# Patient Record
Sex: FEMALE | Race: OTHER | NOT HISPANIC OR LATINO
[De-identification: names, ages, dates, MRNs, and addresses within clinical notes are randomized per-mention and may not be internally consistent; named-entity substitution may affect disease eponyms.]

---

## 2017-04-18 ENCOUNTER — RESULT REVIEW (OUTPATIENT)
Age: 82
End: 2017-04-18

## 2017-04-19 ENCOUNTER — APPOINTMENT (OUTPATIENT)
Dept: DERMATOLOGY | Facility: CLINIC | Age: 82
End: 2017-04-19

## 2017-10-30 ENCOUNTER — TRANSCRIPTION ENCOUNTER (OUTPATIENT)
Age: 82
End: 2017-10-30

## 2017-11-08 ENCOUNTER — TRANSCRIPTION ENCOUNTER (OUTPATIENT)
Age: 82
End: 2017-11-08

## 2017-11-15 ENCOUNTER — OUTPATIENT (OUTPATIENT)
Dept: OUTPATIENT SERVICES | Facility: HOSPITAL | Age: 82
LOS: 1 days | End: 2017-11-15
Payer: MEDICARE

## 2017-11-15 DIAGNOSIS — I63.9 CEREBRAL INFARCTION, UNSPECIFIED: ICD-10-CM

## 2017-11-15 DIAGNOSIS — R26.0 ATAXIC GAIT: ICD-10-CM

## 2017-11-15 DIAGNOSIS — Z51.89 ENCOUNTER FOR OTHER SPECIFIED AFTERCARE: ICD-10-CM

## 2017-11-28 DIAGNOSIS — R27.9 UNSPECIFIED LACK OF COORDINATION: ICD-10-CM

## 2017-11-28 DIAGNOSIS — I67.9 CEREBROVASCULAR DISEASE, UNSPECIFIED: ICD-10-CM

## 2017-12-20 PROCEDURE — 97116 GAIT TRAINING THERAPY: CPT

## 2017-12-20 PROCEDURE — 97112 NEUROMUSCULAR REEDUCATION: CPT

## 2017-12-20 PROCEDURE — 97163 PT EVAL HIGH COMPLEX 45 MIN: CPT

## 2017-12-20 PROCEDURE — 97530 THERAPEUTIC ACTIVITIES: CPT

## 2017-12-20 PROCEDURE — G8978: CPT | Mod: CM

## 2017-12-20 PROCEDURE — G8979: CPT | Mod: CL

## 2017-12-20 PROCEDURE — 97167 OT EVAL HIGH COMPLEX 60 MIN: CPT

## 2017-12-20 PROCEDURE — G8984: CPT | Mod: CM

## 2017-12-20 PROCEDURE — G8985: CPT | Mod: CK

## 2017-12-20 PROCEDURE — 97535 SELF CARE MNGMENT TRAINING: CPT

## 2017-12-20 PROCEDURE — 97110 THERAPEUTIC EXERCISES: CPT

## 2018-01-01 ENCOUNTER — OTHER (OUTPATIENT)
Age: 83
End: 2018-01-01

## 2018-01-01 ENCOUNTER — INPATIENT (INPATIENT)
Facility: HOSPITAL | Age: 83
LOS: 4 days | Discharge: HOME | DRG: 948 | End: 2018-03-15
Attending: SURGERY | Admitting: SURGERY
Payer: MEDICARE

## 2018-01-01 ENCOUNTER — TRANSCRIPTION ENCOUNTER (OUTPATIENT)
Age: 83
End: 2018-01-01

## 2018-01-01 ENCOUNTER — EMERGENCY (EMERGENCY)
Facility: HOSPITAL | Age: 83
LOS: 1 days | Discharge: HOME | End: 2018-01-01
Attending: EMERGENCY MEDICINE
Payer: MEDICARE

## 2018-01-01 ENCOUNTER — INPATIENT (INPATIENT)
Facility: HOSPITAL | Age: 83
LOS: 31 days | DRG: 64 | End: 2018-12-28
Attending: HOSPITALIST | Admitting: INTERNAL MEDICINE
Payer: MEDICARE

## 2018-01-01 ENCOUNTER — APPOINTMENT (OUTPATIENT)
Dept: ORTHOPEDIC SURGERY | Facility: CLINIC | Age: 83
End: 2018-01-01
Payer: MEDICARE

## 2018-01-01 ENCOUNTER — APPOINTMENT (OUTPATIENT)
Dept: PULMONOLOGY | Facility: CLINIC | Age: 83
End: 2018-01-01
Payer: COMMERCIAL

## 2018-01-01 ENCOUNTER — INPATIENT (INPATIENT)
Facility: HOSPITAL | Age: 83
LOS: 4 days | Discharge: HOME | DRG: 871 | End: 2018-03-24
Attending: INTERNAL MEDICINE | Admitting: HOSPITALIST
Payer: MEDICARE

## 2018-01-01 VITALS
RESPIRATION RATE: 18 BRPM | DIASTOLIC BLOOD PRESSURE: 61 MMHG | SYSTOLIC BLOOD PRESSURE: 119 MMHG | HEART RATE: 72 BPM | OXYGEN SATURATION: 98 % | TEMPERATURE: 99 F

## 2018-01-01 VITALS
HEIGHT: 66 IN | HEART RATE: 85 BPM | WEIGHT: 175.05 LBS | TEMPERATURE: 97 F | RESPIRATION RATE: 20 BRPM | SYSTOLIC BLOOD PRESSURE: 158 MMHG | OXYGEN SATURATION: 100 % | DIASTOLIC BLOOD PRESSURE: 90 MMHG

## 2018-01-01 VITALS
HEART RATE: 54 BPM | RESPIRATION RATE: 18 BRPM | DIASTOLIC BLOOD PRESSURE: 79 MMHG | WEIGHT: 154.98 LBS | SYSTOLIC BLOOD PRESSURE: 208 MMHG | TEMPERATURE: 98 F | OXYGEN SATURATION: 95 % | HEIGHT: 66 IN

## 2018-01-01 VITALS
BODY MASS INDEX: 28.7 KG/M2 | HEART RATE: 58 BPM | DIASTOLIC BLOOD PRESSURE: 72 MMHG | WEIGHT: 162 LBS | SYSTOLIC BLOOD PRESSURE: 124 MMHG | HEIGHT: 63 IN | OXYGEN SATURATION: 90 %

## 2018-01-01 VITALS
HEIGHT: 63 IN | TEMPERATURE: 102 F | HEART RATE: 98 BPM | OXYGEN SATURATION: 96 % | WEIGHT: 164.91 LBS | DIASTOLIC BLOOD PRESSURE: 86 MMHG | SYSTOLIC BLOOD PRESSURE: 181 MMHG | RESPIRATION RATE: 24 BRPM

## 2018-01-01 VITALS
HEIGHT: 64 IN | WEIGHT: 134.92 LBS | TEMPERATURE: 98 F | SYSTOLIC BLOOD PRESSURE: 118 MMHG | DIASTOLIC BLOOD PRESSURE: 72 MMHG | HEART RATE: 58 BPM | OXYGEN SATURATION: 96 % | RESPIRATION RATE: 17 BRPM

## 2018-01-01 VITALS — OXYGEN SATURATION: 91 % | RESPIRATION RATE: 11 BRPM

## 2018-01-01 VITALS
HEIGHT: 63 IN | WEIGHT: 168 LBS | BODY MASS INDEX: 29.77 KG/M2 | SYSTOLIC BLOOD PRESSURE: 174 MMHG | DIASTOLIC BLOOD PRESSURE: 73 MMHG | HEART RATE: 60 BPM

## 2018-01-01 VITALS
OXYGEN SATURATION: 96 % | DIASTOLIC BLOOD PRESSURE: 68 MMHG | SYSTOLIC BLOOD PRESSURE: 152 MMHG | HEART RATE: 78 BPM | RESPIRATION RATE: 18 BRPM

## 2018-01-01 VITALS
RESPIRATION RATE: 16 BRPM | SYSTOLIC BLOOD PRESSURE: 116 MMHG | HEART RATE: 60 BPM | TEMPERATURE: 98 F | OXYGEN SATURATION: 98 % | DIASTOLIC BLOOD PRESSURE: 68 MMHG

## 2018-01-01 VITALS
DIASTOLIC BLOOD PRESSURE: 94 MMHG | SYSTOLIC BLOOD PRESSURE: 181 MMHG | OXYGEN SATURATION: 99 % | TEMPERATURE: 99 F | HEART RATE: 51 BPM | RESPIRATION RATE: 20 BRPM

## 2018-01-01 VITALS
OXYGEN SATURATION: 95 % | DIASTOLIC BLOOD PRESSURE: 79 MMHG | RESPIRATION RATE: 18 BRPM | HEART RATE: 61 BPM | SYSTOLIC BLOOD PRESSURE: 172 MMHG | TEMPERATURE: 98 F

## 2018-01-01 DIAGNOSIS — N30.00 ACUTE CYSTITIS WITHOUT HEMATURIA: ICD-10-CM

## 2018-01-01 DIAGNOSIS — R32 UNSPECIFIED URINARY INCONTINENCE: ICD-10-CM

## 2018-01-01 DIAGNOSIS — G45.9 TRANSIENT CEREBRAL ISCHEMIC ATTACK, UNSPECIFIED: ICD-10-CM

## 2018-01-01 DIAGNOSIS — J96.01 ACUTE RESPIRATORY FAILURE WITH HYPOXIA: ICD-10-CM

## 2018-01-01 DIAGNOSIS — E11.65 TYPE 2 DIABETES MELLITUS WITH HYPERGLYCEMIA: ICD-10-CM

## 2018-01-01 DIAGNOSIS — I50.32 CHRONIC DIASTOLIC (CONGESTIVE) HEART FAILURE: ICD-10-CM

## 2018-01-01 DIAGNOSIS — Z86.39 PERSONAL HISTORY OF OTHER ENDOCRINE, NUTRITIONAL AND METABOLIC DISEASE: ICD-10-CM

## 2018-01-01 DIAGNOSIS — Z86.73 PERSONAL HISTORY OF TRANSIENT ISCHEMIC ATTACK (TIA), AND CEREBRAL INFARCTION W/OUT RESIDUAL DEFICITS: ICD-10-CM

## 2018-01-01 DIAGNOSIS — S32.009A UNSPECIFIED FRACTURE OF UNSPECIFIED LUMBAR VERTEBRA, INITIAL ENCOUNTER FOR CLOSED FRACTURE: ICD-10-CM

## 2018-01-01 DIAGNOSIS — N28.9 DISORDER OF KIDNEY AND URETER, UNSPECIFIED: ICD-10-CM

## 2018-01-01 DIAGNOSIS — Z98.890 OTHER SPECIFIED POSTPROCEDURAL STATES: Chronic | ICD-10-CM

## 2018-01-01 DIAGNOSIS — T17.900A UNSPECIFIED FOREIGN BODY IN RESPIRATORY TRACT, PART UNSPECIFIED CAUSING ASPHYXIATION, INITIAL ENCOUNTER: ICD-10-CM

## 2018-01-01 DIAGNOSIS — N20.0 CALCULUS OF KIDNEY: ICD-10-CM

## 2018-01-01 DIAGNOSIS — R13.10 DYSPHAGIA, UNSPECIFIED: ICD-10-CM

## 2018-01-01 DIAGNOSIS — G93.40 ENCEPHALOPATHY, UNSPECIFIED: ICD-10-CM

## 2018-01-01 DIAGNOSIS — I46.9 CARDIAC ARREST, CAUSE UNSPECIFIED: ICD-10-CM

## 2018-01-01 DIAGNOSIS — R57.9 SHOCK, UNSPECIFIED: ICD-10-CM

## 2018-01-01 DIAGNOSIS — J18.9 PNEUMONIA, UNSPECIFIED ORGANISM: ICD-10-CM

## 2018-01-01 DIAGNOSIS — R56.9 UNSPECIFIED CONVULSIONS: ICD-10-CM

## 2018-01-01 DIAGNOSIS — Z82.61 FAMILY HISTORY OF ARTHRITIS: ICD-10-CM

## 2018-01-01 DIAGNOSIS — Z86.59 PERSONAL HISTORY OF OTHER MENTAL AND BEHAVIORAL DISORDERS: ICD-10-CM

## 2018-01-01 DIAGNOSIS — S22.39XA FRACTURE OF ONE RIB, UNSPECIFIED SIDE, INITIAL ENCOUNTER FOR CLOSED FRACTURE: ICD-10-CM

## 2018-01-01 DIAGNOSIS — Z51.5 ENCOUNTER FOR PALLIATIVE CARE: ICD-10-CM

## 2018-01-01 DIAGNOSIS — A41.9 SEPSIS, UNSPECIFIED ORGANISM: ICD-10-CM

## 2018-01-01 DIAGNOSIS — Z86.79 PERSONAL HISTORY OF OTHER DISEASES OF THE CIRCULATORY SYSTEM: ICD-10-CM

## 2018-01-01 DIAGNOSIS — I10 ESSENTIAL (PRIMARY) HYPERTENSION: ICD-10-CM

## 2018-01-01 DIAGNOSIS — Z71.89 OTHER SPECIFIED COUNSELING: ICD-10-CM

## 2018-01-01 DIAGNOSIS — Z86.69 PERSONAL HISTORY OF OTHER DISEASES OF THE NERVOUS SYSTEM AND SENSE ORGANS: ICD-10-CM

## 2018-01-01 DIAGNOSIS — R74.8 ABNORMAL LEVELS OF OTHER SERUM ENZYMES: ICD-10-CM

## 2018-01-01 DIAGNOSIS — J69.0 PNEUMONITIS DUE TO INHALATION OF FOOD AND VOMIT: ICD-10-CM

## 2018-01-01 DIAGNOSIS — Z98.49 CATARACT EXTRACTION STATUS, UNSPECIFIED EYE: Chronic | ICD-10-CM

## 2018-01-01 DIAGNOSIS — E87.0 HYPEROSMOLALITY AND HYPERNATREMIA: ICD-10-CM

## 2018-01-01 DIAGNOSIS — Z87.19 PERSONAL HISTORY OF OTHER DISEASES OF THE DIGESTIVE SYSTEM: ICD-10-CM

## 2018-01-01 DIAGNOSIS — R41.82 ALTERED MENTAL STATUS, UNSPECIFIED: ICD-10-CM

## 2018-01-01 DIAGNOSIS — R53.2 FUNCTIONAL QUADRIPLEGIA: ICD-10-CM

## 2018-01-01 DIAGNOSIS — R50.9 FEVER, UNSPECIFIED: ICD-10-CM

## 2018-01-01 DIAGNOSIS — Z80.9 FAMILY HISTORY OF MALIGNANT NEOPLASM, UNSPECIFIED: ICD-10-CM

## 2018-01-01 DIAGNOSIS — N18.9 CHRONIC KIDNEY DISEASE, UNSPECIFIED: ICD-10-CM

## 2018-01-01 DIAGNOSIS — S22.31XA FRACTURE OF ONE RIB, RIGHT SIDE, INITIAL ENCOUNTER FOR CLOSED FRACTURE: ICD-10-CM

## 2018-01-01 DIAGNOSIS — F03.90 UNSPECIFIED DEMENTIA WITHOUT BEHAVIORAL DISTURBANCE: ICD-10-CM

## 2018-01-01 DIAGNOSIS — I63.9 CEREBRAL INFARCTION, UNSPECIFIED: ICD-10-CM

## 2018-01-01 DIAGNOSIS — G47.33 OBSTRUCTIVE SLEEP APNEA (ADULT) (PEDIATRIC): ICD-10-CM

## 2018-01-01 DIAGNOSIS — I25.10 ATHEROSCLEROTIC HEART DISEASE OF NATIVE CORONARY ARTERY WITHOUT ANGINA PECTORIS: ICD-10-CM

## 2018-01-01 DIAGNOSIS — T14.8XXA OTHER INJURY OF UNSPECIFIED BODY REGION, INITIAL ENCOUNTER: ICD-10-CM

## 2018-01-01 DIAGNOSIS — G93.41 METABOLIC ENCEPHALOPATHY: ICD-10-CM

## 2018-01-01 DIAGNOSIS — E46 UNSPECIFIED PROTEIN-CALORIE MALNUTRITION: ICD-10-CM

## 2018-01-01 DIAGNOSIS — S32.010A WEDGE COMPRESSION FRACTURE OF FIRST LUMBAR VERTEBRA, INITIAL ENCOUNTER FOR CLOSED FRACTURE: ICD-10-CM

## 2018-01-01 DIAGNOSIS — E11.9 TYPE 2 DIABETES MELLITUS WITHOUT COMPLICATIONS: ICD-10-CM

## 2018-01-01 LAB
-  AMIKACIN: SIGNIFICANT CHANGE UP
-  AMPICILLIN/SULBACTAM: SIGNIFICANT CHANGE UP
-  AMPICILLIN: SIGNIFICANT CHANGE UP
-  AZTREONAM: SIGNIFICANT CHANGE UP
-  CEFAZOLIN: SIGNIFICANT CHANGE UP
-  CEFEPIME: SIGNIFICANT CHANGE UP
-  CEFOXITIN: SIGNIFICANT CHANGE UP
-  CEFTAZIDIME: SIGNIFICANT CHANGE UP
-  CEFTRIAXONE: SIGNIFICANT CHANGE UP
-  CIPROFLOXACIN: SIGNIFICANT CHANGE UP
-  CLINDAMYCIN: SIGNIFICANT CHANGE UP
-  COAGULASE NEGATIVE STAPHYLOCOCCUS: SIGNIFICANT CHANGE UP
-  ERTAPENEM: SIGNIFICANT CHANGE UP
-  ERYTHROMYCIN: SIGNIFICANT CHANGE UP
-  GENTAMICIN: SIGNIFICANT CHANGE UP
-  IMIPENEM: SIGNIFICANT CHANGE UP
-  IMIPENEM: SIGNIFICANT CHANGE UP
-  LEVOFLOXACIN: SIGNIFICANT CHANGE UP
-  MEROPENEM: SIGNIFICANT CHANGE UP
-  NITROFURANTOIN: SIGNIFICANT CHANGE UP
-  OXACILLIN: SIGNIFICANT CHANGE UP
-  PENICILLIN: SIGNIFICANT CHANGE UP
-  PIPERACILLIN/TAZOBACTAM: SIGNIFICANT CHANGE UP
-  RIFAMPIN: SIGNIFICANT CHANGE UP
-  TETRACYCLINE: SIGNIFICANT CHANGE UP
-  TIGECYCLINE: SIGNIFICANT CHANGE UP
-  TOBRAMYCIN: SIGNIFICANT CHANGE UP
-  TRIMETHOPRIM/SULFAMETHOXAZOLE: SIGNIFICANT CHANGE UP
-  VANCOMYCIN: SIGNIFICANT CHANGE UP
24R-OH-CALCIDIOL SERPL-MCNC: 26 NG/ML — LOW (ref 30–80)
ABO RH CONFIRMATION: SIGNIFICANT CHANGE UP
ACYLCARNITINE/C0 SERPL-SRTO: 0.8 UMOL/L — SIGNIFICANT CHANGE UP (ref 0.1–0.8)
ALBUMIN SERPL ELPH-MCNC: 3.3 G/DL — SIGNIFICANT CHANGE UP (ref 3.3–5.2)
ALBUMIN SERPL ELPH-MCNC: 3.5 G/DL — SIGNIFICANT CHANGE UP (ref 3.3–5.2)
ALBUMIN SERPL ELPH-MCNC: 3.7 G/DL — SIGNIFICANT CHANGE UP (ref 3.3–5.2)
ALBUMIN SERPL ELPH-MCNC: 3.8 G/DL — SIGNIFICANT CHANGE UP (ref 3.3–5.2)
ALBUMIN SERPL ELPH-MCNC: 4 G/DL — SIGNIFICANT CHANGE UP (ref 3.3–5.2)
ALBUMIN SERPL ELPH-MCNC: 4.4 G/DL — SIGNIFICANT CHANGE UP (ref 3.3–5.2)
ALP SERPL-CCNC: 37 U/L — LOW (ref 40–120)
ALP SERPL-CCNC: 37 U/L — LOW (ref 40–120)
ALP SERPL-CCNC: 41 U/L — SIGNIFICANT CHANGE UP (ref 40–120)
ALP SERPL-CCNC: 45 U/L — SIGNIFICANT CHANGE UP (ref 40–120)
ALP SERPL-CCNC: 45 U/L — SIGNIFICANT CHANGE UP (ref 40–120)
ALP SERPL-CCNC: 47 U/L — SIGNIFICANT CHANGE UP (ref 40–120)
ALT FLD-CCNC: 10 U/L — SIGNIFICANT CHANGE UP
ALT FLD-CCNC: 13 U/L — SIGNIFICANT CHANGE UP
ALT FLD-CCNC: 15 U/L — SIGNIFICANT CHANGE UP
ALT FLD-CCNC: 6 U/L — SIGNIFICANT CHANGE UP
AMMONIA BLD-MCNC: 25 UMOL/L — SIGNIFICANT CHANGE UP (ref 11–55)
AMMONIA BLD-MCNC: 34 UMOL/L — SIGNIFICANT CHANGE UP (ref 11–55)
ANION GAP SERPL CALC-SCNC: 10 MMOL/L — SIGNIFICANT CHANGE UP (ref 5–17)
ANION GAP SERPL CALC-SCNC: 11 MMOL/L — SIGNIFICANT CHANGE UP (ref 5–17)
ANION GAP SERPL CALC-SCNC: 12 MMOL/L — SIGNIFICANT CHANGE UP (ref 5–17)
ANION GAP SERPL CALC-SCNC: 13 MMOL/L — SIGNIFICANT CHANGE UP (ref 5–17)
ANION GAP SERPL CALC-SCNC: 14 MMOL/L — SIGNIFICANT CHANGE UP (ref 5–17)
ANION GAP SERPL CALC-SCNC: 15 MMOL/L — SIGNIFICANT CHANGE UP (ref 5–17)
ANION GAP SERPL CALC-SCNC: 16 MMOL/L — SIGNIFICANT CHANGE UP (ref 5–17)
ANION GAP SERPL CALC-SCNC: 17 MMOL/L — SIGNIFICANT CHANGE UP (ref 5–17)
ANION GAP SERPL CALC-SCNC: 18 MMOL/L — HIGH (ref 5–17)
ANION GAP SERPL CALC-SCNC: 20 MMOL/L — HIGH (ref 5–17)
ANION GAP SERPL CALC-SCNC: 8 MMOL/L — SIGNIFICANT CHANGE UP (ref 5–17)
ANISOCYTOSIS BLD QL: SLIGHT — SIGNIFICANT CHANGE UP
APPEARANCE UR: ABNORMAL
APPEARANCE UR: CLEAR — SIGNIFICANT CHANGE UP
APTT BLD: 28.1 SEC — SIGNIFICANT CHANGE UP (ref 27.5–37.4)
APTT BLD: 28.7 SEC — SIGNIFICANT CHANGE UP (ref 27.5–37.4)
APTT BLD: 29.9 SEC — SIGNIFICANT CHANGE UP (ref 27.5–37.4)
AST SERPL-CCNC: 12 U/L — SIGNIFICANT CHANGE UP
AST SERPL-CCNC: 17 U/L — SIGNIFICANT CHANGE UP
AST SERPL-CCNC: 18 U/L — SIGNIFICANT CHANGE UP
AST SERPL-CCNC: 22 U/L — SIGNIFICANT CHANGE UP
AST SERPL-CCNC: 25 U/L — SIGNIFICANT CHANGE UP
AST SERPL-CCNC: 31 U/L — SIGNIFICANT CHANGE UP
BACTERIA # UR AUTO: ABNORMAL
BACTERIA # UR AUTO: ABNORMAL
BASE EXCESS BLDA CALC-SCNC: -2.2 MMOL/L — LOW (ref -2–2)
BASE EXCESS BLDA CALC-SCNC: 1.5 MMOL/L — SIGNIFICANT CHANGE UP (ref -2–2)
BASE EXCESS BLDA CALC-SCNC: 1.6 MMOL/L — SIGNIFICANT CHANGE UP (ref -2–2)
BASE EXCESS BLDA CALC-SCNC: 2.3 MMOL/L — HIGH (ref -2–2)
BASE EXCESS BLDA CALC-SCNC: 3.6 MMOL/L — HIGH (ref -2–2)
BASOPHILS # BLD AUTO: 0 K/UL — SIGNIFICANT CHANGE UP (ref 0–0.2)
BASOPHILS NFR BLD AUTO: 0.1 % — SIGNIFICANT CHANGE UP (ref 0–2)
BASOPHILS NFR BLD AUTO: 0.2 % — SIGNIFICANT CHANGE UP (ref 0–2)
BASOPHILS NFR BLD AUTO: 0.2 % — SIGNIFICANT CHANGE UP (ref 0–2)
BASOPHILS NFR BLD AUTO: 0.3 % — SIGNIFICANT CHANGE UP (ref 0–2)
BASOPHILS NFR BLD AUTO: 0.4 % — SIGNIFICANT CHANGE UP (ref 0–2)
BASOPHILS NFR BLD AUTO: 0.4 % — SIGNIFICANT CHANGE UP (ref 0–2)
BASOPHILS NFR BLD AUTO: 0.5 % — SIGNIFICANT CHANGE UP (ref 0–2)
BASOPHILS NFR BLD AUTO: 0.5 % — SIGNIFICANT CHANGE UP (ref 0–2)
BASOPHILS NFR BLD AUTO: 0.6 % — SIGNIFICANT CHANGE UP (ref 0–2)
BASOPHILS NFR BLD AUTO: 0.6 % — SIGNIFICANT CHANGE UP (ref 0–2)
BASOPHILS NFR BLD AUTO: 0.7 % — SIGNIFICANT CHANGE UP (ref 0–2)
BASOPHILS NFR BLD AUTO: 0.7 % — SIGNIFICANT CHANGE UP (ref 0–2)
BASOPHILS NFR BLD AUTO: 0.9 % — SIGNIFICANT CHANGE UP (ref 0–2)
BASOPHILS NFR BLD AUTO: 1 % — SIGNIFICANT CHANGE UP (ref 0–2)
BILIRUB SERPL-MCNC: 0.3 MG/DL — LOW (ref 0.4–2)
BILIRUB SERPL-MCNC: 0.4 MG/DL — SIGNIFICANT CHANGE UP (ref 0.4–2)
BILIRUB SERPL-MCNC: 0.5 MG/DL — SIGNIFICANT CHANGE UP (ref 0.4–2)
BILIRUB UR-MCNC: NEGATIVE — SIGNIFICANT CHANGE UP
BLD GP AB SCN SERPL QL: SIGNIFICANT CHANGE UP
BLOOD GAS COMMENTS ARTERIAL: SIGNIFICANT CHANGE UP
BUN SERPL-MCNC: 17 MG/DL — SIGNIFICANT CHANGE UP (ref 8–20)
BUN SERPL-MCNC: 17 MG/DL — SIGNIFICANT CHANGE UP (ref 8–20)
BUN SERPL-MCNC: 18 MG/DL — SIGNIFICANT CHANGE UP (ref 8–20)
BUN SERPL-MCNC: 18 MG/DL — SIGNIFICANT CHANGE UP (ref 8–20)
BUN SERPL-MCNC: 19 MG/DL — SIGNIFICANT CHANGE UP (ref 8–20)
BUN SERPL-MCNC: 20 MG/DL — SIGNIFICANT CHANGE UP (ref 8–20)
BUN SERPL-MCNC: 21 MG/DL — HIGH (ref 8–20)
BUN SERPL-MCNC: 22 MG/DL — HIGH (ref 8–20)
BUN SERPL-MCNC: 22 MG/DL — HIGH (ref 8–20)
BUN SERPL-MCNC: 23 MG/DL — HIGH (ref 8–20)
BUN SERPL-MCNC: 24 MG/DL — HIGH (ref 8–20)
BUN SERPL-MCNC: 24 MG/DL — HIGH (ref 8–20)
BUN SERPL-MCNC: 26 MG/DL — HIGH (ref 8–20)
BUN SERPL-MCNC: 28 MG/DL — HIGH (ref 8–20)
BUN SERPL-MCNC: 30 MG/DL — HIGH (ref 8–20)
BUN SERPL-MCNC: 30 MG/DL — HIGH (ref 8–20)
BUN SERPL-MCNC: 31 MG/DL — HIGH (ref 8–20)
BUN SERPL-MCNC: 32 MG/DL — HIGH (ref 8–20)
BUN SERPL-MCNC: 33 MG/DL — HIGH (ref 8–20)
BUN SERPL-MCNC: 33 MG/DL — HIGH (ref 8–20)
BUN SERPL-MCNC: 36 MG/DL — HIGH (ref 8–20)
BUN SERPL-MCNC: 36 MG/DL — HIGH (ref 8–20)
BUN SERPL-MCNC: 38 MG/DL — HIGH (ref 8–20)
BUN SERPL-MCNC: 41 MG/DL — HIGH (ref 8–20)
BUN SERPL-MCNC: 42 MG/DL — HIGH (ref 8–20)
BUN SERPL-MCNC: 43 MG/DL — HIGH (ref 8–20)
BUN SERPL-MCNC: 43 MG/DL — HIGH (ref 8–20)
BUN SERPL-MCNC: 44 MG/DL — HIGH (ref 8–20)
BUN SERPL-MCNC: 45 MG/DL — HIGH (ref 8–20)
BUN SERPL-MCNC: 48 MG/DL — HIGH (ref 8–20)
BUN SERPL-MCNC: 51 MG/DL — HIGH (ref 8–20)
BUN SERPL-MCNC: 51 MG/DL — HIGH (ref 8–20)
BUN SERPL-MCNC: 52 MG/DL — HIGH (ref 8–20)
BUN SERPL-MCNC: 53 MG/DL — HIGH (ref 8–20)
BUN SERPL-MCNC: 53 MG/DL — HIGH (ref 8–20)
BUN SERPL-MCNC: 58 MG/DL — HIGH (ref 8–20)
BUN SERPL-MCNC: 59 MG/DL — HIGH (ref 8–20)
BUN SERPL-MCNC: 61 MG/DL — HIGH (ref 8–20)
BUN SERPL-MCNC: 61 MG/DL — HIGH (ref 8–20)
BUN SERPL-MCNC: 68 MG/DL — HIGH (ref 8–20)
CALCIUM SERPL-MCNC: 10 MG/DL — SIGNIFICANT CHANGE UP (ref 8.6–10.2)
CALCIUM SERPL-MCNC: 10 MG/DL — SIGNIFICANT CHANGE UP (ref 8.6–10.2)
CALCIUM SERPL-MCNC: 10.1 MG/DL — SIGNIFICANT CHANGE UP (ref 8.6–10.2)
CALCIUM SERPL-MCNC: 8.5 MG/DL — LOW (ref 8.6–10.2)
CALCIUM SERPL-MCNC: 8.7 MG/DL — SIGNIFICANT CHANGE UP (ref 8.6–10.2)
CALCIUM SERPL-MCNC: 8.7 MG/DL — SIGNIFICANT CHANGE UP (ref 8.6–10.2)
CALCIUM SERPL-MCNC: 8.8 MG/DL — SIGNIFICANT CHANGE UP (ref 8.6–10.2)
CALCIUM SERPL-MCNC: 8.9 MG/DL — SIGNIFICANT CHANGE UP (ref 8.6–10.2)
CALCIUM SERPL-MCNC: 9 MG/DL — SIGNIFICANT CHANGE UP (ref 8.6–10.2)
CALCIUM SERPL-MCNC: 9.1 MG/DL — SIGNIFICANT CHANGE UP (ref 8.6–10.2)
CALCIUM SERPL-MCNC: 9.2 MG/DL — SIGNIFICANT CHANGE UP (ref 8.6–10.2)
CALCIUM SERPL-MCNC: 9.2 MG/DL — SIGNIFICANT CHANGE UP (ref 8.6–10.2)
CALCIUM SERPL-MCNC: 9.4 MG/DL — SIGNIFICANT CHANGE UP (ref 8.6–10.2)
CALCIUM SERPL-MCNC: 9.5 MG/DL — SIGNIFICANT CHANGE UP (ref 8.6–10.2)
CALCIUM SERPL-MCNC: 9.6 MG/DL — SIGNIFICANT CHANGE UP (ref 8.6–10.2)
CALCIUM SERPL-MCNC: 9.7 MG/DL — SIGNIFICANT CHANGE UP (ref 8.6–10.2)
CALCIUM SERPL-MCNC: 9.8 MG/DL — SIGNIFICANT CHANGE UP (ref 8.6–10.2)
CALCIUM SERPL-MCNC: 9.9 MG/DL — SIGNIFICANT CHANGE UP (ref 8.6–10.2)
CARN ESTERS SERPL-MCNC: 27.6 UMOL/L — SIGNIFICANT CHANGE UP (ref 4–28)
CARNITINE FREE SERPL-MCNC: 34.3 UMOL/L — SIGNIFICANT CHANGE UP (ref 24–63)
CARNITINE SERPL-MCNC: 61.9 UMOL/L — SIGNIFICANT CHANGE UP (ref 35–84)
CARNITINE SERPL-MCNC: SIGNIFICANT CHANGE UP
CHLORIDE SERPL-SCNC: 102 MMOL/L — SIGNIFICANT CHANGE UP (ref 98–107)
CHLORIDE SERPL-SCNC: 103 MMOL/L — SIGNIFICANT CHANGE UP (ref 98–107)
CHLORIDE SERPL-SCNC: 104 MMOL/L — SIGNIFICANT CHANGE UP (ref 98–107)
CHLORIDE SERPL-SCNC: 105 MMOL/L — SIGNIFICANT CHANGE UP (ref 98–107)
CHLORIDE SERPL-SCNC: 106 MMOL/L — SIGNIFICANT CHANGE UP (ref 98–107)
CHLORIDE SERPL-SCNC: 107 MMOL/L — SIGNIFICANT CHANGE UP (ref 98–107)
CHLORIDE SERPL-SCNC: 108 MMOL/L — HIGH (ref 98–107)
CHLORIDE SERPL-SCNC: 109 MMOL/L — HIGH (ref 98–107)
CHLORIDE SERPL-SCNC: 110 MMOL/L — HIGH (ref 98–107)
CHLORIDE SERPL-SCNC: 111 MMOL/L — HIGH (ref 98–107)
CHLORIDE SERPL-SCNC: 111 MMOL/L — HIGH (ref 98–107)
CHOLEST SERPL-MCNC: 325 MG/DL — HIGH (ref 110–199)
CK MB CFR SERPL CALC: 3.4 NG/ML — SIGNIFICANT CHANGE UP (ref 0–6.7)
CK MB CFR SERPL CALC: 3.8 NG/ML — SIGNIFICANT CHANGE UP (ref 0–6.7)
CK MB CFR SERPL CALC: 8.6 NG/ML — HIGH (ref 0–6.7)
CK SERPL-CCNC: 190 U/L — HIGH (ref 25–170)
CK SERPL-CCNC: 209 U/L — HIGH (ref 25–170)
CK SERPL-CCNC: 380 U/L — HIGH (ref 25–170)
CK SERPL-CCNC: 45 U/L — SIGNIFICANT CHANGE UP (ref 25–170)
CO2 SERPL-SCNC: 19 MMOL/L — LOW (ref 22–29)
CO2 SERPL-SCNC: 21 MMOL/L — LOW (ref 22–29)
CO2 SERPL-SCNC: 22 MMOL/L — SIGNIFICANT CHANGE UP (ref 22–29)
CO2 SERPL-SCNC: 23 MMOL/L — SIGNIFICANT CHANGE UP (ref 22–29)
CO2 SERPL-SCNC: 24 MMOL/L — SIGNIFICANT CHANGE UP (ref 22–29)
CO2 SERPL-SCNC: 25 MMOL/L — SIGNIFICANT CHANGE UP (ref 22–29)
CO2 SERPL-SCNC: 26 MMOL/L — SIGNIFICANT CHANGE UP (ref 22–29)
CO2 SERPL-SCNC: 27 MMOL/L — SIGNIFICANT CHANGE UP (ref 22–29)
CO2 SERPL-SCNC: 28 MMOL/L — SIGNIFICANT CHANGE UP (ref 22–29)
CO2 SERPL-SCNC: 29 MMOL/L — SIGNIFICANT CHANGE UP (ref 22–29)
CO2 SERPL-SCNC: 30 MMOL/L — HIGH (ref 22–29)
CO2 SERPL-SCNC: 30 MMOL/L — HIGH (ref 22–29)
CO2 SERPL-SCNC: 31 MMOL/L — HIGH (ref 22–29)
CO2 SERPL-SCNC: 31 MMOL/L — HIGH (ref 22–29)
CO2 SERPL-SCNC: 32 MMOL/L — HIGH (ref 22–29)
CO2 SERPL-SCNC: 33 MMOL/L — HIGH (ref 22–29)
COLOR SPEC: SIGNIFICANT CHANGE UP
COLOR SPEC: YELLOW — SIGNIFICANT CHANGE UP
COMMENT - URINE: SIGNIFICANT CHANGE UP
CORTIS AM PEAK SERPL-MCNC: 14.3 UG/DL — SIGNIFICANT CHANGE UP (ref 6–18.4)
CREAT ?TM UR-MCNC: 202 MG/DL — SIGNIFICANT CHANGE UP
CREAT SERPL-MCNC: 0.82 MG/DL — SIGNIFICANT CHANGE UP (ref 0.5–1.3)
CREAT SERPL-MCNC: 0.83 MG/DL — SIGNIFICANT CHANGE UP (ref 0.5–1.3)
CREAT SERPL-MCNC: 0.87 MG/DL — SIGNIFICANT CHANGE UP (ref 0.5–1.3)
CREAT SERPL-MCNC: 0.89 MG/DL — SIGNIFICANT CHANGE UP (ref 0.5–1.3)
CREAT SERPL-MCNC: 0.93 MG/DL — SIGNIFICANT CHANGE UP (ref 0.5–1.3)
CREAT SERPL-MCNC: 0.93 MG/DL — SIGNIFICANT CHANGE UP (ref 0.5–1.3)
CREAT SERPL-MCNC: 0.94 MG/DL — SIGNIFICANT CHANGE UP (ref 0.5–1.3)
CREAT SERPL-MCNC: 0.96 MG/DL — SIGNIFICANT CHANGE UP (ref 0.5–1.3)
CREAT SERPL-MCNC: 0.97 MG/DL — SIGNIFICANT CHANGE UP (ref 0.5–1.3)
CREAT SERPL-MCNC: 0.97 MG/DL — SIGNIFICANT CHANGE UP (ref 0.5–1.3)
CREAT SERPL-MCNC: 0.98 MG/DL — SIGNIFICANT CHANGE UP (ref 0.5–1.3)
CREAT SERPL-MCNC: 0.99 MG/DL — SIGNIFICANT CHANGE UP (ref 0.5–1.3)
CREAT SERPL-MCNC: 0.99 MG/DL — SIGNIFICANT CHANGE UP (ref 0.5–1.3)
CREAT SERPL-MCNC: 1 MG/DL — SIGNIFICANT CHANGE UP (ref 0.5–1.3)
CREAT SERPL-MCNC: 1.01 MG/DL — SIGNIFICANT CHANGE UP (ref 0.5–1.3)
CREAT SERPL-MCNC: 1.02 MG/DL — SIGNIFICANT CHANGE UP (ref 0.5–1.3)
CREAT SERPL-MCNC: 1.03 MG/DL — SIGNIFICANT CHANGE UP (ref 0.5–1.3)
CREAT SERPL-MCNC: 1.04 MG/DL — SIGNIFICANT CHANGE UP (ref 0.5–1.3)
CREAT SERPL-MCNC: 1.08 MG/DL — SIGNIFICANT CHANGE UP (ref 0.5–1.3)
CREAT SERPL-MCNC: 1.1 MG/DL — SIGNIFICANT CHANGE UP (ref 0.5–1.3)
CREAT SERPL-MCNC: 1.12 MG/DL — SIGNIFICANT CHANGE UP (ref 0.5–1.3)
CREAT SERPL-MCNC: 1.13 MG/DL — SIGNIFICANT CHANGE UP (ref 0.5–1.3)
CREAT SERPL-MCNC: 1.14 MG/DL — SIGNIFICANT CHANGE UP (ref 0.5–1.3)
CREAT SERPL-MCNC: 1.15 MG/DL — SIGNIFICANT CHANGE UP (ref 0.5–1.3)
CREAT SERPL-MCNC: 1.16 MG/DL — SIGNIFICANT CHANGE UP (ref 0.5–1.3)
CREAT SERPL-MCNC: 1.17 MG/DL — SIGNIFICANT CHANGE UP (ref 0.5–1.3)
CREAT SERPL-MCNC: 1.2 MG/DL — SIGNIFICANT CHANGE UP (ref 0.5–1.3)
CREAT SERPL-MCNC: 1.2 MG/DL — SIGNIFICANT CHANGE UP (ref 0.5–1.3)
CREAT SERPL-MCNC: 1.21 MG/DL — SIGNIFICANT CHANGE UP (ref 0.5–1.3)
CREAT SERPL-MCNC: 1.27 MG/DL — SIGNIFICANT CHANGE UP (ref 0.5–1.3)
CREAT SERPL-MCNC: 1.32 MG/DL — HIGH (ref 0.5–1.3)
CREAT SERPL-MCNC: 1.36 MG/DL — HIGH (ref 0.5–1.3)
CREAT SERPL-MCNC: 1.38 MG/DL — HIGH (ref 0.5–1.3)
CREAT SERPL-MCNC: 1.39 MG/DL — HIGH (ref 0.5–1.3)
CREAT SERPL-MCNC: 1.4 MG/DL — HIGH (ref 0.5–1.3)
CREAT SERPL-MCNC: 1.42 MG/DL — HIGH (ref 0.5–1.3)
CREAT SERPL-MCNC: 1.42 MG/DL — HIGH (ref 0.5–1.3)
CREAT SERPL-MCNC: 1.45 MG/DL — HIGH (ref 0.5–1.3)
CREAT SERPL-MCNC: 1.49 MG/DL — HIGH (ref 0.5–1.3)
CREAT SERPL-MCNC: 1.57 MG/DL — HIGH (ref 0.5–1.3)
CREAT SERPL-MCNC: 1.67 MG/DL — HIGH (ref 0.5–1.3)
CREAT SERPL-MCNC: 1.88 MG/DL — HIGH (ref 0.5–1.3)
CULTURE RESULTS: NO GROWTH — SIGNIFICANT CHANGE UP
CULTURE RESULTS: SIGNIFICANT CHANGE UP
DIFF PNL FLD: ABNORMAL
DIFF PNL FLD: NEGATIVE — SIGNIFICANT CHANGE UP
DIFF PNL FLD: NEGATIVE — SIGNIFICANT CHANGE UP
EOSINOPHIL # BLD AUTO: 0 K/UL — SIGNIFICANT CHANGE UP (ref 0–0.5)
EOSINOPHIL # BLD AUTO: 0.1 K/UL — SIGNIFICANT CHANGE UP (ref 0–0.5)
EOSINOPHIL # BLD AUTO: 0.2 K/UL — SIGNIFICANT CHANGE UP (ref 0–0.5)
EOSINOPHIL # BLD AUTO: 0.2 K/UL — SIGNIFICANT CHANGE UP (ref 0–0.5)
EOSINOPHIL # BLD AUTO: 0.3 K/UL — SIGNIFICANT CHANGE UP (ref 0–0.5)
EOSINOPHIL NFR BLD AUTO: 0.2 % — SIGNIFICANT CHANGE UP (ref 0–6)
EOSINOPHIL NFR BLD AUTO: 0.4 % — SIGNIFICANT CHANGE UP (ref 0–6)
EOSINOPHIL NFR BLD AUTO: 0.6 % — SIGNIFICANT CHANGE UP (ref 0–6)
EOSINOPHIL NFR BLD AUTO: 1.1 % — SIGNIFICANT CHANGE UP (ref 0–6)
EOSINOPHIL NFR BLD AUTO: 1.2 % — SIGNIFICANT CHANGE UP (ref 0–6)
EOSINOPHIL NFR BLD AUTO: 1.3 % — SIGNIFICANT CHANGE UP (ref 0–6)
EOSINOPHIL NFR BLD AUTO: 1.7 % — SIGNIFICANT CHANGE UP (ref 0–6)
EOSINOPHIL NFR BLD AUTO: 2.1 % — SIGNIFICANT CHANGE UP (ref 0–6)
EOSINOPHIL NFR BLD AUTO: 2.1 % — SIGNIFICANT CHANGE UP (ref 0–6)
EOSINOPHIL NFR BLD AUTO: 2.6 % — SIGNIFICANT CHANGE UP (ref 0–6)
EOSINOPHIL NFR BLD AUTO: 2.8 % — SIGNIFICANT CHANGE UP (ref 0–6)
EOSINOPHIL NFR BLD AUTO: 3.2 % — SIGNIFICANT CHANGE UP (ref 0–6)
EOSINOPHIL NFR BLD AUTO: 3.3 % — SIGNIFICANT CHANGE UP (ref 0–6)
EOSINOPHIL NFR BLD AUTO: 4.7 % — SIGNIFICANT CHANGE UP (ref 0–6)
EPI CELLS # UR: ABNORMAL
EPI CELLS # UR: SIGNIFICANT CHANGE UP
FERRITIN SERPL-MCNC: 122 NG/ML — SIGNIFICANT CHANGE UP (ref 11–306)
FOLATE SERPL-MCNC: 13.6 NG/ML — SIGNIFICANT CHANGE UP (ref 4–16)
GAS PNL BLDA: SIGNIFICANT CHANGE UP
GLUCOSE BLDC GLUCOMTR-MCNC: 108 MG/DL — HIGH (ref 70–99)
GLUCOSE BLDC GLUCOMTR-MCNC: 112 MG/DL — HIGH (ref 70–99)
GLUCOSE BLDC GLUCOMTR-MCNC: 112 MG/DL — HIGH (ref 70–99)
GLUCOSE BLDC GLUCOMTR-MCNC: 114 MG/DL — HIGH (ref 70–99)
GLUCOSE BLDC GLUCOMTR-MCNC: 115 MG/DL — HIGH (ref 70–99)
GLUCOSE BLDC GLUCOMTR-MCNC: 116 MG/DL — HIGH (ref 70–99)
GLUCOSE BLDC GLUCOMTR-MCNC: 117 MG/DL — HIGH (ref 70–99)
GLUCOSE BLDC GLUCOMTR-MCNC: 119 MG/DL — HIGH (ref 70–99)
GLUCOSE BLDC GLUCOMTR-MCNC: 120 MG/DL — HIGH (ref 70–99)
GLUCOSE BLDC GLUCOMTR-MCNC: 121 MG/DL — HIGH (ref 70–99)
GLUCOSE BLDC GLUCOMTR-MCNC: 122 MG/DL — HIGH (ref 70–99)
GLUCOSE BLDC GLUCOMTR-MCNC: 125 MG/DL — HIGH (ref 70–99)
GLUCOSE BLDC GLUCOMTR-MCNC: 127 MG/DL — HIGH (ref 70–99)
GLUCOSE BLDC GLUCOMTR-MCNC: 127 MG/DL — HIGH (ref 70–99)
GLUCOSE BLDC GLUCOMTR-MCNC: 128 MG/DL — HIGH (ref 70–99)
GLUCOSE BLDC GLUCOMTR-MCNC: 129 MG/DL — HIGH (ref 70–99)
GLUCOSE BLDC GLUCOMTR-MCNC: 129 MG/DL — HIGH (ref 70–99)
GLUCOSE BLDC GLUCOMTR-MCNC: 130 MG/DL — HIGH (ref 70–99)
GLUCOSE BLDC GLUCOMTR-MCNC: 132 MG/DL — HIGH (ref 70–99)
GLUCOSE BLDC GLUCOMTR-MCNC: 133 MG/DL — HIGH (ref 70–99)
GLUCOSE BLDC GLUCOMTR-MCNC: 133 MG/DL — HIGH (ref 70–99)
GLUCOSE BLDC GLUCOMTR-MCNC: 134 MG/DL — HIGH (ref 70–99)
GLUCOSE BLDC GLUCOMTR-MCNC: 136 MG/DL — HIGH (ref 70–99)
GLUCOSE BLDC GLUCOMTR-MCNC: 136 MG/DL — HIGH (ref 70–99)
GLUCOSE BLDC GLUCOMTR-MCNC: 137 MG/DL — HIGH (ref 70–99)
GLUCOSE BLDC GLUCOMTR-MCNC: 137 MG/DL — HIGH (ref 70–99)
GLUCOSE BLDC GLUCOMTR-MCNC: 138 MG/DL — HIGH (ref 70–99)
GLUCOSE BLDC GLUCOMTR-MCNC: 141 MG/DL — HIGH (ref 70–99)
GLUCOSE BLDC GLUCOMTR-MCNC: 142 MG/DL — HIGH (ref 70–99)
GLUCOSE BLDC GLUCOMTR-MCNC: 142 MG/DL — HIGH (ref 70–99)
GLUCOSE BLDC GLUCOMTR-MCNC: 143 MG/DL — HIGH (ref 70–99)
GLUCOSE BLDC GLUCOMTR-MCNC: 143 MG/DL — HIGH (ref 70–99)
GLUCOSE BLDC GLUCOMTR-MCNC: 145 MG/DL — HIGH (ref 70–99)
GLUCOSE BLDC GLUCOMTR-MCNC: 145 MG/DL — HIGH (ref 70–99)
GLUCOSE BLDC GLUCOMTR-MCNC: 148 MG/DL — HIGH (ref 70–99)
GLUCOSE BLDC GLUCOMTR-MCNC: 148 MG/DL — HIGH (ref 70–99)
GLUCOSE BLDC GLUCOMTR-MCNC: 149 MG/DL — HIGH (ref 70–99)
GLUCOSE BLDC GLUCOMTR-MCNC: 150 MG/DL — HIGH (ref 70–99)
GLUCOSE BLDC GLUCOMTR-MCNC: 152 MG/DL — HIGH (ref 70–99)
GLUCOSE BLDC GLUCOMTR-MCNC: 154 MG/DL — HIGH (ref 70–99)
GLUCOSE BLDC GLUCOMTR-MCNC: 154 MG/DL — HIGH (ref 70–99)
GLUCOSE BLDC GLUCOMTR-MCNC: 155 MG/DL — HIGH (ref 70–99)
GLUCOSE BLDC GLUCOMTR-MCNC: 155 MG/DL — HIGH (ref 70–99)
GLUCOSE BLDC GLUCOMTR-MCNC: 157 MG/DL — HIGH (ref 70–99)
GLUCOSE BLDC GLUCOMTR-MCNC: 157 MG/DL — HIGH (ref 70–99)
GLUCOSE BLDC GLUCOMTR-MCNC: 159 MG/DL — HIGH (ref 70–99)
GLUCOSE BLDC GLUCOMTR-MCNC: 160 MG/DL — HIGH (ref 70–99)
GLUCOSE BLDC GLUCOMTR-MCNC: 160 MG/DL — HIGH (ref 70–99)
GLUCOSE BLDC GLUCOMTR-MCNC: 161 MG/DL — HIGH (ref 70–99)
GLUCOSE BLDC GLUCOMTR-MCNC: 161 MG/DL — HIGH (ref 70–99)
GLUCOSE BLDC GLUCOMTR-MCNC: 162 MG/DL — HIGH (ref 70–99)
GLUCOSE BLDC GLUCOMTR-MCNC: 163 MG/DL — HIGH (ref 70–99)
GLUCOSE BLDC GLUCOMTR-MCNC: 164 MG/DL — HIGH (ref 70–99)
GLUCOSE BLDC GLUCOMTR-MCNC: 164 MG/DL — HIGH (ref 70–99)
GLUCOSE BLDC GLUCOMTR-MCNC: 165 MG/DL — HIGH (ref 70–99)
GLUCOSE BLDC GLUCOMTR-MCNC: 166 MG/DL — HIGH (ref 70–99)
GLUCOSE BLDC GLUCOMTR-MCNC: 166 MG/DL — HIGH (ref 70–99)
GLUCOSE BLDC GLUCOMTR-MCNC: 167 MG/DL — HIGH (ref 70–99)
GLUCOSE BLDC GLUCOMTR-MCNC: 167 MG/DL — HIGH (ref 70–99)
GLUCOSE BLDC GLUCOMTR-MCNC: 168 MG/DL — HIGH (ref 70–99)
GLUCOSE BLDC GLUCOMTR-MCNC: 168 MG/DL — HIGH (ref 70–99)
GLUCOSE BLDC GLUCOMTR-MCNC: 169 MG/DL — HIGH (ref 70–99)
GLUCOSE BLDC GLUCOMTR-MCNC: 169 MG/DL — HIGH (ref 70–99)
GLUCOSE BLDC GLUCOMTR-MCNC: 171 MG/DL — HIGH (ref 70–99)
GLUCOSE BLDC GLUCOMTR-MCNC: 172 MG/DL — HIGH (ref 70–99)
GLUCOSE BLDC GLUCOMTR-MCNC: 173 MG/DL — HIGH (ref 70–99)
GLUCOSE BLDC GLUCOMTR-MCNC: 173 MG/DL — HIGH (ref 70–99)
GLUCOSE BLDC GLUCOMTR-MCNC: 174 MG/DL — HIGH (ref 70–99)
GLUCOSE BLDC GLUCOMTR-MCNC: 174 MG/DL — HIGH (ref 70–99)
GLUCOSE BLDC GLUCOMTR-MCNC: 176 MG/DL — HIGH (ref 70–99)
GLUCOSE BLDC GLUCOMTR-MCNC: 176 MG/DL — HIGH (ref 70–99)
GLUCOSE BLDC GLUCOMTR-MCNC: 178 MG/DL — HIGH (ref 70–99)
GLUCOSE BLDC GLUCOMTR-MCNC: 181 MG/DL — HIGH (ref 70–99)
GLUCOSE BLDC GLUCOMTR-MCNC: 182 MG/DL — HIGH (ref 70–99)
GLUCOSE BLDC GLUCOMTR-MCNC: 183 MG/DL — HIGH (ref 70–99)
GLUCOSE BLDC GLUCOMTR-MCNC: 184 MG/DL — HIGH (ref 70–99)
GLUCOSE BLDC GLUCOMTR-MCNC: 184 MG/DL — HIGH (ref 70–99)
GLUCOSE BLDC GLUCOMTR-MCNC: 186 MG/DL — HIGH (ref 70–99)
GLUCOSE BLDC GLUCOMTR-MCNC: 187 MG/DL — HIGH (ref 70–99)
GLUCOSE BLDC GLUCOMTR-MCNC: 187 MG/DL — HIGH (ref 70–99)
GLUCOSE BLDC GLUCOMTR-MCNC: 189 MG/DL — HIGH (ref 70–99)
GLUCOSE BLDC GLUCOMTR-MCNC: 189 MG/DL — HIGH (ref 70–99)
GLUCOSE BLDC GLUCOMTR-MCNC: 190 MG/DL — HIGH (ref 70–99)
GLUCOSE BLDC GLUCOMTR-MCNC: 191 MG/DL — HIGH (ref 70–99)
GLUCOSE BLDC GLUCOMTR-MCNC: 191 MG/DL — HIGH (ref 70–99)
GLUCOSE BLDC GLUCOMTR-MCNC: 193 MG/DL — HIGH (ref 70–99)
GLUCOSE BLDC GLUCOMTR-MCNC: 193 MG/DL — HIGH (ref 70–99)
GLUCOSE BLDC GLUCOMTR-MCNC: 194 MG/DL — HIGH (ref 70–99)
GLUCOSE BLDC GLUCOMTR-MCNC: 195 MG/DL — HIGH (ref 70–99)
GLUCOSE BLDC GLUCOMTR-MCNC: 196 MG/DL — HIGH (ref 70–99)
GLUCOSE BLDC GLUCOMTR-MCNC: 197 MG/DL — HIGH (ref 70–99)
GLUCOSE BLDC GLUCOMTR-MCNC: 199 MG/DL — HIGH (ref 70–99)
GLUCOSE BLDC GLUCOMTR-MCNC: 199 MG/DL — HIGH (ref 70–99)
GLUCOSE BLDC GLUCOMTR-MCNC: 200 MG/DL — HIGH (ref 70–99)
GLUCOSE BLDC GLUCOMTR-MCNC: 201 MG/DL — HIGH (ref 70–99)
GLUCOSE BLDC GLUCOMTR-MCNC: 202 MG/DL — HIGH (ref 70–99)
GLUCOSE BLDC GLUCOMTR-MCNC: 207 MG/DL — HIGH (ref 70–99)
GLUCOSE BLDC GLUCOMTR-MCNC: 207 MG/DL — HIGH (ref 70–99)
GLUCOSE BLDC GLUCOMTR-MCNC: 209 MG/DL — HIGH (ref 70–99)
GLUCOSE BLDC GLUCOMTR-MCNC: 212 MG/DL — HIGH (ref 70–99)
GLUCOSE BLDC GLUCOMTR-MCNC: 213 MG/DL — HIGH (ref 70–99)
GLUCOSE BLDC GLUCOMTR-MCNC: 214 MG/DL — HIGH (ref 70–99)
GLUCOSE BLDC GLUCOMTR-MCNC: 216 MG/DL — HIGH (ref 70–99)
GLUCOSE BLDC GLUCOMTR-MCNC: 217 MG/DL — HIGH (ref 70–99)
GLUCOSE BLDC GLUCOMTR-MCNC: 218 MG/DL — HIGH (ref 70–99)
GLUCOSE BLDC GLUCOMTR-MCNC: 218 MG/DL — HIGH (ref 70–99)
GLUCOSE BLDC GLUCOMTR-MCNC: 220 MG/DL — HIGH (ref 70–99)
GLUCOSE BLDC GLUCOMTR-MCNC: 221 MG/DL — HIGH (ref 70–99)
GLUCOSE BLDC GLUCOMTR-MCNC: 222 MG/DL — HIGH (ref 70–99)
GLUCOSE BLDC GLUCOMTR-MCNC: 223 MG/DL — HIGH (ref 70–99)
GLUCOSE BLDC GLUCOMTR-MCNC: 223 MG/DL — HIGH (ref 70–99)
GLUCOSE BLDC GLUCOMTR-MCNC: 224 MG/DL — HIGH (ref 70–99)
GLUCOSE BLDC GLUCOMTR-MCNC: 224 MG/DL — HIGH (ref 70–99)
GLUCOSE BLDC GLUCOMTR-MCNC: 225 MG/DL — HIGH (ref 70–99)
GLUCOSE BLDC GLUCOMTR-MCNC: 228 MG/DL — HIGH (ref 70–99)
GLUCOSE BLDC GLUCOMTR-MCNC: 229 MG/DL — HIGH (ref 70–99)
GLUCOSE BLDC GLUCOMTR-MCNC: 229 MG/DL — HIGH (ref 70–99)
GLUCOSE BLDC GLUCOMTR-MCNC: 230 MG/DL — HIGH (ref 70–99)
GLUCOSE BLDC GLUCOMTR-MCNC: 231 MG/DL — HIGH (ref 70–99)
GLUCOSE BLDC GLUCOMTR-MCNC: 232 MG/DL — HIGH (ref 70–99)
GLUCOSE BLDC GLUCOMTR-MCNC: 233 MG/DL — HIGH (ref 70–99)
GLUCOSE BLDC GLUCOMTR-MCNC: 233 MG/DL — HIGH (ref 70–99)
GLUCOSE BLDC GLUCOMTR-MCNC: 237 MG/DL — HIGH (ref 70–99)
GLUCOSE BLDC GLUCOMTR-MCNC: 237 MG/DL — HIGH (ref 70–99)
GLUCOSE BLDC GLUCOMTR-MCNC: 238 MG/DL — HIGH (ref 70–99)
GLUCOSE BLDC GLUCOMTR-MCNC: 239 MG/DL — HIGH (ref 70–99)
GLUCOSE BLDC GLUCOMTR-MCNC: 242 MG/DL — HIGH (ref 70–99)
GLUCOSE BLDC GLUCOMTR-MCNC: 246 MG/DL — HIGH (ref 70–99)
GLUCOSE BLDC GLUCOMTR-MCNC: 246 MG/DL — HIGH (ref 70–99)
GLUCOSE BLDC GLUCOMTR-MCNC: 247 MG/DL — HIGH (ref 70–99)
GLUCOSE BLDC GLUCOMTR-MCNC: 248 MG/DL — HIGH (ref 70–99)
GLUCOSE BLDC GLUCOMTR-MCNC: 250 MG/DL — HIGH (ref 70–99)
GLUCOSE BLDC GLUCOMTR-MCNC: 252 MG/DL — HIGH (ref 70–99)
GLUCOSE BLDC GLUCOMTR-MCNC: 253 MG/DL — HIGH (ref 70–99)
GLUCOSE BLDC GLUCOMTR-MCNC: 255 MG/DL — HIGH (ref 70–99)
GLUCOSE BLDC GLUCOMTR-MCNC: 255 MG/DL — HIGH (ref 70–99)
GLUCOSE BLDC GLUCOMTR-MCNC: 257 MG/DL — HIGH (ref 70–99)
GLUCOSE BLDC GLUCOMTR-MCNC: 262 MG/DL — HIGH (ref 70–99)
GLUCOSE BLDC GLUCOMTR-MCNC: 264 MG/DL — HIGH (ref 70–99)
GLUCOSE BLDC GLUCOMTR-MCNC: 266 MG/DL — HIGH (ref 70–99)
GLUCOSE BLDC GLUCOMTR-MCNC: 269 MG/DL — HIGH (ref 70–99)
GLUCOSE BLDC GLUCOMTR-MCNC: 270 MG/DL — HIGH (ref 70–99)
GLUCOSE BLDC GLUCOMTR-MCNC: 271 MG/DL — HIGH (ref 70–99)
GLUCOSE BLDC GLUCOMTR-MCNC: 277 MG/DL — HIGH (ref 70–99)
GLUCOSE BLDC GLUCOMTR-MCNC: 280 MG/DL — HIGH (ref 70–99)
GLUCOSE BLDC GLUCOMTR-MCNC: 281 MG/DL — HIGH (ref 70–99)
GLUCOSE BLDC GLUCOMTR-MCNC: 291 MG/DL — HIGH (ref 70–99)
GLUCOSE BLDC GLUCOMTR-MCNC: 293 MG/DL — HIGH (ref 70–99)
GLUCOSE BLDC GLUCOMTR-MCNC: 294 MG/DL — HIGH (ref 70–99)
GLUCOSE BLDC GLUCOMTR-MCNC: 311 MG/DL — HIGH (ref 70–99)
GLUCOSE BLDC GLUCOMTR-MCNC: 56 MG/DL — LOW (ref 70–99)
GLUCOSE BLDC GLUCOMTR-MCNC: 82 MG/DL — SIGNIFICANT CHANGE UP (ref 70–99)
GLUCOSE BLDC GLUCOMTR-MCNC: 83 MG/DL — SIGNIFICANT CHANGE UP (ref 70–99)
GLUCOSE BLDC GLUCOMTR-MCNC: 93 MG/DL — SIGNIFICANT CHANGE UP (ref 70–99)
GLUCOSE BLDC GLUCOMTR-MCNC: 95 MG/DL — SIGNIFICANT CHANGE UP (ref 70–99)
GLUCOSE BLDC GLUCOMTR-MCNC: 97 MG/DL — SIGNIFICANT CHANGE UP (ref 70–99)
GLUCOSE SERPL-MCNC: 104 MG/DL — SIGNIFICANT CHANGE UP (ref 70–115)
GLUCOSE SERPL-MCNC: 105 MG/DL — SIGNIFICANT CHANGE UP (ref 70–115)
GLUCOSE SERPL-MCNC: 111 MG/DL — SIGNIFICANT CHANGE UP (ref 70–115)
GLUCOSE SERPL-MCNC: 117 MG/DL — HIGH (ref 70–115)
GLUCOSE SERPL-MCNC: 118 MG/DL — HIGH (ref 70–115)
GLUCOSE SERPL-MCNC: 121 MG/DL — HIGH (ref 70–115)
GLUCOSE SERPL-MCNC: 123 MG/DL — HIGH (ref 70–115)
GLUCOSE SERPL-MCNC: 127 MG/DL — HIGH (ref 70–115)
GLUCOSE SERPL-MCNC: 127 MG/DL — HIGH (ref 70–115)
GLUCOSE SERPL-MCNC: 129 MG/DL — HIGH (ref 70–115)
GLUCOSE SERPL-MCNC: 132 MG/DL — HIGH (ref 70–115)
GLUCOSE SERPL-MCNC: 133 MG/DL — HIGH (ref 70–115)
GLUCOSE SERPL-MCNC: 133 MG/DL — HIGH (ref 70–115)
GLUCOSE SERPL-MCNC: 134 MG/DL — HIGH (ref 70–115)
GLUCOSE SERPL-MCNC: 136 MG/DL — HIGH (ref 70–115)
GLUCOSE SERPL-MCNC: 137 MG/DL — HIGH (ref 70–115)
GLUCOSE SERPL-MCNC: 151 MG/DL — HIGH (ref 70–115)
GLUCOSE SERPL-MCNC: 154 MG/DL — HIGH (ref 70–115)
GLUCOSE SERPL-MCNC: 161 MG/DL — HIGH (ref 70–115)
GLUCOSE SERPL-MCNC: 163 MG/DL — HIGH (ref 70–115)
GLUCOSE SERPL-MCNC: 168 MG/DL — HIGH (ref 70–115)
GLUCOSE SERPL-MCNC: 170 MG/DL — HIGH (ref 70–115)
GLUCOSE SERPL-MCNC: 181 MG/DL — HIGH (ref 70–115)
GLUCOSE SERPL-MCNC: 187 MG/DL — HIGH (ref 70–115)
GLUCOSE SERPL-MCNC: 196 MG/DL — HIGH (ref 70–115)
GLUCOSE SERPL-MCNC: 196 MG/DL — HIGH (ref 70–115)
GLUCOSE SERPL-MCNC: 197 MG/DL — HIGH (ref 70–115)
GLUCOSE SERPL-MCNC: 201 MG/DL — HIGH (ref 70–115)
GLUCOSE SERPL-MCNC: 206 MG/DL — HIGH (ref 70–115)
GLUCOSE SERPL-MCNC: 208 MG/DL — HIGH (ref 70–115)
GLUCOSE SERPL-MCNC: 209 MG/DL — HIGH (ref 70–115)
GLUCOSE SERPL-MCNC: 210 MG/DL — HIGH (ref 70–115)
GLUCOSE SERPL-MCNC: 215 MG/DL — HIGH (ref 70–115)
GLUCOSE SERPL-MCNC: 219 MG/DL — HIGH (ref 70–115)
GLUCOSE SERPL-MCNC: 228 MG/DL — HIGH (ref 70–115)
GLUCOSE SERPL-MCNC: 229 MG/DL — HIGH (ref 70–115)
GLUCOSE SERPL-MCNC: 234 MG/DL — HIGH (ref 70–115)
GLUCOSE SERPL-MCNC: 242 MG/DL — HIGH (ref 70–115)
GLUCOSE SERPL-MCNC: 247 MG/DL — HIGH (ref 70–115)
GLUCOSE SERPL-MCNC: 248 MG/DL — HIGH (ref 70–115)
GLUCOSE SERPL-MCNC: 255 MG/DL — HIGH (ref 70–115)
GLUCOSE SERPL-MCNC: 262 MG/DL — HIGH (ref 70–115)
GLUCOSE SERPL-MCNC: 281 MG/DL — HIGH (ref 70–115)
GLUCOSE SERPL-MCNC: 298 MG/DL — HIGH (ref 70–115)
GLUCOSE SERPL-MCNC: 319 MG/DL — HIGH (ref 70–115)
GLUCOSE UR QL: NEGATIVE MG/DL — SIGNIFICANT CHANGE UP
HAV IGM SER-ACNC: SIGNIFICANT CHANGE UP
HBA1C BLD-MCNC: 6.3 % — HIGH (ref 4–5.6)
HBA1C BLD-MCNC: 6.7 % — HIGH (ref 4–5.6)
HBA1C BLD-MCNC: 6.9 % — HIGH (ref 4–5.6)
HBV CORE IGM SER-ACNC: SIGNIFICANT CHANGE UP
HBV SURFACE AG SER-ACNC: SIGNIFICANT CHANGE UP
HCO3 BLDA-SCNC: 22 MMOL/L — SIGNIFICANT CHANGE UP (ref 20–26)
HCO3 BLDA-SCNC: 26 MMOL/L — SIGNIFICANT CHANGE UP (ref 20–26)
HCO3 BLDA-SCNC: 28 MMOL/L — HIGH (ref 20–26)
HCT VFR BLD CALC: 25.7 % — LOW (ref 37–47)
HCT VFR BLD CALC: 27.3 % — LOW (ref 37–47)
HCT VFR BLD CALC: 27.5 % — LOW (ref 37–47)
HCT VFR BLD CALC: 27.8 % — LOW (ref 37–47)
HCT VFR BLD CALC: 28.3 % — LOW (ref 37–47)
HCT VFR BLD CALC: 28.3 % — LOW (ref 37–47)
HCT VFR BLD CALC: 28.7 % — LOW (ref 37–47)
HCT VFR BLD CALC: 29.7 % — LOW (ref 37–47)
HCT VFR BLD CALC: 29.9 % — LOW (ref 37–47)
HCT VFR BLD CALC: 29.9 % — LOW (ref 37–47)
HCT VFR BLD CALC: 30 % — LOW (ref 37–47)
HCT VFR BLD CALC: 30.2 % — LOW (ref 37–47)
HCT VFR BLD CALC: 30.3 % — LOW (ref 37–47)
HCT VFR BLD CALC: 30.4 % — LOW (ref 37–47)
HCT VFR BLD CALC: 30.9 % — LOW (ref 37–47)
HCT VFR BLD CALC: 31 % — LOW (ref 37–47)
HCT VFR BLD CALC: 31 % — LOW (ref 37–47)
HCT VFR BLD CALC: 31.3 % — LOW (ref 37–47)
HCT VFR BLD CALC: 31.6 % — LOW (ref 37–47)
HCT VFR BLD CALC: 32 % — LOW (ref 37–47)
HCT VFR BLD CALC: 32.4 % — LOW (ref 37–47)
HCT VFR BLD CALC: 32.7 % — LOW (ref 37–47)
HCT VFR BLD CALC: 32.8 % — LOW (ref 37–47)
HCT VFR BLD CALC: 32.8 % — LOW (ref 37–47)
HCT VFR BLD CALC: 32.9 % — LOW (ref 37–47)
HCT VFR BLD CALC: 33.1 % — LOW (ref 37–47)
HCT VFR BLD CALC: 33.4 % — LOW (ref 37–47)
HCT VFR BLD CALC: 33.6 % — LOW (ref 37–47)
HCT VFR BLD CALC: 33.7 % — LOW (ref 37–47)
HCT VFR BLD CALC: 33.8 % — LOW (ref 37–47)
HCT VFR BLD CALC: 33.9 % — LOW (ref 37–47)
HCT VFR BLD CALC: 34.2 % — LOW (ref 37–47)
HCT VFR BLD CALC: 34.2 % — LOW (ref 37–47)
HCT VFR BLD CALC: 34.6 % — LOW (ref 37–47)
HCT VFR BLD CALC: 35 % — LOW (ref 37–47)
HCT VFR BLD CALC: 35.5 % — LOW (ref 37–47)
HCT VFR BLD CALC: 35.9 % — LOW (ref 37–47)
HCT VFR BLD CALC: 36.1 % — LOW (ref 37–47)
HCV AB S/CO SERPL IA: 0.08 S/CO — SIGNIFICANT CHANGE UP
HCV AB SERPL-IMP: SIGNIFICANT CHANGE UP
HDLC SERPL-MCNC: 48 MG/DL — LOW
HGB BLD-MCNC: 10.1 G/DL — LOW (ref 12–16)
HGB BLD-MCNC: 10.1 G/DL — LOW (ref 12–16)
HGB BLD-MCNC: 10.2 G/DL — LOW (ref 12–16)
HGB BLD-MCNC: 10.2 G/DL — LOW (ref 12–16)
HGB BLD-MCNC: 10.4 G/DL — LOW (ref 12–16)
HGB BLD-MCNC: 10.5 G/DL — LOW (ref 12–16)
HGB BLD-MCNC: 10.6 G/DL — LOW (ref 12–16)
HGB BLD-MCNC: 10.6 G/DL — LOW (ref 12–16)
HGB BLD-MCNC: 10.7 G/DL — LOW (ref 12–16)
HGB BLD-MCNC: 11 G/DL — LOW (ref 12–16)
HGB BLD-MCNC: 11.1 G/DL — LOW (ref 12–16)
HGB BLD-MCNC: 11.2 G/DL — LOW (ref 12–16)
HGB BLD-MCNC: 11.2 G/DL — LOW (ref 12–16)
HGB BLD-MCNC: 11.6 G/DL — LOW (ref 12–16)
HGB BLD-MCNC: 11.8 G/DL — LOW (ref 12–16)
HGB BLD-MCNC: 8.4 G/DL — LOW (ref 12–16)
HGB BLD-MCNC: 8.8 G/DL — LOW (ref 12–16)
HGB BLD-MCNC: 8.9 G/DL — LOW (ref 12–16)
HGB BLD-MCNC: 9 G/DL — LOW (ref 12–16)
HGB BLD-MCNC: 9.2 G/DL — LOW (ref 12–16)
HGB BLD-MCNC: 9.2 G/DL — LOW (ref 12–16)
HGB BLD-MCNC: 9.5 G/DL — LOW (ref 12–16)
HGB BLD-MCNC: 9.6 G/DL — LOW (ref 12–16)
HGB BLD-MCNC: 9.7 G/DL — LOW (ref 12–16)
HGB BLD-MCNC: 9.8 G/DL — LOW (ref 12–16)
HGB BLD-MCNC: 9.9 G/DL — LOW (ref 12–16)
HGB BLD-MCNC: 9.9 G/DL — LOW (ref 12–16)
HIV 1 & 2 AB SERPL IA.RAPID: SIGNIFICANT CHANGE UP
HOROWITZ INDEX BLDA+IHG-RTO: 0.21 — SIGNIFICANT CHANGE UP
HOROWITZ INDEX BLDA+IHG-RTO: 21 — SIGNIFICANT CHANGE UP
HOROWITZ INDEX BLDA+IHG-RTO: SIGNIFICANT CHANGE UP
INR BLD: 1.01 RATIO — SIGNIFICANT CHANGE UP (ref 0.88–1.16)
INR BLD: 1.03 RATIO — SIGNIFICANT CHANGE UP (ref 0.88–1.16)
INR BLD: 1.03 RATIO — SIGNIFICANT CHANGE UP (ref 0.88–1.16)
INR BLD: 1.08 RATIO — SIGNIFICANT CHANGE UP (ref 0.88–1.16)
IRON SATN MFR SERPL: 26 % — SIGNIFICANT CHANGE UP (ref 14–50)
IRON SATN MFR SERPL: 62 UG/DL — SIGNIFICANT CHANGE UP (ref 37–145)
KETONES UR-MCNC: ABNORMAL
KETONES UR-MCNC: NEGATIVE — SIGNIFICANT CHANGE UP
LACTATE BLDV-MCNC: 1.2 MMOL/L — SIGNIFICANT CHANGE UP (ref 0.5–2)
LACTATE BLDV-MCNC: 1.3 MMOL/L — SIGNIFICANT CHANGE UP (ref 0.5–2)
LACTATE BLDV-MCNC: 1.3 MMOL/L — SIGNIFICANT CHANGE UP (ref 0.5–2)
LACTATE BLDV-MCNC: 1.4 MMOL/L — SIGNIFICANT CHANGE UP (ref 0.5–2)
LACTATE BLDV-MCNC: 1.5 MMOL/L — SIGNIFICANT CHANGE UP (ref 0.5–2)
LEUKOCYTE ESTERASE UR-ACNC: ABNORMAL
LEUKOCYTE ESTERASE UR-ACNC: NEGATIVE — SIGNIFICANT CHANGE UP
LEVETIRACETAM SERPL-MCNC: 22.9 MCG/ML — SIGNIFICANT CHANGE UP (ref 12–46)
LEVETIRACETAM SERPL-MCNC: 24.7 MCG/ML — SIGNIFICANT CHANGE UP (ref 12–46)
LIDOCAIN IGE QN: 20 U/L — LOW (ref 22–51)
LIPID PNL WITH DIRECT LDL SERPL: 253 MG/DL — SIGNIFICANT CHANGE UP
LYMPHOCYTES # BLD AUTO: 0.5 K/UL — LOW (ref 1–4.8)
LYMPHOCYTES # BLD AUTO: 1 K/UL — SIGNIFICANT CHANGE UP (ref 1–4.8)
LYMPHOCYTES # BLD AUTO: 1.1 K/UL — SIGNIFICANT CHANGE UP (ref 1–4.8)
LYMPHOCYTES # BLD AUTO: 1.3 K/UL — SIGNIFICANT CHANGE UP (ref 1–4.8)
LYMPHOCYTES # BLD AUTO: 1.4 K/UL — SIGNIFICANT CHANGE UP (ref 1–4.8)
LYMPHOCYTES # BLD AUTO: 1.7 K/UL — SIGNIFICANT CHANGE UP (ref 1–4.8)
LYMPHOCYTES # BLD AUTO: 11.5 % — LOW (ref 20–55)
LYMPHOCYTES # BLD AUTO: 13.1 % — LOW (ref 20–55)
LYMPHOCYTES # BLD AUTO: 14.6 % — LOW (ref 20–55)
LYMPHOCYTES # BLD AUTO: 17 % — LOW (ref 20–55)
LYMPHOCYTES # BLD AUTO: 17.6 % — LOW (ref 20–55)
LYMPHOCYTES # BLD AUTO: 18.2 % — LOW (ref 20–55)
LYMPHOCYTES # BLD AUTO: 19.4 % — LOW (ref 20–55)
LYMPHOCYTES # BLD AUTO: 20 % — SIGNIFICANT CHANGE UP (ref 20–55)
LYMPHOCYTES # BLD AUTO: 20.1 % — SIGNIFICANT CHANGE UP (ref 20–55)
LYMPHOCYTES # BLD AUTO: 20.5 % — SIGNIFICANT CHANGE UP (ref 20–55)
LYMPHOCYTES # BLD AUTO: 23.3 % — SIGNIFICANT CHANGE UP (ref 20–55)
LYMPHOCYTES # BLD AUTO: 24.2 % — SIGNIFICANT CHANGE UP (ref 20–55)
LYMPHOCYTES # BLD AUTO: 24.9 % — SIGNIFICANT CHANGE UP (ref 20–55)
LYMPHOCYTES # BLD AUTO: 27.9 % — SIGNIFICANT CHANGE UP (ref 20–55)
LYMPHOCYTES # BLD AUTO: 38.1 % — SIGNIFICANT CHANGE UP (ref 20–55)
LYMPHOCYTES # BLD AUTO: 4.9 % — LOW (ref 20–55)
MACROCYTES BLD QL: SLIGHT — SIGNIFICANT CHANGE UP
MAGNESIUM SERPL-MCNC: 1.7 MG/DL — SIGNIFICANT CHANGE UP (ref 1.6–2.6)
MAGNESIUM SERPL-MCNC: 1.8 MG/DL — SIGNIFICANT CHANGE UP (ref 1.6–2.6)
MAGNESIUM SERPL-MCNC: 1.8 MG/DL — SIGNIFICANT CHANGE UP (ref 1.8–2.6)
MAGNESIUM SERPL-MCNC: 1.9 MG/DL — SIGNIFICANT CHANGE UP (ref 1.6–2.6)
MAGNESIUM SERPL-MCNC: 1.9 MG/DL — SIGNIFICANT CHANGE UP (ref 1.8–2.6)
MAGNESIUM SERPL-MCNC: 2 MG/DL — SIGNIFICANT CHANGE UP (ref 1.8–2.6)
MAGNESIUM SERPL-MCNC: 2.1 MG/DL — SIGNIFICANT CHANGE UP (ref 1.6–2.6)
MAGNESIUM SERPL-MCNC: 2.1 MG/DL — SIGNIFICANT CHANGE UP (ref 1.8–2.6)
MAGNESIUM SERPL-MCNC: 2.2 MG/DL — SIGNIFICANT CHANGE UP (ref 1.6–2.6)
MAGNESIUM SERPL-MCNC: 2.3 MG/DL — SIGNIFICANT CHANGE UP (ref 1.6–2.6)
MAGNESIUM SERPL-MCNC: 2.3 MG/DL — SIGNIFICANT CHANGE UP (ref 1.6–2.6)
MAGNESIUM SERPL-MCNC: 2.7 MG/DL — HIGH (ref 1.6–2.6)
MAGNESIUM SERPL-MCNC: 2.7 MG/DL — HIGH (ref 1.6–2.6)
MCHC RBC-ENTMCNC: 29.6 PG — SIGNIFICANT CHANGE UP (ref 27–31)
MCHC RBC-ENTMCNC: 29.9 PG — SIGNIFICANT CHANGE UP (ref 27–31)
MCHC RBC-ENTMCNC: 29.9 PG — SIGNIFICANT CHANGE UP (ref 27–31)
MCHC RBC-ENTMCNC: 30 PG — SIGNIFICANT CHANGE UP (ref 27–31)
MCHC RBC-ENTMCNC: 30.1 PG — SIGNIFICANT CHANGE UP (ref 27–31)
MCHC RBC-ENTMCNC: 30.1 PG — SIGNIFICANT CHANGE UP (ref 27–31)
MCHC RBC-ENTMCNC: 30.2 PG — SIGNIFICANT CHANGE UP (ref 27–31)
MCHC RBC-ENTMCNC: 30.3 PG — SIGNIFICANT CHANGE UP (ref 27–31)
MCHC RBC-ENTMCNC: 30.4 PG — SIGNIFICANT CHANGE UP (ref 27–31)
MCHC RBC-ENTMCNC: 30.5 PG — SIGNIFICANT CHANGE UP (ref 27–31)
MCHC RBC-ENTMCNC: 30.6 PG — SIGNIFICANT CHANGE UP (ref 27–31)
MCHC RBC-ENTMCNC: 30.7 PG — SIGNIFICANT CHANGE UP (ref 27–31)
MCHC RBC-ENTMCNC: 30.8 PG — SIGNIFICANT CHANGE UP (ref 27–31)
MCHC RBC-ENTMCNC: 30.8 PG — SIGNIFICANT CHANGE UP (ref 27–31)
MCHC RBC-ENTMCNC: 30.9 PG — SIGNIFICANT CHANGE UP (ref 27–31)
MCHC RBC-ENTMCNC: 31 G/DL — LOW (ref 32–36)
MCHC RBC-ENTMCNC: 31.1 PG — HIGH (ref 27–31)
MCHC RBC-ENTMCNC: 31.2 PG — HIGH (ref 27–31)
MCHC RBC-ENTMCNC: 31.2 PG — HIGH (ref 27–31)
MCHC RBC-ENTMCNC: 31.3 G/DL — LOW (ref 32–36)
MCHC RBC-ENTMCNC: 31.3 PG — HIGH (ref 27–31)
MCHC RBC-ENTMCNC: 31.3 PG — HIGH (ref 27–31)
MCHC RBC-ENTMCNC: 31.4 G/DL — LOW (ref 32–36)
MCHC RBC-ENTMCNC: 31.4 PG — HIGH (ref 27–31)
MCHC RBC-ENTMCNC: 31.5 G/DL — LOW (ref 32–36)
MCHC RBC-ENTMCNC: 31.7 G/DL — LOW (ref 32–36)
MCHC RBC-ENTMCNC: 31.8 G/DL — LOW (ref 32–36)
MCHC RBC-ENTMCNC: 32 G/DL — SIGNIFICANT CHANGE UP (ref 32–36)
MCHC RBC-ENTMCNC: 32 G/DL — SIGNIFICANT CHANGE UP (ref 32–36)
MCHC RBC-ENTMCNC: 32.3 G/DL — SIGNIFICANT CHANGE UP (ref 32–36)
MCHC RBC-ENTMCNC: 32.4 G/DL — SIGNIFICANT CHANGE UP (ref 32–36)
MCHC RBC-ENTMCNC: 32.4 G/DL — SIGNIFICANT CHANGE UP (ref 32–36)
MCHC RBC-ENTMCNC: 32.5 G/DL — SIGNIFICANT CHANGE UP (ref 32–36)
MCHC RBC-ENTMCNC: 32.6 G/DL — SIGNIFICANT CHANGE UP (ref 32–36)
MCHC RBC-ENTMCNC: 32.7 G/DL — SIGNIFICANT CHANGE UP (ref 32–36)
MCHC RBC-ENTMCNC: 32.8 G/DL — SIGNIFICANT CHANGE UP (ref 32–36)
MCHC RBC-ENTMCNC: 32.8 G/DL — SIGNIFICANT CHANGE UP (ref 32–36)
MCHC RBC-ENTMCNC: 32.9 G/DL — SIGNIFICANT CHANGE UP (ref 32–36)
MCHC RBC-ENTMCNC: 32.9 G/DL — SIGNIFICANT CHANGE UP (ref 32–36)
MCHC RBC-ENTMCNC: 33 G/DL — SIGNIFICANT CHANGE UP (ref 32–36)
MCHC RBC-ENTMCNC: 33.1 G/DL — SIGNIFICANT CHANGE UP (ref 32–36)
MCHC RBC-ENTMCNC: 33.1 G/DL — SIGNIFICANT CHANGE UP (ref 32–36)
MCHC RBC-ENTMCNC: 33.2 G/DL — SIGNIFICANT CHANGE UP (ref 32–36)
MCHC RBC-ENTMCNC: 33.2 G/DL — SIGNIFICANT CHANGE UP (ref 32–36)
MCHC RBC-ENTMCNC: 33.3 G/DL — SIGNIFICANT CHANGE UP (ref 32–36)
MCHC RBC-ENTMCNC: 33.4 G/DL — SIGNIFICANT CHANGE UP (ref 32–36)
MCHC RBC-ENTMCNC: 33.5 G/DL — SIGNIFICANT CHANGE UP (ref 32–36)
MCHC RBC-ENTMCNC: 33.7 G/DL — SIGNIFICANT CHANGE UP (ref 32–36)
MCHC RBC-ENTMCNC: 33.9 G/DL — SIGNIFICANT CHANGE UP (ref 32–36)
MCV RBC AUTO: 100.6 FL — HIGH (ref 81–99)
MCV RBC AUTO: 90.6 FL — SIGNIFICANT CHANGE UP (ref 81–99)
MCV RBC AUTO: 90.7 FL — SIGNIFICANT CHANGE UP (ref 81–99)
MCV RBC AUTO: 90.7 FL — SIGNIFICANT CHANGE UP (ref 81–99)
MCV RBC AUTO: 91 FL — SIGNIFICANT CHANGE UP (ref 81–99)
MCV RBC AUTO: 91.4 FL — SIGNIFICANT CHANGE UP (ref 81–99)
MCV RBC AUTO: 91.5 FL — SIGNIFICANT CHANGE UP (ref 81–99)
MCV RBC AUTO: 91.9 FL — SIGNIFICANT CHANGE UP (ref 81–99)
MCV RBC AUTO: 92.1 FL — SIGNIFICANT CHANGE UP (ref 81–99)
MCV RBC AUTO: 92.2 FL — SIGNIFICANT CHANGE UP (ref 81–99)
MCV RBC AUTO: 92.3 FL — SIGNIFICANT CHANGE UP (ref 81–99)
MCV RBC AUTO: 92.3 FL — SIGNIFICANT CHANGE UP (ref 81–99)
MCV RBC AUTO: 92.4 FL — SIGNIFICANT CHANGE UP (ref 81–99)
MCV RBC AUTO: 92.5 FL — SIGNIFICANT CHANGE UP (ref 81–99)
MCV RBC AUTO: 92.6 FL — SIGNIFICANT CHANGE UP (ref 81–99)
MCV RBC AUTO: 92.7 FL — SIGNIFICANT CHANGE UP (ref 81–99)
MCV RBC AUTO: 92.9 FL — SIGNIFICANT CHANGE UP (ref 81–99)
MCV RBC AUTO: 92.9 FL — SIGNIFICANT CHANGE UP (ref 81–99)
MCV RBC AUTO: 93 FL — SIGNIFICANT CHANGE UP (ref 81–99)
MCV RBC AUTO: 93.1 FL — SIGNIFICANT CHANGE UP (ref 81–99)
MCV RBC AUTO: 93.2 FL — SIGNIFICANT CHANGE UP (ref 81–99)
MCV RBC AUTO: 93.4 FL — SIGNIFICANT CHANGE UP (ref 81–99)
MCV RBC AUTO: 93.6 FL — SIGNIFICANT CHANGE UP (ref 81–99)
MCV RBC AUTO: 93.7 FL — SIGNIFICANT CHANGE UP (ref 81–99)
MCV RBC AUTO: 94.5 FL — SIGNIFICANT CHANGE UP (ref 81–99)
MCV RBC AUTO: 95.3 FL — SIGNIFICANT CHANGE UP (ref 81–99)
MCV RBC AUTO: 96 FL — SIGNIFICANT CHANGE UP (ref 81–99)
MCV RBC AUTO: 96.2 FL — SIGNIFICANT CHANGE UP (ref 81–99)
MCV RBC AUTO: 96.5 FL — SIGNIFICANT CHANGE UP (ref 81–99)
MCV RBC AUTO: 96.9 FL — SIGNIFICANT CHANGE UP (ref 81–99)
MCV RBC AUTO: 97 FL — SIGNIFICANT CHANGE UP (ref 81–99)
MCV RBC AUTO: 97.1 FL — SIGNIFICANT CHANGE UP (ref 81–99)
MCV RBC AUTO: 97.2 FL — SIGNIFICANT CHANGE UP (ref 81–99)
MCV RBC AUTO: 97.7 FL — SIGNIFICANT CHANGE UP (ref 81–99)
METHOD TYPE: SIGNIFICANT CHANGE UP
MONOCYTES # BLD AUTO: 0.4 K/UL — SIGNIFICANT CHANGE UP (ref 0–0.8)
MONOCYTES # BLD AUTO: 0.5 K/UL — SIGNIFICANT CHANGE UP (ref 0–0.8)
MONOCYTES # BLD AUTO: 0.6 K/UL — SIGNIFICANT CHANGE UP (ref 0–0.8)
MONOCYTES # BLD AUTO: 0.7 K/UL — SIGNIFICANT CHANGE UP (ref 0–0.8)
MONOCYTES NFR BLD AUTO: 10.3 % — HIGH (ref 3–10)
MONOCYTES NFR BLD AUTO: 11.6 % — HIGH (ref 3–10)
MONOCYTES NFR BLD AUTO: 11.7 % — HIGH (ref 3–10)
MONOCYTES NFR BLD AUTO: 5.5 % — SIGNIFICANT CHANGE UP (ref 3–10)
MONOCYTES NFR BLD AUTO: 5.8 % — SIGNIFICANT CHANGE UP (ref 3–10)
MONOCYTES NFR BLD AUTO: 6.4 % — SIGNIFICANT CHANGE UP (ref 3–10)
MONOCYTES NFR BLD AUTO: 7.3 % — SIGNIFICANT CHANGE UP (ref 3–10)
MONOCYTES NFR BLD AUTO: 7.5 % — SIGNIFICANT CHANGE UP (ref 3–10)
MONOCYTES NFR BLD AUTO: 7.8 % — SIGNIFICANT CHANGE UP (ref 3–10)
MONOCYTES NFR BLD AUTO: 8.3 % — SIGNIFICANT CHANGE UP (ref 3–10)
MONOCYTES NFR BLD AUTO: 8.4 % — SIGNIFICANT CHANGE UP (ref 3–10)
MONOCYTES NFR BLD AUTO: 8.6 % — SIGNIFICANT CHANGE UP (ref 3–10)
MONOCYTES NFR BLD AUTO: 8.7 % — SIGNIFICANT CHANGE UP (ref 3–10)
MONOCYTES NFR BLD AUTO: 8.8 % — SIGNIFICANT CHANGE UP (ref 3–10)
MONOCYTES NFR BLD AUTO: 9.1 % — SIGNIFICANT CHANGE UP (ref 3–10)
MONOCYTES NFR BLD AUTO: 9.7 % — SIGNIFICANT CHANGE UP (ref 3–10)
NEUTROPHILS # BLD AUTO: 2.1 K/UL — SIGNIFICANT CHANGE UP (ref 1.8–8)
NEUTROPHILS # BLD AUTO: 2.7 K/UL — SIGNIFICANT CHANGE UP (ref 1.8–8)
NEUTROPHILS # BLD AUTO: 3 K/UL — SIGNIFICANT CHANGE UP (ref 1.8–8)
NEUTROPHILS # BLD AUTO: 3 K/UL — SIGNIFICANT CHANGE UP (ref 1.8–8)
NEUTROPHILS # BLD AUTO: 3.1 K/UL — SIGNIFICANT CHANGE UP (ref 1.8–8)
NEUTROPHILS # BLD AUTO: 3.2 K/UL — SIGNIFICANT CHANGE UP (ref 1.8–8)
NEUTROPHILS # BLD AUTO: 3.7 K/UL — SIGNIFICANT CHANGE UP (ref 1.8–8)
NEUTROPHILS # BLD AUTO: 3.8 K/UL — SIGNIFICANT CHANGE UP (ref 1.8–8)
NEUTROPHILS # BLD AUTO: 4.1 K/UL — SIGNIFICANT CHANGE UP (ref 1.8–8)
NEUTROPHILS # BLD AUTO: 4.1 K/UL — SIGNIFICANT CHANGE UP (ref 1.8–8)
NEUTROPHILS # BLD AUTO: 4.5 K/UL — SIGNIFICANT CHANGE UP (ref 1.8–8)
NEUTROPHILS # BLD AUTO: 4.7 K/UL — SIGNIFICANT CHANGE UP (ref 1.8–8)
NEUTROPHILS # BLD AUTO: 5 K/UL — SIGNIFICANT CHANGE UP (ref 1.8–8)
NEUTROPHILS # BLD AUTO: 6.5 K/UL — SIGNIFICANT CHANGE UP (ref 1.8–8)
NEUTROPHILS # BLD AUTO: 9 K/UL — HIGH (ref 1.8–8)
NEUTROPHILS # BLD AUTO: 9.2 K/UL — HIGH (ref 1.8–8)
NEUTROPHILS NFR BLD AUTO: 48.5 % — SIGNIFICANT CHANGE UP (ref 37–73)
NEUTROPHILS NFR BLD AUTO: 59.4 % — SIGNIFICANT CHANGE UP (ref 37–73)
NEUTROPHILS NFR BLD AUTO: 62.9 % — SIGNIFICANT CHANGE UP (ref 37–73)
NEUTROPHILS NFR BLD AUTO: 64.2 % — SIGNIFICANT CHANGE UP (ref 37–73)
NEUTROPHILS NFR BLD AUTO: 64.8 % — SIGNIFICANT CHANGE UP (ref 37–73)
NEUTROPHILS NFR BLD AUTO: 65.3 % — SIGNIFICANT CHANGE UP (ref 37–73)
NEUTROPHILS NFR BLD AUTO: 65.8 % — SIGNIFICANT CHANGE UP (ref 37–73)
NEUTROPHILS NFR BLD AUTO: 68.9 % — SIGNIFICANT CHANGE UP (ref 37–73)
NEUTROPHILS NFR BLD AUTO: 69.4 % — SIGNIFICANT CHANGE UP (ref 37–73)
NEUTROPHILS NFR BLD AUTO: 70.1 % — SIGNIFICANT CHANGE UP (ref 37–73)
NEUTROPHILS NFR BLD AUTO: 70.9 % — SIGNIFICANT CHANGE UP (ref 37–73)
NEUTROPHILS NFR BLD AUTO: 72.9 % — SIGNIFICANT CHANGE UP (ref 37–73)
NEUTROPHILS NFR BLD AUTO: 75.8 % — HIGH (ref 37–73)
NEUTROPHILS NFR BLD AUTO: 77.9 % — HIGH (ref 37–73)
NEUTROPHILS NFR BLD AUTO: 80.7 % — HIGH (ref 37–73)
NEUTROPHILS NFR BLD AUTO: 88.8 % — HIGH (ref 37–73)
NITRITE UR-MCNC: NEGATIVE — SIGNIFICANT CHANGE UP
NITRITE UR-MCNC: POSITIVE
NITRITE UR-MCNC: POSITIVE
NT-PROBNP SERPL-SCNC: 1680 PG/ML — HIGH (ref 0–300)
NT-PROBNP SERPL-SCNC: 816 PG/ML — HIGH (ref 0–300)
ORGANISM # SPEC MICROSCOPIC CNT: SIGNIFICANT CHANGE UP
OVALOCYTES BLD QL SMEAR: SLIGHT — SIGNIFICANT CHANGE UP
PCO2 BLDA: 36 MMHG — SIGNIFICANT CHANGE UP (ref 35–45)
PCO2 BLDA: 39 MMHG — SIGNIFICANT CHANGE UP (ref 35–45)
PCO2 BLDA: 40 MMHG — SIGNIFICANT CHANGE UP (ref 35–45)
PCO2 BLDA: 44 MMHG — SIGNIFICANT CHANGE UP (ref 35–45)
PCO2 BLDA: 46 MMHG — HIGH (ref 35–45)
PH BLDA: 7.38 — SIGNIFICANT CHANGE UP (ref 7.35–7.45)
PH BLDA: 7.39 — SIGNIFICANT CHANGE UP (ref 7.35–7.45)
PH BLDA: 7.41 — SIGNIFICANT CHANGE UP (ref 7.35–7.45)
PH BLDA: 7.42 — SIGNIFICANT CHANGE UP (ref 7.35–7.45)
PH BLDA: 7.46 — HIGH (ref 7.35–7.45)
PH UR: 5 — SIGNIFICANT CHANGE UP (ref 5–8)
PH UR: 6 — SIGNIFICANT CHANGE UP (ref 5–8)
PH UR: 6.5 — SIGNIFICANT CHANGE UP (ref 5–8)
PH UR: 7 — SIGNIFICANT CHANGE UP (ref 5–8)
PH UR: 9 — HIGH (ref 5–8)
PHOSPHATE SERPL-MCNC: 2.1 MG/DL — LOW (ref 2.4–4.7)
PHOSPHATE SERPL-MCNC: 2.8 MG/DL — SIGNIFICANT CHANGE UP (ref 2.4–4.7)
PHOSPHATE SERPL-MCNC: 2.9 MG/DL — SIGNIFICANT CHANGE UP (ref 2.4–4.7)
PHOSPHATE SERPL-MCNC: 3.1 MG/DL — SIGNIFICANT CHANGE UP (ref 2.4–4.7)
PHOSPHATE SERPL-MCNC: 3.4 MG/DL — SIGNIFICANT CHANGE UP (ref 2.4–4.7)
PHOSPHATE SERPL-MCNC: 3.6 MG/DL — SIGNIFICANT CHANGE UP (ref 2.4–4.7)
PHOSPHATE SERPL-MCNC: 3.6 MG/DL — SIGNIFICANT CHANGE UP (ref 2.4–4.7)
PHOSPHATE SERPL-MCNC: 3.8 MG/DL — SIGNIFICANT CHANGE UP (ref 2.4–4.7)
PHOSPHATE SERPL-MCNC: 4.2 MG/DL — SIGNIFICANT CHANGE UP (ref 2.4–4.7)
PHOSPHATE SERPL-MCNC: 4.4 MG/DL — SIGNIFICANT CHANGE UP (ref 2.4–4.7)
PHOSPHATE SERPL-MCNC: 4.9 MG/DL — HIGH (ref 2.4–4.7)
PHOSPHATE SERPL-MCNC: 5.1 MG/DL — HIGH (ref 2.4–4.7)
PLAT MORPH BLD: NORMAL — SIGNIFICANT CHANGE UP
PLATELET # BLD AUTO: 147 K/UL — LOW (ref 150–400)
PLATELET # BLD AUTO: 155 K/UL — SIGNIFICANT CHANGE UP (ref 150–400)
PLATELET # BLD AUTO: 164 K/UL — SIGNIFICANT CHANGE UP (ref 150–400)
PLATELET # BLD AUTO: 171 K/UL — SIGNIFICANT CHANGE UP (ref 150–400)
PLATELET # BLD AUTO: 176 K/UL — SIGNIFICANT CHANGE UP (ref 150–400)
PLATELET # BLD AUTO: 182 K/UL — SIGNIFICANT CHANGE UP (ref 150–400)
PLATELET # BLD AUTO: 183 K/UL — SIGNIFICANT CHANGE UP (ref 150–400)
PLATELET # BLD AUTO: 183 K/UL — SIGNIFICANT CHANGE UP (ref 150–400)
PLATELET # BLD AUTO: 184 K/UL — SIGNIFICANT CHANGE UP (ref 150–400)
PLATELET # BLD AUTO: 184 K/UL — SIGNIFICANT CHANGE UP (ref 150–400)
PLATELET # BLD AUTO: 186 K/UL — SIGNIFICANT CHANGE UP (ref 150–400)
PLATELET # BLD AUTO: 187 K/UL — SIGNIFICANT CHANGE UP (ref 150–400)
PLATELET # BLD AUTO: 188 K/UL — SIGNIFICANT CHANGE UP (ref 150–400)
PLATELET # BLD AUTO: 198 K/UL — SIGNIFICANT CHANGE UP (ref 150–400)
PLATELET # BLD AUTO: 199 K/UL — SIGNIFICANT CHANGE UP (ref 150–400)
PLATELET # BLD AUTO: 200 K/UL — SIGNIFICANT CHANGE UP (ref 150–400)
PLATELET # BLD AUTO: 202 K/UL — SIGNIFICANT CHANGE UP (ref 150–400)
PLATELET # BLD AUTO: 203 K/UL — SIGNIFICANT CHANGE UP (ref 150–400)
PLATELET # BLD AUTO: 203 K/UL — SIGNIFICANT CHANGE UP (ref 150–400)
PLATELET # BLD AUTO: 204 K/UL — SIGNIFICANT CHANGE UP (ref 150–400)
PLATELET # BLD AUTO: 206 K/UL — SIGNIFICANT CHANGE UP (ref 150–400)
PLATELET # BLD AUTO: 207 K/UL — SIGNIFICANT CHANGE UP (ref 150–400)
PLATELET # BLD AUTO: 209 K/UL — SIGNIFICANT CHANGE UP (ref 150–400)
PLATELET # BLD AUTO: 210 K/UL — SIGNIFICANT CHANGE UP (ref 150–400)
PLATELET # BLD AUTO: 210 K/UL — SIGNIFICANT CHANGE UP (ref 150–400)
PLATELET # BLD AUTO: 212 K/UL — SIGNIFICANT CHANGE UP (ref 150–400)
PLATELET # BLD AUTO: 216 K/UL — SIGNIFICANT CHANGE UP (ref 150–400)
PLATELET # BLD AUTO: 217 K/UL — SIGNIFICANT CHANGE UP (ref 150–400)
PLATELET # BLD AUTO: 219 K/UL — SIGNIFICANT CHANGE UP (ref 150–400)
PLATELET # BLD AUTO: 224 K/UL — SIGNIFICANT CHANGE UP (ref 150–400)
PLATELET # BLD AUTO: 225 K/UL — SIGNIFICANT CHANGE UP (ref 150–400)
PLATELET # BLD AUTO: 239 K/UL — SIGNIFICANT CHANGE UP (ref 150–400)
PLATELET # BLD AUTO: 246 K/UL — SIGNIFICANT CHANGE UP (ref 150–400)
PLATELET # BLD AUTO: 248 K/UL — SIGNIFICANT CHANGE UP (ref 150–400)
PO2 BLDA: 65 MMHG — LOW (ref 83–108)
PO2 BLDA: 72 MMHG — LOW (ref 83–108)
PO2 BLDA: 75 MMHG — LOW (ref 83–108)
PO2 BLDA: 78 MMHG — LOW (ref 83–108)
PO2 BLDA: 80 MMHG — LOW (ref 83–108)
POIKILOCYTOSIS BLD QL AUTO: SLIGHT — SIGNIFICANT CHANGE UP
POTASSIUM SERPL-MCNC: 3.2 MMOL/L — LOW (ref 3.5–5.3)
POTASSIUM SERPL-MCNC: 3.6 MMOL/L — SIGNIFICANT CHANGE UP (ref 3.5–5.3)
POTASSIUM SERPL-MCNC: 3.6 MMOL/L — SIGNIFICANT CHANGE UP (ref 3.5–5.3)
POTASSIUM SERPL-MCNC: 3.8 MMOL/L — SIGNIFICANT CHANGE UP (ref 3.5–5.3)
POTASSIUM SERPL-MCNC: 3.9 MMOL/L — SIGNIFICANT CHANGE UP (ref 3.5–5.3)
POTASSIUM SERPL-MCNC: 3.9 MMOL/L — SIGNIFICANT CHANGE UP (ref 3.5–5.3)
POTASSIUM SERPL-MCNC: 4 MMOL/L — SIGNIFICANT CHANGE UP (ref 3.5–5.3)
POTASSIUM SERPL-MCNC: 4.1 MMOL/L — SIGNIFICANT CHANGE UP (ref 3.5–5.3)
POTASSIUM SERPL-MCNC: 4.1 MMOL/L — SIGNIFICANT CHANGE UP (ref 3.5–5.3)
POTASSIUM SERPL-MCNC: 4.2 MMOL/L — SIGNIFICANT CHANGE UP (ref 3.5–5.3)
POTASSIUM SERPL-MCNC: 4.3 MMOL/L — SIGNIFICANT CHANGE UP (ref 3.5–5.3)
POTASSIUM SERPL-MCNC: 4.3 MMOL/L — SIGNIFICANT CHANGE UP (ref 3.5–5.3)
POTASSIUM SERPL-MCNC: 4.4 MMOL/L — SIGNIFICANT CHANGE UP (ref 3.5–5.3)
POTASSIUM SERPL-MCNC: 4.5 MMOL/L — SIGNIFICANT CHANGE UP (ref 3.5–5.3)
POTASSIUM SERPL-MCNC: 4.6 MMOL/L — SIGNIFICANT CHANGE UP (ref 3.5–5.3)
POTASSIUM SERPL-MCNC: 4.7 MMOL/L — SIGNIFICANT CHANGE UP (ref 3.5–5.3)
POTASSIUM SERPL-MCNC: 4.8 MMOL/L — SIGNIFICANT CHANGE UP (ref 3.5–5.3)
POTASSIUM SERPL-MCNC: 5 MMOL/L — SIGNIFICANT CHANGE UP (ref 3.5–5.3)
POTASSIUM SERPL-MCNC: 5.3 MMOL/L — SIGNIFICANT CHANGE UP (ref 3.5–5.3)
POTASSIUM SERPL-MCNC: 5.7 MMOL/L — HIGH (ref 3.5–5.3)
POTASSIUM SERPL-MCNC: 5.8 MMOL/L — HIGH (ref 3.5–5.3)
POTASSIUM SERPL-MCNC: 5.9 MMOL/L — HIGH (ref 3.5–5.3)
POTASSIUM SERPL-SCNC: 3.2 MMOL/L — LOW (ref 3.5–5.3)
POTASSIUM SERPL-SCNC: 3.6 MMOL/L — SIGNIFICANT CHANGE UP (ref 3.5–5.3)
POTASSIUM SERPL-SCNC: 3.6 MMOL/L — SIGNIFICANT CHANGE UP (ref 3.5–5.3)
POTASSIUM SERPL-SCNC: 3.8 MMOL/L — SIGNIFICANT CHANGE UP (ref 3.5–5.3)
POTASSIUM SERPL-SCNC: 3.9 MMOL/L — SIGNIFICANT CHANGE UP (ref 3.5–5.3)
POTASSIUM SERPL-SCNC: 3.9 MMOL/L — SIGNIFICANT CHANGE UP (ref 3.5–5.3)
POTASSIUM SERPL-SCNC: 4 MMOL/L — SIGNIFICANT CHANGE UP (ref 3.5–5.3)
POTASSIUM SERPL-SCNC: 4.1 MMOL/L — SIGNIFICANT CHANGE UP (ref 3.5–5.3)
POTASSIUM SERPL-SCNC: 4.1 MMOL/L — SIGNIFICANT CHANGE UP (ref 3.5–5.3)
POTASSIUM SERPL-SCNC: 4.2 MMOL/L — SIGNIFICANT CHANGE UP (ref 3.5–5.3)
POTASSIUM SERPL-SCNC: 4.3 MMOL/L — SIGNIFICANT CHANGE UP (ref 3.5–5.3)
POTASSIUM SERPL-SCNC: 4.3 MMOL/L — SIGNIFICANT CHANGE UP (ref 3.5–5.3)
POTASSIUM SERPL-SCNC: 4.4 MMOL/L — SIGNIFICANT CHANGE UP (ref 3.5–5.3)
POTASSIUM SERPL-SCNC: 4.5 MMOL/L — SIGNIFICANT CHANGE UP (ref 3.5–5.3)
POTASSIUM SERPL-SCNC: 4.6 MMOL/L — SIGNIFICANT CHANGE UP (ref 3.5–5.3)
POTASSIUM SERPL-SCNC: 4.7 MMOL/L — SIGNIFICANT CHANGE UP (ref 3.5–5.3)
POTASSIUM SERPL-SCNC: 4.8 MMOL/L — SIGNIFICANT CHANGE UP (ref 3.5–5.3)
POTASSIUM SERPL-SCNC: 5 MMOL/L — SIGNIFICANT CHANGE UP (ref 3.5–5.3)
POTASSIUM SERPL-SCNC: 5.3 MMOL/L — SIGNIFICANT CHANGE UP (ref 3.5–5.3)
POTASSIUM SERPL-SCNC: 5.7 MMOL/L — HIGH (ref 3.5–5.3)
POTASSIUM SERPL-SCNC: 5.8 MMOL/L — HIGH (ref 3.5–5.3)
POTASSIUM SERPL-SCNC: 5.9 MMOL/L — HIGH (ref 3.5–5.3)
PREALB SERPL-MCNC: 15 MG/DL — LOW (ref 18–38)
PROCALCITONIN SERPL-MCNC: 0.11 NG/ML — HIGH (ref 0–0.04)
PROCALCITONIN SERPL-MCNC: 0.19 NG/ML — HIGH (ref 0–0.04)
PROCALCITONIN SERPL-MCNC: 0.42 NG/ML — HIGH (ref 0–0.04)
PROT SERPL-MCNC: 5.8 G/DL — LOW (ref 6.6–8.7)
PROT SERPL-MCNC: 6 G/DL — LOW (ref 6.6–8.7)
PROT SERPL-MCNC: 6.4 G/DL — LOW (ref 6.6–8.7)
PROT SERPL-MCNC: 6.5 G/DL — LOW (ref 6.6–8.7)
PROT SERPL-MCNC: 6.5 G/DL — LOW (ref 6.6–8.7)
PROT SERPL-MCNC: 6.8 G/DL — SIGNIFICANT CHANGE UP (ref 6.6–8.7)
PROT UR-MCNC: 100 MG/DL
PROT UR-MCNC: 30 MG/DL
PROT UR-MCNC: 500 MG/DL
PROTHROM AB SERPL-ACNC: 11.1 SEC — SIGNIFICANT CHANGE UP (ref 9.8–12.7)
PROTHROM AB SERPL-ACNC: 11.3 SEC — SIGNIFICANT CHANGE UP (ref 9.8–12.7)
PROTHROM AB SERPL-ACNC: 11.9 SEC — SIGNIFICANT CHANGE UP (ref 10–12.9)
PROTHROM AB SERPL-ACNC: 11.9 SEC — SIGNIFICANT CHANGE UP (ref 9.8–12.7)
RAPID RVP RESULT: SIGNIFICANT CHANGE UP
RBC # BLD: 2.78 M/UL — LOW (ref 4.4–5.2)
RBC # BLD: 2.91 M/UL — LOW (ref 4.4–5.2)
RBC # BLD: 2.93 M/UL — LOW (ref 4.4–5.2)
RBC # BLD: 3 M/UL — LOW (ref 4.4–5.2)
RBC # BLD: 3.03 M/UL — LOW (ref 4.4–5.2)
RBC # BLD: 3.08 M/UL — LOW (ref 4.4–5.2)
RBC # BLD: 3.09 M/UL — LOW (ref 4.4–5.2)
RBC # BLD: 3.11 M/UL — LOW (ref 4.4–5.2)
RBC # BLD: 3.19 M/UL — LOW (ref 4.4–5.2)
RBC # BLD: 3.19 M/UL — LOW (ref 4.4–5.2)
RBC # BLD: 3.24 M/UL — LOW (ref 4.4–5.2)
RBC # BLD: 3.26 M/UL — LOW (ref 4.4–5.2)
RBC # BLD: 3.29 M/UL — LOW (ref 4.4–5.2)
RBC # BLD: 3.31 M/UL — LOW (ref 4.4–5.2)
RBC # BLD: 3.33 M/UL — LOW (ref 4.4–5.2)
RBC # BLD: 3.35 M/UL — LOW (ref 4.4–5.2)
RBC # BLD: 3.36 M/UL — LOW (ref 4.4–5.2)
RBC # BLD: 3.37 M/UL — LOW (ref 4.4–5.2)
RBC # BLD: 3.38 M/UL — LOW (ref 4.4–5.2)
RBC # BLD: 3.4 M/UL — LOW (ref 4.4–5.2)
RBC # BLD: 3.4 M/UL — LOW (ref 4.4–5.2)
RBC # BLD: 3.41 M/UL — LOW (ref 4.4–5.2)
RBC # BLD: 3.44 M/UL — LOW (ref 4.4–5.2)
RBC # BLD: 3.44 M/UL — LOW (ref 4.4–5.2)
RBC # BLD: 3.46 M/UL — LOW (ref 4.4–5.2)
RBC # BLD: 3.46 M/UL — LOW (ref 4.4–5.2)
RBC # BLD: 3.52 M/UL — LOW (ref 4.4–5.2)
RBC # BLD: 3.56 M/UL — LOW (ref 4.4–5.2)
RBC # BLD: 3.57 M/UL — LOW (ref 4.4–5.2)
RBC # BLD: 3.59 M/UL — LOW (ref 4.4–5.2)
RBC # BLD: 3.64 M/UL — LOW (ref 4.4–5.2)
RBC # BLD: 3.65 M/UL — LOW (ref 4.4–5.2)
RBC # BLD: 3.65 M/UL — LOW (ref 4.4–5.2)
RBC # BLD: 3.69 M/UL — LOW (ref 4.4–5.2)
RBC # BLD: 3.7 M/UL — LOW (ref 4.4–5.2)
RBC # BLD: 3.82 M/UL — LOW (ref 4.4–5.2)
RBC # BLD: 3.86 M/UL — LOW (ref 4.4–5.2)
RBC # BLD: 3.98 M/UL — LOW (ref 4.4–5.2)
RBC # FLD: 14 % — SIGNIFICANT CHANGE UP (ref 11–15.6)
RBC # FLD: 14.2 % — SIGNIFICANT CHANGE UP (ref 11–15.6)
RBC # FLD: 14.3 % — SIGNIFICANT CHANGE UP (ref 11–15.6)
RBC # FLD: 14.4 % — SIGNIFICANT CHANGE UP (ref 11–15.6)
RBC # FLD: 14.4 % — SIGNIFICANT CHANGE UP (ref 11–15.6)
RBC # FLD: 14.5 % — SIGNIFICANT CHANGE UP (ref 11–15.6)
RBC # FLD: 14.6 % — SIGNIFICANT CHANGE UP (ref 11–15.6)
RBC # FLD: 14.6 % — SIGNIFICANT CHANGE UP (ref 11–15.6)
RBC # FLD: 14.7 % — SIGNIFICANT CHANGE UP (ref 11–15.6)
RBC # FLD: 14.8 % — SIGNIFICANT CHANGE UP (ref 11–15.6)
RBC # FLD: 14.9 % — SIGNIFICANT CHANGE UP (ref 11–15.6)
RBC # FLD: 15 % — SIGNIFICANT CHANGE UP (ref 11–15.6)
RBC # FLD: 15.2 % — SIGNIFICANT CHANGE UP (ref 11–15.6)
RBC # FLD: 15.3 % — SIGNIFICANT CHANGE UP (ref 11–15.6)
RBC # FLD: 15.4 % — SIGNIFICANT CHANGE UP (ref 11–15.6)
RBC # FLD: 15.7 % — HIGH (ref 11–15.6)
RBC # FLD: 15.8 % — HIGH (ref 11–15.6)
RBC # FLD: 15.8 % — HIGH (ref 11–15.6)
RBC # FLD: 15.9 % — HIGH (ref 11–15.6)
RBC # FLD: 16.1 % — HIGH (ref 11–15.6)
RBC # FLD: 16.2 % — HIGH (ref 11–15.6)
RBC # FLD: 16.4 % — HIGH (ref 11–15.6)
RBC BLD AUTO: ABNORMAL
RBC CASTS # UR COMP ASSIST: ABNORMAL /HPF (ref 0–4)
RBC CASTS # UR COMP ASSIST: SIGNIFICANT CHANGE UP /HPF (ref 0–4)
SAO2 % BLDA: 94 % — LOW (ref 95–99)
SAO2 % BLDA: 96 % — SIGNIFICANT CHANGE UP (ref 95–99)
SAO2 % BLDA: 96 % — SIGNIFICANT CHANGE UP (ref 95–99)
SAO2 % BLDA: 97 % — SIGNIFICANT CHANGE UP (ref 95–99)
SAO2 % BLDA: 98 % — SIGNIFICANT CHANGE UP (ref 95–99)
SODIUM SERPL-SCNC: 137 MMOL/L — SIGNIFICANT CHANGE UP (ref 135–145)
SODIUM SERPL-SCNC: 139 MMOL/L — SIGNIFICANT CHANGE UP (ref 135–145)
SODIUM SERPL-SCNC: 141 MMOL/L — SIGNIFICANT CHANGE UP (ref 135–145)
SODIUM SERPL-SCNC: 142 MMOL/L — SIGNIFICANT CHANGE UP (ref 135–145)
SODIUM SERPL-SCNC: 143 MMOL/L — SIGNIFICANT CHANGE UP (ref 135–145)
SODIUM SERPL-SCNC: 144 MMOL/L — SIGNIFICANT CHANGE UP (ref 135–145)
SODIUM SERPL-SCNC: 145 MMOL/L — SIGNIFICANT CHANGE UP (ref 135–145)
SODIUM SERPL-SCNC: 146 MMOL/L — HIGH (ref 135–145)
SODIUM SERPL-SCNC: 147 MMOL/L — HIGH (ref 135–145)
SODIUM SERPL-SCNC: 148 MMOL/L — HIGH (ref 135–145)
SODIUM SERPL-SCNC: 150 MMOL/L — HIGH (ref 135–145)
SODIUM SERPL-SCNC: 150 MMOL/L — HIGH (ref 135–145)
SODIUM SERPL-SCNC: 151 MMOL/L — HIGH (ref 135–145)
SODIUM SERPL-SCNC: 153 MMOL/L — HIGH (ref 135–145)
SODIUM UR-SCNC: <30 MMOL/L — SIGNIFICANT CHANGE UP
SP GR SPEC: 1.01 — SIGNIFICANT CHANGE UP (ref 1.01–1.02)
SP GR SPEC: 1.02 — SIGNIFICANT CHANGE UP (ref 1.01–1.02)
SPECIMEN SOURCE: SIGNIFICANT CHANGE UP
T PALLIDUM AB TITR SER: NEGATIVE — SIGNIFICANT CHANGE UP
T3 SERPL-MCNC: 42 NG/DL — LOW (ref 80–200)
T4 AB SER-ACNC: 5.1 UG/DL — SIGNIFICANT CHANGE UP (ref 4.5–12)
TIBC SERPL-MCNC: 243 UG/DL — SIGNIFICANT CHANGE UP (ref 220–430)
TOTAL CHOLESTEROL/HDL RATIO MEASUREMENT: 7 RATIO — SIGNIFICANT CHANGE UP (ref 3.3–7.1)
TRANSFERRIN SERPL-MCNC: 170 MG/DL — LOW (ref 192–382)
TRIGL SERPL-MCNC: 119 MG/DL — SIGNIFICANT CHANGE UP (ref 10–200)
TROPONIN T SERPL-MCNC: 0.01 NG/ML — SIGNIFICANT CHANGE UP (ref 0–0.06)
TROPONIN T SERPL-MCNC: 0.21 NG/ML — HIGH (ref 0–0.06)
TROPONIN T SERPL-MCNC: 0.24 NG/ML — HIGH (ref 0–0.06)
TROPONIN T SERPL-MCNC: 0.26 NG/ML — HIGH (ref 0–0.06)
TROPONIN T SERPL-MCNC: 0.27 NG/ML — HIGH (ref 0–0.06)
TSH SERPL-MCNC: 1.65 UIU/ML — SIGNIFICANT CHANGE UP (ref 0.27–4.2)
TSH SERPL-MCNC: 2.5 UIU/ML — SIGNIFICANT CHANGE UP (ref 0.27–4.2)
TYPE + AB SCN PNL BLD: SIGNIFICANT CHANGE UP
UROBILINOGEN FLD QL: NEGATIVE MG/DL — SIGNIFICANT CHANGE UP
VANCOMYCIN TROUGH SERPL-MCNC: 18 UG/ML — SIGNIFICANT CHANGE UP (ref 10–20)
VIT B12 SERPL-MCNC: >2000 PG/ML — HIGH (ref 232–1245)
VIT B12 SERPL-MCNC: >2000 PG/ML — HIGH (ref 232–1245)
WBC # BLD: 10.3 K/UL — SIGNIFICANT CHANGE UP (ref 4.8–10.8)
WBC # BLD: 10.4 K/UL — SIGNIFICANT CHANGE UP (ref 4.8–10.8)
WBC # BLD: 11.1 K/UL — HIGH (ref 4.8–10.8)
WBC # BLD: 3.3 K/UL — LOW (ref 4.8–10.8)
WBC # BLD: 4.1 K/UL — LOW (ref 4.8–10.8)
WBC # BLD: 4.3 K/UL — LOW (ref 4.8–10.8)
WBC # BLD: 4.3 K/UL — LOW (ref 4.8–10.8)
WBC # BLD: 4.4 K/UL — LOW (ref 4.8–10.8)
WBC # BLD: 4.4 K/UL — LOW (ref 4.8–10.8)
WBC # BLD: 4.7 K/UL — LOW (ref 4.8–10.8)
WBC # BLD: 4.8 K/UL — SIGNIFICANT CHANGE UP (ref 4.8–10.8)
WBC # BLD: 4.8 K/UL — SIGNIFICANT CHANGE UP (ref 4.8–10.8)
WBC # BLD: 5.1 K/UL — SIGNIFICANT CHANGE UP (ref 4.8–10.8)
WBC # BLD: 5.3 K/UL — SIGNIFICANT CHANGE UP (ref 4.8–10.8)
WBC # BLD: 5.3 K/UL — SIGNIFICANT CHANGE UP (ref 4.8–10.8)
WBC # BLD: 5.4 K/UL — SIGNIFICANT CHANGE UP (ref 4.8–10.8)
WBC # BLD: 5.6 K/UL — SIGNIFICANT CHANGE UP (ref 4.8–10.8)
WBC # BLD: 5.6 K/UL — SIGNIFICANT CHANGE UP (ref 4.8–10.8)
WBC # BLD: 5.7 K/UL — SIGNIFICANT CHANGE UP (ref 4.8–10.8)
WBC # BLD: 5.8 K/UL — SIGNIFICANT CHANGE UP (ref 4.8–10.8)
WBC # BLD: 6 K/UL — SIGNIFICANT CHANGE UP (ref 4.8–10.8)
WBC # BLD: 6 K/UL — SIGNIFICANT CHANGE UP (ref 4.8–10.8)
WBC # BLD: 6.3 K/UL — SIGNIFICANT CHANGE UP (ref 4.8–10.8)
WBC # BLD: 6.5 K/UL — SIGNIFICANT CHANGE UP (ref 4.8–10.8)
WBC # BLD: 6.6 K/UL — SIGNIFICANT CHANGE UP (ref 4.8–10.8)
WBC # BLD: 6.6 K/UL — SIGNIFICANT CHANGE UP (ref 4.8–10.8)
WBC # BLD: 6.7 K/UL — SIGNIFICANT CHANGE UP (ref 4.8–10.8)
WBC # BLD: 6.9 K/UL — SIGNIFICANT CHANGE UP (ref 4.8–10.8)
WBC # BLD: 7.1 K/UL — SIGNIFICANT CHANGE UP (ref 4.8–10.8)
WBC # BLD: 7.2 K/UL — SIGNIFICANT CHANGE UP (ref 4.8–10.8)
WBC # BLD: 7.9 K/UL — SIGNIFICANT CHANGE UP (ref 4.8–10.8)
WBC # BLD: 8.3 K/UL — SIGNIFICANT CHANGE UP (ref 4.8–10.8)
WBC # FLD AUTO: 10.3 K/UL — SIGNIFICANT CHANGE UP (ref 4.8–10.8)
WBC # FLD AUTO: 10.4 K/UL — SIGNIFICANT CHANGE UP (ref 4.8–10.8)
WBC # FLD AUTO: 11.1 K/UL — HIGH (ref 4.8–10.8)
WBC # FLD AUTO: 3.3 K/UL — LOW (ref 4.8–10.8)
WBC # FLD AUTO: 4.1 K/UL — LOW (ref 4.8–10.8)
WBC # FLD AUTO: 4.3 K/UL — LOW (ref 4.8–10.8)
WBC # FLD AUTO: 4.3 K/UL — LOW (ref 4.8–10.8)
WBC # FLD AUTO: 4.4 K/UL — LOW (ref 4.8–10.8)
WBC # FLD AUTO: 4.4 K/UL — LOW (ref 4.8–10.8)
WBC # FLD AUTO: 4.7 K/UL — LOW (ref 4.8–10.8)
WBC # FLD AUTO: 4.8 K/UL — SIGNIFICANT CHANGE UP (ref 4.8–10.8)
WBC # FLD AUTO: 4.8 K/UL — SIGNIFICANT CHANGE UP (ref 4.8–10.8)
WBC # FLD AUTO: 5.1 K/UL — SIGNIFICANT CHANGE UP (ref 4.8–10.8)
WBC # FLD AUTO: 5.3 K/UL — SIGNIFICANT CHANGE UP (ref 4.8–10.8)
WBC # FLD AUTO: 5.3 K/UL — SIGNIFICANT CHANGE UP (ref 4.8–10.8)
WBC # FLD AUTO: 5.4 K/UL — SIGNIFICANT CHANGE UP (ref 4.8–10.8)
WBC # FLD AUTO: 5.6 K/UL — SIGNIFICANT CHANGE UP (ref 4.8–10.8)
WBC # FLD AUTO: 5.6 K/UL — SIGNIFICANT CHANGE UP (ref 4.8–10.8)
WBC # FLD AUTO: 5.7 K/UL — SIGNIFICANT CHANGE UP (ref 4.8–10.8)
WBC # FLD AUTO: 5.8 K/UL — SIGNIFICANT CHANGE UP (ref 4.8–10.8)
WBC # FLD AUTO: 6 K/UL — SIGNIFICANT CHANGE UP (ref 4.8–10.8)
WBC # FLD AUTO: 6 K/UL — SIGNIFICANT CHANGE UP (ref 4.8–10.8)
WBC # FLD AUTO: 6.3 K/UL — SIGNIFICANT CHANGE UP (ref 4.8–10.8)
WBC # FLD AUTO: 6.5 K/UL — SIGNIFICANT CHANGE UP (ref 4.8–10.8)
WBC # FLD AUTO: 6.6 K/UL — SIGNIFICANT CHANGE UP (ref 4.8–10.8)
WBC # FLD AUTO: 6.6 K/UL — SIGNIFICANT CHANGE UP (ref 4.8–10.8)
WBC # FLD AUTO: 6.7 K/UL — SIGNIFICANT CHANGE UP (ref 4.8–10.8)
WBC # FLD AUTO: 6.9 K/UL — SIGNIFICANT CHANGE UP (ref 4.8–10.8)
WBC # FLD AUTO: 7.1 K/UL — SIGNIFICANT CHANGE UP (ref 4.8–10.8)
WBC # FLD AUTO: 7.2 K/UL — SIGNIFICANT CHANGE UP (ref 4.8–10.8)
WBC # FLD AUTO: 7.9 K/UL — SIGNIFICANT CHANGE UP (ref 4.8–10.8)
WBC # FLD AUTO: 8.3 K/UL — SIGNIFICANT CHANGE UP (ref 4.8–10.8)
WBC UR QL: ABNORMAL
WBC UR QL: SIGNIFICANT CHANGE UP

## 2018-01-01 PROCEDURE — 83605 ASSAY OF LACTIC ACID: CPT

## 2018-01-01 PROCEDURE — 82803 BLOOD GASES ANY COMBINATION: CPT

## 2018-01-01 PROCEDURE — 99233 SBSQ HOSP IP/OBS HIGH 50: CPT

## 2018-01-01 PROCEDURE — 85027 COMPLETE CBC AUTOMATED: CPT

## 2018-01-01 PROCEDURE — 99284 EMERGENCY DEPT VISIT MOD MDM: CPT | Mod: 25

## 2018-01-01 PROCEDURE — 83880 ASSAY OF NATRIURETIC PEPTIDE: CPT

## 2018-01-01 PROCEDURE — 71045 X-RAY EXAM CHEST 1 VIEW: CPT

## 2018-01-01 PROCEDURE — 99223 1ST HOSP IP/OBS HIGH 75: CPT

## 2018-01-01 PROCEDURE — 83735 ASSAY OF MAGNESIUM: CPT

## 2018-01-01 PROCEDURE — 95819 EEG AWAKE AND ASLEEP: CPT

## 2018-01-01 PROCEDURE — 82607 VITAMIN B-12: CPT

## 2018-01-01 PROCEDURE — 85730 THROMBOPLASTIN TIME PARTIAL: CPT

## 2018-01-01 PROCEDURE — 99232 SBSQ HOSP IP/OBS MODERATE 35: CPT

## 2018-01-01 PROCEDURE — 82550 ASSAY OF CK (CPK): CPT

## 2018-01-01 PROCEDURE — 99358 PROLONG SERVICE W/O CONTACT: CPT

## 2018-01-01 PROCEDURE — 84484 ASSAY OF TROPONIN QUANT: CPT

## 2018-01-01 PROCEDURE — 87186 SC STD MICRODIL/AGAR DIL: CPT

## 2018-01-01 PROCEDURE — 93880 EXTRACRANIAL BILAT STUDY: CPT

## 2018-01-01 PROCEDURE — 85610 PROTHROMBIN TIME: CPT

## 2018-01-01 PROCEDURE — 80202 ASSAY OF VANCOMYCIN: CPT

## 2018-01-01 PROCEDURE — 93005 ELECTROCARDIOGRAM TRACING: CPT

## 2018-01-01 PROCEDURE — 43752 NASAL/OROGASTRIC W/TUBE PLMT: CPT

## 2018-01-01 PROCEDURE — 97530 THERAPEUTIC ACTIVITIES: CPT

## 2018-01-01 PROCEDURE — 71045 X-RAY EXAM CHEST 1 VIEW: CPT | Mod: 26

## 2018-01-01 PROCEDURE — 84132 ASSAY OF SERUM POTASSIUM: CPT

## 2018-01-01 PROCEDURE — 99497 ADVNCD CARE PLAN 30 MIN: CPT

## 2018-01-01 PROCEDURE — 80053 COMPREHEN METABOLIC PANEL: CPT

## 2018-01-01 PROCEDURE — 84100 ASSAY OF PHOSPHORUS: CPT

## 2018-01-01 PROCEDURE — 82962 GLUCOSE BLOOD TEST: CPT

## 2018-01-01 PROCEDURE — 84300 ASSAY OF URINE SODIUM: CPT

## 2018-01-01 PROCEDURE — 97167 OT EVAL HIGH COMPLEX 60 MIN: CPT

## 2018-01-01 PROCEDURE — 99285 EMERGENCY DEPT VISIT HI MDM: CPT

## 2018-01-01 PROCEDURE — 74018 RADEX ABDOMEN 1 VIEW: CPT | Mod: 26

## 2018-01-01 PROCEDURE — 36600 WITHDRAWAL OF ARTERIAL BLOOD: CPT

## 2018-01-01 PROCEDURE — 71045 X-RAY EXAM CHEST 1 VIEW: CPT | Mod: 26,77

## 2018-01-01 PROCEDURE — 93010 ELECTROCARDIOGRAM REPORT: CPT

## 2018-01-01 PROCEDURE — 70450 CT HEAD/BRAIN W/O DYE: CPT | Mod: 26

## 2018-01-01 PROCEDURE — 71250 CT THORAX DX C-: CPT

## 2018-01-01 PROCEDURE — 97110 THERAPEUTIC EXERCISES: CPT

## 2018-01-01 PROCEDURE — 87040 BLOOD CULTURE FOR BACTERIA: CPT

## 2018-01-01 PROCEDURE — 99285 EMERGENCY DEPT VISIT HI MDM: CPT | Mod: 25

## 2018-01-01 PROCEDURE — 72131 CT LUMBAR SPINE W/O DYE: CPT | Mod: 26

## 2018-01-01 PROCEDURE — 92610 EVALUATE SWALLOWING FUNCTION: CPT

## 2018-01-01 PROCEDURE — 73060 X-RAY EXAM OF HUMERUS: CPT | Mod: 26,RT

## 2018-01-01 PROCEDURE — 82140 ASSAY OF AMMONIA: CPT

## 2018-01-01 PROCEDURE — 80074 ACUTE HEPATITIS PANEL: CPT

## 2018-01-01 PROCEDURE — 93880 EXTRACRANIAL BILAT STUDY: CPT | Mod: 26

## 2018-01-01 PROCEDURE — 93306 TTE W/DOPPLER COMPLETE: CPT

## 2018-01-01 PROCEDURE — 99498 ADVNCD CARE PLAN ADDL 30 MIN: CPT

## 2018-01-01 PROCEDURE — 70450 CT HEAD/BRAIN W/O DYE: CPT

## 2018-01-01 PROCEDURE — 99223 1ST HOSP IP/OBS HIGH 75: CPT | Mod: AI

## 2018-01-01 PROCEDURE — 71250 CT THORAX DX C-: CPT | Mod: 26

## 2018-01-01 PROCEDURE — C9254: CPT

## 2018-01-01 PROCEDURE — 84145 PROCALCITONIN (PCT): CPT

## 2018-01-01 PROCEDURE — 83036 HEMOGLOBIN GLYCOSYLATED A1C: CPT

## 2018-01-01 PROCEDURE — 87086 URINE CULTURE/COLONY COUNT: CPT

## 2018-01-01 PROCEDURE — 94640 AIRWAY INHALATION TREATMENT: CPT

## 2018-01-01 PROCEDURE — 81001 URINALYSIS AUTO W/SCOPE: CPT

## 2018-01-01 PROCEDURE — 95819 EEG AWAKE AND ASLEEP: CPT | Mod: 26

## 2018-01-01 PROCEDURE — 87150 DNA/RNA AMPLIFIED PROBE: CPT

## 2018-01-01 PROCEDURE — 99222 1ST HOSP IP/OBS MODERATE 55: CPT | Mod: AI

## 2018-01-01 PROCEDURE — 99222 1ST HOSP IP/OBS MODERATE 55: CPT

## 2018-01-01 PROCEDURE — 93306 TTE W/DOPPLER COMPLETE: CPT | Mod: 26

## 2018-01-01 PROCEDURE — 92526 ORAL FUNCTION THERAPY: CPT

## 2018-01-01 PROCEDURE — 74176 CT ABD & PELVIS W/O CONTRAST: CPT | Mod: 26

## 2018-01-01 PROCEDURE — 99024 POSTOP FOLLOW-UP VISIT: CPT

## 2018-01-01 PROCEDURE — 80048 BASIC METABOLIC PNL TOTAL CA: CPT

## 2018-01-01 PROCEDURE — 84436 ASSAY OF TOTAL THYROXINE: CPT

## 2018-01-01 PROCEDURE — 99234 HOSP IP/OBS SM DT SF/LOW 45: CPT

## 2018-01-01 PROCEDURE — 74176 CT ABD & PELVIS W/O CONTRAST: CPT

## 2018-01-01 PROCEDURE — 72125 CT NECK SPINE W/O DYE: CPT | Mod: 26

## 2018-01-01 PROCEDURE — 99231 SBSQ HOSP IP/OBS SF/LOW 25: CPT

## 2018-01-01 PROCEDURE — 84295 ASSAY OF SERUM SODIUM: CPT

## 2018-01-01 PROCEDURE — 86703 HIV-1/HIV-2 1 RESULT ANTBDY: CPT

## 2018-01-01 PROCEDURE — 76775 US EXAM ABDO BACK WALL LIM: CPT | Mod: 26

## 2018-01-01 PROCEDURE — 82947 ASSAY GLUCOSE BLOOD QUANT: CPT

## 2018-01-01 PROCEDURE — 96374 THER/PROPH/DIAG INJ IV PUSH: CPT

## 2018-01-01 PROCEDURE — 82435 ASSAY OF BLOOD CHLORIDE: CPT

## 2018-01-01 PROCEDURE — 85014 HEMATOCRIT: CPT

## 2018-01-01 PROCEDURE — 94760 N-INVAS EAR/PLS OXIMETRY 1: CPT

## 2018-01-01 PROCEDURE — 84443 ASSAY THYROID STIM HORMONE: CPT

## 2018-01-01 PROCEDURE — 82533 TOTAL CORTISOL: CPT

## 2018-01-01 PROCEDURE — 36415 COLL VENOUS BLD VENIPUNCTURE: CPT

## 2018-01-01 PROCEDURE — 80061 LIPID PANEL: CPT

## 2018-01-01 PROCEDURE — 82570 ASSAY OF URINE CREATININE: CPT

## 2018-01-01 PROCEDURE — 74018 RADEX ABDOMEN 1 VIEW: CPT

## 2018-01-01 PROCEDURE — 86780 TREPONEMA PALLIDUM: CPT

## 2018-01-01 PROCEDURE — 99214 OFFICE O/P EST MOD 30 MIN: CPT | Mod: 25

## 2018-01-01 PROCEDURE — 94010 BREATHING CAPACITY TEST: CPT

## 2018-01-01 PROCEDURE — 72128 CT CHEST SPINE W/O DYE: CPT | Mod: 26

## 2018-01-01 PROCEDURE — 70551 MRI BRAIN STEM W/O DYE: CPT | Mod: 26

## 2018-01-01 PROCEDURE — 72125 CT NECK SPINE W/O DYE: CPT

## 2018-01-01 PROCEDURE — 95951: CPT

## 2018-01-01 PROCEDURE — 70551 MRI BRAIN STEM W/O DYE: CPT

## 2018-01-01 PROCEDURE — 97535 SELF CARE MNGMENT TRAINING: CPT

## 2018-01-01 PROCEDURE — 94002 VENT MGMT INPAT INIT DAY: CPT

## 2018-01-01 PROCEDURE — 80177 DRUG SCRN QUAN LEVETIRACETAM: CPT

## 2018-01-01 PROCEDURE — 73060 X-RAY EXAM OF HUMERUS: CPT

## 2018-01-01 PROCEDURE — 31500 INSERT EMERGENCY AIRWAY: CPT

## 2018-01-01 PROCEDURE — 82330 ASSAY OF CALCIUM: CPT

## 2018-01-01 PROCEDURE — 97163 PT EVAL HIGH COMPLEX 45 MIN: CPT

## 2018-01-01 PROCEDURE — 76775 US EXAM ABDO BACK WALL LIM: CPT

## 2018-01-01 PROCEDURE — 99291 CRITICAL CARE FIRST HOUR: CPT | Mod: 25

## 2018-01-01 PROCEDURE — G0378: CPT

## 2018-01-01 PROCEDURE — 99239 HOSP IP/OBS DSCHRG MGMT >30: CPT

## 2018-01-01 PROCEDURE — 95951: CPT | Mod: 26

## 2018-01-01 PROCEDURE — 97116 GAIT TRAINING THERAPY: CPT

## 2018-01-01 PROCEDURE — 84480 ASSAY TRIIODOTHYRONINE (T3): CPT

## 2018-01-01 PROCEDURE — 94660 CPAP INITIATION&MGMT: CPT

## 2018-01-01 PROCEDURE — 97112 NEUROMUSCULAR REEDUCATION: CPT

## 2018-01-01 RX ORDER — FOSFOMYCIN TROMETHAMINE 3 G/1
3 POWDER ORAL
Qty: 1 | Refills: 0 | Status: ACTIVE | COMMUNITY
Start: 2017-12-22

## 2018-01-01 RX ORDER — ASPIRIN/CALCIUM CARB/MAGNESIUM 324 MG
81 TABLET ORAL DAILY
Qty: 0 | Refills: 0 | Status: DISCONTINUED | OUTPATIENT
Start: 2018-01-01 | End: 2018-01-01

## 2018-01-01 RX ORDER — LEVETIRACETAM 250 MG/1
250 TABLET, FILM COATED ORAL
Qty: 60 | Refills: 0 | Status: ACTIVE | COMMUNITY
Start: 2017-12-02

## 2018-01-01 RX ORDER — SODIUM CHLORIDE 9 MG/ML
1000 INJECTION, SOLUTION INTRAVENOUS
Qty: 0 | Refills: 0 | Status: DISCONTINUED | OUTPATIENT
Start: 2018-01-01 | End: 2018-01-01

## 2018-01-01 RX ORDER — SIMVASTATIN 20 MG/1
1 TABLET, FILM COATED ORAL
Qty: 0 | Refills: 0 | COMMUNITY
Start: 2018-01-01

## 2018-01-01 RX ORDER — LEVETIRACETAM 250 MG/1
250 TABLET, FILM COATED ORAL EVERY 12 HOURS
Qty: 0 | Refills: 0 | Status: DISCONTINUED | OUTPATIENT
Start: 2018-01-01 | End: 2018-01-01

## 2018-01-01 RX ORDER — HYDRALAZINE HCL 50 MG
10 TABLET ORAL EVERY 6 HOURS
Qty: 0 | Refills: 0 | Status: DISCONTINUED | OUTPATIENT
Start: 2018-01-01 | End: 2018-01-01

## 2018-01-01 RX ORDER — CHOLECALCIFEROL (VITAMIN D3) 125 MCG
2000 CAPSULE ORAL DAILY
Qty: 0 | Refills: 0 | Status: DISCONTINUED | OUTPATIENT
Start: 2018-01-01 | End: 2018-01-01

## 2018-01-01 RX ORDER — CIPROFLOXACIN LACTATE 400MG/40ML
500 VIAL (ML) INTRAVENOUS EVERY 12 HOURS
Qty: 0 | Refills: 0 | Status: COMPLETED | OUTPATIENT
Start: 2018-01-01 | End: 2018-01-01

## 2018-01-01 RX ORDER — HYDRALAZINE HCL 50 MG
10 TABLET ORAL
Qty: 0 | Refills: 0 | Status: DISCONTINUED | OUTPATIENT
Start: 2018-01-01 | End: 2018-01-01

## 2018-01-01 RX ORDER — HYDRALAZINE HYDROCHLORIDE 25 MG/1
25 TABLET ORAL
Qty: 180 | Refills: 0 | Status: ACTIVE | COMMUNITY
Start: 2017-12-03

## 2018-01-01 RX ORDER — INSULIN GLULISINE 100 [IU]/ML
8 INJECTION, SOLUTION SUBCUTANEOUS
Qty: 1 | Refills: 0 | OUTPATIENT
Start: 2018-01-01

## 2018-01-01 RX ORDER — HYDRALAZINE HCL 50 MG
10 TABLET ORAL ONCE
Qty: 0 | Refills: 0 | Status: COMPLETED | OUTPATIENT
Start: 2018-01-01 | End: 2018-01-01

## 2018-01-01 RX ORDER — ACETAMINOPHEN 500 MG
765 TABLET ORAL EVERY 6 HOURS
Qty: 0 | Refills: 0 | Status: DISCONTINUED | OUTPATIENT
Start: 2018-01-01 | End: 2018-01-01

## 2018-01-01 RX ORDER — LEVETIRACETAM 250 MG/1
2.5 TABLET, FILM COATED ORAL
Qty: 0 | Refills: 0 | COMMUNITY
Start: 2018-01-01

## 2018-01-01 RX ORDER — ACETAMINOPHEN 500 MG
650 TABLET ORAL ONCE
Qty: 0 | Refills: 0 | Status: COMPLETED | OUTPATIENT
Start: 2018-01-01 | End: 2018-01-01

## 2018-01-01 RX ORDER — INSULIN DETEMIR 100/ML (3)
10 INSULIN PEN (ML) SUBCUTANEOUS EVERY 12 HOURS
Qty: 0 | Refills: 0 | Status: DISCONTINUED | OUTPATIENT
Start: 2018-01-01 | End: 2018-01-01

## 2018-01-01 RX ORDER — SIMVASTATIN 20 MG/1
20 TABLET, FILM COATED ORAL AT BEDTIME
Qty: 0 | Refills: 0 | Status: DISCONTINUED | OUTPATIENT
Start: 2018-01-01 | End: 2018-01-01

## 2018-01-01 RX ORDER — TOBRAMYCIN AND DEXAMETHASONE 1; 3 MG/ML; MG/ML
2 SUSPENSION/ DROPS OPHTHALMIC
Qty: 0 | Refills: 0 | COMMUNITY

## 2018-01-01 RX ORDER — PIPERACILLIN AND TAZOBACTAM 4; .5 G/20ML; G/20ML
3.38 INJECTION, POWDER, LYOPHILIZED, FOR SOLUTION INTRAVENOUS EVERY 8 HOURS
Qty: 0 | Refills: 0 | Status: DISCONTINUED | OUTPATIENT
Start: 2018-01-01 | End: 2018-01-01

## 2018-01-01 RX ORDER — INSULIN LISPRO 100/ML
VIAL (ML) SUBCUTANEOUS
Qty: 0 | Refills: 0 | Status: DISCONTINUED | OUTPATIENT
Start: 2018-01-01 | End: 2018-01-01

## 2018-01-01 RX ORDER — LABETALOL HCL 100 MG
50 TABLET ORAL
Qty: 0 | Refills: 0 | COMMUNITY

## 2018-01-01 RX ORDER — LABETALOL HCL 100 MG
1 TABLET ORAL
Qty: 0 | Refills: 0 | COMMUNITY
Start: 2018-01-01

## 2018-01-01 RX ORDER — LEVETIRACETAM 250 MG/1
500 TABLET, FILM COATED ORAL AT BEDTIME
Qty: 0 | Refills: 0 | Status: DISCONTINUED | OUTPATIENT
Start: 2018-01-01 | End: 2018-01-01

## 2018-01-01 RX ORDER — GABAPENTIN 400 MG/1
0 CAPSULE ORAL
Qty: 0 | Refills: 0 | COMMUNITY

## 2018-01-01 RX ORDER — HYDRALAZINE HCL 50 MG
25 TABLET ORAL EVERY 8 HOURS
Qty: 0 | Refills: 0 | Status: DISCONTINUED | OUTPATIENT
Start: 2018-01-01 | End: 2018-01-01

## 2018-01-01 RX ORDER — LABETALOL HYDROCHLORIDE 200 MG/1
200 TABLET, FILM COATED ORAL
Qty: 90 | Refills: 0 | Status: ACTIVE | COMMUNITY
Start: 2018-01-01

## 2018-01-01 RX ORDER — HYDRALAZINE HCL 50 MG
50 TABLET ORAL EVERY 8 HOURS
Qty: 0 | Refills: 0 | Status: DISCONTINUED | OUTPATIENT
Start: 2018-01-01 | End: 2018-01-01

## 2018-01-01 RX ORDER — ACETAMINOPHEN 500 MG
1000 TABLET ORAL ONCE
Qty: 0 | Refills: 0 | Status: DISCONTINUED | OUTPATIENT
Start: 2018-01-01 | End: 2018-01-01

## 2018-01-01 RX ORDER — LABETALOL HCL 100 MG
100 TABLET ORAL AT BEDTIME
Qty: 0 | Refills: 0 | Status: DISCONTINUED | OUTPATIENT
Start: 2018-01-01 | End: 2018-01-01

## 2018-01-01 RX ORDER — CEFAZOLIN SODIUM 1 G
250 VIAL (EA) INJECTION EVERY 24 HOURS
Qty: 0 | Refills: 0 | Status: DISCONTINUED | OUTPATIENT
Start: 2018-01-01 | End: 2018-01-01

## 2018-01-01 RX ORDER — LIDOCAINE 4 G/100G
1 CREAM TOPICAL DAILY
Qty: 0 | Refills: 0 | Status: DISCONTINUED | OUTPATIENT
Start: 2018-01-01 | End: 2018-01-01

## 2018-01-01 RX ORDER — HUMAN INSULIN 100 [IU]/ML
3 INJECTION, SUSPENSION SUBCUTANEOUS EVERY 8 HOURS
Qty: 0 | Refills: 0 | Status: DISCONTINUED | OUTPATIENT
Start: 2018-01-01 | End: 2018-01-01

## 2018-01-01 RX ORDER — LACTOBACILLUS ACIDOPHILUS 100MM CELL
1 CAPSULE ORAL
Qty: 0 | Refills: 0 | Status: DISCONTINUED | OUTPATIENT
Start: 2018-01-01 | End: 2018-01-01

## 2018-01-01 RX ORDER — HYDRALAZINE HCL 50 MG
5 TABLET ORAL ONCE
Qty: 0 | Refills: 0 | Status: COMPLETED | OUTPATIENT
Start: 2018-01-01 | End: 2018-01-01

## 2018-01-01 RX ORDER — IPRATROPIUM/ALBUTEROL SULFATE 18-103MCG
3 AEROSOL WITH ADAPTER (GRAM) INHALATION
Qty: 0 | Refills: 0 | COMMUNITY
Start: 2018-01-01

## 2018-01-01 RX ORDER — INSULIN GLARGINE 100 [IU]/ML
0 INJECTION, SOLUTION SUBCUTANEOUS
Qty: 0 | Refills: 0 | COMMUNITY

## 2018-01-01 RX ORDER — ASPIRIN/CALCIUM CARB/MAGNESIUM 324 MG
300 TABLET ORAL ONCE
Qty: 0 | Refills: 0 | Status: COMPLETED | OUTPATIENT
Start: 2018-01-01 | End: 2018-01-01

## 2018-01-01 RX ORDER — TOBRAMYCIN AND DEXAMETHASONE 1; 3 MG/ML; MG/ML
1 SUSPENSION/ DROPS OPHTHALMIC DAILY
Qty: 0 | Refills: 0 | Status: DISCONTINUED | OUTPATIENT
Start: 2018-01-01 | End: 2018-01-01

## 2018-01-01 RX ORDER — LATANOPROST 0.05 MG/ML
1 SOLUTION/ DROPS OPHTHALMIC; TOPICAL
Qty: 0 | Refills: 0 | COMMUNITY
Start: 2018-01-01

## 2018-01-01 RX ORDER — CHOLECALCIFEROL (VITAMIN D3) 125 MCG
2000 CAPSULE ORAL
Qty: 0 | Refills: 0 | COMMUNITY
Start: 2018-01-01

## 2018-01-01 RX ORDER — DEXTROSE 50 % IN WATER 50 %
25 SYRINGE (ML) INTRAVENOUS ONCE
Qty: 0 | Refills: 0 | Status: COMPLETED | OUTPATIENT
Start: 2018-01-01 | End: 2018-01-01

## 2018-01-01 RX ORDER — SODIUM CHLORIDE 9 MG/ML
1000 INJECTION INTRAMUSCULAR; INTRAVENOUS; SUBCUTANEOUS
Qty: 0 | Refills: 0 | Status: DISCONTINUED | OUTPATIENT
Start: 2018-01-01 | End: 2018-01-01

## 2018-01-01 RX ORDER — SACCHAROMYCES BOULARDII 250 MG
250 POWDER IN PACKET (EA) ORAL
Qty: 0 | Refills: 0 | Status: DISCONTINUED | OUTPATIENT
Start: 2018-01-01 | End: 2018-01-01

## 2018-01-01 RX ORDER — HYDRALAZINE HCL 50 MG
5 TABLET ORAL EVERY 6 HOURS
Qty: 0 | Refills: 0 | Status: DISCONTINUED | OUTPATIENT
Start: 2018-01-01 | End: 2018-01-01

## 2018-01-01 RX ORDER — HEPARIN SODIUM 5000 [USP'U]/ML
5000 INJECTION INTRAVENOUS; SUBCUTANEOUS EVERY 8 HOURS
Qty: 0 | Refills: 0 | Status: DISCONTINUED | OUTPATIENT
Start: 2018-01-01 | End: 2018-01-01

## 2018-01-01 RX ORDER — LEVETIRACETAM 250 MG/1
1 TABLET, FILM COATED ORAL
Qty: 0 | Refills: 0 | COMMUNITY
Start: 2018-01-01

## 2018-01-01 RX ORDER — DEXTROSE 50 % IN WATER 50 %
15 SYRINGE (ML) INTRAVENOUS ONCE
Qty: 0 | Refills: 0 | Status: DISCONTINUED | OUTPATIENT
Start: 2018-01-01 | End: 2018-01-01

## 2018-01-01 RX ORDER — SACCHAROMYCES BOULARDII 250 MG
1 POWDER IN PACKET (EA) ORAL
Qty: 0 | Refills: 0 | COMMUNITY
Start: 2018-01-01

## 2018-01-01 RX ORDER — INSULIN GLARGINE 100 [IU]/ML
8 INJECTION, SOLUTION SUBCUTANEOUS
Qty: 0 | Refills: 0 | COMMUNITY
Start: 2018-01-01

## 2018-01-01 RX ORDER — VANCOMYCIN HCL 1 G
VIAL (EA) INTRAVENOUS
Qty: 0 | Refills: 0 | Status: DISCONTINUED | OUTPATIENT
Start: 2018-01-01 | End: 2018-01-01

## 2018-01-01 RX ORDER — LATANOPROST 0.05 MG/ML
1 SOLUTION/ DROPS OPHTHALMIC; TOPICAL AT BEDTIME
Qty: 0 | Refills: 0 | Status: DISCONTINUED | OUTPATIENT
Start: 2018-01-01 | End: 2018-01-01

## 2018-01-01 RX ORDER — SODIUM CHLORIDE 9 MG/ML
500 INJECTION, SOLUTION INTRAVENOUS ONCE
Qty: 0 | Refills: 0 | Status: COMPLETED | OUTPATIENT
Start: 2018-01-01 | End: 2018-01-01

## 2018-01-01 RX ORDER — BRIMONIDINE TARTRATE 2 MG/MG
1 SOLUTION/ DROPS OPHTHALMIC DAILY
Qty: 0 | Refills: 0 | Status: DISCONTINUED | OUTPATIENT
Start: 2018-01-01 | End: 2018-01-01

## 2018-01-01 RX ORDER — BRIMONIDINE TARTRATE 2 MG/MG
1 SOLUTION/ DROPS OPHTHALMIC
Qty: 0 | Refills: 0 | COMMUNITY
Start: 2018-01-01

## 2018-01-01 RX ORDER — BISACODYL 10 MG/1
10 SUPPOSITORY RECTAL
Refills: 0 | Status: ACTIVE | COMMUNITY

## 2018-01-01 RX ORDER — MORPHINE SULFATE 50 MG/1
2 CAPSULE, EXTENDED RELEASE ORAL
Qty: 0 | Refills: 0 | Status: DISCONTINUED | OUTPATIENT
Start: 2018-01-01 | End: 2018-01-01

## 2018-01-01 RX ORDER — METOPROLOL TARTRATE 50 MG
12.5 TABLET ORAL
Qty: 0 | Refills: 0 | Status: DISCONTINUED | OUTPATIENT
Start: 2018-01-01 | End: 2018-01-01

## 2018-01-01 RX ORDER — GABAPENTIN 400 MG/1
300 CAPSULE ORAL THREE TIMES A DAY
Qty: 0 | Refills: 0 | Status: DISCONTINUED | OUTPATIENT
Start: 2018-01-01 | End: 2018-01-01

## 2018-01-01 RX ORDER — INSULIN GLARGINE 100 [IU]/ML
15 INJECTION, SOLUTION SUBCUTANEOUS
Qty: 0 | Refills: 0 | COMMUNITY
Start: 2018-01-01

## 2018-01-01 RX ORDER — TIMOLOL 0.5 %
0 DROPS OPHTHALMIC (EYE)
Qty: 0 | Refills: 0 | COMMUNITY

## 2018-01-01 RX ORDER — METOPROLOL TARTRATE 50 MG
25 TABLET ORAL
Qty: 0 | Refills: 0 | Status: DISCONTINUED | OUTPATIENT
Start: 2018-01-01 | End: 2018-01-01

## 2018-01-01 RX ORDER — ENOXAPARIN SODIUM 100 MG/ML
30 INJECTION SUBCUTANEOUS EVERY 12 HOURS
Qty: 0 | Refills: 0 | Status: DISCONTINUED | OUTPATIENT
Start: 2018-01-01 | End: 2018-01-01

## 2018-01-01 RX ORDER — LACOSAMIDE 50 MG/1
50 TABLET ORAL
Qty: 0 | Refills: 0 | Status: DISCONTINUED | OUTPATIENT
Start: 2018-01-01 | End: 2018-01-01

## 2018-01-01 RX ORDER — ACETAMINOPHEN 500 MG
650 TABLET ORAL EVERY 6 HOURS
Qty: 0 | Refills: 0 | Status: DISCONTINUED | OUTPATIENT
Start: 2018-01-01 | End: 2018-01-01

## 2018-01-01 RX ORDER — METOPROLOL TARTRATE 50 MG
5 TABLET ORAL ONCE
Qty: 0 | Refills: 0 | Status: COMPLETED | OUTPATIENT
Start: 2018-01-01 | End: 2018-01-01

## 2018-01-01 RX ORDER — CEPHALEXIN 500 MG
250 CAPSULE ORAL DAILY
Qty: 0 | Refills: 0 | Status: DISCONTINUED | OUTPATIENT
Start: 2018-01-01 | End: 2018-01-01

## 2018-01-01 RX ORDER — GLUCAGON INJECTION, SOLUTION 0.5 MG/.1ML
1 INJECTION, SOLUTION SUBCUTANEOUS ONCE
Qty: 0 | Refills: 0 | Status: DISCONTINUED | OUTPATIENT
Start: 2018-01-01 | End: 2018-01-01

## 2018-01-01 RX ORDER — CEFPODOXIME PROXETIL 100 MG
1 TABLET ORAL
Qty: 14 | Refills: 0 | OUTPATIENT
Start: 2018-01-01 | End: 2018-01-01

## 2018-01-01 RX ORDER — GABAPENTIN 400 MG/1
1 CAPSULE ORAL
Qty: 21 | Refills: 0 | OUTPATIENT
Start: 2018-01-01 | End: 2018-01-01

## 2018-01-01 RX ORDER — PEN NEEDLE, DIABETIC 29 G X1/2"
31G X 8 MM NEEDLE, DISPOSABLE MISCELLANEOUS
Qty: 100 | Refills: 0 | Status: ACTIVE | COMMUNITY
Start: 2017-11-22

## 2018-01-01 RX ORDER — INSULIN LISPRO 100/ML
1 VIAL (ML) SUBCUTANEOUS
Qty: 1 | Refills: 0 | OUTPATIENT
Start: 2018-01-01

## 2018-01-01 RX ORDER — SODIUM CHLORIDE 9 MG/ML
1000 INJECTION, SOLUTION INTRAVENOUS ONCE
Qty: 0 | Refills: 0 | Status: DISCONTINUED | OUTPATIENT
Start: 2018-01-01 | End: 2018-01-01

## 2018-01-01 RX ORDER — LEVETIRACETAM 250 MG/1
1 TABLET, FILM COATED ORAL
Qty: 30 | Refills: 0 | OUTPATIENT
Start: 2018-01-01 | End: 2018-01-01

## 2018-01-01 RX ORDER — HYDRALAZINE HCL 50 MG
10 TABLET ORAL EVERY 8 HOURS
Qty: 0 | Refills: 0 | Status: DISCONTINUED | OUTPATIENT
Start: 2018-01-01 | End: 2018-01-01

## 2018-01-01 RX ORDER — DEXTROSE 50 % IN WATER 50 %
25 SYRINGE (ML) INTRAVENOUS ONCE
Qty: 0 | Refills: 0 | Status: DISCONTINUED | OUTPATIENT
Start: 2018-01-01 | End: 2018-01-01

## 2018-01-01 RX ORDER — CEFEPIME 1 G/1
1000 INJECTION, POWDER, FOR SOLUTION INTRAMUSCULAR; INTRAVENOUS EVERY 12 HOURS
Qty: 0 | Refills: 0 | Status: COMPLETED | OUTPATIENT
Start: 2018-01-01 | End: 2018-01-01

## 2018-01-01 RX ORDER — LABETALOL HCL 100 MG
1 TABLET ORAL
Qty: 0 | Refills: 0 | COMMUNITY

## 2018-01-01 RX ORDER — TIMOLOL 0.5 %
1 DROPS OPHTHALMIC (EYE)
Qty: 0 | Refills: 0 | Status: DISCONTINUED | OUTPATIENT
Start: 2018-01-01 | End: 2018-01-01

## 2018-01-01 RX ORDER — INSULIN DETEMIR 100/ML (3)
8 INSULIN PEN (ML) SUBCUTANEOUS ONCE
Qty: 0 | Refills: 0 | Status: DISCONTINUED | OUTPATIENT
Start: 2018-01-01 | End: 2018-01-01

## 2018-01-01 RX ORDER — VANCOMYCIN HCL 1 G
1000 VIAL (EA) INTRAVENOUS ONCE
Qty: 0 | Refills: 0 | Status: COMPLETED | OUTPATIENT
Start: 2018-01-01 | End: 2018-01-01

## 2018-01-01 RX ORDER — LABETALOL HCL 100 MG
10 TABLET ORAL EVERY 8 HOURS
Qty: 0 | Refills: 0 | Status: DISCONTINUED | OUTPATIENT
Start: 2018-01-01 | End: 2018-01-01

## 2018-01-01 RX ORDER — LEVETIRACETAM 250 MG/1
1 TABLET, FILM COATED ORAL
Qty: 0 | Refills: 0 | COMMUNITY

## 2018-01-01 RX ORDER — IPRATROPIUM/ALBUTEROL SULFATE 18-103MCG
3 AEROSOL WITH ADAPTER (GRAM) INHALATION EVERY 6 HOURS
Qty: 0 | Refills: 0 | Status: DISCONTINUED | OUTPATIENT
Start: 2018-01-01 | End: 2018-01-01

## 2018-01-01 RX ORDER — INSULIN LISPRO 100/ML
2 VIAL (ML) SUBCUTANEOUS ONCE
Qty: 0 | Refills: 0 | Status: COMPLETED | OUTPATIENT
Start: 2018-01-01 | End: 2018-01-01

## 2018-01-01 RX ORDER — INSULIN GLARGINE 100 [IU]/ML
100 INJECTION, SOLUTION SUBCUTANEOUS
Qty: 15 | Refills: 0 | Status: ACTIVE | COMMUNITY
Start: 2017-12-06

## 2018-01-01 RX ORDER — LIDOCAINE 4 G/100G
3 CREAM TOPICAL DAILY
Qty: 0 | Refills: 0 | Status: DISCONTINUED | OUTPATIENT
Start: 2018-01-01 | End: 2018-01-01

## 2018-01-01 RX ORDER — BRIMONIDINE TARTRATE 2 MG/MG
0 SOLUTION/ DROPS OPHTHALMIC
Qty: 0 | Refills: 0 | COMMUNITY

## 2018-01-01 RX ORDER — ASPIRIN ENTERIC COATED TABLETS 81 MG 81 MG/1
81 TABLET, DELAYED RELEASE ORAL
Refills: 0 | Status: ACTIVE | COMMUNITY

## 2018-01-01 RX ORDER — LABETALOL HCL 100 MG
200 TABLET ORAL DAILY
Qty: 0 | Refills: 0 | Status: DISCONTINUED | OUTPATIENT
Start: 2018-01-01 | End: 2018-01-01

## 2018-01-01 RX ORDER — LIDOCAINE 4 G/100G
1 CREAM TOPICAL
Qty: 0 | Refills: 0 | COMMUNITY
Start: 2018-01-01

## 2018-01-01 RX ORDER — DEXTROSE 50 % IN WATER 50 %
12.5 SYRINGE (ML) INTRAVENOUS ONCE
Qty: 0 | Refills: 0 | Status: DISCONTINUED | OUTPATIENT
Start: 2018-01-01 | End: 2018-01-01

## 2018-01-01 RX ORDER — SODIUM CHLORIDE 9 MG/ML
1000 INJECTION, SOLUTION INTRAVENOUS
Qty: 0 | Refills: 0 | Status: COMPLETED | OUTPATIENT
Start: 2018-01-01 | End: 2018-01-01

## 2018-01-01 RX ORDER — LEVETIRACETAM 250 MG/1
500 TABLET, FILM COATED ORAL
Qty: 0 | Refills: 0 | Status: DISCONTINUED | OUTPATIENT
Start: 2018-01-01 | End: 2018-01-01

## 2018-01-01 RX ORDER — GABAPENTIN 300 MG/1
300 CAPSULE ORAL
Qty: 21 | Refills: 0 | Status: ACTIVE | COMMUNITY
Start: 2018-01-01

## 2018-01-01 RX ORDER — CEPHALEXIN 500 MG
1 CAPSULE ORAL
Qty: 0 | Refills: 0 | COMMUNITY

## 2018-01-01 RX ORDER — CEFTRIAXONE 500 MG/1
1000 INJECTION, POWDER, FOR SOLUTION INTRAMUSCULAR; INTRAVENOUS EVERY 24 HOURS
Qty: 0 | Refills: 0 | Status: COMPLETED | OUTPATIENT
Start: 2018-01-01 | End: 2018-01-01

## 2018-01-01 RX ORDER — GABAPENTIN 400 MG/1
1 CAPSULE ORAL
Qty: 0 | Refills: 0 | COMMUNITY
Start: 2018-01-01

## 2018-01-01 RX ORDER — INSULIN LISPRO 100/ML
3 VIAL (ML) SUBCUTANEOUS
Qty: 1 | Refills: 0 | OUTPATIENT
Start: 2018-01-01

## 2018-01-01 RX ORDER — POLYETHYLENE GLYCOL 3350 17 G/17G
17 POWDER, FOR SOLUTION ORAL
Refills: 0 | Status: ACTIVE | COMMUNITY

## 2018-01-01 RX ORDER — LATANOPROST 0.05 MG/ML
1 SOLUTION/ DROPS OPHTHALMIC; TOPICAL
Qty: 0 | Refills: 0 | COMMUNITY

## 2018-01-01 RX ORDER — HYDRALAZINE HCL 50 MG
25 TABLET ORAL
Qty: 0 | Refills: 0 | Status: DISCONTINUED | OUTPATIENT
Start: 2018-01-01 | End: 2018-01-01

## 2018-01-01 RX ORDER — TIMOLOL 0.5 %
1 DROPS OPHTHALMIC (EYE)
Qty: 0 | Refills: 0 | COMMUNITY

## 2018-01-01 RX ORDER — CEFTRIAXONE 500 MG/1
INJECTION, POWDER, FOR SOLUTION INTRAMUSCULAR; INTRAVENOUS
Qty: 0 | Refills: 0 | Status: DISCONTINUED | OUTPATIENT
Start: 2018-01-01 | End: 2018-01-01

## 2018-01-01 RX ORDER — OMEPRAZOLE 20 MG/1
20 CAPSULE, DELAYED RELEASE ORAL
Refills: 0 | Status: ACTIVE | COMMUNITY

## 2018-01-01 RX ORDER — LACOSAMIDE 50 MG/1
50 TABLET ORAL EVERY 12 HOURS
Qty: 0 | Refills: 0 | Status: DISCONTINUED | OUTPATIENT
Start: 2018-01-01 | End: 2018-01-01

## 2018-01-01 RX ORDER — TIMOLOL 0.5 %
1 DROPS OPHTHALMIC (EYE) DAILY
Qty: 0 | Refills: 0 | Status: DISCONTINUED | OUTPATIENT
Start: 2018-01-01 | End: 2018-01-01

## 2018-01-01 RX ORDER — CEFTRIAXONE 500 MG/1
1 INJECTION, POWDER, FOR SOLUTION INTRAMUSCULAR; INTRAVENOUS ONCE
Qty: 0 | Refills: 0 | Status: DISCONTINUED | OUTPATIENT
Start: 2018-01-01 | End: 2018-01-01

## 2018-01-01 RX ORDER — VANCOMYCIN HCL 1 G
500 VIAL (EA) INTRAVENOUS EVERY 12 HOURS
Qty: 0 | Refills: 0 | Status: DISCONTINUED | OUTPATIENT
Start: 2018-01-01 | End: 2018-01-01

## 2018-01-01 RX ORDER — LACTULOSE 10 G/15ML
10 SOLUTION ORAL ONCE
Qty: 0 | Refills: 0 | Status: COMPLETED | OUTPATIENT
Start: 2018-01-01 | End: 2018-01-01

## 2018-01-01 RX ORDER — METHENAMINE MANDELATE 1 G
1 TABLET ORAL
Qty: 0 | Refills: 0 | COMMUNITY

## 2018-01-01 RX ORDER — LATANOPROST/PF 0.005 %
0.01 DROPS OPHTHALMIC (EYE)
Qty: 2 | Refills: 0 | Status: ACTIVE | COMMUNITY
Start: 2018-01-01

## 2018-01-01 RX ORDER — HEPARIN SODIUM 5000 [USP'U]/ML
5000 INJECTION INTRAVENOUS; SUBCUTANEOUS
Qty: 0 | Refills: 0 | COMMUNITY
Start: 2018-01-01

## 2018-01-01 RX ORDER — ACETAMINOPHEN 500 MG
650 TABLET ORAL
Qty: 0 | Refills: 0 | COMMUNITY

## 2018-01-01 RX ORDER — CEFTRIAXONE 500 MG/1
1 INJECTION, POWDER, FOR SOLUTION INTRAMUSCULAR; INTRAVENOUS ONCE
Qty: 0 | Refills: 0 | Status: COMPLETED | OUTPATIENT
Start: 2018-01-01 | End: 2018-01-01

## 2018-01-01 RX ORDER — ALBUTEROL 90 UG/1
1 AEROSOL, METERED ORAL EVERY 4 HOURS
Qty: 0 | Refills: 0 | Status: DISCONTINUED | OUTPATIENT
Start: 2018-01-01 | End: 2018-01-01

## 2018-01-01 RX ORDER — INSULIN DETEMIR 100/ML (3)
16 INSULIN PEN (ML) SUBCUTANEOUS EVERY 12 HOURS
Qty: 0 | Refills: 0 | Status: DISCONTINUED | OUTPATIENT
Start: 2018-01-01 | End: 2018-01-01

## 2018-01-01 RX ORDER — DOCUSATE SODIUM 100 MG
1 CAPSULE ORAL
Qty: 0 | Refills: 0 | COMMUNITY
Start: 2018-01-01

## 2018-01-01 RX ORDER — SODIUM CHLORIDE 0.65 %
1 AEROSOL, SPRAY (ML) NASAL
Qty: 0 | Refills: 0 | Status: DISCONTINUED | OUTPATIENT
Start: 2018-01-01 | End: 2018-01-01

## 2018-01-01 RX ORDER — LACTOBACILLUS ACIDOPHILUS 100MM CELL
1 CAPSULE ORAL DAILY
Qty: 0 | Refills: 0 | Status: DISCONTINUED | OUTPATIENT
Start: 2018-01-01 | End: 2018-01-01

## 2018-01-01 RX ORDER — INSULIN DETEMIR 100/ML (3)
8 INSULIN PEN (ML) SUBCUTANEOUS EVERY 12 HOURS
Qty: 0 | Refills: 0 | Status: DISCONTINUED | OUTPATIENT
Start: 2018-01-01 | End: 2018-01-01

## 2018-01-01 RX ORDER — MORPHINE SULFATE 50 MG/1
2 CAPSULE, EXTENDED RELEASE ORAL ONCE
Qty: 0 | Refills: 0 | Status: DISCONTINUED | OUTPATIENT
Start: 2018-01-01 | End: 2018-01-01

## 2018-01-01 RX ORDER — AMLODIPINE BESYLATE 2.5 MG/1
5 TABLET ORAL DAILY
Qty: 0 | Refills: 0 | Status: DISCONTINUED | OUTPATIENT
Start: 2018-01-01 | End: 2018-01-01

## 2018-01-01 RX ORDER — NOREPINEPHRINE BITARTRATE/D5W 8 MG/250ML
0.05 PLASTIC BAG, INJECTION (ML) INTRAVENOUS
Qty: 8 | Refills: 0 | Status: DISCONTINUED | OUTPATIENT
Start: 2018-01-01 | End: 2018-01-01

## 2018-01-01 RX ORDER — ALBUTEROL 90 UG/1
2.5 AEROSOL, METERED ORAL EVERY 6 HOURS
Qty: 0 | Refills: 0 | Status: DISCONTINUED | OUTPATIENT
Start: 2018-01-01 | End: 2018-01-01

## 2018-01-01 RX ORDER — ALBUTEROL 90 UG/1
1 AEROSOL, METERED ORAL
Qty: 0 | Refills: 0 | COMMUNITY
Start: 2018-01-01

## 2018-01-01 RX ORDER — LABETALOL HYDROCHLORIDE 100 MG/1
100 TABLET ORAL
Refills: 0 | Status: ACTIVE | COMMUNITY

## 2018-01-01 RX ORDER — MULTIVITAMIN
TABLET ORAL
Refills: 0 | Status: ACTIVE | COMMUNITY

## 2018-01-01 RX ORDER — INSULIN LISPRO 100/ML
VIAL (ML) SUBCUTANEOUS AT BEDTIME
Qty: 0 | Refills: 0 | Status: DISCONTINUED | OUTPATIENT
Start: 2018-01-01 | End: 2018-01-01

## 2018-01-01 RX ORDER — SODIUM CHLORIDE 9 MG/ML
3 INJECTION INTRAMUSCULAR; INTRAVENOUS; SUBCUTANEOUS ONCE
Qty: 0 | Refills: 0 | Status: COMPLETED | OUTPATIENT
Start: 2018-01-01 | End: 2018-01-01

## 2018-01-01 RX ORDER — CHLORHEXIDINE GLUCONATE 213 G/1000ML
15 SOLUTION TOPICAL
Qty: 0 | Refills: 0 | Status: DISCONTINUED | OUTPATIENT
Start: 2018-01-01 | End: 2018-01-01

## 2018-01-01 RX ORDER — DEXTROSE 50 % IN WATER 50 %
1 SYRINGE (ML) INTRAVENOUS ONCE
Qty: 0 | Refills: 0 | Status: DISCONTINUED | OUTPATIENT
Start: 2018-01-01 | End: 2018-01-01

## 2018-01-01 RX ORDER — LABETALOL HCL 100 MG
TABLET ORAL
Qty: 0 | Refills: 0 | Status: DISCONTINUED | OUTPATIENT
Start: 2018-01-01 | End: 2018-01-01

## 2018-01-01 RX ORDER — ASPIRIN/CALCIUM CARB/MAGNESIUM 324 MG
300 TABLET ORAL DAILY
Qty: 0 | Refills: 0 | Status: DISCONTINUED | OUTPATIENT
Start: 2018-01-01 | End: 2018-01-01

## 2018-01-01 RX ORDER — VANCOMYCIN HCL 1 G
750 VIAL (EA) INTRAVENOUS EVERY 12 HOURS
Qty: 0 | Refills: 0 | Status: DISCONTINUED | OUTPATIENT
Start: 2018-01-01 | End: 2018-01-01

## 2018-01-01 RX ORDER — CHROMIUM 200 MCG
TABLET ORAL
Refills: 0 | Status: ACTIVE | COMMUNITY

## 2018-01-01 RX ORDER — LEVETIRACETAM 250 MG/1
250 TABLET, FILM COATED ORAL DAILY
Qty: 0 | Refills: 0 | Status: DISCONTINUED | OUTPATIENT
Start: 2018-01-01 | End: 2018-01-01

## 2018-01-01 RX ORDER — SODIUM CHLORIDE 9 MG/ML
1000 INJECTION INTRAMUSCULAR; INTRAVENOUS; SUBCUTANEOUS ONCE
Qty: 0 | Refills: 0 | Status: COMPLETED | OUTPATIENT
Start: 2018-01-01 | End: 2018-01-01

## 2018-01-01 RX ORDER — DOCUSATE SODIUM 100 MG
100 CAPSULE ORAL DAILY
Qty: 0 | Refills: 0 | Status: DISCONTINUED | OUTPATIENT
Start: 2018-01-01 | End: 2018-01-01

## 2018-01-01 RX ORDER — BRIMONIDINE TARTRATE 2 MG/MG
1 SOLUTION/ DROPS OPHTHALMIC
Qty: 0 | Refills: 0 | Status: DISCONTINUED | OUTPATIENT
Start: 2018-01-01 | End: 2018-01-01

## 2018-01-01 RX ORDER — SODIUM POLYSTYRENE SULFONATE 4.1 MEQ/G
30 POWDER, FOR SUSPENSION ORAL ONCE
Qty: 0 | Refills: 0 | Status: COMPLETED | OUTPATIENT
Start: 2018-01-01 | End: 2018-01-01

## 2018-01-01 RX ORDER — INSULIN GLARGINE 100 [IU]/ML
5 INJECTION, SOLUTION SUBCUTANEOUS AT BEDTIME
Qty: 0 | Refills: 0 | Status: DISCONTINUED | OUTPATIENT
Start: 2018-01-01 | End: 2018-01-01

## 2018-01-01 RX ORDER — TIMOLOL 0.5 %
1 DROPS OPHTHALMIC (EYE)
Qty: 0 | Refills: 0 | COMMUNITY
Start: 2018-01-01

## 2018-01-01 RX ORDER — LEVETIRACETAM 250 MG/1
500 TABLET, FILM COATED ORAL EVERY 12 HOURS
Qty: 0 | Refills: 0 | Status: DISCONTINUED | OUTPATIENT
Start: 2018-01-01 | End: 2018-01-01

## 2018-01-01 RX ORDER — HYDRALAZINE HCL 50 MG
0 TABLET ORAL
Qty: 0 | Refills: 0 | COMMUNITY

## 2018-01-01 RX ORDER — INSULIN LISPRO 100/ML
3 VIAL (ML) SUBCUTANEOUS
Qty: 0 | Refills: 0 | Status: DISCONTINUED | OUTPATIENT
Start: 2018-01-01 | End: 2018-01-01

## 2018-01-01 RX ORDER — VANCOMYCIN HCL 1 G
750 VIAL (EA) INTRAVENOUS ONCE
Qty: 0 | Refills: 0 | Status: COMPLETED | OUTPATIENT
Start: 2018-01-01 | End: 2018-01-01

## 2018-01-01 RX ORDER — HYDRALAZINE HCL 50 MG
25 TABLET ORAL THREE TIMES A DAY
Qty: 0 | Refills: 0 | Status: DISCONTINUED | OUTPATIENT
Start: 2018-01-01 | End: 2018-01-01

## 2018-01-01 RX ORDER — BRIMONIDINE TARTRATE 2 MG/MG
0.2 SOLUTION/ DROPS OPHTHALMIC
Qty: 5 | Refills: 0 | Status: ACTIVE | COMMUNITY
Start: 2018-01-01

## 2018-01-01 RX ORDER — SIMVASTATIN 20 MG/1
40 TABLET, FILM COATED ORAL AT BEDTIME
Qty: 0 | Refills: 0 | Status: DISCONTINUED | OUTPATIENT
Start: 2018-01-01 | End: 2018-01-01

## 2018-01-01 RX ORDER — POLYETHYLENE GLYCOL 3350 17 G/17G
17 POWDER, FOR SOLUTION ORAL DAILY
Qty: 0 | Refills: 0 | Status: DISCONTINUED | OUTPATIENT
Start: 2018-01-01 | End: 2018-01-01

## 2018-01-01 RX ORDER — MORPHINE SULFATE 50 MG/1
2 CAPSULE, EXTENDED RELEASE ORAL
Qty: 100 | Refills: 0 | Status: DISCONTINUED | OUTPATIENT
Start: 2018-01-01 | End: 2018-01-01

## 2018-01-01 RX ORDER — POLYETHYLENE GLYCOL 3350 17 G/17G
17 POWDER, FOR SOLUTION ORAL
Qty: 0 | Refills: 0 | COMMUNITY
Start: 2018-01-01

## 2018-01-01 RX ORDER — ACETAMINOPHEN 500 MG
2 TABLET ORAL
Qty: 0 | Refills: 0 | COMMUNITY
Start: 2018-01-01

## 2018-01-01 RX ORDER — LABETALOL HCL 100 MG
10 TABLET ORAL ONCE
Qty: 0 | Refills: 0 | Status: COMPLETED | OUTPATIENT
Start: 2018-01-01 | End: 2018-01-01

## 2018-01-01 RX ORDER — CEPHALEXIN 250 MG/1
250 CAPSULE ORAL
Qty: 90 | Refills: 0 | Status: DISCONTINUED | COMMUNITY
Start: 2017-12-18 | End: 2018-01-01

## 2018-01-01 RX ORDER — KETOROLAC TROMETHAMINE 30 MG/ML
10 SYRINGE (ML) INJECTION EVERY 6 HOURS
Qty: 0 | Refills: 0 | Status: DISCONTINUED | OUTPATIENT
Start: 2018-01-01 | End: 2018-01-01

## 2018-01-01 RX ORDER — GINGER ROOT/GINGER ROOT EXT 262.5 MG
CAPSULE ORAL
Refills: 0 | Status: ACTIVE | COMMUNITY

## 2018-01-01 RX ORDER — ENOXAPARIN SODIUM 100 MG/ML
40 INJECTION SUBCUTANEOUS DAILY
Qty: 0 | Refills: 0 | Status: DISCONTINUED | OUTPATIENT
Start: 2018-01-01 | End: 2018-01-01

## 2018-01-01 RX ORDER — SENNA PLUS 8.6 MG/1
2 TABLET ORAL AT BEDTIME
Qty: 0 | Refills: 0 | Status: DISCONTINUED | OUTPATIENT
Start: 2018-01-01 | End: 2018-01-01

## 2018-01-01 RX ORDER — MAGNESIUM HYDROXIDE 400 MG/5ML
400 SUSPENSION ORAL
Refills: 0 | Status: ACTIVE | COMMUNITY

## 2018-01-01 RX ORDER — ASPIRIN/CALCIUM CARB/MAGNESIUM 324 MG
1 TABLET ORAL
Qty: 30 | Refills: 0 | OUTPATIENT
Start: 2018-01-01 | End: 2018-01-01

## 2018-01-01 RX ORDER — INSULIN GLARGINE 100 [IU]/ML
8 INJECTION, SOLUTION SUBCUTANEOUS AT BEDTIME
Qty: 0 | Refills: 0 | Status: DISCONTINUED | OUTPATIENT
Start: 2018-01-01 | End: 2018-01-01

## 2018-01-01 RX ORDER — SODIUM CHLORIDE 9 MG/ML
500 INJECTION INTRAMUSCULAR; INTRAVENOUS; SUBCUTANEOUS
Qty: 0 | Refills: 0 | Status: COMPLETED | OUTPATIENT
Start: 2018-01-01 | End: 2018-01-01

## 2018-01-01 RX ORDER — INSULIN GLARGINE 100 [IU]/ML
10 INJECTION, SOLUTION SUBCUTANEOUS AT BEDTIME
Qty: 0 | Refills: 0 | Status: DISCONTINUED | OUTPATIENT
Start: 2018-01-01 | End: 2018-01-01

## 2018-01-01 RX ORDER — POTASSIUM CHLORIDE 20 MEQ
10 PACKET (EA) ORAL
Qty: 0 | Refills: 0 | Status: COMPLETED | OUTPATIENT
Start: 2018-01-01 | End: 2018-01-01

## 2018-01-01 RX ORDER — HYDRALAZINE HCL 50 MG
10 TABLET ORAL EVERY 4 HOURS
Qty: 0 | Refills: 0 | Status: DISCONTINUED | OUTPATIENT
Start: 2018-01-01 | End: 2018-01-01

## 2018-01-01 RX ORDER — LABETALOL HCL 100 MG
200 TABLET ORAL ONCE
Qty: 0 | Refills: 0 | Status: COMPLETED | OUTPATIENT
Start: 2018-01-01 | End: 2018-01-01

## 2018-01-01 RX ORDER — INSULIN LISPRO 100 [IU]/ML
100 INJECTION, SOLUTION INTRAVENOUS; SUBCUTANEOUS
Qty: 15 | Refills: 0 | Status: ACTIVE | COMMUNITY
Start: 2017-12-14

## 2018-01-01 RX ORDER — LIDOCAINE 4 G/100G
3 CREAM TOPICAL
Qty: 0 | Refills: 0 | COMMUNITY
Start: 2018-01-01

## 2018-01-01 RX ORDER — SODIUM CHLORIDE 0.65 %
1 AEROSOL, SPRAY (ML) NASAL
Qty: 0 | Refills: 0 | COMMUNITY
Start: 2018-01-01

## 2018-01-01 RX ORDER — TRAVOPROST 0.04 MG/ML
0 SOLUTION/ DROPS OPHTHALMIC
Qty: 2 | Refills: 0 | Status: ACTIVE | COMMUNITY
Start: 2017-12-18

## 2018-01-01 RX ORDER — TIMOLOL 0.5 %
1 DROPS OPHTHALMIC (EYE) AT BEDTIME
Qty: 0 | Refills: 0 | Status: DISCONTINUED | OUTPATIENT
Start: 2018-01-01 | End: 2018-01-01

## 2018-01-01 RX ORDER — ENOXAPARIN SODIUM 100 MG/ML
10 INJECTION SUBCUTANEOUS
Qty: 0 | Refills: 0 | COMMUNITY
Start: 2018-01-01

## 2018-01-01 RX ORDER — LABETALOL HCL 100 MG
300 TABLET ORAL
Qty: 0 | Refills: 0 | Status: DISCONTINUED | OUTPATIENT
Start: 2018-01-01 | End: 2018-01-01

## 2018-01-01 RX ORDER — INSULIN DETEMIR 100/ML (3)
2 INSULIN PEN (ML) SUBCUTANEOUS ONCE
Qty: 0 | Refills: 0 | Status: COMPLETED | OUTPATIENT
Start: 2018-01-01 | End: 2018-01-01

## 2018-01-01 RX ORDER — CEFTRIAXONE 500 MG/1
1 INJECTION, POWDER, FOR SOLUTION INTRAMUSCULAR; INTRAVENOUS EVERY 24 HOURS
Qty: 0 | Refills: 0 | Status: DISCONTINUED | OUTPATIENT
Start: 2018-01-01 | End: 2018-01-01

## 2018-01-01 RX ORDER — MOXIFLOXACIN HYDROCHLORIDE TABLETS, 400 MG 400 MG/1
1 TABLET, FILM COATED ORAL
Qty: 14 | Refills: 0 | OUTPATIENT
Start: 2018-01-01 | End: 2018-01-01

## 2018-01-01 RX ORDER — INSULIN LISPRO 100/ML
1 VIAL (ML) SUBCUTANEOUS ONCE
Qty: 0 | Refills: 0 | Status: COMPLETED | OUTPATIENT
Start: 2018-01-01 | End: 2018-01-01

## 2018-01-01 RX ORDER — LEVETIRACETAM 250 MG/1
250 TABLET, FILM COATED ORAL
Qty: 0 | Refills: 0 | Status: DISCONTINUED | OUTPATIENT
Start: 2018-01-01 | End: 2018-01-01

## 2018-01-01 RX ORDER — ACETAMINOPHEN 325 MG/1
325 TABLET ORAL
Refills: 0 | Status: ACTIVE | COMMUNITY

## 2018-01-01 RX ORDER — FUROSEMIDE 40 MG
40 TABLET ORAL DAILY
Qty: 0 | Refills: 0 | Status: COMPLETED | OUTPATIENT
Start: 2018-01-01 | End: 2018-01-01

## 2018-01-01 RX ORDER — SENNA PLUS 8.6 MG/1
2 TABLET ORAL
Qty: 0 | Refills: 0 | COMMUNITY
Start: 2018-01-01

## 2018-01-01 RX ORDER — TIOTROPIUM BROMIDE 18 UG/1
1 CAPSULE ORAL; RESPIRATORY (INHALATION) DAILY
Qty: 0 | Refills: 0 | Status: DISCONTINUED | OUTPATIENT
Start: 2018-01-01 | End: 2018-01-01

## 2018-01-01 RX ORDER — HYDRALAZINE HCL 50 MG
1 TABLET ORAL
Qty: 0 | Refills: 0 | COMMUNITY
Start: 2018-01-01

## 2018-01-01 RX ORDER — SALIVA SUBSTITUTE COMB NO.11 351 MG
5 POWDER IN PACKET (EA) MUCOUS MEMBRANE
Qty: 0 | Refills: 0 | Status: DISCONTINUED | OUTPATIENT
Start: 2018-01-01 | End: 2018-01-01

## 2018-01-01 RX ORDER — INSULIN LISPRO 100/ML
VIAL (ML) SUBCUTANEOUS EVERY 6 HOURS
Qty: 0 | Refills: 0 | Status: DISCONTINUED | OUTPATIENT
Start: 2018-01-01 | End: 2018-01-01

## 2018-01-01 RX ORDER — LEVETIRACETAM 500 MG/1
500 TABLET, FILM COATED ORAL
Refills: 0 | Status: ACTIVE | COMMUNITY

## 2018-01-01 RX ORDER — LIDOCAINE 50 MG/G
5 PATCH CUTANEOUS
Refills: 0 | Status: ACTIVE | COMMUNITY

## 2018-01-01 RX ORDER — LABETALOL HCL 100 MG
100 TABLET ORAL EVERY 12 HOURS
Qty: 0 | Refills: 0 | Status: DISCONTINUED | OUTPATIENT
Start: 2018-01-01 | End: 2018-01-01

## 2018-01-01 RX ORDER — INSULIN LISPRO 100/ML
0 VIAL (ML) SUBCUTANEOUS
Qty: 0 | Refills: 0 | COMMUNITY

## 2018-01-01 RX ORDER — HYDROMORPHONE HYDROCHLORIDE 2 MG/ML
0.5 INJECTION INTRAMUSCULAR; INTRAVENOUS; SUBCUTANEOUS
Qty: 0 | Refills: 0 | Status: DISCONTINUED | OUTPATIENT
Start: 2018-01-01 | End: 2018-01-01

## 2018-01-01 RX ORDER — CEFTRIAXONE 500 MG/1
1000 INJECTION, POWDER, FOR SOLUTION INTRAMUSCULAR; INTRAVENOUS ONCE
Qty: 0 | Refills: 0 | Status: COMPLETED | OUTPATIENT
Start: 2018-01-01 | End: 2018-01-01

## 2018-01-01 RX ORDER — LEVETIRACETAM 250 MG/1
0 TABLET, FILM COATED ORAL
Qty: 0 | Refills: 0 | COMMUNITY

## 2018-01-01 RX ORDER — TIOTROPIUM BROMIDE 18 UG/1
1 CAPSULE ORAL; RESPIRATORY (INHALATION)
Qty: 0 | Refills: 0 | COMMUNITY
Start: 2018-01-01

## 2018-01-01 RX ORDER — TOBRAMYCIN AND DEXAMETHASONE 1; 3 MG/ML; MG/ML
1 SUSPENSION/ DROPS OPHTHALMIC
Qty: 0 | Refills: 0 | COMMUNITY
Start: 2018-01-01

## 2018-01-01 RX ORDER — TOBRAMYCIN 0.3 %
1 DROPS OPHTHALMIC (EYE) EVERY 4 HOURS
Qty: 0 | Refills: 0 | Status: DISCONTINUED | OUTPATIENT
Start: 2018-01-01 | End: 2018-01-01

## 2018-01-01 RX ORDER — ASPIRIN/CALCIUM CARB/MAGNESIUM 324 MG
1 TABLET ORAL
Qty: 0 | Refills: 0 | COMMUNITY

## 2018-01-01 RX ADMIN — Medication 650 MILLIGRAM(S): at 06:25

## 2018-01-01 RX ADMIN — LATANOPROST 1 DROP(S): 0.05 SOLUTION/ DROPS OPHTHALMIC; TOPICAL at 00:06

## 2018-01-01 RX ADMIN — Medication 650 MILLIGRAM(S): at 13:45

## 2018-01-01 RX ADMIN — Medication 40 MILLIGRAM(S): at 05:22

## 2018-01-01 RX ADMIN — INSULIN GLARGINE 8 UNIT(S): 100 INJECTION, SOLUTION SUBCUTANEOUS at 23:51

## 2018-01-01 RX ADMIN — INSULIN GLARGINE 5 UNIT(S): 100 INJECTION, SOLUTION SUBCUTANEOUS at 21:49

## 2018-01-01 RX ADMIN — Medication 1 DROP(S): at 11:05

## 2018-01-01 RX ADMIN — Medication 4: at 06:20

## 2018-01-01 RX ADMIN — MORPHINE SULFATE 2 MILLIGRAM(S): 50 CAPSULE, EXTENDED RELEASE ORAL at 15:49

## 2018-01-01 RX ADMIN — LACOSAMIDE 50 MILLIGRAM(S): 50 TABLET ORAL at 07:11

## 2018-01-01 RX ADMIN — LEVETIRACETAM 400 MILLIGRAM(S): 250 TABLET, FILM COATED ORAL at 21:42

## 2018-01-01 RX ADMIN — Medication 1 DROP(S): at 11:10

## 2018-01-01 RX ADMIN — LIDOCAINE 1 PATCH: 4 CREAM TOPICAL at 18:30

## 2018-01-01 RX ADMIN — LIDOCAINE 3 PATCH: 4 CREAM TOPICAL at 23:59

## 2018-01-01 RX ADMIN — SODIUM CHLORIDE 100 MILLILITER(S): 9 INJECTION INTRAMUSCULAR; INTRAVENOUS; SUBCUTANEOUS at 12:51

## 2018-01-01 RX ADMIN — MORPHINE SULFATE 2 MILLIGRAM(S): 50 CAPSULE, EXTENDED RELEASE ORAL at 17:56

## 2018-01-01 RX ADMIN — LEVETIRACETAM 400 MILLIGRAM(S): 250 TABLET, FILM COATED ORAL at 17:37

## 2018-01-01 RX ADMIN — Medication 3 MILLILITER(S): at 09:02

## 2018-01-01 RX ADMIN — TOBRAMYCIN AND DEXAMETHASONE 1 APPLICATION(S): 1; 3 SUSPENSION/ DROPS OPHTHALMIC at 19:39

## 2018-01-01 RX ADMIN — Medication 25 MILLIGRAM(S): at 06:55

## 2018-01-01 RX ADMIN — Medication 650 MILLIGRAM(S): at 05:34

## 2018-01-01 RX ADMIN — LEVETIRACETAM 400 MILLIGRAM(S): 250 TABLET, FILM COATED ORAL at 05:22

## 2018-01-01 RX ADMIN — Medication 100 MILLIEQUIVALENT(S): at 15:43

## 2018-01-01 RX ADMIN — Medication 100 MILLIGRAM(S): at 19:26

## 2018-01-01 RX ADMIN — Medication 10 MILLIGRAM(S): at 06:44

## 2018-01-01 RX ADMIN — LATANOPROST 1 DROP(S): 0.05 SOLUTION/ DROPS OPHTHALMIC; TOPICAL at 23:15

## 2018-01-01 RX ADMIN — INSULIN GLARGINE 5 UNIT(S): 100 INJECTION, SOLUTION SUBCUTANEOUS at 23:16

## 2018-01-01 RX ADMIN — LIDOCAINE 1 PATCH: 4 CREAM TOPICAL at 14:25

## 2018-01-01 RX ADMIN — Medication 10 MILLIGRAM(S): at 03:33

## 2018-01-01 RX ADMIN — Medication 4: at 12:18

## 2018-01-01 RX ADMIN — Medication 1 DROP(S): at 11:46

## 2018-01-01 RX ADMIN — LIDOCAINE 3 PATCH: 4 CREAM TOPICAL at 11:05

## 2018-01-01 RX ADMIN — HUMAN INSULIN 3 UNIT(S): 100 INJECTION, SUSPENSION SUBCUTANEOUS at 23:17

## 2018-01-01 RX ADMIN — LACOSAMIDE 50 MILLIGRAM(S): 50 TABLET ORAL at 18:20

## 2018-01-01 RX ADMIN — LATANOPROST 1 DROP(S): 0.05 SOLUTION/ DROPS OPHTHALMIC; TOPICAL at 22:17

## 2018-01-01 RX ADMIN — Medication 81 MILLIGRAM(S): at 13:09

## 2018-01-01 RX ADMIN — Medication 650 MILLIGRAM(S): at 15:00

## 2018-01-01 RX ADMIN — Medication 25 MILLIGRAM(S): at 13:33

## 2018-01-01 RX ADMIN — LATANOPROST 1 DROP(S): 0.05 SOLUTION/ DROPS OPHTHALMIC; TOPICAL at 21:20

## 2018-01-01 RX ADMIN — Medication 6: at 18:24

## 2018-01-01 RX ADMIN — LIDOCAINE 1 PATCH: 4 CREAM TOPICAL at 18:20

## 2018-01-01 RX ADMIN — Medication 4: at 17:34

## 2018-01-01 RX ADMIN — ENOXAPARIN SODIUM 40 MILLIGRAM(S): 100 INJECTION SUBCUTANEOUS at 23:15

## 2018-01-01 RX ADMIN — Medication 3 MILLILITER(S): at 03:40

## 2018-01-01 RX ADMIN — INSULIN GLARGINE 8 UNIT(S): 100 INJECTION, SOLUTION SUBCUTANEOUS at 21:55

## 2018-01-01 RX ADMIN — LIDOCAINE 1 PATCH: 4 CREAM TOPICAL at 12:13

## 2018-01-01 RX ADMIN — Medication 1 DROP(S): at 13:04

## 2018-01-01 RX ADMIN — Medication 81 MILLIGRAM(S): at 14:08

## 2018-01-01 RX ADMIN — ENOXAPARIN SODIUM 40 MILLIGRAM(S): 100 INJECTION SUBCUTANEOUS at 22:23

## 2018-01-01 RX ADMIN — Medication 2: at 13:10

## 2018-01-01 RX ADMIN — LACOSAMIDE 50 MILLIGRAM(S): 50 TABLET ORAL at 18:43

## 2018-01-01 RX ADMIN — Medication 650 MILLIGRAM(S): at 00:00

## 2018-01-01 RX ADMIN — Medication 1 DROP(S): at 23:45

## 2018-01-01 RX ADMIN — LEVETIRACETAM 400 MILLIGRAM(S): 250 TABLET, FILM COATED ORAL at 17:22

## 2018-01-01 RX ADMIN — Medication 81 MILLIGRAM(S): at 13:10

## 2018-01-01 RX ADMIN — CEFTRIAXONE 100 GRAM(S): 500 INJECTION, POWDER, FOR SOLUTION INTRAMUSCULAR; INTRAVENOUS at 13:20

## 2018-01-01 RX ADMIN — GABAPENTIN 300 MILLIGRAM(S): 400 CAPSULE ORAL at 14:34

## 2018-01-01 RX ADMIN — Medication 650 MILLIGRAM(S): at 19:00

## 2018-01-01 RX ADMIN — Medication 1: at 16:07

## 2018-01-01 RX ADMIN — LIDOCAINE 3 PATCH: 4 CREAM TOPICAL at 23:47

## 2018-01-01 RX ADMIN — Medication 25 MILLIGRAM(S): at 12:16

## 2018-01-01 RX ADMIN — Medication 1 DROP(S): at 12:02

## 2018-01-01 RX ADMIN — Medication 1 DROP(S): at 02:35

## 2018-01-01 RX ADMIN — BRIMONIDINE TARTRATE 1 DROP(S): 2 SOLUTION/ DROPS OPHTHALMIC at 18:22

## 2018-01-01 RX ADMIN — Medication 25 MILLIGRAM(S): at 05:06

## 2018-01-01 RX ADMIN — Medication 3 MILLILITER(S): at 20:14

## 2018-01-01 RX ADMIN — Medication 250 MILLIGRAM(S): at 17:31

## 2018-01-01 RX ADMIN — Medication 1 TABLET(S): at 13:19

## 2018-01-01 RX ADMIN — BRIMONIDINE TARTRATE 1 DROP(S): 2 SOLUTION/ DROPS OPHTHALMIC at 13:03

## 2018-01-01 RX ADMIN — Medication 25 MILLIGRAM(S): at 22:39

## 2018-01-01 RX ADMIN — Medication 1 DROP(S): at 11:12

## 2018-01-01 RX ADMIN — Medication 4: at 18:03

## 2018-01-01 RX ADMIN — LATANOPROST 1 DROP(S): 0.05 SOLUTION/ DROPS OPHTHALMIC; TOPICAL at 23:33

## 2018-01-01 RX ADMIN — Medication 2 UNIT(S): at 19:47

## 2018-01-01 RX ADMIN — SODIUM CHLORIDE 70 MILLILITER(S): 9 INJECTION, SOLUTION INTRAVENOUS at 06:21

## 2018-01-01 RX ADMIN — Medication 6: at 23:15

## 2018-01-01 RX ADMIN — Medication 5 MILLIGRAM(S): at 13:30

## 2018-01-01 RX ADMIN — ENOXAPARIN SODIUM 30 MILLIGRAM(S): 100 INJECTION SUBCUTANEOUS at 06:24

## 2018-01-01 RX ADMIN — SODIUM CHLORIDE 100 MILLILITER(S): 9 INJECTION, SOLUTION INTRAVENOUS at 10:48

## 2018-01-01 RX ADMIN — ENOXAPARIN SODIUM 40 MILLIGRAM(S): 100 INJECTION SUBCUTANEOUS at 22:13

## 2018-01-01 RX ADMIN — BRIMONIDINE TARTRATE 1 DROP(S): 2 SOLUTION/ DROPS OPHTHALMIC at 21:00

## 2018-01-01 RX ADMIN — HUMAN INSULIN 3 UNIT(S): 100 INJECTION, SUSPENSION SUBCUTANEOUS at 22:42

## 2018-01-01 RX ADMIN — LIDOCAINE 3 PATCH: 4 CREAM TOPICAL at 13:45

## 2018-01-01 RX ADMIN — Medication 650 MILLIGRAM(S): at 00:26

## 2018-01-01 RX ADMIN — BRIMONIDINE TARTRATE 1 DROP(S): 2 SOLUTION/ DROPS OPHTHALMIC at 12:14

## 2018-01-01 RX ADMIN — Medication 4: at 12:53

## 2018-01-01 RX ADMIN — HUMAN INSULIN 3 UNIT(S): 100 INJECTION, SUSPENSION SUBCUTANEOUS at 05:24

## 2018-01-01 RX ADMIN — Medication 2 UNIT(S): at 03:52

## 2018-01-01 RX ADMIN — LIDOCAINE 1 PATCH: 4 CREAM TOPICAL at 11:13

## 2018-01-01 RX ADMIN — Medication 25 MILLIGRAM(S): at 22:16

## 2018-01-01 RX ADMIN — LEVETIRACETAM 250 MILLIGRAM(S): 250 TABLET, FILM COATED ORAL at 09:50

## 2018-01-01 RX ADMIN — MORPHINE SULFATE 2 MG/HR: 50 CAPSULE, EXTENDED RELEASE ORAL at 18:29

## 2018-01-01 RX ADMIN — Medication 25 MILLIGRAM(S): at 21:23

## 2018-01-01 RX ADMIN — SODIUM CHLORIDE 40 MILLILITER(S): 9 INJECTION, SOLUTION INTRAVENOUS at 09:07

## 2018-01-01 RX ADMIN — LEVETIRACETAM 400 MILLIGRAM(S): 250 TABLET, FILM COATED ORAL at 06:20

## 2018-01-01 RX ADMIN — LEVETIRACETAM 500 MILLIGRAM(S): 250 TABLET, FILM COATED ORAL at 21:21

## 2018-01-01 RX ADMIN — Medication 1 DROP(S): at 13:45

## 2018-01-01 RX ADMIN — Medication 25 MILLIGRAM(S): at 18:02

## 2018-01-01 RX ADMIN — Medication 25 MILLIGRAM(S): at 13:03

## 2018-01-01 RX ADMIN — SIMVASTATIN 20 MILLIGRAM(S): 20 TABLET, FILM COATED ORAL at 03:29

## 2018-01-01 RX ADMIN — Medication 5 MILLILITER(S): at 18:43

## 2018-01-01 RX ADMIN — SENNA PLUS 2 TABLET(S): 8.6 TABLET ORAL at 21:21

## 2018-01-01 RX ADMIN — Medication 2: at 11:55

## 2018-01-01 RX ADMIN — LATANOPROST 1 DROP(S): 0.05 SOLUTION/ DROPS OPHTHALMIC; TOPICAL at 22:25

## 2018-01-01 RX ADMIN — Medication 2: at 23:15

## 2018-01-01 RX ADMIN — CEFTRIAXONE 1000 MILLIGRAM(S): 500 INJECTION, POWDER, FOR SOLUTION INTRAMUSCULAR; INTRAVENOUS at 14:32

## 2018-01-01 RX ADMIN — Medication 25 MILLIGRAM(S): at 18:00

## 2018-01-01 RX ADMIN — Medication 50 MILLIGRAM(S): at 22:07

## 2018-01-01 RX ADMIN — HUMAN INSULIN 3 UNIT(S): 100 INJECTION, SUSPENSION SUBCUTANEOUS at 14:21

## 2018-01-01 RX ADMIN — LIDOCAINE 1 PATCH: 4 CREAM TOPICAL at 00:23

## 2018-01-01 RX ADMIN — AMLODIPINE BESYLATE 5 MILLIGRAM(S): 2.5 TABLET ORAL at 06:39

## 2018-01-01 RX ADMIN — Medication 1 DROP(S): at 13:10

## 2018-01-01 RX ADMIN — Medication 10 MILLIGRAM(S): at 08:28

## 2018-01-01 RX ADMIN — Medication 2 MILLIGRAM(S): at 18:25

## 2018-01-01 RX ADMIN — SODIUM CHLORIDE 50 MILLILITER(S): 9 INJECTION, SOLUTION INTRAVENOUS at 05:50

## 2018-01-01 RX ADMIN — Medication 4: at 13:15

## 2018-01-01 RX ADMIN — LIDOCAINE 1 PATCH: 4 CREAM TOPICAL at 22:46

## 2018-01-01 RX ADMIN — HEPARIN SODIUM 5000 UNIT(S): 5000 INJECTION INTRAVENOUS; SUBCUTANEOUS at 22:49

## 2018-01-01 RX ADMIN — Medication 100 MILLIGRAM(S): at 06:24

## 2018-01-01 RX ADMIN — Medication 16 UNIT(S): at 18:54

## 2018-01-01 RX ADMIN — Medication 100 MILLIGRAM(S): at 17:35

## 2018-01-01 RX ADMIN — Medication 650 MILLIGRAM(S): at 13:46

## 2018-01-01 RX ADMIN — Medication 10 MILLIGRAM(S): at 01:10

## 2018-01-01 RX ADMIN — CEFEPIME 100 MILLIGRAM(S): 1 INJECTION, POWDER, FOR SOLUTION INTRAMUSCULAR; INTRAVENOUS at 06:35

## 2018-01-01 RX ADMIN — Medication 100 MILLIGRAM(S): at 21:19

## 2018-01-01 RX ADMIN — Medication 1 DROP(S): at 08:52

## 2018-01-01 RX ADMIN — Medication 25 MILLIGRAM(S): at 06:19

## 2018-01-01 RX ADMIN — Medication 4: at 11:51

## 2018-01-01 RX ADMIN — Medication 6: at 06:20

## 2018-01-01 RX ADMIN — Medication 25 MILLIGRAM(S): at 14:49

## 2018-01-01 RX ADMIN — Medication 250 MILLIGRAM(S): at 07:48

## 2018-01-01 RX ADMIN — Medication 1 DROP(S): at 12:32

## 2018-01-01 RX ADMIN — SODIUM POLYSTYRENE SULFONATE 30 GRAM(S): 4.1 POWDER, FOR SUSPENSION ORAL at 22:58

## 2018-01-01 RX ADMIN — Medication 2000 UNIT(S): at 11:16

## 2018-01-01 RX ADMIN — Medication 1 DROP(S): at 12:36

## 2018-01-01 RX ADMIN — Medication 650 MILLIGRAM(S): at 05:12

## 2018-01-01 RX ADMIN — Medication 250 MILLIGRAM(S): at 05:34

## 2018-01-01 RX ADMIN — Medication 2: at 17:57

## 2018-01-01 RX ADMIN — ENOXAPARIN SODIUM 40 MILLIGRAM(S): 100 INJECTION SUBCUTANEOUS at 23:14

## 2018-01-01 RX ADMIN — Medication 1 UNIT(S): at 22:33

## 2018-01-01 RX ADMIN — LATANOPROST 1 DROP(S): 0.05 SOLUTION/ DROPS OPHTHALMIC; TOPICAL at 22:39

## 2018-01-01 RX ADMIN — Medication 250 MILLIGRAM(S): at 07:11

## 2018-01-01 RX ADMIN — Medication 81 MILLIGRAM(S): at 16:43

## 2018-01-01 RX ADMIN — LATANOPROST 1 DROP(S): 0.05 SOLUTION/ DROPS OPHTHALMIC; TOPICAL at 21:16

## 2018-01-01 RX ADMIN — BRIMONIDINE TARTRATE 1 DROP(S): 2 SOLUTION/ DROPS OPHTHALMIC at 12:20

## 2018-01-01 RX ADMIN — LIDOCAINE 1 PATCH: 4 CREAM TOPICAL at 00:25

## 2018-01-01 RX ADMIN — LIDOCAINE 1 PATCH: 4 CREAM TOPICAL at 19:33

## 2018-01-01 RX ADMIN — Medication 1 TABLET(S): at 17:04

## 2018-01-01 RX ADMIN — Medication 650 MILLIGRAM(S): at 18:00

## 2018-01-01 RX ADMIN — BRIMONIDINE TARTRATE 1 DROP(S): 2 SOLUTION/ DROPS OPHTHALMIC at 06:11

## 2018-01-01 RX ADMIN — BRIMONIDINE TARTRATE 1 DROP(S): 2 SOLUTION/ DROPS OPHTHALMIC at 13:11

## 2018-01-01 RX ADMIN — Medication 2: at 16:33

## 2018-01-01 RX ADMIN — BRIMONIDINE TARTRATE 1 DROP(S): 2 SOLUTION/ DROPS OPHTHALMIC at 05:36

## 2018-01-01 RX ADMIN — ENOXAPARIN SODIUM 40 MILLIGRAM(S): 100 INJECTION SUBCUTANEOUS at 22:43

## 2018-01-01 RX ADMIN — LIDOCAINE 1 PATCH: 4 CREAM TOPICAL at 11:16

## 2018-01-01 RX ADMIN — Medication 250 MILLIGRAM(S): at 05:55

## 2018-01-01 RX ADMIN — Medication 765 MILLIGRAM(S): at 14:26

## 2018-01-01 RX ADMIN — Medication 16 UNIT(S): at 06:32

## 2018-01-01 RX ADMIN — Medication 100 MILLIGRAM(S): at 11:05

## 2018-01-01 RX ADMIN — Medication 1 DROP(S): at 05:19

## 2018-01-01 RX ADMIN — Medication 81 MILLIGRAM(S): at 12:20

## 2018-01-01 RX ADMIN — Medication 100 MILLIGRAM(S): at 12:35

## 2018-01-01 RX ADMIN — LIDOCAINE 3 PATCH: 4 CREAM TOPICAL at 12:35

## 2018-01-01 RX ADMIN — Medication 1: at 17:03

## 2018-01-01 RX ADMIN — CEFEPIME 100 MILLIGRAM(S): 1 INJECTION, POWDER, FOR SOLUTION INTRAMUSCULAR; INTRAVENOUS at 19:05

## 2018-01-01 RX ADMIN — HUMAN INSULIN 3 UNIT(S): 100 INJECTION, SUSPENSION SUBCUTANEOUS at 23:34

## 2018-01-01 RX ADMIN — Medication 81 MILLIGRAM(S): at 11:19

## 2018-01-01 RX ADMIN — Medication 10 MILLIGRAM(S): at 01:41

## 2018-01-01 RX ADMIN — Medication 650 MILLIGRAM(S): at 00:16

## 2018-01-01 RX ADMIN — Medication 650 MILLIGRAM(S): at 05:05

## 2018-01-01 RX ADMIN — Medication 5 MILLIGRAM(S): at 19:00

## 2018-01-01 RX ADMIN — Medication 3: at 14:36

## 2018-01-01 RX ADMIN — Medication 650 MILLIGRAM(S): at 23:16

## 2018-01-01 RX ADMIN — Medication 650 MILLIGRAM(S): at 13:20

## 2018-01-01 RX ADMIN — Medication 2000 UNIT(S): at 14:32

## 2018-01-01 RX ADMIN — Medication 6: at 06:50

## 2018-01-01 RX ADMIN — ENOXAPARIN SODIUM 40 MILLIGRAM(S): 100 INJECTION SUBCUTANEOUS at 22:17

## 2018-01-01 RX ADMIN — Medication 300 MILLIGRAM(S): at 23:12

## 2018-01-01 RX ADMIN — Medication 81 MILLIGRAM(S): at 12:42

## 2018-01-01 RX ADMIN — ENOXAPARIN SODIUM 40 MILLIGRAM(S): 100 INJECTION SUBCUTANEOUS at 22:25

## 2018-01-01 RX ADMIN — Medication 50 MILLIGRAM(S): at 05:22

## 2018-01-01 RX ADMIN — Medication 50 MILLIGRAM(S): at 05:14

## 2018-01-01 RX ADMIN — BRIMONIDINE TARTRATE 1 DROP(S): 2 SOLUTION/ DROPS OPHTHALMIC at 05:19

## 2018-01-01 RX ADMIN — Medication 4: at 06:42

## 2018-01-01 RX ADMIN — ENOXAPARIN SODIUM 40 MILLIGRAM(S): 100 INJECTION SUBCUTANEOUS at 00:44

## 2018-01-01 RX ADMIN — HEPARIN SODIUM 5000 UNIT(S): 5000 INJECTION INTRAVENOUS; SUBCUTANEOUS at 22:08

## 2018-01-01 RX ADMIN — ENOXAPARIN SODIUM 40 MILLIGRAM(S): 100 INJECTION SUBCUTANEOUS at 21:12

## 2018-01-01 RX ADMIN — BRIMONIDINE TARTRATE 1 DROP(S): 2 SOLUTION/ DROPS OPHTHALMIC at 11:52

## 2018-01-01 RX ADMIN — Medication 50 MILLIGRAM(S): at 13:28

## 2018-01-01 RX ADMIN — ENOXAPARIN SODIUM 40 MILLIGRAM(S): 100 INJECTION SUBCUTANEOUS at 21:50

## 2018-01-01 RX ADMIN — INSULIN GLARGINE 8 UNIT(S): 100 INJECTION, SOLUTION SUBCUTANEOUS at 22:35

## 2018-01-01 RX ADMIN — LATANOPROST 1 DROP(S): 0.05 SOLUTION/ DROPS OPHTHALMIC; TOPICAL at 21:31

## 2018-01-01 RX ADMIN — Medication 50 MILLIGRAM(S): at 22:49

## 2018-01-01 RX ADMIN — HEPARIN SODIUM 5000 UNIT(S): 5000 INJECTION INTRAVENOUS; SUBCUTANEOUS at 05:14

## 2018-01-01 RX ADMIN — Medication 4: at 11:38

## 2018-01-01 RX ADMIN — Medication 5 MILLIGRAM(S): at 20:44

## 2018-01-01 RX ADMIN — Medication 1 DROP(S): at 12:04

## 2018-01-01 RX ADMIN — Medication 1 DROP(S): at 19:28

## 2018-01-01 RX ADMIN — LACTULOSE 10 GRAM(S): 10 SOLUTION ORAL at 12:47

## 2018-01-01 RX ADMIN — Medication 3 UNIT(S): at 12:10

## 2018-01-01 RX ADMIN — Medication 2: at 18:08

## 2018-01-01 RX ADMIN — Medication 2: at 23:17

## 2018-01-01 RX ADMIN — LATANOPROST 1 DROP(S): 0.05 SOLUTION/ DROPS OPHTHALMIC; TOPICAL at 23:22

## 2018-01-01 RX ADMIN — Medication 25 MILLIGRAM(S): at 21:47

## 2018-01-01 RX ADMIN — Medication 1 TABLET(S): at 08:10

## 2018-01-01 RX ADMIN — SIMVASTATIN 20 MILLIGRAM(S): 20 TABLET, FILM COATED ORAL at 21:16

## 2018-01-01 RX ADMIN — LEVETIRACETAM 400 MILLIGRAM(S): 250 TABLET, FILM COATED ORAL at 06:35

## 2018-01-01 RX ADMIN — Medication 4: at 12:20

## 2018-01-01 RX ADMIN — LEVETIRACETAM 500 MILLIGRAM(S): 250 TABLET, FILM COATED ORAL at 21:16

## 2018-01-01 RX ADMIN — Medication 1 DROP(S): at 11:53

## 2018-01-01 RX ADMIN — Medication 650 MILLIGRAM(S): at 14:29

## 2018-01-01 RX ADMIN — LEVETIRACETAM 400 MILLIGRAM(S): 250 TABLET, FILM COATED ORAL at 18:22

## 2018-01-01 RX ADMIN — Medication 40 MILLIGRAM(S): at 09:08

## 2018-01-01 RX ADMIN — ENOXAPARIN SODIUM 40 MILLIGRAM(S): 100 INJECTION SUBCUTANEOUS at 23:28

## 2018-01-01 RX ADMIN — Medication 1 TABLET(S): at 16:26

## 2018-01-01 RX ADMIN — LEVETIRACETAM 400 MILLIGRAM(S): 250 TABLET, FILM COATED ORAL at 06:28

## 2018-01-01 RX ADMIN — Medication 50 MILLIGRAM(S): at 22:16

## 2018-01-01 RX ADMIN — Medication 650 MILLIGRAM(S): at 00:55

## 2018-01-01 RX ADMIN — Medication 3 UNIT(S): at 16:37

## 2018-01-01 RX ADMIN — LEVETIRACETAM 250 MILLIGRAM(S): 250 TABLET, FILM COATED ORAL at 08:36

## 2018-01-01 RX ADMIN — LIDOCAINE 1 PATCH: 4 CREAM TOPICAL at 13:08

## 2018-01-01 RX ADMIN — Medication 250 MILLIGRAM(S): at 05:30

## 2018-01-01 RX ADMIN — LEVETIRACETAM 400 MILLIGRAM(S): 250 TABLET, FILM COATED ORAL at 05:47

## 2018-01-01 RX ADMIN — HEPARIN SODIUM 5000 UNIT(S): 5000 INJECTION INTRAVENOUS; SUBCUTANEOUS at 12:43

## 2018-01-01 RX ADMIN — Medication 3 MILLILITER(S): at 03:15

## 2018-01-01 RX ADMIN — Medication 650 MILLIGRAM(S): at 17:31

## 2018-01-01 RX ADMIN — Medication 3 MILLILITER(S): at 15:47

## 2018-01-01 RX ADMIN — Medication 81 MILLIGRAM(S): at 13:02

## 2018-01-01 RX ADMIN — Medication 1 SPRAY(S): at 17:33

## 2018-01-01 RX ADMIN — Medication 3 MILLILITER(S): at 20:39

## 2018-01-01 RX ADMIN — Medication 1 DROP(S): at 06:11

## 2018-01-01 RX ADMIN — BRIMONIDINE TARTRATE 1 DROP(S): 2 SOLUTION/ DROPS OPHTHALMIC at 11:40

## 2018-01-01 RX ADMIN — Medication 10 MILLIGRAM(S): at 02:28

## 2018-01-01 RX ADMIN — Medication 650 MILLIGRAM(S): at 13:04

## 2018-01-01 RX ADMIN — SODIUM CHLORIDE 75 MILLILITER(S): 9 INJECTION, SOLUTION INTRAVENOUS at 06:30

## 2018-01-01 RX ADMIN — SODIUM CHLORIDE 50 MILLILITER(S): 9 INJECTION, SOLUTION INTRAVENOUS at 08:56

## 2018-01-01 RX ADMIN — Medication 81 MILLIGRAM(S): at 12:12

## 2018-01-01 RX ADMIN — Medication 2: at 18:29

## 2018-01-01 RX ADMIN — Medication 10 UNIT(S): at 06:34

## 2018-01-01 RX ADMIN — ENOXAPARIN SODIUM 40 MILLIGRAM(S): 100 INJECTION SUBCUTANEOUS at 22:40

## 2018-01-01 RX ADMIN — Medication 1 TABLET(S): at 13:33

## 2018-01-01 RX ADMIN — BRIMONIDINE TARTRATE 1 DROP(S): 2 SOLUTION/ DROPS OPHTHALMIC at 12:33

## 2018-01-01 RX ADMIN — Medication 1 DROP(S): at 02:30

## 2018-01-01 RX ADMIN — SODIUM CHLORIDE 75 MILLILITER(S): 9 INJECTION INTRAMUSCULAR; INTRAVENOUS; SUBCUTANEOUS at 11:38

## 2018-01-01 RX ADMIN — Medication 8: at 11:41

## 2018-01-01 RX ADMIN — Medication 250 MILLIGRAM(S): at 18:44

## 2018-01-01 RX ADMIN — Medication 50 MILLIGRAM(S): at 13:12

## 2018-01-01 RX ADMIN — Medication 1 DROP(S): at 02:15

## 2018-01-01 RX ADMIN — Medication 25 MILLIGRAM(S): at 06:05

## 2018-01-01 RX ADMIN — Medication 10 MILLIGRAM(S): at 06:54

## 2018-01-01 RX ADMIN — ENOXAPARIN SODIUM 40 MILLIGRAM(S): 100 INJECTION SUBCUTANEOUS at 22:15

## 2018-01-01 RX ADMIN — LEVETIRACETAM 400 MILLIGRAM(S): 250 TABLET, FILM COATED ORAL at 05:08

## 2018-01-01 RX ADMIN — CEFTRIAXONE 100 GRAM(S): 500 INJECTION, POWDER, FOR SOLUTION INTRAMUSCULAR; INTRAVENOUS at 14:29

## 2018-01-01 RX ADMIN — Medication 3 MILLILITER(S): at 15:49

## 2018-01-01 RX ADMIN — Medication 10 UNIT(S): at 18:27

## 2018-01-01 RX ADMIN — LEVETIRACETAM 400 MILLIGRAM(S): 250 TABLET, FILM COATED ORAL at 17:51

## 2018-01-01 RX ADMIN — LIDOCAINE 1 PATCH: 4 CREAM TOPICAL at 18:31

## 2018-01-01 RX ADMIN — LATANOPROST 1 DROP(S): 0.05 SOLUTION/ DROPS OPHTHALMIC; TOPICAL at 21:12

## 2018-01-01 RX ADMIN — Medication 100 MILLIGRAM(S): at 05:14

## 2018-01-01 RX ADMIN — INSULIN GLARGINE 8 UNIT(S): 100 INJECTION, SOLUTION SUBCUTANEOUS at 21:26

## 2018-01-01 RX ADMIN — Medication 81 MILLIGRAM(S): at 12:39

## 2018-01-01 RX ADMIN — LEVETIRACETAM 420 MILLIGRAM(S): 250 TABLET, FILM COATED ORAL at 20:29

## 2018-01-01 RX ADMIN — Medication 650 MILLIGRAM(S): at 23:57

## 2018-01-01 RX ADMIN — GABAPENTIN 300 MILLIGRAM(S): 400 CAPSULE ORAL at 21:19

## 2018-01-01 RX ADMIN — Medication 1: at 17:34

## 2018-01-01 RX ADMIN — BRIMONIDINE TARTRATE 1 DROP(S): 2 SOLUTION/ DROPS OPHTHALMIC at 06:44

## 2018-01-01 RX ADMIN — INSULIN GLARGINE 8 UNIT(S): 100 INJECTION, SOLUTION SUBCUTANEOUS at 22:38

## 2018-01-01 RX ADMIN — Medication 1 DROP(S): at 16:03

## 2018-01-01 RX ADMIN — Medication 6: at 17:37

## 2018-01-01 RX ADMIN — Medication 1 TABLET(S): at 12:32

## 2018-01-01 RX ADMIN — LEVETIRACETAM 500 MILLIGRAM(S): 250 TABLET, FILM COATED ORAL at 18:00

## 2018-01-01 RX ADMIN — BRIMONIDINE TARTRATE 1 DROP(S): 2 SOLUTION/ DROPS OPHTHALMIC at 12:29

## 2018-01-01 RX ADMIN — BRIMONIDINE TARTRATE 1 DROP(S): 2 SOLUTION/ DROPS OPHTHALMIC at 12:36

## 2018-01-01 RX ADMIN — Medication 10 MILLIGRAM(S): at 06:41

## 2018-01-01 RX ADMIN — Medication 25 MILLIGRAM(S): at 06:35

## 2018-01-01 RX ADMIN — LEVETIRACETAM 420 MILLIGRAM(S): 250 TABLET, FILM COATED ORAL at 05:33

## 2018-01-01 RX ADMIN — Medication 500 MILLIGRAM(S): at 18:00

## 2018-01-01 RX ADMIN — LIDOCAINE 1 PATCH: 4 CREAM TOPICAL at 12:43

## 2018-01-01 RX ADMIN — LATANOPROST 1 DROP(S): 0.05 SOLUTION/ DROPS OPHTHALMIC; TOPICAL at 23:17

## 2018-01-01 RX ADMIN — Medication 4: at 18:26

## 2018-01-01 RX ADMIN — Medication 500 MILLIGRAM(S): at 22:15

## 2018-01-01 RX ADMIN — HEPARIN SODIUM 5000 UNIT(S): 5000 INJECTION INTRAVENOUS; SUBCUTANEOUS at 02:10

## 2018-01-01 RX ADMIN — ENOXAPARIN SODIUM 40 MILLIGRAM(S): 100 INJECTION SUBCUTANEOUS at 22:30

## 2018-01-01 RX ADMIN — SIMVASTATIN 20 MILLIGRAM(S): 20 TABLET, FILM COATED ORAL at 22:17

## 2018-01-01 RX ADMIN — LATANOPROST 1 DROP(S): 0.05 SOLUTION/ DROPS OPHTHALMIC; TOPICAL at 23:45

## 2018-01-01 RX ADMIN — INSULIN GLARGINE 10 UNIT(S): 100 INJECTION, SOLUTION SUBCUTANEOUS at 23:22

## 2018-01-01 RX ADMIN — HEPARIN SODIUM 5000 UNIT(S): 5000 INJECTION INTRAVENOUS; SUBCUTANEOUS at 13:12

## 2018-01-01 RX ADMIN — Medication 10 UNIT(S): at 06:52

## 2018-01-01 RX ADMIN — LATANOPROST 1 DROP(S): 0.05 SOLUTION/ DROPS OPHTHALMIC; TOPICAL at 22:16

## 2018-01-01 RX ADMIN — Medication 2: at 06:31

## 2018-01-01 RX ADMIN — Medication 6: at 06:30

## 2018-01-01 RX ADMIN — Medication 10 MILLIGRAM(S): at 05:37

## 2018-01-01 RX ADMIN — LEVETIRACETAM 400 MILLIGRAM(S): 250 TABLET, FILM COATED ORAL at 18:02

## 2018-01-01 RX ADMIN — Medication 81 MILLIGRAM(S): at 12:02

## 2018-01-01 RX ADMIN — BRIMONIDINE TARTRATE 1 DROP(S): 2 SOLUTION/ DROPS OPHTHALMIC at 11:46

## 2018-01-01 RX ADMIN — Medication 81 MILLIGRAM(S): at 11:44

## 2018-01-01 RX ADMIN — HEPARIN SODIUM 5000 UNIT(S): 5000 INJECTION INTRAVENOUS; SUBCUTANEOUS at 23:46

## 2018-01-01 RX ADMIN — Medication 1 DROP(S): at 22:39

## 2018-01-01 RX ADMIN — GABAPENTIN 300 MILLIGRAM(S): 400 CAPSULE ORAL at 05:30

## 2018-01-01 RX ADMIN — Medication 1 DROP(S): at 05:18

## 2018-01-01 RX ADMIN — INSULIN GLARGINE 8 UNIT(S): 100 INJECTION, SOLUTION SUBCUTANEOUS at 22:50

## 2018-01-01 RX ADMIN — LATANOPROST 1 DROP(S): 0.05 SOLUTION/ DROPS OPHTHALMIC; TOPICAL at 23:29

## 2018-01-01 RX ADMIN — Medication 50 MILLIGRAM(S): at 12:43

## 2018-01-01 RX ADMIN — SODIUM CHLORIDE 500 MILLILITER(S): 9 INJECTION, SOLUTION INTRAVENOUS at 09:24

## 2018-01-01 RX ADMIN — Medication 250 MILLIGRAM(S): at 18:20

## 2018-01-01 RX ADMIN — LEVETIRACETAM 400 MILLIGRAM(S): 250 TABLET, FILM COATED ORAL at 18:28

## 2018-01-01 RX ADMIN — Medication 10 MILLIGRAM(S): at 04:01

## 2018-01-01 RX ADMIN — BRIMONIDINE TARTRATE 1 DROP(S): 2 SOLUTION/ DROPS OPHTHALMIC at 11:53

## 2018-01-01 RX ADMIN — LATANOPROST 1 DROP(S): 0.05 SOLUTION/ DROPS OPHTHALMIC; TOPICAL at 22:35

## 2018-01-01 RX ADMIN — Medication 100 MILLIGRAM(S): at 11:39

## 2018-01-01 RX ADMIN — LEVETIRACETAM 500 MILLIGRAM(S): 250 TABLET, FILM COATED ORAL at 22:38

## 2018-01-01 RX ADMIN — Medication 3: at 17:05

## 2018-01-01 RX ADMIN — HUMAN INSULIN 3 UNIT(S): 100 INJECTION, SUSPENSION SUBCUTANEOUS at 06:42

## 2018-01-01 RX ADMIN — INSULIN GLARGINE 8 UNIT(S): 100 INJECTION, SOLUTION SUBCUTANEOUS at 21:37

## 2018-01-01 RX ADMIN — Medication 2: at 18:20

## 2018-01-01 RX ADMIN — GABAPENTIN 300 MILLIGRAM(S): 400 CAPSULE ORAL at 17:49

## 2018-01-01 RX ADMIN — ENOXAPARIN SODIUM 40 MILLIGRAM(S): 100 INJECTION SUBCUTANEOUS at 23:21

## 2018-01-01 RX ADMIN — Medication 10 MILLIGRAM(S): at 20:28

## 2018-01-01 RX ADMIN — Medication 4: at 06:35

## 2018-01-01 RX ADMIN — Medication 1 DROP(S): at 12:50

## 2018-01-01 RX ADMIN — Medication 25 MILLIGRAM(S): at 05:07

## 2018-01-01 RX ADMIN — LEVETIRACETAM 500 MILLIGRAM(S): 250 TABLET, FILM COATED ORAL at 23:45

## 2018-01-01 RX ADMIN — Medication 25 MILLIGRAM(S): at 06:36

## 2018-01-01 RX ADMIN — Medication 10 MILLIGRAM(S): at 06:31

## 2018-01-01 RX ADMIN — Medication 2: at 09:02

## 2018-01-01 RX ADMIN — Medication 3 MILLILITER(S): at 09:12

## 2018-01-01 RX ADMIN — LATANOPROST 1 DROP(S): 0.05 SOLUTION/ DROPS OPHTHALMIC; TOPICAL at 22:43

## 2018-01-01 RX ADMIN — Medication 81 MILLIGRAM(S): at 13:32

## 2018-01-01 RX ADMIN — LEVETIRACETAM 250 MILLIGRAM(S): 250 TABLET, FILM COATED ORAL at 08:03

## 2018-01-01 RX ADMIN — Medication 10 MILLIGRAM(S): at 16:02

## 2018-01-01 RX ADMIN — MORPHINE SULFATE 2 MG/HR: 50 CAPSULE, EXTENDED RELEASE ORAL at 18:22

## 2018-01-01 RX ADMIN — ENOXAPARIN SODIUM 40 MILLIGRAM(S): 100 INJECTION SUBCUTANEOUS at 23:16

## 2018-01-01 RX ADMIN — ENOXAPARIN SODIUM 40 MILLIGRAM(S): 100 INJECTION SUBCUTANEOUS at 21:47

## 2018-01-01 RX ADMIN — Medication 10 MILLIGRAM(S): at 05:18

## 2018-01-01 RX ADMIN — Medication 25 MILLIGRAM(S): at 07:11

## 2018-01-01 RX ADMIN — LEVETIRACETAM 400 MILLIGRAM(S): 250 TABLET, FILM COATED ORAL at 06:36

## 2018-01-01 RX ADMIN — Medication 650 MILLIGRAM(S): at 00:59

## 2018-01-01 RX ADMIN — Medication 10 UNIT(S): at 19:22

## 2018-01-01 RX ADMIN — Medication 12.5 MILLIGRAM(S): at 05:38

## 2018-01-01 RX ADMIN — Medication 3 UNIT(S): at 09:58

## 2018-01-01 RX ADMIN — SODIUM CHLORIDE 70 MILLILITER(S): 9 INJECTION, SOLUTION INTRAVENOUS at 20:00

## 2018-01-01 RX ADMIN — Medication 100 MILLIGRAM(S): at 16:53

## 2018-01-01 RX ADMIN — ENOXAPARIN SODIUM 40 MILLIGRAM(S): 100 INJECTION SUBCUTANEOUS at 00:06

## 2018-01-01 RX ADMIN — Medication 50 MILLIGRAM(S): at 06:08

## 2018-01-01 RX ADMIN — HUMAN INSULIN 3 UNIT(S): 100 INJECTION, SUSPENSION SUBCUTANEOUS at 05:49

## 2018-01-01 RX ADMIN — Medication 650 MILLIGRAM(S): at 22:49

## 2018-01-01 RX ADMIN — Medication 16 UNIT(S): at 06:31

## 2018-01-01 RX ADMIN — Medication 25 MILLIGRAM(S): at 05:38

## 2018-01-01 RX ADMIN — Medication 10 MILLIGRAM(S): at 15:50

## 2018-01-01 RX ADMIN — Medication 25 MILLIGRAM(S): at 05:49

## 2018-01-01 RX ADMIN — Medication 250 MILLIGRAM(S): at 05:49

## 2018-01-01 RX ADMIN — Medication 1 DROP(S): at 14:08

## 2018-01-01 RX ADMIN — Medication 3 MILLILITER(S): at 15:59

## 2018-01-01 RX ADMIN — Medication 25 MILLIGRAM(S): at 23:21

## 2018-01-01 RX ADMIN — Medication 1 DROP(S): at 12:20

## 2018-01-01 RX ADMIN — Medication 1 DROP(S): at 11:44

## 2018-01-01 RX ADMIN — BRIMONIDINE TARTRATE 1 DROP(S): 2 SOLUTION/ DROPS OPHTHALMIC at 11:13

## 2018-01-01 RX ADMIN — LATANOPROST 1 DROP(S): 0.05 SOLUTION/ DROPS OPHTHALMIC; TOPICAL at 22:49

## 2018-01-01 RX ADMIN — Medication 81 MILLIGRAM(S): at 14:27

## 2018-01-01 RX ADMIN — Medication 250 MILLIGRAM(S): at 05:05

## 2018-01-01 RX ADMIN — HUMAN INSULIN 3 UNIT(S): 100 INJECTION, SUSPENSION SUBCUTANEOUS at 14:35

## 2018-01-01 RX ADMIN — Medication 1: at 09:23

## 2018-01-01 RX ADMIN — Medication 1 TABLET(S): at 16:53

## 2018-01-01 RX ADMIN — SODIUM CHLORIDE 50 MILLILITER(S): 9 INJECTION, SOLUTION INTRAVENOUS at 09:32

## 2018-01-01 RX ADMIN — BRIMONIDINE TARTRATE 1 DROP(S): 2 SOLUTION/ DROPS OPHTHALMIC at 12:50

## 2018-01-01 RX ADMIN — Medication 250 MILLIGRAM(S): at 20:28

## 2018-01-01 RX ADMIN — BRIMONIDINE TARTRATE 1 DROP(S): 2 SOLUTION/ DROPS OPHTHALMIC at 13:07

## 2018-01-01 RX ADMIN — SIMVASTATIN 20 MILLIGRAM(S): 20 TABLET, FILM COATED ORAL at 21:20

## 2018-01-01 RX ADMIN — Medication 2: at 12:46

## 2018-01-01 RX ADMIN — Medication 50 MILLIGRAM(S): at 05:36

## 2018-01-01 RX ADMIN — Medication 10 MILLIGRAM(S): at 06:24

## 2018-01-01 RX ADMIN — Medication 25 MILLIGRAM(S): at 06:09

## 2018-01-01 RX ADMIN — Medication 6: at 17:23

## 2018-01-01 RX ADMIN — Medication 25 MILLIGRAM(S): at 13:21

## 2018-01-01 RX ADMIN — Medication 3: at 12:34

## 2018-01-01 RX ADMIN — Medication 25 MILLIGRAM(S): at 18:09

## 2018-01-01 RX ADMIN — CHLORHEXIDINE GLUCONATE 15 MILLILITER(S): 213 SOLUTION TOPICAL at 17:56

## 2018-01-01 RX ADMIN — BRIMONIDINE TARTRATE 1 DROP(S): 2 SOLUTION/ DROPS OPHTHALMIC at 08:50

## 2018-01-01 RX ADMIN — Medication 10 MILLIGRAM(S): at 01:27

## 2018-01-01 RX ADMIN — SODIUM CHLORIDE 100 MILLILITER(S): 9 INJECTION, SOLUTION INTRAVENOUS at 11:39

## 2018-01-01 RX ADMIN — SODIUM CHLORIDE 100 MILLILITER(S): 9 INJECTION, SOLUTION INTRAVENOUS at 01:39

## 2018-01-01 RX ADMIN — LEVETIRACETAM 400 MILLIGRAM(S): 250 TABLET, FILM COATED ORAL at 05:38

## 2018-01-01 RX ADMIN — LATANOPROST 1 DROP(S): 0.05 SOLUTION/ DROPS OPHTHALMIC; TOPICAL at 21:47

## 2018-01-01 RX ADMIN — Medication 650 MILLIGRAM(S): at 06:57

## 2018-01-01 RX ADMIN — HUMAN INSULIN 3 UNIT(S): 100 INJECTION, SUSPENSION SUBCUTANEOUS at 22:25

## 2018-01-01 RX ADMIN — LATANOPROST 1 DROP(S): 0.05 SOLUTION/ DROPS OPHTHALMIC; TOPICAL at 23:26

## 2018-01-01 RX ADMIN — BRIMONIDINE TARTRATE 1 DROP(S): 2 SOLUTION/ DROPS OPHTHALMIC at 11:10

## 2018-01-01 RX ADMIN — LIDOCAINE 1 PATCH: 4 CREAM TOPICAL at 23:48

## 2018-01-01 RX ADMIN — LACOSAMIDE 110 MILLIGRAM(S): 50 TABLET ORAL at 06:44

## 2018-01-01 RX ADMIN — LATANOPROST 1 DROP(S): 0.05 SOLUTION/ DROPS OPHTHALMIC; TOPICAL at 22:23

## 2018-01-01 RX ADMIN — Medication 6: at 08:33

## 2018-01-01 RX ADMIN — Medication 81 MILLIGRAM(S): at 13:20

## 2018-01-01 RX ADMIN — LIDOCAINE 1 PATCH: 4 CREAM TOPICAL at 19:44

## 2018-01-01 RX ADMIN — LEVETIRACETAM 250 MILLIGRAM(S): 250 TABLET, FILM COATED ORAL at 09:24

## 2018-01-01 RX ADMIN — Medication 25 MILLIGRAM(S): at 00:18

## 2018-01-01 RX ADMIN — LIDOCAINE 1 PATCH: 4 CREAM TOPICAL at 19:54

## 2018-01-01 RX ADMIN — Medication 5 MILLILITER(S): at 17:24

## 2018-01-01 RX ADMIN — Medication 10 MILLIGRAM(S): at 01:30

## 2018-01-01 RX ADMIN — Medication 8 UNIT(S): at 18:32

## 2018-01-01 RX ADMIN — ENOXAPARIN SODIUM 40 MILLIGRAM(S): 100 INJECTION SUBCUTANEOUS at 22:16

## 2018-01-01 RX ADMIN — GABAPENTIN 300 MILLIGRAM(S): 400 CAPSULE ORAL at 17:12

## 2018-01-01 RX ADMIN — Medication 2: at 16:49

## 2018-01-01 RX ADMIN — Medication 100 MILLIGRAM(S): at 21:57

## 2018-01-01 RX ADMIN — Medication 12.5 MILLIGRAM(S): at 18:05

## 2018-01-01 RX ADMIN — LACOSAMIDE 50 MILLIGRAM(S): 50 TABLET ORAL at 05:49

## 2018-01-01 RX ADMIN — SODIUM CHLORIDE 50 MILLILITER(S): 9 INJECTION, SOLUTION INTRAVENOUS at 06:45

## 2018-01-01 RX ADMIN — LATANOPROST 1 DROP(S): 0.05 SOLUTION/ DROPS OPHTHALMIC; TOPICAL at 21:46

## 2018-01-01 RX ADMIN — Medication 4: at 06:13

## 2018-01-01 RX ADMIN — Medication 250 MILLIGRAM(S): at 21:53

## 2018-01-01 RX ADMIN — LATANOPROST 1 DROP(S): 0.05 SOLUTION/ DROPS OPHTHALMIC; TOPICAL at 21:56

## 2018-01-01 RX ADMIN — LEVETIRACETAM 500 MILLIGRAM(S): 250 TABLET, FILM COATED ORAL at 22:50

## 2018-01-01 RX ADMIN — Medication 3 MILLILITER(S): at 15:30

## 2018-01-01 RX ADMIN — Medication 10 UNIT(S): at 06:30

## 2018-01-01 RX ADMIN — INSULIN GLARGINE 8 UNIT(S): 100 INJECTION, SOLUTION SUBCUTANEOUS at 22:16

## 2018-01-01 RX ADMIN — LACOSAMIDE 110 MILLIGRAM(S): 50 TABLET ORAL at 05:17

## 2018-01-01 RX ADMIN — LIDOCAINE 1 PATCH: 4 CREAM TOPICAL at 23:19

## 2018-01-01 RX ADMIN — Medication 300 MILLIGRAM(S): at 18:27

## 2018-01-01 RX ADMIN — Medication 16 UNIT(S): at 18:08

## 2018-01-01 RX ADMIN — GABAPENTIN 300 MILLIGRAM(S): 400 CAPSULE ORAL at 16:37

## 2018-01-01 RX ADMIN — LEVETIRACETAM 400 MILLIGRAM(S): 250 TABLET, FILM COATED ORAL at 18:19

## 2018-01-01 RX ADMIN — TOBRAMYCIN AND DEXAMETHASONE 1 APPLICATION(S): 1; 3 SUSPENSION/ DROPS OPHTHALMIC at 12:00

## 2018-01-01 RX ADMIN — Medication 100 MILLIEQUIVALENT(S): at 16:45

## 2018-01-01 RX ADMIN — ENOXAPARIN SODIUM 40 MILLIGRAM(S): 100 INJECTION SUBCUTANEOUS at 21:31

## 2018-01-01 RX ADMIN — ENOXAPARIN SODIUM 40 MILLIGRAM(S): 100 INJECTION SUBCUTANEOUS at 22:35

## 2018-01-01 RX ADMIN — LATANOPROST 1 DROP(S): 0.05 SOLUTION/ DROPS OPHTHALMIC; TOPICAL at 00:12

## 2018-01-01 RX ADMIN — ENOXAPARIN SODIUM 40 MILLIGRAM(S): 100 INJECTION SUBCUTANEOUS at 22:03

## 2018-01-01 RX ADMIN — Medication 81 MILLIGRAM(S): at 11:48

## 2018-01-01 RX ADMIN — GABAPENTIN 300 MILLIGRAM(S): 400 CAPSULE ORAL at 05:05

## 2018-01-01 RX ADMIN — Medication 12.5 MILLIGRAM(S): at 06:28

## 2018-01-01 RX ADMIN — Medication 250 MILLIGRAM(S): at 17:49

## 2018-01-01 RX ADMIN — LEVETIRACETAM 400 MILLIGRAM(S): 250 TABLET, FILM COATED ORAL at 06:47

## 2018-01-01 RX ADMIN — Medication 50 MILLIGRAM(S): at 23:47

## 2018-01-01 RX ADMIN — Medication 3: at 19:24

## 2018-01-01 RX ADMIN — Medication 1 DROP(S): at 20:47

## 2018-01-01 RX ADMIN — Medication 500 MILLIGRAM(S): at 06:25

## 2018-01-01 RX ADMIN — LIDOCAINE 1 PATCH: 4 CREAM TOPICAL at 23:31

## 2018-01-01 RX ADMIN — Medication 1 DROP(S): at 06:09

## 2018-01-01 RX ADMIN — CEFTRIAXONE 100 GRAM(S): 500 INJECTION, POWDER, FOR SOLUTION INTRAMUSCULAR; INTRAVENOUS at 23:37

## 2018-01-01 RX ADMIN — Medication 25 MILLIGRAM(S): at 16:43

## 2018-01-01 RX ADMIN — Medication 1: at 08:35

## 2018-01-01 RX ADMIN — HEPARIN SODIUM 5000 UNIT(S): 5000 INJECTION INTRAVENOUS; SUBCUTANEOUS at 11:04

## 2018-01-01 RX ADMIN — Medication 40 MILLIGRAM(S): at 05:47

## 2018-01-01 RX ADMIN — Medication 250 MILLIGRAM(S): at 13:33

## 2018-01-01 RX ADMIN — Medication 1 DROP(S): at 12:28

## 2018-01-01 RX ADMIN — Medication 1 DROP(S): at 22:08

## 2018-01-01 RX ADMIN — CEFTRIAXONE 1000 MILLIGRAM(S): 500 INJECTION, POWDER, FOR SOLUTION INTRAMUSCULAR; INTRAVENOUS at 14:08

## 2018-01-01 RX ADMIN — Medication 1 DROP(S): at 13:07

## 2018-01-01 RX ADMIN — Medication 100 MILLIGRAM(S): at 06:09

## 2018-01-01 RX ADMIN — Medication 2: at 02:06

## 2018-01-01 RX ADMIN — Medication 25 MILLIGRAM(S): at 23:28

## 2018-01-01 RX ADMIN — Medication 25 MILLIGRAM(S): at 14:29

## 2018-01-01 RX ADMIN — Medication 650 MILLIGRAM(S): at 01:30

## 2018-01-01 RX ADMIN — SODIUM CHLORIDE 2000 MILLILITER(S): 9 INJECTION INTRAMUSCULAR; INTRAVENOUS; SUBCUTANEOUS at 20:40

## 2018-01-01 RX ADMIN — Medication 25 MILLIGRAM(S): at 17:12

## 2018-01-01 RX ADMIN — GABAPENTIN 300 MILLIGRAM(S): 400 CAPSULE ORAL at 13:45

## 2018-01-01 RX ADMIN — LEVETIRACETAM 400 MILLIGRAM(S): 250 TABLET, FILM COATED ORAL at 18:30

## 2018-01-01 RX ADMIN — Medication 25 MILLIGRAM(S): at 12:39

## 2018-01-01 RX ADMIN — Medication 10 MILLIGRAM(S): at 23:57

## 2018-01-01 RX ADMIN — Medication 25 MILLIGRAM(S): at 05:34

## 2018-01-01 RX ADMIN — HUMAN INSULIN 3 UNIT(S): 100 INJECTION, SUSPENSION SUBCUTANEOUS at 15:01

## 2018-01-01 RX ADMIN — Medication 1 DROP(S): at 02:38

## 2018-01-01 RX ADMIN — Medication 1 SPRAY(S): at 17:13

## 2018-01-01 RX ADMIN — HUMAN INSULIN 3 UNIT(S): 100 INJECTION, SUSPENSION SUBCUTANEOUS at 15:39

## 2018-01-01 RX ADMIN — Medication 1 SPRAY(S): at 21:19

## 2018-01-01 RX ADMIN — Medication 6: at 05:23

## 2018-01-01 RX ADMIN — HEPARIN SODIUM 5000 UNIT(S): 5000 INJECTION INTRAVENOUS; SUBCUTANEOUS at 13:45

## 2018-01-01 RX ADMIN — Medication 7.15 MICROGRAM(S)/KG/MIN: at 12:19

## 2018-01-01 RX ADMIN — Medication 10 MILLIGRAM(S): at 10:30

## 2018-01-01 RX ADMIN — Medication 1 TABLET(S): at 13:10

## 2018-01-01 RX ADMIN — LEVETIRACETAM 250 MILLIGRAM(S): 250 TABLET, FILM COATED ORAL at 11:04

## 2018-01-01 RX ADMIN — LEVETIRACETAM 400 MILLIGRAM(S): 250 TABLET, FILM COATED ORAL at 18:37

## 2018-01-01 RX ADMIN — Medication 650 MILLIGRAM(S): at 11:39

## 2018-01-01 RX ADMIN — Medication 12.5 MILLIGRAM(S): at 06:35

## 2018-01-01 RX ADMIN — Medication 650 MILLIGRAM(S): at 13:00

## 2018-01-01 RX ADMIN — BRIMONIDINE TARTRATE 1 DROP(S): 2 SOLUTION/ DROPS OPHTHALMIC at 13:15

## 2018-01-01 RX ADMIN — Medication 2: at 17:07

## 2018-01-01 RX ADMIN — LIDOCAINE 3 PATCH: 4 CREAM TOPICAL at 23:15

## 2018-01-01 RX ADMIN — Medication 8 UNIT(S): at 05:24

## 2018-01-01 RX ADMIN — BRIMONIDINE TARTRATE 1 DROP(S): 2 SOLUTION/ DROPS OPHTHALMIC at 13:18

## 2018-01-01 RX ADMIN — LATANOPROST 1 DROP(S): 0.05 SOLUTION/ DROPS OPHTHALMIC; TOPICAL at 22:30

## 2018-01-01 RX ADMIN — Medication 1 TABLET(S): at 12:39

## 2018-01-01 RX ADMIN — Medication 100 MILLIGRAM(S): at 05:37

## 2018-01-01 RX ADMIN — Medication 650 MILLIGRAM(S): at 17:56

## 2018-01-01 RX ADMIN — LATANOPROST 1 DROP(S): 0.05 SOLUTION/ DROPS OPHTHALMIC; TOPICAL at 22:02

## 2018-01-01 RX ADMIN — Medication 25 MILLIGRAM(S): at 13:19

## 2018-01-01 RX ADMIN — Medication 25 MILLIGRAM(S): at 13:02

## 2018-01-01 RX ADMIN — Medication 81 MILLIGRAM(S): at 11:16

## 2018-01-01 RX ADMIN — SODIUM CHLORIDE 50 MILLILITER(S): 9 INJECTION, SOLUTION INTRAVENOUS at 10:16

## 2018-01-01 RX ADMIN — CEFTRIAXONE 1000 MILLIGRAM(S): 500 INJECTION, POWDER, FOR SOLUTION INTRAMUSCULAR; INTRAVENOUS at 20:43

## 2018-01-01 RX ADMIN — ENOXAPARIN SODIUM 40 MILLIGRAM(S): 100 INJECTION SUBCUTANEOUS at 21:37

## 2018-01-01 RX ADMIN — BRIMONIDINE TARTRATE 1 DROP(S): 2 SOLUTION/ DROPS OPHTHALMIC at 13:33

## 2018-01-01 RX ADMIN — Medication 2 MILLIGRAM(S): at 21:21

## 2018-01-01 RX ADMIN — Medication 650 MILLIGRAM(S): at 23:23

## 2018-01-01 RX ADMIN — Medication 2: at 17:39

## 2018-01-01 RX ADMIN — Medication 5 MILLILITER(S): at 13:16

## 2018-01-01 RX ADMIN — LEVETIRACETAM 400 MILLIGRAM(S): 250 TABLET, FILM COATED ORAL at 18:04

## 2018-01-01 RX ADMIN — Medication 25 MILLIGRAM(S): at 17:49

## 2018-01-01 RX ADMIN — Medication 650 MILLIGRAM(S): at 06:21

## 2018-01-01 RX ADMIN — Medication 4: at 17:38

## 2018-01-01 RX ADMIN — Medication 25 MILLIGRAM(S): at 14:08

## 2018-01-01 RX ADMIN — Medication 765 MILLIGRAM(S): at 16:10

## 2018-01-01 RX ADMIN — LEVETIRACETAM 400 MILLIGRAM(S): 250 TABLET, FILM COATED ORAL at 05:23

## 2018-01-01 RX ADMIN — Medication 650 MILLIGRAM(S): at 05:30

## 2018-01-01 RX ADMIN — Medication 1 TABLET(S): at 13:03

## 2018-01-01 RX ADMIN — Medication 10 MILLIGRAM(S): at 04:17

## 2018-01-01 RX ADMIN — Medication 3 MILLILITER(S): at 20:01

## 2018-01-01 RX ADMIN — Medication 4: at 14:22

## 2018-01-01 RX ADMIN — Medication 2: at 08:53

## 2018-01-01 RX ADMIN — Medication 10 MILLIGRAM(S): at 19:01

## 2018-01-01 RX ADMIN — LEVETIRACETAM 400 MILLIGRAM(S): 250 TABLET, FILM COATED ORAL at 12:40

## 2018-01-01 RX ADMIN — Medication 1 DROP(S): at 14:20

## 2018-01-01 RX ADMIN — SODIUM CHLORIDE 75 MILLILITER(S): 9 INJECTION, SOLUTION INTRAVENOUS at 22:25

## 2018-01-01 RX ADMIN — LIDOCAINE 3 PATCH: 4 CREAM TOPICAL at 11:39

## 2018-01-01 RX ADMIN — SODIUM CHLORIDE 75 MILLILITER(S): 9 INJECTION, SOLUTION INTRAVENOUS at 20:35

## 2018-01-01 RX ADMIN — Medication 2: at 11:14

## 2018-01-01 RX ADMIN — Medication 1 DROP(S): at 14:34

## 2018-01-01 RX ADMIN — BRIMONIDINE TARTRATE 1 DROP(S): 2 SOLUTION/ DROPS OPHTHALMIC at 14:07

## 2018-01-01 RX ADMIN — LIDOCAINE 1 PATCH: 4 CREAM TOPICAL at 23:39

## 2018-01-01 RX ADMIN — Medication 10 MILLIGRAM(S): at 01:29

## 2018-01-01 RX ADMIN — Medication 1 DROP(S): at 05:37

## 2018-01-01 RX ADMIN — Medication 2: at 00:27

## 2018-01-01 RX ADMIN — HEPARIN SODIUM 5000 UNIT(S): 5000 INJECTION INTRAVENOUS; SUBCUTANEOUS at 22:38

## 2018-01-01 RX ADMIN — Medication 1 TABLET(S): at 17:35

## 2018-01-01 RX ADMIN — Medication 2: at 09:40

## 2018-01-01 RX ADMIN — LACOSAMIDE 50 MILLIGRAM(S): 50 TABLET ORAL at 06:39

## 2018-01-01 RX ADMIN — Medication 1 DROP(S): at 13:28

## 2018-01-01 RX ADMIN — Medication 650 MILLIGRAM(S): at 00:56

## 2018-01-01 RX ADMIN — LEVETIRACETAM 400 MILLIGRAM(S): 250 TABLET, FILM COATED ORAL at 18:20

## 2018-01-01 RX ADMIN — Medication 12.5 MILLIGRAM(S): at 17:37

## 2018-01-01 RX ADMIN — Medication 1 DROP(S): at 19:43

## 2018-01-01 RX ADMIN — Medication 4: at 18:52

## 2018-01-01 RX ADMIN — LATANOPROST 1 DROP(S): 0.05 SOLUTION/ DROPS OPHTHALMIC; TOPICAL at 00:44

## 2018-01-01 RX ADMIN — Medication 25 MILLIGRAM(S): at 21:13

## 2018-01-01 RX ADMIN — LATANOPROST 1 DROP(S): 0.05 SOLUTION/ DROPS OPHTHALMIC; TOPICAL at 21:37

## 2018-01-01 RX ADMIN — Medication 25 MILLIGRAM(S): at 13:56

## 2018-01-01 RX ADMIN — CEFEPIME 100 MILLIGRAM(S): 1 INJECTION, POWDER, FOR SOLUTION INTRAMUSCULAR; INTRAVENOUS at 21:42

## 2018-01-01 RX ADMIN — Medication 1 DROP(S): at 11:13

## 2018-01-01 RX ADMIN — Medication 2: at 17:26

## 2018-01-01 RX ADMIN — Medication 4: at 09:28

## 2018-01-01 RX ADMIN — Medication 650 MILLIGRAM(S): at 23:55

## 2018-01-01 RX ADMIN — HUMAN INSULIN 3 UNIT(S): 100 INJECTION, SUSPENSION SUBCUTANEOUS at 14:24

## 2018-01-01 RX ADMIN — Medication 1 TABLET(S): at 07:57

## 2018-01-01 RX ADMIN — Medication 81 MILLIGRAM(S): at 14:33

## 2018-01-01 RX ADMIN — ENOXAPARIN SODIUM 40 MILLIGRAM(S): 100 INJECTION SUBCUTANEOUS at 21:43

## 2018-01-01 RX ADMIN — Medication 2: at 06:19

## 2018-01-01 RX ADMIN — LATANOPROST 1 DROP(S): 0.05 SOLUTION/ DROPS OPHTHALMIC; TOPICAL at 22:13

## 2018-01-01 RX ADMIN — Medication 50 MILLIGRAM(S): at 06:20

## 2018-01-01 RX ADMIN — Medication 81 MILLIGRAM(S): at 12:50

## 2018-01-01 RX ADMIN — Medication 2: at 12:10

## 2018-01-01 RX ADMIN — BRIMONIDINE TARTRATE 1 DROP(S): 2 SOLUTION/ DROPS OPHTHALMIC at 14:20

## 2018-01-01 RX ADMIN — BRIMONIDINE TARTRATE 1 DROP(S): 2 SOLUTION/ DROPS OPHTHALMIC at 14:28

## 2018-01-01 RX ADMIN — MORPHINE SULFATE 2 MILLIGRAM(S): 50 CAPSULE, EXTENDED RELEASE ORAL at 15:03

## 2018-01-01 RX ADMIN — SODIUM CHLORIDE 60 MILLILITER(S): 9 INJECTION, SOLUTION INTRAVENOUS at 17:13

## 2018-01-01 RX ADMIN — GABAPENTIN 300 MILLIGRAM(S): 400 CAPSULE ORAL at 21:16

## 2018-01-01 RX ADMIN — Medication 6: at 13:08

## 2018-01-01 RX ADMIN — HEPARIN SODIUM 5000 UNIT(S): 5000 INJECTION INTRAVENOUS; SUBCUTANEOUS at 06:09

## 2018-01-01 RX ADMIN — Medication 81 MILLIGRAM(S): at 12:32

## 2018-01-01 RX ADMIN — Medication 250 MILLIGRAM(S): at 17:12

## 2018-01-01 RX ADMIN — MORPHINE SULFATE 2 MILLIGRAM(S): 50 CAPSULE, EXTENDED RELEASE ORAL at 18:18

## 2018-01-01 RX ADMIN — BRIMONIDINE TARTRATE 1 DROP(S): 2 SOLUTION/ DROPS OPHTHALMIC at 11:42

## 2018-01-01 RX ADMIN — Medication 5 MILLIGRAM(S): at 11:08

## 2018-01-01 RX ADMIN — Medication 10 MILLIGRAM(S): at 05:17

## 2018-01-01 RX ADMIN — Medication 650 MILLIGRAM(S): at 19:41

## 2018-01-01 RX ADMIN — Medication 50 MILLIGRAM(S): at 23:18

## 2018-01-01 RX ADMIN — Medication 12.5 MILLIGRAM(S): at 22:39

## 2018-01-01 RX ADMIN — LEVETIRACETAM 400 MILLIGRAM(S): 250 TABLET, FILM COATED ORAL at 06:31

## 2018-01-01 RX ADMIN — LEVETIRACETAM 400 MILLIGRAM(S): 250 TABLET, FILM COATED ORAL at 18:46

## 2018-01-01 RX ADMIN — LEVETIRACETAM 400 MILLIGRAM(S): 250 TABLET, FILM COATED ORAL at 06:24

## 2018-01-01 RX ADMIN — Medication 1 DROP(S): at 13:32

## 2018-01-01 RX ADMIN — LIDOCAINE 1 PATCH: 4 CREAM TOPICAL at 06:05

## 2018-01-01 RX ADMIN — Medication 25 MILLIGRAM(S): at 06:39

## 2018-01-01 RX ADMIN — Medication 650 MILLIGRAM(S): at 06:07

## 2018-01-01 RX ADMIN — Medication 25 MILLIGRAM(S): at 21:32

## 2018-01-01 RX ADMIN — LACOSAMIDE 110 MILLIGRAM(S): 50 TABLET ORAL at 21:46

## 2018-01-01 RX ADMIN — Medication 25 MILLIGRAM(S): at 17:31

## 2018-01-01 RX ADMIN — Medication 2: at 18:47

## 2018-01-01 RX ADMIN — Medication 650 MILLIGRAM(S): at 12:46

## 2018-01-01 RX ADMIN — Medication 250 MILLIGRAM(S): at 18:08

## 2018-01-01 RX ADMIN — Medication 1 TABLET(S): at 08:37

## 2018-01-01 RX ADMIN — Medication 1 TABLET(S): at 06:24

## 2018-01-01 RX ADMIN — SENNA PLUS 2 TABLET(S): 8.6 TABLET ORAL at 21:56

## 2018-01-01 RX ADMIN — HUMAN INSULIN 3 UNIT(S): 100 INJECTION, SUSPENSION SUBCUTANEOUS at 06:25

## 2018-01-01 RX ADMIN — Medication 100 MILLIGRAM(S): at 12:46

## 2018-01-01 RX ADMIN — Medication 4: at 11:44

## 2018-01-01 RX ADMIN — Medication 4: at 12:14

## 2018-01-01 RX ADMIN — POLYETHYLENE GLYCOL 3350 17 GRAM(S): 17 POWDER, FOR SOLUTION ORAL at 11:16

## 2018-01-01 RX ADMIN — ENOXAPARIN SODIUM 40 MILLIGRAM(S): 100 INJECTION SUBCUTANEOUS at 23:18

## 2018-01-01 RX ADMIN — SODIUM CHLORIDE 3 MILLILITER(S): 9 INJECTION INTRAMUSCULAR; INTRAVENOUS; SUBCUTANEOUS at 21:15

## 2018-01-01 RX ADMIN — LACOSAMIDE 50 MILLIGRAM(S): 50 TABLET ORAL at 18:09

## 2018-01-01 RX ADMIN — LEVETIRACETAM 250 MILLIGRAM(S): 250 TABLET, FILM COATED ORAL at 11:16

## 2018-01-01 RX ADMIN — Medication 10 MILLIGRAM(S): at 05:47

## 2018-01-01 RX ADMIN — Medication 200 MILLIGRAM(S): at 05:35

## 2018-01-01 RX ADMIN — Medication 1 DROP(S): at 06:24

## 2018-01-01 RX ADMIN — Medication 650 MILLIGRAM(S): at 20:26

## 2018-01-01 RX ADMIN — Medication 10 MILLIGRAM(S): at 14:36

## 2018-01-01 RX ADMIN — LATANOPROST 1 DROP(S): 0.05 SOLUTION/ DROPS OPHTHALMIC; TOPICAL at 21:43

## 2018-01-01 RX ADMIN — LIDOCAINE 1 PATCH: 4 CREAM TOPICAL at 12:28

## 2018-01-01 RX ADMIN — Medication 2: at 00:03

## 2018-01-01 RX ADMIN — Medication 8 UNIT(S): at 22:02

## 2018-01-01 RX ADMIN — LATANOPROST 1 DROP(S): 0.05 SOLUTION/ DROPS OPHTHALMIC; TOPICAL at 22:08

## 2018-01-01 RX ADMIN — Medication 1 DROP(S): at 13:19

## 2018-01-01 RX ADMIN — Medication 25 MILLIGRAM(S): at 23:15

## 2018-01-01 RX ADMIN — Medication 81 MILLIGRAM(S): at 11:32

## 2018-01-01 RX ADMIN — LIDOCAINE 3 PATCH: 4 CREAM TOPICAL at 12:47

## 2018-01-01 RX ADMIN — SODIUM CHLORIDE 100 MILLILITER(S): 9 INJECTION INTRAMUSCULAR; INTRAVENOUS; SUBCUTANEOUS at 23:08

## 2018-01-01 RX ADMIN — LIDOCAINE 1 PATCH: 4 CREAM TOPICAL at 00:55

## 2018-01-01 RX ADMIN — LIDOCAINE 1 PATCH: 4 CREAM TOPICAL at 11:44

## 2018-01-01 RX ADMIN — LACOSAMIDE 110 MILLIGRAM(S): 50 TABLET ORAL at 18:03

## 2018-01-01 RX ADMIN — Medication 81 MILLIGRAM(S): at 18:09

## 2018-01-01 RX ADMIN — Medication 10 MILLIGRAM(S): at 17:21

## 2018-01-01 RX ADMIN — Medication 4: at 01:01

## 2018-01-01 RX ADMIN — Medication 1 TABLET(S): at 12:28

## 2018-01-01 RX ADMIN — HEPARIN SODIUM 5000 UNIT(S): 5000 INJECTION INTRAVENOUS; SUBCUTANEOUS at 13:27

## 2018-01-01 RX ADMIN — Medication 1 TABLET(S): at 19:27

## 2018-01-01 RX ADMIN — HEPARIN SODIUM 5000 UNIT(S): 5000 INJECTION INTRAVENOUS; SUBCUTANEOUS at 17:49

## 2018-01-01 RX ADMIN — Medication 650 MILLIGRAM(S): at 17:49

## 2018-01-01 RX ADMIN — Medication 3 UNIT(S): at 14:34

## 2018-01-01 RX ADMIN — Medication 650 MILLIGRAM(S): at 11:40

## 2018-01-01 RX ADMIN — Medication 1 DROP(S): at 11:39

## 2018-01-01 RX ADMIN — Medication 4: at 18:12

## 2018-01-01 RX ADMIN — Medication 5 MILLIGRAM(S): at 23:27

## 2018-01-01 RX ADMIN — ENOXAPARIN SODIUM 40 MILLIGRAM(S): 100 INJECTION SUBCUTANEOUS at 00:17

## 2018-01-01 RX ADMIN — LEVETIRACETAM 250 MILLIGRAM(S): 250 TABLET, FILM COATED ORAL at 11:43

## 2018-01-01 RX ADMIN — GABAPENTIN 300 MILLIGRAM(S): 400 CAPSULE ORAL at 21:56

## 2018-01-01 RX ADMIN — Medication 1 DROP(S): at 05:36

## 2018-01-01 RX ADMIN — Medication 100 MILLIGRAM(S): at 23:44

## 2018-01-01 RX ADMIN — BRIMONIDINE TARTRATE 1 DROP(S): 2 SOLUTION/ DROPS OPHTHALMIC at 16:03

## 2018-01-01 RX ADMIN — Medication 650 MILLIGRAM(S): at 20:40

## 2018-01-01 RX ADMIN — Medication 1 TABLET(S): at 13:20

## 2018-01-01 RX ADMIN — Medication 1 TABLET(S): at 08:03

## 2018-01-01 RX ADMIN — Medication 25 MILLIGRAM(S): at 23:14

## 2018-01-01 RX ADMIN — LACOSAMIDE 50 MILLIGRAM(S): 50 TABLET ORAL at 17:21

## 2018-01-01 RX ADMIN — SENNA PLUS 2 TABLET(S): 8.6 TABLET ORAL at 21:16

## 2018-01-01 RX ADMIN — Medication 10 MILLIGRAM(S): at 11:13

## 2018-01-01 RX ADMIN — LATANOPROST 1 DROP(S): 0.05 SOLUTION/ DROPS OPHTHALMIC; TOPICAL at 22:15

## 2018-01-01 RX ADMIN — SODIUM CHLORIDE 75 MILLILITER(S): 9 INJECTION, SOLUTION INTRAVENOUS at 15:37

## 2018-01-01 RX ADMIN — Medication 250 MILLIGRAM(S): at 06:39

## 2018-01-01 RX ADMIN — Medication 300 MILLIGRAM(S): at 05:16

## 2018-01-01 RX ADMIN — Medication 81 MILLIGRAM(S): at 13:18

## 2018-01-01 RX ADMIN — SODIUM CHLORIDE 50 MILLILITER(S): 9 INJECTION, SOLUTION INTRAVENOUS at 03:56

## 2018-01-01 RX ADMIN — Medication 1 TABLET(S): at 14:47

## 2018-01-01 RX ADMIN — Medication 1 DROP(S): at 13:15

## 2018-01-01 RX ADMIN — Medication 4: at 07:33

## 2018-01-01 RX ADMIN — Medication 650 MILLIGRAM(S): at 18:31

## 2018-01-01 RX ADMIN — Medication 4: at 11:52

## 2018-01-01 RX ADMIN — GABAPENTIN 300 MILLIGRAM(S): 400 CAPSULE ORAL at 06:29

## 2018-01-01 RX ADMIN — Medication 1 TABLET(S): at 12:20

## 2018-01-01 RX ADMIN — Medication 81 MILLIGRAM(S): at 14:28

## 2018-01-01 RX ADMIN — SODIUM CHLORIDE 2000 MILLILITER(S): 9 INJECTION INTRAMUSCULAR; INTRAVENOUS; SUBCUTANEOUS at 20:41

## 2018-01-01 RX ADMIN — TOBRAMYCIN AND DEXAMETHASONE 1 APPLICATION(S): 1; 3 SUSPENSION/ DROPS OPHTHALMIC at 13:46

## 2018-01-01 RX ADMIN — Medication 3 UNIT(S): at 12:59

## 2018-01-01 RX ADMIN — LIDOCAINE 1 PATCH: 4 CREAM TOPICAL at 01:31

## 2018-01-01 RX ADMIN — Medication 3 UNIT(S): at 10:30

## 2018-01-01 RX ADMIN — GABAPENTIN 300 MILLIGRAM(S): 400 CAPSULE ORAL at 05:34

## 2018-01-01 RX ADMIN — Medication 25 MILLIGRAM(S): at 22:25

## 2018-01-01 RX ADMIN — Medication 50 MILLIGRAM(S): at 05:17

## 2018-01-01 RX ADMIN — Medication 5 MILLIGRAM(S): at 05:35

## 2018-01-01 RX ADMIN — LIDOCAINE 1 PATCH: 4 CREAM TOPICAL at 02:20

## 2018-01-01 RX ADMIN — LEVETIRACETAM 400 MILLIGRAM(S): 250 TABLET, FILM COATED ORAL at 06:11

## 2018-01-01 RX ADMIN — ENOXAPARIN SODIUM 40 MILLIGRAM(S): 100 INJECTION SUBCUTANEOUS at 23:33

## 2018-01-01 RX ADMIN — Medication 1 TABLET(S): at 12:50

## 2018-01-01 RX ADMIN — Medication 25 MILLIGRAM(S): at 06:28

## 2018-01-01 RX ADMIN — LEVETIRACETAM 400 MILLIGRAM(S): 250 TABLET, FILM COATED ORAL at 05:24

## 2018-01-01 RX ADMIN — Medication 1 DROP(S): at 17:34

## 2018-01-01 RX ADMIN — Medication 10 MILLIGRAM(S): at 06:23

## 2018-01-01 RX ADMIN — Medication 1 TABLET(S): at 13:07

## 2018-01-01 RX ADMIN — LATANOPROST 1 DROP(S): 0.05 SOLUTION/ DROPS OPHTHALMIC; TOPICAL at 23:46

## 2018-01-01 RX ADMIN — LATANOPROST 1 DROP(S): 0.05 SOLUTION/ DROPS OPHTHALMIC; TOPICAL at 21:00

## 2018-01-01 RX ADMIN — SODIUM CHLORIDE 60 MILLILITER(S): 9 INJECTION, SOLUTION INTRAVENOUS at 18:27

## 2018-01-01 RX ADMIN — Medication 2: at 18:09

## 2018-01-01 RX ADMIN — Medication 650 MILLIGRAM(S): at 12:00

## 2018-01-01 RX ADMIN — INSULIN GLARGINE 8 UNIT(S): 100 INJECTION, SOLUTION SUBCUTANEOUS at 22:07

## 2018-01-01 RX ADMIN — LEVETIRACETAM 250 MILLIGRAM(S): 250 TABLET, FILM COATED ORAL at 09:58

## 2018-01-01 RX ADMIN — CEFTRIAXONE 100 GRAM(S): 500 INJECTION, POWDER, FOR SOLUTION INTRAMUSCULAR; INTRAVENOUS at 14:46

## 2018-01-01 RX ADMIN — SIMVASTATIN 20 MILLIGRAM(S): 20 TABLET, FILM COATED ORAL at 21:57

## 2018-01-01 RX ADMIN — Medication 4: at 08:42

## 2018-01-01 RX ADMIN — Medication 4: at 06:58

## 2018-01-01 RX ADMIN — Medication 6: at 00:16

## 2018-01-01 RX ADMIN — Medication 50 MILLIGRAM(S): at 06:24

## 2018-01-01 RX ADMIN — SODIUM CHLORIDE 75 MILLILITER(S): 9 INJECTION, SOLUTION INTRAVENOUS at 22:32

## 2018-01-01 RX ADMIN — Medication 1 DROP(S): at 12:12

## 2018-01-01 RX ADMIN — Medication 1 TABLET(S): at 12:02

## 2018-01-01 RX ADMIN — Medication 1 DROP(S): at 12:42

## 2018-01-01 RX ADMIN — LATANOPROST 1 DROP(S): 0.05 SOLUTION/ DROPS OPHTHALMIC; TOPICAL at 23:19

## 2018-01-01 RX ADMIN — Medication 50 MILLIGRAM(S): at 22:38

## 2018-01-01 RX ADMIN — GABAPENTIN 300 MILLIGRAM(S): 400 CAPSULE ORAL at 22:18

## 2018-01-01 RX ADMIN — Medication 1 DROP(S): at 17:04

## 2018-01-01 RX ADMIN — POLYETHYLENE GLYCOL 3350 17 GRAM(S): 17 POWDER, FOR SOLUTION ORAL at 11:43

## 2018-01-01 RX ADMIN — Medication 4: at 12:26

## 2018-01-01 RX ADMIN — Medication 1 TABLET(S): at 14:23

## 2018-01-01 RX ADMIN — BRIMONIDINE TARTRATE 1 DROP(S): 2 SOLUTION/ DROPS OPHTHALMIC at 12:47

## 2018-01-01 RX ADMIN — Medication 1 TABLET(S): at 06:08

## 2018-01-01 RX ADMIN — Medication 10 MILLIGRAM(S): at 00:15

## 2018-01-01 RX ADMIN — HEPARIN SODIUM 5000 UNIT(S): 5000 INJECTION INTRAVENOUS; SUBCUTANEOUS at 05:37

## 2018-01-01 RX ADMIN — Medication 4: at 06:24

## 2018-01-01 RX ADMIN — HEPARIN SODIUM 5000 UNIT(S): 5000 INJECTION INTRAVENOUS; SUBCUTANEOUS at 14:08

## 2018-01-01 RX ADMIN — Medication 2: at 23:30

## 2018-01-01 RX ADMIN — Medication 25 MILLIGRAM(S): at 14:33

## 2018-01-01 RX ADMIN — HEPARIN SODIUM 5000 UNIT(S): 5000 INJECTION INTRAVENOUS; SUBCUTANEOUS at 05:17

## 2018-01-01 RX ADMIN — Medication 650 MILLIGRAM(S): at 06:05

## 2018-01-01 RX ADMIN — Medication 10 MILLIGRAM(S): at 10:33

## 2018-01-01 RX ADMIN — LIDOCAINE 1 PATCH: 4 CREAM TOPICAL at 12:01

## 2018-01-01 RX ADMIN — CEFTRIAXONE 100 GRAM(S): 500 INJECTION, POWDER, FOR SOLUTION INTRAMUSCULAR; INTRAVENOUS at 14:26

## 2018-01-01 RX ADMIN — Medication 3 MILLILITER(S): at 09:34

## 2018-01-01 RX ADMIN — Medication 300 MILLIGRAM(S): at 11:27

## 2018-01-01 RX ADMIN — Medication 100 MILLIGRAM(S): at 17:04

## 2018-01-01 RX ADMIN — Medication 1 DROP(S): at 05:14

## 2018-01-01 RX ADMIN — CEFEPIME 100 MILLIGRAM(S): 1 INJECTION, POWDER, FOR SOLUTION INTRAMUSCULAR; INTRAVENOUS at 12:40

## 2018-01-01 RX ADMIN — LEVETIRACETAM 400 MILLIGRAM(S): 250 TABLET, FILM COATED ORAL at 19:16

## 2018-01-01 RX ADMIN — Medication 25 MILLIGRAM(S): at 17:08

## 2018-01-01 RX ADMIN — Medication 25 GRAM(S): at 17:32

## 2018-01-01 RX ADMIN — Medication 25 MILLIGRAM(S): at 21:43

## 2018-01-01 RX ADMIN — Medication 3 UNIT(S): at 09:23

## 2018-01-01 RX ADMIN — Medication 100 MILLIGRAM(S): at 22:18

## 2018-01-01 RX ADMIN — ENOXAPARIN SODIUM 40 MILLIGRAM(S): 100 INJECTION SUBCUTANEOUS at 00:12

## 2018-01-01 RX ADMIN — GABAPENTIN 300 MILLIGRAM(S): 400 CAPSULE ORAL at 06:05

## 2018-01-01 RX ADMIN — Medication 650 MILLIGRAM(S): at 07:30

## 2018-01-01 RX ADMIN — Medication 650 MILLIGRAM(S): at 12:35

## 2018-01-01 RX ADMIN — Medication 2: at 12:43

## 2018-01-01 RX ADMIN — SODIUM CHLORIDE 70 MILLILITER(S): 9 INJECTION, SOLUTION INTRAVENOUS at 21:00

## 2018-01-01 RX ADMIN — LEVETIRACETAM 500 MILLIGRAM(S): 250 TABLET, FILM COATED ORAL at 22:07

## 2018-01-01 RX ADMIN — Medication 10 UNIT(S): at 18:21

## 2018-01-01 RX ADMIN — LIDOCAINE 3 PATCH: 4 CREAM TOPICAL at 23:55

## 2018-01-01 RX ADMIN — BRIMONIDINE TARTRATE 1 DROP(S): 2 SOLUTION/ DROPS OPHTHALMIC at 12:04

## 2018-01-01 RX ADMIN — LIDOCAINE 1 PATCH: 4 CREAM TOPICAL at 11:15

## 2018-01-01 RX ADMIN — Medication 650 MILLIGRAM(S): at 12:43

## 2018-01-01 RX ADMIN — Medication 650 MILLIGRAM(S): at 05:17

## 2018-01-01 RX ADMIN — SENNA PLUS 2 TABLET(S): 8.6 TABLET ORAL at 22:18

## 2018-01-01 RX ADMIN — LIDOCAINE 1 PATCH: 4 CREAM TOPICAL at 12:33

## 2018-01-01 RX ADMIN — Medication 1 DROP(S): at 11:42

## 2018-01-01 RX ADMIN — Medication 1 DROP(S): at 11:40

## 2018-01-01 RX ADMIN — Medication 1 DROP(S): at 11:51

## 2018-01-01 RX ADMIN — SODIUM CHLORIDE 50 MILLILITER(S): 9 INJECTION, SOLUTION INTRAVENOUS at 00:12

## 2018-01-01 RX ADMIN — BRIMONIDINE TARTRATE 1 DROP(S): 2 SOLUTION/ DROPS OPHTHALMIC at 12:02

## 2018-01-01 RX ADMIN — Medication 1 DROP(S): at 14:26

## 2018-01-01 RX ADMIN — Medication 25 MILLIGRAM(S): at 06:25

## 2018-01-01 RX ADMIN — HEPARIN SODIUM 5000 UNIT(S): 5000 INJECTION INTRAVENOUS; SUBCUTANEOUS at 06:24

## 2018-01-01 RX ADMIN — SODIUM CHLORIDE 70 MILLILITER(S): 9 INJECTION INTRAMUSCULAR; INTRAVENOUS; SUBCUTANEOUS at 23:27

## 2018-01-01 RX ADMIN — LATANOPROST 1 DROP(S): 0.05 SOLUTION/ DROPS OPHTHALMIC; TOPICAL at 00:19

## 2018-01-01 RX ADMIN — Medication 25 MILLIGRAM(S): at 06:31

## 2018-01-01 RX ADMIN — Medication 650 MILLIGRAM(S): at 23:40

## 2018-01-01 RX ADMIN — LATANOPROST 1 DROP(S): 0.05 SOLUTION/ DROPS OPHTHALMIC; TOPICAL at 22:40

## 2018-01-01 RX ADMIN — ENOXAPARIN SODIUM 40 MILLIGRAM(S): 100 INJECTION SUBCUTANEOUS at 13:30

## 2018-01-01 RX ADMIN — Medication 4: at 05:48

## 2018-01-01 RX ADMIN — SODIUM CHLORIDE 75 MILLILITER(S): 9 INJECTION, SOLUTION INTRAVENOUS at 12:11

## 2018-01-01 RX ADMIN — Medication 10 MILLIGRAM(S): at 13:48

## 2018-01-01 RX ADMIN — SODIUM CHLORIDE 40 MILLILITER(S): 9 INJECTION, SOLUTION INTRAVENOUS at 22:15

## 2018-01-01 RX ADMIN — SODIUM CHLORIDE 40 MILLILITER(S): 9 INJECTION, SOLUTION INTRAVENOUS at 10:53

## 2018-01-01 RX ADMIN — Medication 10 MILLIGRAM(S): at 07:30

## 2018-02-27 NOTE — ED ADULT NURSE NOTE - PSH
Benign Breast Disease  removal of cyst- 1963  Bilateral Cataracts  excision 2006  Cataract    Fracture of Humerus  repair with harware right  Glaucoma  relieve pressure left eye along with cataract extraction  Kidney Stones  lithotripsy 2005, 2006  S/P Appendectomy  1937

## 2018-02-27 NOTE — ED ADULT TRIAGE NOTE - CHIEF COMPLAINT QUOTE
pt biba s/p being found in her bed with a period of "unresponsiveness" as per daughter. pt had incontinence and hx of seizures. pt alert ( baseline as per family) offers no complaint

## 2018-02-27 NOTE — ED PROVIDER NOTE - CARE PLAN
Principal Discharge DX:	Syncope Principal Discharge DX:	Syncope  Secondary Diagnosis:	UTI (urinary tract infection)

## 2018-02-27 NOTE — ED ADULT NURSE NOTE - OBJECTIVE STATEMENT
per daughter pt was eating breakfast when pt went unresponsive for several minutes. when pt arouse she asked daughter "where is the cat" daughter denies pt hitting her head.

## 2018-02-27 NOTE — ED CDU PROVIDER INITIAL DAY NOTE - OBJECTIVE STATEMENT
89 yo female hx of chronic UTI, seizure disorder, prior TIA/CVA; seizure d/o on keppra; presents with apparent syncopal episode today; lives with daughter who had patient sitting in chair, was feeding her when she slumped over in chair and was unresponsive for a few minutes, apparently some food began to be regurgitated; when she started coming around she noticed her mother's left arm and left side of face seemed to be abnormal; at time of ED eval, all symptoms have resolved; patient states she currently feels well now; no chest pain or sob, no headache.

## 2018-02-27 NOTE — ED ADULT NURSE REASSESSMENT NOTE - NS ED NURSE REASSESS COMMENT FT1
Pt resting with family member at bedside. Awaiting discharge post antibiotic.
pt resting in bed with daughter at bedside, no complaints offered at this time, no acute distress.
Pt laying in bed with family at bedside. Pt denies all symptoms, appears comfortable. Will continue to monitor.

## 2018-02-27 NOTE — ED ADULT NURSE NOTE - PMH
Acid Reflux    Acute Pulmonary Edema Syndrome  Summer 2011  Anemia    CAD (Coronary Artery Disease)    Calcium Nephrolithiasis    Carpal Tunnel Syndrome  bilateral  Controlled Type 2 Diabetes with Neuropathy    Glaucoma, Low Tension    Heart Murmur    Hepatitis in Viral Disease    History of Pulmonary Hypertension    Hyperlipidemia LDL Goal < 100    Obstructive Sleep Apnea    Pericarditis  Summer 2011

## 2018-02-27 NOTE — ED ADULT TRIAGE NOTE - HEART RATE (BEATS/MIN)
LMP 12/18/16  First positive pregnancy test 1/15/17  No histroy of DM, HTN or miscarriage.  2nd pregnancy  Currently taking prenatal vitamins.    Prenatal packet mailed to patient    OB1 visit 2/20/17 with nurse at 1:45 pm and Dr. Lindo at 2:30 pm in [place]     85

## 2018-02-27 NOTE — ED PROVIDER NOTE - OBJECTIVE STATEMENT
89 yo female hx of chronic UTI, seizure disorder, prior TIA/CVA; presents with apparent syncopal episode 89 yo female hx of chronic UTI, seizure disorder, prior TIA/CVA; seizure d/o on keppra; presents with apparent syncopal episode today; lives with daughter who had patient sitting in chair, was feeding her when she slumped over in chair and was unresponsive for a few minutes, apparently some food began to be regurgitated; when she started coming around she noticed her mother's left arm and left side of face seemed to be abnormal; at time of ED eval, all symptoms have resolved; patient states she currently feels well now; no chest pain or sob, no headache.

## 2018-02-27 NOTE — ED PROVIDER NOTE - PROGRESS NOTE DETAILS
MRI results as noted.  UA as noted.  Pt on Keflex, but will Rx with Rocephin and start po Vantin.  Spoke with pt's daughter at length and she is agreeable to pt's d/c and will f/u as outpt

## 2018-03-01 NOTE — ED POST DISCHARGE NOTE - ADDITIONAL DOCUMENTATION
Needs Abx change- called, Left msg Needs Abx change- called, Left msg 3/3/18 - spoke with daughter will change Abx to Cipro

## 2018-03-01 NOTE — ED CDU PROVIDER DISPOSITION NOTE - CLINICAL COURSE
see final progress note in ED Provider note authored by Dr Eagle; patient MRI and UA resulted; antibiotics were addressed, discussion with daughter held at length, patient discharged by Dr Eagle after results of labs, tele, echo, carotids, and MRI

## 2018-03-10 NOTE — H&P ADULT - HISTORY OF PRESENT ILLNESS
Full consult pending, pt seen and examined bedside. Orthospine recommendations 89 y/o F without significant medical history mechanical fall from standing height when being transferred from bed to wheel chair, without head trauma or LOC. Pt c/o R "side" pain. Does not take blood thinners    Airway clear  Breathsounds present b/l  Femoral pulses 2+, Radial pulse 2+  GCS 15, no focal deficits  R flank 12x6cm area of ecchymosis without laceration or sign of expanding hematoma

## 2018-03-10 NOTE — ED ADULT NURSE REASSESSMENT NOTE - NS ED NURSE REASSESS COMMENT FT1
daughter arrived with medications and medicated pt for elevated BP, ok'd per MD dahl. will re-evual pt bp

## 2018-03-10 NOTE — ED PROVIDER NOTE - NS ED ROS FT
Const: Denies fever, chills  HEENT: Denies blurry vision, sore throat  Neck: Denies neck pain/stiffness  Resp: Denies coughing, SOB  Cardiovascular: Denies CP, palpitations, LE edema  GI: Deneis nausea, vomiting, abdominal pain, diarrhea, constipation, blood in stool  : Denies urinary frequency/urgency/dysuria, hematuria  MSK: (+) back pain, (+) flank pain  Neuro: Denies HA, dizziness, numbness, weakness  Skin: Denies rashes.

## 2018-03-10 NOTE — ED PROVIDER NOTE - PRINCIPAL DIAGNOSIS
Right hand football injury. Player fell on hand with knee. Injured yesterday same hand.   +CMS Closed fracture of one rib of right side, initial encounter

## 2018-03-10 NOTE — ED PROVIDER NOTE - PROGRESS NOTE DETAILS
Daughter notes that she was carrying pt up after pt had a bowl movement. She notes that pt was being lowered on a chair however pt slipped from her chair landing oh her R arm. Daughter notes that pt did hit her head but did not loose consciousness. Daughter notes that pt was alert after event.  Pt is currently on abx for UTI Daughter notes that she was carrying pt up after pt had a bowl movement. She notes that pt was being lowered on a chair however pt slipped from her chair landing oh her R arm. Daughter notes that pt did hit her head but did not loose consciousness. Daughter notes that pt was alert after event.  Pt is currently on abx for UTI. Daughter notes that pt has been having frequent SZ and is currently being followed up with a neurologist: Dr. Love. Unsure when last episode of SZ was. Pt is on 750mg Keppra which she was started on 3 days ago. Did not take anything for pain. Daughter notes that she was carrying pt up after pt had a bowl movement. She notes that pt was being lowered on a chair however pt slipped from her chair landing oh her R arm. Daughter notes that pt did hit her head but did not loose consciousness. Daughter notes that pt was alert after event.  Pt is currently on abx for UTI. Daughter notes that pt has been having frequent SZ and is currently being followed up with a neurologist: Dr. Love. Unsure when last episode of SZ was. Daughter has been giving pt extra Keppra not under the care of a physician. Did not take anything for pain. I reviewed the CT results and discussed with Trauma surgery who will evaluate the patient. I discussed with Dr. Graves who will evaluate the patient.

## 2018-03-10 NOTE — H&P ADULT - FAMILY HISTORY
Sibling  Still living? Unknown  Family history of cerebrovascular accident (CVA), Age at diagnosis: Age Unknown

## 2018-03-10 NOTE — ED ADULT NURSE REASSESSMENT NOTE - NS ED NURSE REASSESS COMMENT FT1
md aware of hypertension, pt doesn't know what medications she is on for BP, unsure if she took morning dose. awaiting daughter arrival for med reconciliation.

## 2018-03-10 NOTE — H&P ADULT - NSHPPHYSICALEXAM_GEN_ALL_CORE
Vital Signs Last 24 Hrs  T(C): 36.8 (10 Mar 2018 19:34), Max: 36.8 (10 Mar 2018 14:25)  T(F): 98.3 (10 Mar 2018 19:34), Max: 98.3 (10 Mar 2018 19:34)  HR: 55 (10 Mar 2018 19:34) (54 - 55)  BP: 147/53 (10 Mar 2018 19:34) (147/53 - 215/82)  BP(mean): --  RR: 16 (10 Mar 2018 19:34) (16 - 18)  SpO2: 95% (10 Mar 2018 19:34) (95% - 95%)    NAD  Head NCAT, EOMI  Neck supple, no JVD, no TTP  Chest expansion symmetric, atraumatic  Abd soft, ND, NTTP, no rebound/guarding  Pelvis stable, NTTP no stepoff or deformity  Back NTTP, no stepoff or deformity, R flank ecchymosis and TTP  Ext; FROM, SILT, Strength 5/5, atraumatic

## 2018-03-10 NOTE — ED ADULT NURSE NOTE - OBJECTIVE STATEMENT
received pt AOx3 s/p fall to the ground and being unable to get up, patient fell while being transferred from her bed to her wheelchair, patient states that she has pain to her right ribs. pt denies vomiting, PO intolerance, neck pain, recent travel, sick contacts, SHx, headache, blurred vision, sinus congestion, hematuria, chest pain, sob, blurred vision or any other complaints. resp even unlabored. neuro intact. will cont to monitor received pt AOx3 s/p fall to the ground and being unable to get up, patient fell while being transferred from her bed to her wheelchair, patient states that she has pain to her right ribs / right arm with bruising noted. pt denies LOC, denies head trauma. no obvious deformity's noted, pt denies blood thinners, vomiting, PO intolerance, neck pain, recent travel, sick contacts, SHx, headache, blurred vision, sinus congestion, hematuria, chest pain, sob, blurred vision or any other complaints. resp even unlabored. neuro intact. will cont to monitor

## 2018-03-10 NOTE — ED PROVIDER NOTE - PHYSICAL EXAMINATION
Const: Awake, alert and oriented. In no acute distress. Well appearing.  HEENT: NC/AT. Moist mucous membranes.  Eyes: No scleral icterus. EOMI.  Neck:. Soft and supple. Full ROM without pain.  Cardiac: Bradycardia. +S1/S2. No murmurs. Peripheral pulses 2+ and symmetric.   Resp: Speaking in full sentences. No evidence of respiratory distress. No wheezes, rales or rhonchi.  Abd: Soft, non-tender, non-distended. Normal bowel sounds in all 4 quadrants. No guarding or rebound.  Back: paraspinal, midline, thoracic tenderness  Skin: Old/healing ecchymosis to R humerus (well healing). SUPERFICIAL abrasions on R breast. Large ecchymosis R flank  Lymph: No cervical lymphadenopathy.

## 2018-03-10 NOTE — ED PROVIDER NOTE - OBJECTIVE STATEMENT
89 y/o female with a hx of GERD, Anemia, CAD, calcium nephrolithiasis, carpal tunnel syndrome, Glaucoma, Pericarditis, Heart murmur, Hepatitis, and Pulmonary HTN presents to the ED s/p fall which onset today. Pt notes that she lives at home with daughter and fell. Unclear mechanism of fall. Pt is c/o flank pain. She denies hitting her head, numbness, tingling, HA or LOC. Pt is a poor historian. No further complaints at this time.

## 2018-03-10 NOTE — ED PROVIDER NOTE - CARE PLAN
Principal Discharge DX:	Closed fracture of one rib of right side, initial encounter Principal Discharge DX:	Closed fracture of one rib of right side, initial encounter  Secondary Diagnosis:	Hematoma

## 2018-03-10 NOTE — ED ADULT TRIAGE NOTE - CHIEF COMPLAINT QUOTE
BIBA, patient is awake and oriented times 3, complains of a fall to the ground ands being unable to get up, as per ems, patient fell while being transferred from her bed to her wheelchair, patient states that she has pain, but not specific as to location

## 2018-03-10 NOTE — H&P ADULT - NSHPLABSRESULTS_GEN_ALL_CORE
11.6   11.1  )-----------( 212      ( 10 Mar 2018 15:14 )             35.0     03-10    x   |  x   |  x   ----------------------------<  x   5.0   |  x   |  x     Ca    9.4      10 Mar 2018 15:14    TPro  6.5<L>  /  Alb  3.8  /  TBili  0.3<L>  /  DBili  x   /  AST  31  /  ALT  13  /  AlkPhos  37<L>  03-10

## 2018-03-10 NOTE — ED ADULT NURSE REASSESSMENT NOTE - NS ED NURSE REASSESS COMMENT FT1
pt resting conformably, in no acute distress, aware and educated on incentive spirometry use. resp even unlabored. will cont to monitor

## 2018-03-10 NOTE — CONSULT NOTE ADULT - SUBJECTIVE AND OBJECTIVE BOX
Asked to ED to evaluate 89 y/o female with multiple medical comorbidities who presented to ER with low back pain s/p mechanical fall. Seen at bedside with daughter. Patient is a poor historian, history obtained from patients daughter. States patient has baseline difficulty with ambulation. States earlier today patients daughter helped her get up and transfer to a wheelchair and the wheelchair slipped back causing patient to fall on her back and side. Denies head trauma and LOC.  X rays obtained in the ER revealed multilevel lumbar TP fractures. Notes pain in the lumbar region and buttock. Denies acute motor or sensory changes    PAST MEDICAL & SURGICAL HISTORY:  Carpal Tunnel Syndrome: bilateral  Acid Reflux  Pericarditis: Summer 2011  Acute Pulmonary Edema Syndrome: Summer 2011  Anemia  Hepatitis in Viral Disease  Heart Murmur  Calcium Nephrolithiasis  Hyperlipidemia LDL Goal < 100  Glaucoma, Low Tension  History of Pulmonary Hypertension  CAD (Coronary Artery Disease)  Obstructive Sleep Apnea  Controlled Type 2 Diabetes with Neuropathy  Fracture of Humerus: repair with harware right  Glaucoma: relieve pressure left eye along with cataract extraction  Bilateral Cataracts: excision 2006  Benign Breast Disease: removal of cyst- 1963  Cataract  Kidney Stones: lithotripsy 2005, 2006  S/P Appendectomy: 1937    Allergies    erythromycin (Unknown)  IV-Dye (Unknown)  sulfADIAZINE (Unknown)    Intolerances    SOCIAL HISTORY:   lives at home with daughter    PE: NAD., resting comfortably  lumbar:   no significant swelling, skin intact. + TTP midline and paraspinal  5/5 motor strength b/l LE  gross sensation intact to light touch  neurologically intact     CT Lumbar: L1, L2, L3 transverse process fractures    A/P 89 y/o female with L1,2,3 transverse process fracture s/p mechanical fall    pain management  PT/OT: WBAT.   LSO brace when ambulating for comfort, brace ordered  no surgical intervention indicated at this time  ortho stable  f/u in office with Dr. Graves as needed  D/W Dr. Graves

## 2018-03-10 NOTE — H&P ADULT - ATTENDING COMMENTS
Agree with above assessment.  The patient is a 89 year old female s/p fall while transferring from bed to wheelchair.  The patient denies LOC, states she has mild pain along the right lower chest and buttock area.  She has no neck or abdominal pain.  HEENT NC/AT PERRL EOMI no raccoon eyes, no naik signs, trachea midline, no JVD, chest with bilateral air entry, mild tenderness right lower chest wall, abdomen is soft, with no tenderness, no guarding, no rebound, there is ecchymosis and mild tenderness to the right buttock.  Head CT and c-spine negative for acute traumatic injury, Chest/Abd/Pel CT scan with a right posterolateral 11th rib fracture, transverse process fractures of the right L1, L2, and L3.  Right buttock contusion.  The patient is to be admitted for pain control, PT eval, ortho eval and TLSO brace.

## 2018-03-10 NOTE — ED PROVIDER NOTE - MEDICAL DECISION MAKING DETAILS
Pt is a very poor historian. Complaints of fall and R sided flank pain. Superficial abrasions to R chest wall. large ecchymosis R flank. Some midline tenderness. Will trauma scan pt without IV contrast because pt is unclear whether she is allergic to it. Obtain blood work, labs, and re-eval. Will obtain better hx from daughter once she arrives to ED. Pt is a very poor historian. Complaints of fall and R sided flank pain. Superficial abrasions to R chest wall. large ecchymosis R flank. Some midline tenderness. Will trauma scan pt without IV contrast because pt is unclear whether she is allergic to it. Obtain blood work, labs, tx pain, and re-eval. Will obtain better hx from daughter once she arrives to ED.

## 2018-03-11 NOTE — DISCHARGE NOTE ADULT - CARE PROVIDERS DIRECT ADDRESSES
[email or web address],DirectAddress_Unknown

## 2018-03-11 NOTE — DISCHARGE NOTE ADULT - PATIENT PORTAL LINK FT
You can access the [facility] Patient Portal, offered by [facility], by registering with the following website: [email or web address]

## 2018-03-11 NOTE — DISCHARGE NOTE ADULT - PLAN OF CARE
to be pain free and return to baseline activity level please followup with Dr. Graves in 2 weeks of discharge. Use the TLSO brace for comfort only when out of bed. If you should have any change in clinical status please return to the ER as soon as possible. Continue all medications as previously prescribed by your prescribing doctor and make an appointment to see your doctor within 7-10 days of discharge Hematoma is resolving, your hemoglobin is stable. Please follow up with the acute care surgery service in 2 weeks of discharge. If you should have any change in clinical status please call 911 or go to the nearest ER as soon as possible. Continue to use the incentive spirometer as much as possible while awake, continue to try to cough and deep breath to promote good pulmonary toliet and be out of bed to a chair as much as possible. please followup with Dr. Graves in 1-2 weeks of discharge post discharge. Use the LSO brace for comfort only when out of bed. If you should have any change in clinical status please return to the ER as soon as possible. Continue all medications as previously prescribed by your prescribing doctor and make an appointment to see your doctor within 7-10 days of discharge Please resume all home diabetes medications at current doses, monitor blood sugar at home, limit your intake of carbohydrates and sugars, and follow-up with your endocrinologist and/or your primary care provider as per your usual schedule.

## 2018-03-11 NOTE — DISCHARGE NOTE ADULT - HOSPITAL COURSE
89 y/o F without significant medical history mechanical fall from standing height when being transferred from bed to wheel chair, without head trauma or LOC. Pt c/o R "side" pain. Does not take blood thinners    Airway clear  Breathsounds present b/l  Femoral pulses 2+, Radial pulse 2+  GCS 15, no focal deficits  R flank 12x6cm area of ecchymosis without laceration or sign of expanding hematoma    Patient was pan scanned and found to have large right flank hematoma, 1 right rib fracture and lumbar tranvserse spinous fractures 1-3. Patient was admitted to [facility] and placed on picc protocol, patient remained in no respiratory distress, always sating >95%, pulling 500 on incentive spirometer.  Dr. graves from ortho spine consulted and fractures deemed non-op and TLSO brace for comfort. Patient hemoglobin was monitored closely as right flank hematoma was quite large in size, she did have a persistent drop and remained asymptomatic, it then began to climb back to baseline, dvtppx able to be started on 3/14. She does have chronic renal insufficiency but patients bun/creatinine was up from baseline, we continued IV fluids and gave 1 bolus and lab values resolved back to baseline. PT/OT And pm&R worked with patient and all said home with wheelchair and outpatient services. Patient will be discharged home with a wheelchair and outpatient services, all home medications continued, will followup with the Dr. Graves and acs as an outpatient. 89 y/o F without significant medical history mechanical fall from standing height when being transferred from bed to wheel chair, without head trauma or LOC. Pt c/o R "side" pain. Does not take blood thinners    Airway clear  Breathsounds present b/l  Femoral pulses 2+, Radial pulse 2+  GCS 15, no focal deficits  R flank 12x6cm area of ecchymosis without laceration or sign of expanding hematoma    Patient was pan scanned and found to have large right flank hematoma, 1 right rib fracture and lumbar tranvserse spinous fractures 1-3. Patient was admitted to acs and placed on picc protocol, patient remained in no respiratory distress, always sating >95%, pulling 500 on incentive spirometer.  Dr. graves from ortho spine consulted and fractures deemed non-op and TLSO brace for comfort. Patient hemoglobin was monitored closely as right flank hematoma was quite large in size, she did have a persistent drop and remained asymptomatic, it then began to climb back to baseline, dvtppx able to be started on 3/14. She does have chronic renal insufficiency but patients bun/creatinine was up from baseline, we continued IV fluids and gave 1 bolus and lab values resolved back to baseline. PT/OT And pm&R worked with patient and all said home with wheelchair and outpatient services. Patient will be discharged home with a wheelchair and outpatient services, all home medications continued, will followup with the Dr. Graves and acs as an outpatient.    Patient is advised to RETURN TO THE EMERGENCY DEPARTMENT for any of the following - worsening pain, fever/chills, nausea/vomiting, altered mental status, chest pain, shortness of breath, or any other new / worsening symptom. 89 y/o F without significant medical history mechanical fall from standing height when being transferred from bed to wheel chair, without head trauma or LOC. Pt c/o R "side" pain. Does not take blood thinners    Airway clear  Breathsounds present b/l  Femoral pulses 2+, Radial pulse 2+  GCS 15, no focal deficits  R flank 12x6cm area of ecchymosis without laceration or sign of expanding hematoma    Patient was pan scanned and found to have large right flank hematoma, 1 right rib fracture and lumbar tranvserse spinous fractures 1-3. Patient was admitted to acs and placed on picc protocol, patient remained in no respiratory distress, always sating >95%, pulling 500 on incentive spirometer.  Dr. graves from ortho spine consulted and fractures deemed non-op and TLSO brace for comfort. Patient hemoglobin was monitored closely as right flank hematoma was quite large in size, she did have a persistent drop and remained asymptomatic, it then began to climb back to baseline, dvtppx able to be started on 3/14. She does have chronic renal insufficiency but patients bun/creatinine was up from baseline, we continued IV fluids and gave 1 bolus and lab values resolved back to baseline. PT/OT And pm&R worked with patient and all said home with wheelchair and outpatient services. Patient will be discharged home with a wheelchair, marvin lift, and outpatient services, all home medications continued, will followup with the Dr. Graves and acs as an outpatient.    Patient is advised to RETURN TO THE EMERGENCY DEPARTMENT for any of the following - worsening pain, fever/chills, nausea/vomiting, altered mental status, chest pain, shortness of breath, or any other new / worsening symptom.

## 2018-03-11 NOTE — CHART NOTE - NSCHARTNOTEFT_GEN_A_CORE
Patient found sleeping but arousable, daughter at bedside who states " this is her norm, she spends all day in bed at home" Eric (orthotist) at bedside to fit patient for LSO brace. Patient on telemetry monitor saturation>95 with no supplemental O2, while sleeping chest rising and falling evenly in no respiratory distress. Patients right flank hematoma stable, non expanding, skin intergrity intact . Patient found to have decrease in h/h to 9.9, will continue to hold off on asa /lovenox. On am BMP found to have MISSY, will continue fluids, avoid nephrotoxic drugs, will repeat in am , to note patient also has UTI on cipro Patient found sleeping but arousable, daughter at bedside who states " this is her norm, she spends all day in bed at home" Eric (orthotist) at bedside to fit patient for LSO brace. Patient on telemetry monitor saturation>95 with no supplemental O2, while sleeping chest rising and falling evenly in no respiratory distress. Patients right flank hematoma stable, non expanding, skin intergrity intact . Patient found to have decrease in h/h to 9.9, will continue to hold off on asa /lovenox. On am BMP found to have MISSY, reviewing labs from the past this is chronic, will continue fluids, avoid nephrotoxic drugs, will repeat in am , to note patient also has UTI on cipro

## 2018-03-11 NOTE — DISCHARGE NOTE ADULT - CARE PROVIDER_API CALL
Chema Graves (DO), Orthopaedic Surgery  [address] [place] [address]  Phone: [phone]  Fax: [phone] Chema Graves (DO), Orthopaedic Surgery  [address] [place] [address]  Phone: [phone]  Fax: [phone] Chema Graves (DO), Orthopaedic Surgery  [address] [place] [address]  Phone: [phone]  Fax: [phone]    ACS/Trauma clinic,   [address] [facility], [address] [place] [address]  Phone: [phone]  Fax: (   )    -

## 2018-03-11 NOTE — DISCHARGE NOTE ADULT - ADDITIONAL INSTRUCTIONS
LSO for comfort when ambulating, weight bearing as tolerated. Follow up with Dr Graves in office in 1-2 weeks.

## 2018-03-11 NOTE — PROGRESS NOTE ADULT - SUBJECTIVE AND OBJECTIVE BOX
SUBJECTIVE/24 hour events:  Patient is a 89yFemale s/p mechanical fall, no loc, did hit head with no injury, sustained 1 right rib fracture, L1-L3 tx process fx and right flank hematoma. Doing well, no supplemental o2 needed saturation >95%, pulling @500 on incentive spirometer, speaking in full sentences, H/H dropped 1 point, asa/toradol/lovenox until repeat CBC is resulted. Patient brought cpap in from home, RT aware, orders completed. Baseline ABG to followup       Vital Signs Last 24 Hrs  T(C): 36.3 (11 Mar 2018 07:04), Max: 36.8 (10 Mar 2018 14:25)  T(F): 97.3 (11 Mar 2018 07:04), Max: 98.3 (10 Mar 2018 19:34)  HR: 60 (11 Mar 2018 07:04) (54 - 63)  BP: 155/63 (11 Mar 2018 07:04) (128/68 - 215/82)  BP(mean): --  RR: 20 (11 Mar 2018 07:04) (16 - 20)  SpO2: 100% (11 Mar 2018 07:04) (95% - 100%)  Drug Dosing Weight  Height (cm): 167.64 (10 Mar 2018 14:25)  Weight (kg): 70.3 (10 Mar 2018 14:25)  BMI (kg/m2): 25 (10 Mar 2018 14:25)  BSA (m2): 1.79 (10 Mar 2018 14:25)  I&O's Detail    Allergies    erythromycin (Unknown)  IV-Dye (Unknown)  sulfADIAZINE (Unknown)    Intolerances                              10.1   8.3   )-----------( 183      ( 10 Mar 2018 23:13 )             30.3   03-10    x   |  x   |  x   ----------------------------<  x   5.0   |  x   |  x     Ca    9.4      10 Mar 2018 15:14    TPro  6.5<L>  /  Alb  3.8  /  TBili  0.3<L>  /  DBili  x   /  AST  31  /  ALT  13  /  AlkPhos  37<L>  03-10  PT/INR - ( 10 Mar 2018 15:14 )   PT: 11.3 sec;   INR: 1.03 ratio         PTT - ( 10 Mar 2018 15:14 )  PTT:29.9 sec    ROS:    PHYSICAL EXAM:  Constitutional: in good spirits    Respiratory: CTAB, no splinting noted, saturation >95% on RA, ttp right lateral chest wall     Cardiovascular: NSR on telemetry     Gastrointestinal: abdomen soft, non-tender, obese, tolerating a diet, right flank hematoma stable     Extremities: atraumatic     Neurological: GCS 15    Skin: warm, dry and no rashes           MEDICATIONS  (STANDING):  acetaminophen   Tablet. 650 milliGRAM(s) Oral every 6 hours  ciprofloxacin     Tablet 500 milliGRAM(s) Oral every 12 hours  docusate sodium 100 milliGRAM(s) Oral daily  gabapentin 300 milliGRAM(s) Oral three times a day  hydrALAZINE 25 milliGRAM(s) Oral two times a day  insulin glargine Injectable (LANTUS) 8 Unit(s) SubCutaneous at bedtime  insulin lispro Injectable (HumaLOG) 3 Unit(s) SubCutaneous three times a day before meals  labetalol 100 milliGRAM(s) Oral at bedtime  lactated ringers. 1000 milliLiter(s) (75 mL/Hr) IV Continuous <Continuous>  latanoprost 0.005% Ophthalmic Solution 1 Drop(s) Both EYES at bedtime  lidocaine   Patch 3 Patch Transdermal daily  senna 2 Tablet(s) Oral at bedtime  simvastatin 20 milliGRAM(s) Oral at bedtime  timolol 0.25% Solution 1 Drop(s) Both EYES at bedtime    MEDICATIONS  (PRN):  HYDROmorphone  Injectable 0.5 milliGRAM(s) IV Push every 3 hours PRN Severe Pain (7 - 10)      RADIOLOGY STUDIES:    CULTURES:

## 2018-03-11 NOTE — DISCHARGE NOTE ADULT - CARE PLAN
Principal Discharge DX:	Lumbar transverse process fracture  Goal:	to be pain free and return to baseline activity level  Assessment and plan of treatment:	please followup with Dr. Graves in 2 weeks of discharge. Use the TLSO brace for comfort only when out of bed. If you should have any change in clinical status please return to the ER as soon as possible. Continue all medications as previously prescribed by your prescribing doctor and make an appointment to see your doctor within 7-10 days of discharge  Secondary Diagnosis:	Hematoma  Assessment and plan of treatment:	Hematoma is resolving, your hemoglobin is stable.  Secondary Diagnosis:	Closed fracture of one rib of right side, initial encounter  Assessment and plan of treatment:	Please follow up with the acute care surgery service in 2 weeks of discharge. If you should have any change in clinical status please call 911 or go to the nearest ER as soon as possible. Continue to use the incentive spirometer as much as possible while awake, continue to try to cough and deep breath to promote good pulmonary toliet and be out of bed to a chair as much as possible. Principal Discharge DX:	Lumbar transverse process fracture  Goal:	to be pain free and return to baseline activity level  Assessment and plan of treatment:	please followup with Dr. Graves in 1-2 weeks of discharge post discharge. Use the LSO brace for comfort only when out of bed. If you should have any change in clinical status please return to the ER as soon as possible. Continue all medications as previously prescribed by your prescribing doctor and make an appointment to see your doctor within 7-10 days of discharge  Secondary Diagnosis:	Hematoma  Assessment and plan of treatment:	Hematoma is resolving, your hemoglobin is stable.  Secondary Diagnosis:	Closed fracture of one rib of right side, initial encounter  Assessment and plan of treatment:	Please follow up with the acute care surgery service in 2 weeks of discharge. If you should have any change in clinical status please call 911 or go to the nearest ER as soon as possible. Continue to use the incentive spirometer as much as possible while awake, continue to try to cough and deep breath to promote good pulmonary toliet and be out of bed to a chair as much as possible. Principal Discharge DX:	Lumbar transverse process fracture  Goal:	to be pain free and return to baseline activity level  Assessment and plan of treatment:	please followup with Dr. Graves in 1-2 weeks of discharge post discharge. Use the LSO brace for comfort only when out of bed. If you should have any change in clinical status please return to the ER as soon as possible. Continue all medications as previously prescribed by your prescribing doctor and make an appointment to see your doctor within 7-10 days of discharge  Secondary Diagnosis:	Hematoma  Assessment and plan of treatment:	Hematoma is resolving, your hemoglobin is stable.  Secondary Diagnosis:	Closed fracture of one rib of right side, initial encounter  Assessment and plan of treatment:	Please follow up with the acute care surgery service in 2 weeks of discharge. If you should have any change in clinical status please call 911 or go to the nearest ER as soon as possible. Continue to use the incentive spirometer as much as possible while awake, continue to try to cough and deep breath to promote good pulmonary toliet and be out of bed to a chair as much as possible.  Secondary Diagnosis:	Controlled type 2 diabetes with neuropathy  Assessment and plan of treatment:	Please resume all home diabetes medications at current doses, monitor blood sugar at home, limit your intake of carbohydrates and sugars, and follow-up with your endocrinologist and/or your primary care provider as per your usual schedule.

## 2018-03-11 NOTE — DISCHARGE NOTE ADULT - MEDICATION SUMMARY - MEDICATIONS TO TAKE
I will START or STAY ON the medications listed below when I get home from the hospital:    acetaminophen 325 mg oral tablet  -- 2 tab(s) by mouth every 6 hours  -- Indication: For Pain    heparin  -- 5000 unit(s) subcutaneous every 8 hours  -- Indication: For DVT ppx    gabapentin 300 mg oral capsule  -- 1 cap(s) by mouth 3 times a day  -- Indication: For Pain    levETIRAcetam 100 mg/mL oral solution  -- 2.5 milliliter(s) by mouth once a day  -- Indication: For Anticonvulsant    insulin glargine  -- 8 unit(s) subcutaneous once a day (at bedtime)  -- Indication: For Diabetes    simvastatin 20 mg oral tablet  -- 1 tab(s) by mouth once a day (at bedtime)  -- Indication: For HLD    labetalol 100 mg oral tablet  -- 1 tab(s) by mouth once a day (at bedtime)  -- Indication: For HTN    albuterol 90 mcg/inh inhalation aerosol  -- 1 puff(s) inhaled every 4 hours  -- Indication: For Bronchodilator     ipratropium-albuterol 0.5 mg-2.5 mg/3 mLinhalation solution  -- 3 milliliter(s) inhaled every 6 hours  -- Indication: For Bronchodilator     tiotropium 18 mcg inhalation capsule  -- 1 cap(s) inhaled once a day  -- Indication: For Bronchodilator     lidocaine 5% topical film  -- Apply on skin to affected area once a day. Patch may remain in place for up to 12 hours in any 24-hour period.  * External Use Only *  -- Indication: For Pain    docusate sodium 100 mg oral capsule  -- 1 cap(s) by mouth once a day  -- Indication: For Constipation    senna oral tablet  -- 2 tab(s) by mouth once a day (at bedtime)  -- Indication: For Constipation    sodium chloride 0.65% nasal spray  -- 1 spray(s) into nose 4 times a day, As Needed  -- Indication: For Nasal prep    tobramycin-dexamethasone 0.3%-0.1% ophthalmic ointment  -- 1 application to each affected eye once a day  -- Indication: For Ophthalmic Agent    timolol maleate 0.25% ophthalmic solution  -- 1 drop(s) to each affected eye once a day  -- Indication: For Ophthalmic Agent    latanoprost 0.005% ophthalmic solution  -- 1 drop(s) to each affected eye once a day (at bedtime)  -- Indication: For Ophthalmic Agent    saccharomyces boulardii lyo 250 mg oral capsule  -- 1 cap(s) by mouth 2 times a day  -- Indication: For Probiotic    hydrALAZINE 25 mg oral tablet  -- 1 tab(s) by mouth 2 times a day  -- Indication: For HTN I will START or STAY ON the medications listed below when I get home from the hospital:    acetaminophen 325 mg oral tablet  -- 2 tab(s) by mouth every 6 hours  -- Indication: For Pain    levETIRAcetam 100 mg/mL oral solution  -- 2.5 milliliter(s) by mouth once a day  -- Indication: For Anticonvulsant    gabapentin 300 mg oral capsule  -- 1 cap(s) by mouth 3 times a day  -- Indication: For Pain    insulin glargine  -- 15 unit(s) subcutaneous once a day (at bedtime)  -- Indication: For Diabetes    simvastatin 20 mg oral tablet  -- 1 tab(s) by mouth once a day (at bedtime)  -- Indication: For HLD    labetalol 100 mg oral tablet  -- 1 tab(s) by mouth once a day (at bedtime)  -- Indication: For HTN    latanoprost 0.005% ophthalmic solution  -- 1 drop(s) to each affected eye once a day (at bedtime)  -- Indication: For Ophthalmic Agent    hydrALAZINE 25 mg oral tablet  -- 1 tab(s) by mouth 2 times a day  -- Indication: For HTN I will START or STAY ON the medications listed below when I get home from the hospital:    acetaminophen 325 mg oral tablet  -- 2 tab(s) by mouth every 6 hours  -- Indication: For Pain    aspirin 325 mg oral tablet  -- 1 tab(s) by mouth once a day  -- Indication: For Home medication    gabapentin 300 mg oral capsule  -- 1 cap(s) by mouth 3 times a day  -- Indication: For Pain    levETIRAcetam 100 mg/mL oral solution  -- 2.5 milliliter(s) by mouth once a day  -- Indication: For Home medication    insulin glargine  -- 15 unit(s) subcutaneous once a day (at bedtime)  -- Indication: For Diabetes    Apidra 100 units/mL subcutaneous solution  -- 8 unit(s) subcutaneous once a day  -- Indication: For Diabetes    simvastatin 20 mg oral tablet  -- 1 tab(s) by mouth once a day (at bedtime)  -- Indication: For Home medication    labetalol 100 mg oral tablet  -- 1 tab(s) by mouth once a day (at bedtime)  -- Indication: For Home medication    latanoprost 0.005% ophthalmic solution  -- 1 drop(s) to each affected eye once a day (at bedtime)  -- Indication: For Home medication    hydrALAZINE 25 mg oral tablet  -- 1 tab(s) by mouth 2 times a day  -- Indication: For Home medication I will START or STAY ON the medications listed below when I get home from the hospital:    acetaminophen 325 mg oral tablet  -- 2 tab(s) by mouth every 6 hours  -- Indication: For Pain    gabapentin 300 mg oral capsule  -- 1 cap(s) by mouth 3 times a day  -- Indication: For Pain    insulin glargine  -- 15 unit(s) subcutaneous once a day (at bedtime)  -- Indication: For Diabetes    Apidra 100 units/mL subcutaneous solution  -- 8 unit(s) subcutaneous once a day  -- Indication: For Diabetes    labetalol 100 mg oral tablet  -- 1 tab(s) by mouth once a day (at bedtime)  -- Indication: For Home medication    latanoprost 0.005% ophthalmic solution  -- 1 drop(s) to each affected eye once a day (at bedtime)  -- Indication: For Home medication    hydrALAZINE 25 mg oral tablet  -- 1 tab(s) by mouth 2 times a day  -- Indication: For Home medication

## 2018-03-11 NOTE — DISCHARGE NOTE ADULT - SECONDARY DIAGNOSIS.
Hematoma Closed fracture of one rib of right side, initial encounter Controlled type 2 diabetes with neuropathy

## 2018-03-11 NOTE — DISCHARGE NOTE ADULT - PROVIDER TOKENS
TOKEN:'[id number]' TOKEN:'[id number]',FREE:[LAST:[ACS/Trauma clinic],PHONE:[phone]],FAX:[(   [phone]    -],ADDRESS:[[address] [place] [address], [address] [place] [address]]]

## 2018-03-11 NOTE — DISCHARGE NOTE ADULT - MEDICATION SUMMARY - MEDICATIONS TO STOP TAKING
I will STOP taking the medications listed below when I get home from the hospital:    Hiprex hippurate 1 g oral tablet  -- 1 tab(s) by mouth 2 times a day  -- Finish all this medication unless otherwise directed by prescriber.  Medication should be taken with plenty of water.  Obtain medical advice before taking any non-prescription drugs as some may affect the action of this medication.  Take with food or milk.    cefpodoxime 100 mg oral tablet  -- 1 tab(s) by mouth 2 times a day   -- Finish all this medication unless otherwise directed by prescriber.  Take with food or milk.    Cipro 500 mg oral tablet  -- 1 tab(s) by mouth every 12 hours   -- Avoid prolonged or excessive exposure to direct and/or artificial sunlight while taking this medication.  Check with your doctor before becoming pregnant.  Do not take dairy products, antacids, or iron preparations within one hour of this medication.  Finish all this medication unless otherwise directed by prescriber.  Medication should be taken with plenty of water. I will STOP taking the medications listed below when I get home from the hospital:  None

## 2018-03-12 NOTE — OCCUPATIONAL THERAPY INITIAL EVALUATION ADULT - DIAGNOSIS, OT EVAL
Right lateral flank and buttock subcutaneous soft tissue swelling with a focal hematomas; fractures of right L1, L2 and L3 transverse processes; fractures of the right posterior 11th rib

## 2018-03-12 NOTE — PHYSICAL THERAPY INITIAL EVALUATION ADULT - PERTINENT HX OF CURRENT PROBLEM, REHAB EVAL
Large hematoma right flank, Right rib frature Large hematoma right flank, Right rib fracture, L1-L3 TP fracture

## 2018-03-12 NOTE — CONSULT NOTE ADULT - SUBJECTIVE AND OBJECTIVE BOX
89F was admitted on 3/10 after a fall while being transferred from bed to . Had no head trauma/no LOC. In ED, GCS=15. She was found to have right flank ecchymosis. Workup showed right 11th rib fracture and L1-L3 TP fractuires.   No operative management and is WBAT.     REVIEW OF SYSTEMS  Constitutional - No fever, No weight loss, No fatigue  HEENT - No eye pain, No visual disturbances, No difficulty hearing, No tinnitus, No vertigo, No neck pain  Respiratory - No cough, No wheezing, No shortness of breath  Cardiovascular - No chest pain, No palpitations  Gastrointestinal - No abdominal pain, No nausea, No vomiting, No diarrhea, No constipation  Genitourinary - No dysuria, No frequency, No hematuria, No incontinence  Neurological - No headaches, No memory loss, No loss of strength, No numbness, No tremors  Skin - No itching, No rashes, No lesions   Endocrine - No temperature intolerance  Musculoskeletal - No joint pain, No joint swelling, No muscle pain  Psychiatric - No depression, No anxiety    VITALS  T(C): 36.6 (18 @ 09:09), Max: 36.9 (18 @ 11:47)  HR: 58 (18 @ 09:09) (55 - 67)  BP: 150/68 (18 @ 09:09) (125/57 - 161/73)  RR: 18 (18 @ 09:09) (16 - 18)  SpO2: 96% (18 @ 09:09) (94% - 96%)  Wt(kg): --    PAST MEDICAL & SURGICAL HISTORY  Carpal Tunnel Syndrome  Acid Reflux  Pericarditis  Acute Pulmonary Edema Syndrome  Anemia  Hepatitis in Viral Disease  Hypertension  Heart Murmur  History of Appendectomy  Calcium Nephrolithiasis  Hyperlipidemia LDL Goal < 100  Glaucoma, Low Tension  History of Pulmonary Hypertension  CAD (Coronary Artery Disease)  Obstructive Sleep Apnea  Controlled Type 2 Diabetes with Neuropathy  Fracture of Humerus  Glaucoma  Bilateral Cataracts  Benign Breast Disease  Cataract  Kidney Stones  S/P Appendectomy      SOCIAL HISTORY  Smoking - Denied  EtOH - Denied   Drugs - Denied    FUNCTIONAL HISTORY  Lives   Independent    CURRENT FUNCTIONAL STATUS      FAMILY HISTORY   Family history of cerebrovascular accident (CVA) (Sibling)      RECENT LABS/IMAGING  CBC Full  -  ( 12 Mar 2018 06:39 )  WBC Count : 5.3 K/uL  Hemoglobin : 8.9 g/dL  Hematocrit : 27.3 %  Platelet Count - Automated : 176 K/uL  Mean Cell Volume : 93.2 fl  Mean Cell Hemoglobin : 30.4 pg  Mean Cell Hemoglobin Concentration : 32.6 g/dL  Auto Neutrophil # : x  Auto Lymphocyte # : x  Auto Monocyte # : x  Auto Eosinophil # : x  Auto Basophil # : x  Auto Neutrophil % : x  Auto Lymphocyte % : x  Auto Monocyte % : x  Auto Eosinophil % : x  Auto Basophil % : x    03-12    143  |  104  |  51.0<H>  ----------------------------<  170<H>  4.5   |  25.0  |  1.88<H>    Ca    9.0      12 Mar 2018 06:39  Phos  5.1     -12  Mg     2.1     -12    TPro  6.5<L>  /  Alb  3.8  /  TBili  0.3<L>  /  DBili  x   /  AST  31  /  ALT  13  /  AlkPhos  37<L>  03-10    Urinalysis Basic - ( 11 Mar 2018 10:46 )    Color: Yellow / Appearance: Clear / S.025 / pH: x  Gluc: x / Ketone: Negative  / Bili: Negative / Urobili: Negative mg/dL   Blood: x / Protein: 30 mg/dL / Nitrite: Negative   Leuk Esterase: Trace / RBC: x / WBC 3-5   Sq Epi: x / Non Sq Epi: Few / Bacteria: x        ALLERGIES  erythromycin (Unknown)  IV-Dye (Unknown)  sulfADIAZINE (Unknown)      MEDICATIONS   acetaminophen   Tablet. 650 milliGRAM(s) Oral every 6 hours  dextrose 5%. 1000 milliLiter(s) IV Continuous <Continuous>  dextrose 50% Injectable 12.5 Gram(s) IV Push once  dextrose 50% Injectable 25 Gram(s) IV Push once  dextrose 50% Injectable 25 Gram(s) IV Push once  dextrose Gel 1 Dose(s) Oral once PRN  docusate sodium 100 milliGRAM(s) Oral daily  gabapentin 300 milliGRAM(s) Oral three times a day  glucagon  Injectable 1 milliGRAM(s) IntraMuscular once PRN  hydrALAZINE 25 milliGRAM(s) Oral two times a day  insulin glargine Injectable (LANTUS) 8 Unit(s) SubCutaneous at bedtime  insulin lispro (HumaLOG) corrective regimen sliding scale   SubCutaneous three times a day before meals  insulin lispro Injectable (HumaLOG) 3 Unit(s) SubCutaneous three times a day before meals  labetalol 100 milliGRAM(s) Oral at bedtime  lactated ringers Bolus 500 milliLiter(s) IV Bolus once  lactated ringers. 1000 milliLiter(s) IV Continuous <Continuous>  latanoprost 0.005% Ophthalmic Solution 1 Drop(s) Both EYES at bedtime  levETIRAcetam  Solution 250 milliGRAM(s) Oral daily  levETIRAcetam  Solution 500 milliGRAM(s) Oral at bedtime  lidocaine   Patch 3 Patch Transdermal daily  senna 2 Tablet(s) Oral at bedtime  simvastatin 20 milliGRAM(s) Oral at bedtime  timolol 0.25% Solution 1 Drop(s) Both EYES daily 89F was admitted on 3/10 after a fall while being transferred from bed to . Had no head trauma/no LOC. In ED, GCS=15. She was found to have right flank ecchymosis. Workup showed right 11th rib fracture and L1-L3 TP fractures. No operative management and is WBAT.     Patient in chair, daughter at bedside. Patient having pain in the right side of her chest. Patient keeps eyes closed. Limited exam due to pain.   As per daughter, needed significant help prior, was in bed for most of the day, but would get up to chair, sometimes would walk.     REVIEW OF SYSTEMS  Constitutional - No fever, No weight loss, +fatigue  HEENT - No eye pain, No visual disturbances, No difficulty hearing, No tinnitus, No vertigo, No neck pain  Respiratory - +cough, No wheezing, No shortness of breath  Cardiovascular - +chest pain, No palpitations  Gastrointestinal - No abdominal pain, No nausea, No vomiting, No diarrhea, No constipation  Genitourinary - No dysuria, No frequency, No hematuria, No incontinence  Neurological - No headaches, +memory loss, +loss of strength, No numbness, No tremors  Skin - No itching, No rashes, No lesions   Endocrine - No temperature intolerance  Musculoskeletal - +joint pain, No joint swelling, +muscle pain  Psychiatric - No depression, No anxiety    VITALS  T(C): 36.6 (18 @ 09:09), Max: 36.9 (18 @ 11:47)  HR: 58 (18 @ 09:09) (55 - 67)  BP: 150/68 (18 @ 09:09) (125/57 - 161/73)  RR: 18 (18 @ 09:09) (16 - 18)  SpO2: 96% (18 @ 09:09) (94% - 96%)  Wt(kg): --    PAST MEDICAL & SURGICAL HISTORY  Carpal Tunnel Syndrome  Acid Reflux  Pericarditis  Acute Pulmonary Edema Syndrome  Anemia  Hepatitis in Viral Disease  Hypertension  Heart Murmur  History of Appendectomy  Calcium Nephrolithiasis  Hyperlipidemia LDL Goal < 100  Glaucoma, Low Tension  History of Pulmonary Hypertension  CAD (Coronary Artery Disease)  Obstructive Sleep Apnea  Controlled Type 2 Diabetes with Neuropathy  Fracture of Humerus  Glaucoma  Bilateral Cataracts  Benign Breast Disease  Cataract  Kidney Stones  S/P Appendectomy      SOCIAL HISTORY  Smoking - Denied  EtOH - Denied   Drugs - Denied    FUNCTIONAL HISTORY  Lives with daughter  Needed assist with transfers, spent most of day in bed.     CURRENT FUNCTIONAL STATUS  3/12  ransfer Skill: Bed to Chair   · Level of [place]	maximum assist (25% patients effort)	  · Physical Assist/Nonphysical Assist	2 person assist	  · Weight-Bearing Restrictions	full weight-bearing	  · Assistive Device	rolling walker	    Bed/Chair Transfer Safety Analysis:     · Impairments Contributing to Impaired Transfers	decreased flexibility; pain; decreased ROM; decreased strength	  · Transfer Safety Concerns Noted	decreased balance during turns; decreased safety awareness	    Transfer: Sit to Stand:     · Level of [place]	maximum assist (25% patients effort)	  · Physical Assist/Nonphysical Assist	2 person assist	  · Weight-Bearing Restrictions	full weight-bearing	  · Assistive Device	rolling walker	    Transfer: Stand to Sit:     · Level of [place]	maximum assist (25% patients effort)	  · Physical Assist/Nonphysical Assist	2 person assist	  · Weight-Bearing Restrictions	full weight-bearing	  · Assistive Device	rolling walker	    Sit/Stand Transfer Safety Analysis:     · Transfer Safety Concerns Noted	decreased balance during turns; decreased safety awareness	  · Impairments Contributing to Impaired Transfers	decreased flexibility; pain; decreased ROM; decreased strength; impaired balance	    Gait Skills:     · Level of [place]	maximum assist (25% patients effort)	  · Physical Assist/Nonphysical Assist	2 person assist	  · Weight-Bearing Restrictions	full weight-bearing	  · Assistive Device	rolling walker	  · Gait Distance	bed to chair	  · Brace/Orthotics	LSO for comfort; LSO	      FAMILY HISTORY   Family history of cerebrovascular accident (CVA) (Sibling)      RECENT LABS/IMAGING  CBC Full  -  ( 12 Mar 2018 06:39 )  WBC Count : 5.3 K/uL  Hemoglobin : 8.9 g/dL  Hematocrit : 27.3 %  Platelet Count - Automated : 176 K/uL  Mean Cell Volume : 93.2 fl  Mean Cell Hemoglobin : 30.4 pg  Mean Cell Hemoglobin Concentration : 32.6 g/dL  Auto Neutrophil # : x  Auto Lymphocyte # : x  Auto Monocyte # : x  Auto Eosinophil # : x  Auto Basophil # : x  Auto Neutrophil % : x  Auto Lymphocyte % : x  Auto Monocyte % : x  Auto Eosinophil % : x  Auto Basophil % : x    -    143  |  104  |  51.0<H>  ----------------------------<  170<H>  4.5   |  25.0  |  1.88<H>    Ca    9.0      12 Mar 2018 06:39  Phos  5.1     03-12  Mg     2.1     03-12    TPro  6.5<L>  /  Alb  3.8  /  TBili  0.3<L>  /  DBili  x   /  AST  31  /  ALT  13  /  AlkPhos  37<L>  03-10    Urinalysis Basic - ( 11 Mar 2018 10:46 )    Color: Yellow / Appearance: Clear / S.025 / pH: x  Gluc: x / Ketone: Negative  / Bili: Negative / Urobili: Negative mg/dL   Blood: x / Protein: 30 mg/dL / Nitrite: Negative   Leuk Esterase: Trace / RBC: x / WBC 3-5   Sq Epi: x / Non Sq Epi: Few / Bacteria: x        ALLERGIES  erythromycin (Unknown)  IV-Dye (Unknown)  sulfADIAZINE (Unknown)      MEDICATIONS   acetaminophen   Tablet. 650 milliGRAM(s) Oral every 6 hours  dextrose 5%. 1000 milliLiter(s) IV Continuous <Continuous>  dextrose 50% Injectable 12.5 Gram(s) IV Push once  dextrose 50% Injectable 25 Gram(s) IV Push once  dextrose 50% Injectable 25 Gram(s) IV Push once  dextrose Gel 1 Dose(s) Oral once PRN  docusate sodium 100 milliGRAM(s) Oral daily  gabapentin 300 milliGRAM(s) Oral three times a day  glucagon  Injectable 1 milliGRAM(s) IntraMuscular once PRN  hydrALAZINE 25 milliGRAM(s) Oral two times a day  insulin glargine Injectable (LANTUS) 8 Unit(s) SubCutaneous at bedtime  insulin lispro (HumaLOG) corrective regimen sliding scale   SubCutaneous three times a day before meals  insulin lispro Injectable (HumaLOG) 3 Unit(s) SubCutaneous three times a day before meals  labetalol 100 milliGRAM(s) Oral at bedtime  lactated ringers Bolus 500 milliLiter(s) IV Bolus once  lactated ringers. 1000 milliLiter(s) IV Continuous <Continuous>  latanoprost 0.005% Ophthalmic Solution 1 Drop(s) Both EYES at bedtime  levETIRAcetam  Solution 250 milliGRAM(s) Oral daily  levETIRAcetam  Solution 500 milliGRAM(s) Oral at bedtime  lidocaine   Patch 3 Patch Transdermal daily  senna 2 Tablet(s) Oral at bedtime  simvastatin 20 milliGRAM(s) Oral at bedtime  timolol 0.25% Solution 1 Drop(s) Both EYES daily    ----------------------------------------------------------------------------------------  PHYSICAL EXAM  Constitutional - NAD, Uncomfortable  HEENT - NCAT, EOMI  Neck - Supple, No limited ROM  Chest - Breathing comfortably, No wheezing  Cardiovascular - S1S2   Abdomen - Soft   Extremities - No C/C/E, No calf tenderness   Neurologic Exam -                    Cognitive - Awake, Alert, AAO to self      Communication - Mild dysarthria     Motor - Limited evaluation due to pain/participation                    LEFT    UE - ShAB 3/5, EF 3/5,  4/5                    RIGHT UE - ShAB 3/5, EF 3/5,  4/5                    LEFT    LE - HF 1/5, KE 1/5, DF 3/5                     RIGHT LE - HF 1/5, KE 1/5, DF 3/5      Sensory - Intact to LT  Psychiatric - Flat  ----------------------------------------------------------------------------------------  ASSESSMENT/PLAN  89y Female with functional deficits after sustaining right 11th rib fracture and L1-L3 TP fractures   Pain - Tylenol, Neurontin  DVT PPX - SCDs  Rehab - Daughter wants DC HOME with homecare. Work with PT and OT to assist with family training and transition to home.

## 2018-03-12 NOTE — PHYSICAL THERAPY INITIAL EVALUATION ADULT - ACTIVE RANGE OF MOTION EXAMINATION, REHAB EVAL
grossly assessed, 60 deg shoulder flexion, see OT eval/deficits as listed below/bilateral  lower extremity Active ROM was WFL (within functional limits)

## 2018-03-12 NOTE — OCCUPATIONAL THERAPY INITIAL EVALUATION ADULT - ADDITIONAL COMMENTS
Pt lives with daughter in private home with 0 JOSE. Pt resides on first level however there are 3 steps inside up to bathroom. Bathroom has shower stall. Pt owns RW, cane, commode, shower chair, wheelchair. Pt is right handed. Pt does not drive. Pt's daughter provides 24/7 assistance to pt. Daughter states pt has been wheelchair bound for about the past year; daughter lifts/pivots pt to/from bed and to/from toilet and provides assistance with all ADL tasks.

## 2018-03-12 NOTE — PHYSICAL THERAPY INITIAL EVALUATION ADULT - GAIT DEVIATIONS NOTED, PT EVAL
increased stride width/decreased weight-shifting ability/decreased stride length/decreased rosamaria

## 2018-03-12 NOTE — OCCUPATIONAL THERAPY INITIAL EVALUATION ADULT - RANGE OF MOTION EXAMINATION
bilateral shoulder flexion AROM to about 30 degrees; bilateral shoulder flexion PROM to about 45 degrees; bilateral elbows/wrist/gross grasp WFL

## 2018-03-12 NOTE — PROGRESS NOTE ADULT - SUBJECTIVE AND OBJECTIVE BOX
INTERVAL HPI/OVERNIGHT EVENTS:  Patient was seen and examined at bedside this AM. No acute events overnight. Currently denies chest pain and abdominal pain Tolerating regular diet. Reports bowel movement and flatus.    STATUS POST:      POST OPERATIVE DAY #:       MEDICATIONS  (STANDING):  acetaminophen   Tablet. 650 milliGRAM(s) Oral every 6 hours  ALBUTerol    90 MICROgram(s) HFA Inhaler 1 Puff(s) Inhalation every 4 hours  ALBUTerol/ipratropium for Nebulization 3 milliLiter(s) Nebulizer every 6 hours  dextrose 5%. 1000 milliLiter(s) (50 mL/Hr) IV Continuous <Continuous>  dextrose 50% Injectable 12.5 Gram(s) IV Push once  dextrose 50% Injectable 25 Gram(s) IV Push once  dextrose 50% Injectable 25 Gram(s) IV Push once  docusate sodium 100 milliGRAM(s) Oral daily  gabapentin 300 milliGRAM(s) Oral three times a day  hydrALAZINE 25 milliGRAM(s) Oral two times a day  insulin glargine Injectable (LANTUS) 8 Unit(s) SubCutaneous at bedtime  insulin lispro (HumaLOG) corrective regimen sliding scale   SubCutaneous three times a day before meals  insulin lispro Injectable (HumaLOG) 3 Unit(s) SubCutaneous three times a day before meals  labetalol 100 milliGRAM(s) Oral at bedtime  lactated ringers. 1000 milliLiter(s) (100 mL/Hr) IV Continuous <Continuous>  latanoprost 0.005% Ophthalmic Solution 1 Drop(s) Both EYES at bedtime  levETIRAcetam  Solution 250 milliGRAM(s) Oral daily  levETIRAcetam  Solution 500 milliGRAM(s) Oral at bedtime  lidocaine   Patch 3 Patch Transdermal daily  saccharomyces boulardii 250 milliGRAM(s) Oral two times a day  senna 2 Tablet(s) Oral at bedtime  simvastatin 20 milliGRAM(s) Oral at bedtime  timolol 0.25% Solution 1 Drop(s) Both EYES daily  tiotropium 18 MICROgram(s) Capsule 1 Capsule(s) Inhalation daily    MEDICATIONS  (PRN):  dextrose Gel 1 Dose(s) Oral once PRN Blood Glucose LESS THAN 70 milliGRAM(s)/deciliter  glucagon  Injectable 1 milliGRAM(s) IntraMuscular once PRN Glucose LESS THAN 70 milligrams/deciliter  sodium chloride 0.65% Nasal 1 Spray(s) Both Nostrils four times a day PRN Nasal Congestion      Vital Signs Last 24 Hrs  T(C): 36.6 (12 Mar 2018 09:09), Max: 36.9 (11 Mar 2018 21:14)  T(F): 97.9 (12 Mar 2018 09:09), Max: 98.5 (11 Mar 2018 21:14)  HR: 68 (12 Mar 2018 11:36) (55 - 68)  BP: 139/58 (12 Mar 2018 11:36) (125/57 - 161/73)  BP(mean): --  RR: 18 (12 Mar 2018 11:36) (16 - 18)  SpO2: 96% (12 Mar 2018 11:36) (94% - 96%)    Physical Exam:  Gen: NAD  Neurological:  No sensory/motor deficits  HEENT: PERRLA, EOMI  Respiratory: Breath Sounds equal & CTA bilaterally, no accessory muscle use  Cardiovascular: Regular rate & rhythm, normal S1, S2; no murmurs, gallops or rubs  Chest: Some tenderness to right lower chest  Gastrointestinal:  non-tender, obese, tolerating a diet, right flank hematoma stable  Vascular: Equal and normal pulses: 2+ peripheral pulses throughout  Musculoskeletal: No joint pain, swelling or deformity; no limitation of movement  Skin: No rashes      I&O's Detail    11 Mar 2018 07:01  -  12 Mar 2018 07:00  --------------------------------------------------------  IN:    lactated ringers.: 975 mL  Total IN: 975 mL    OUT:    Voided: 1 mL  Total OUT: 1 mL    Total NET: 974 mL      12 Mar 2018 07:01  -  12 Mar 2018 13:10  --------------------------------------------------------  IN:    Lactated Ringers IV Bolus: 500 mL    lactated ringers.: 500 mL  Total IN: 1000 mL    OUT:  Total OUT: 0 mL    Total NET: 1000 mL          LABS:                        8.9    5.3   )-----------( 176      ( 12 Mar 2018 06:39 )             27.3     03-12    143  |  104  |  51.0<H>  ----------------------------<  170<H>  4.5   |  25.0  |  1.88<H>    Ca    9.0      12 Mar 2018 06:39  Phos  5.1     -12  Mg     2.1     -12    TPro  6.5<L>  /  Alb  3.8  /  TBili  0.3<L>  /  DBili  x   /  AST  31  /  ALT  13  /  AlkPhos  37<L>  03-10    PT/INR - ( 10 Mar 2018 15:14 )   PT: 11.3 sec;   INR: 1.03 ratio         PTT - ( 10 Mar 2018 15:14 )  PTT:29.9 sec  Urinalysis Basic - ( 11 Mar 2018 10:46 )    Color: Yellow / Appearance: Clear / S.025 / pH: x  Gluc: x / Ketone: Negative  / Bili: Negative / Urobili: Negative mg/dL   Blood: x / Protein: 30 mg/dL / Nitrite: Negative   Leuk Esterase: Trace / RBC: x / WBC 3-5   Sq Epi: x / Non Sq Epi: Few / Bacteria: x        RADIOLOGY & ADDITIONAL STUDIES:

## 2018-03-12 NOTE — PHYSICAL THERAPY INITIAL EVALUATION ADULT - AMBULATION SKILLS, REHAB EVAL
Pt. inconsistent with ambulation. WC bound pre morbid (per daughter) and occasional will get up and walk/needs device and assist

## 2018-03-12 NOTE — OCCUPATIONAL THERAPY INITIAL EVALUATION ADULT - GENERAL OBSERVATIONS, REHAB EVAL
Received pt in semi-hansen position in bed, +IV, +cardiac monitor/pulse ox; pt agreeable to OT evaluation.

## 2018-03-12 NOTE — PHYSICAL THERAPY INITIAL EVALUATION ADULT - ADDITIONAL COMMENTS
Pt lives in a house with 0 steps to enter 3 stairs inside to get to the shower.   Pt owns medical equipment: commode, shower chair, RW, grab bars  Pt lives with: daughter who provides 24 hour care. Daughter states pt. was WC bound, max A pre morbid, however is inconsistent with assist at times.

## 2018-03-13 NOTE — PROGRESS NOTE ADULT - SUBJECTIVE AND OBJECTIVE BOX
Patient in bed, eyes closed. Daughter at bedside.  As per patient feels pain is improved. Also reports that she had breakfast (but did not), and that she slept well.  Keeps her eyes closed, noted to have crust in the right eye, as per daughter has been treated in the past for viral infection.   Will plan to have SLP eval later today.     FUNCTIONAL PROGRESS  3/12  Bed Mobility: Supine to Sit:     · Level of [place]	moderate assist (50% patients effort)	  · Physical Assist/Nonphysical Assist	1 person assist	    Bed Mobility Analysis:     · Impairments Contributing to Impaired Bed Mobility	decreased flexibility; pain; decreased ROM; decreased strength	    Transfer: Bed to Chair:     Transfer Skill: Bed to Chair   · Level of [place]	maximum assist (25% patients effort)	  · Physical Assist/Nonphysical Assist	2 person assist	  · Weight-Bearing Restrictions	full weight-bearing	  · Assistive Device	rolling walker	    Bed/Chair Transfer Safety Analysis:     · Impairments Contributing to Impaired Transfers	decreased flexibility; pain; decreased ROM; decreased strength	  · Transfer Safety Concerns Noted	decreased balance during turns; decreased safety awareness	    Transfer: Sit to Stand:     · Level of [place]	maximum assist (25% patients effort)	  · Physical Assist/Nonphysical Assist	2 person assist	  · Weight-Bearing Restrictions	full weight-bearing	  · Assistive Device	rolling walker	    Transfer: Stand to Sit:     · Level of [place]	maximum assist (25% patients effort)	  · Physical Assist/Nonphysical Assist	2 person assist	  · Weight-Bearing Restrictions	full weight-bearing	  · Assistive Device	rolling walker	    REVIEW OF SYSTEMS  Constitutional - No fever,  +fatigue  Neurological - +loss of strength   Musculoskeletal - +joint pain, No joint swelling, +muscle pain    VITALS  T(C): 36.7 (18 @ 09:00), Max: 37.1 (18 @ 23:49)  HR: 68 (18 @ 09:14) (58 - 80)  BP: 151/78 (18 @ 09:00) (114/47 - 151/78)  RR: 18 (18 @ 09:00) (16 - 22)  SpO2: 95% (18 @ 09:14) (88% - 99%)  Wt(kg): --    MEDICATIONS   acetaminophen   Tablet. 650 milliGRAM(s) every 6 hours  ALBUTerol    90 MICROgram(s) HFA Inhaler 1 Puff(s) every 4 hours  ALBUTerol/ipratropium for Nebulization 3 milliLiter(s) every 6 hours  dextrose 5%. 1000 milliLiter(s) <Continuous>  dextrose 50% Injectable 12.5 Gram(s) once  dextrose 50% Injectable 25 Gram(s) once  dextrose 50% Injectable 25 Gram(s) once  dextrose Gel 1 Dose(s) once PRN  docusate sodium 100 milliGRAM(s) daily  gabapentin 300 milliGRAM(s) three times a day  glucagon  Injectable 1 milliGRAM(s) once PRN  hydrALAZINE 25 milliGRAM(s) two times a day  insulin glargine Injectable (LANTUS) 8 Unit(s) at bedtime  insulin lispro (HumaLOG) corrective regimen sliding scale   three times a day before meals  insulin lispro Injectable (HumaLOG) 3 Unit(s) three times a day before meals  labetalol 100 milliGRAM(s) at bedtime  lactated ringers. 1000 milliLiter(s) <Continuous>  lactulose Syrup 10 Gram(s) once  latanoprost 0.005% Ophthalmic Solution 1 Drop(s) at bedtime  levETIRAcetam  Solution 250 milliGRAM(s) daily  levETIRAcetam  Solution 500 milliGRAM(s) at bedtime  lidocaine   Patch 3 Patch daily  saccharomyces boulardii 250 milliGRAM(s) two times a day  senna 2 Tablet(s) at bedtime  simvastatin 20 milliGRAM(s) at bedtime  sodium chloride 0.65% Nasal 1 Spray(s) four times a day PRN  timolol 0.25% Solution 1 Drop(s) daily  tiotropium 18 MICROgram(s) Capsule 1 Capsule(s) daily      RECENT LABS/IMAGING  CBC Full  -  ( 13 Mar 2018 06:46 )  WBC Count : 7.1 K/uL  Hemoglobin : 9.6 g/dL  Hematocrit : 28.7 %  Platelet Count - Automated : 186 K/uL  Mean Cell Volume : 92.9 fl  Mean Cell Hemoglobin : 31.1 pg  Mean Cell Hemoglobin Concentration : 33.4 g/dL  Auto Neutrophil # : x  Auto Lymphocyte # : x  Auto Monocyte # : x  Auto Eosinophil # : x  Auto Basophil # : x  Auto Neutrophil % : x  Auto Lymphocyte % : x  Auto Monocyte % : x  Auto Eosinophil % : x  Auto Basophil % : x    03-13    141  |  105  |  43.0<H>  ----------------------------<  121<H>  4.4   |  24.0  |  1.57<H>    Ca    9.0      13 Mar 2018 06:46  Phos  4.2     03-13  Mg     2.1     03-13      Urinalysis Basic - ( 11 Mar 2018 10:46 )    Color: Yellow / Appearance: Clear / S.025 / pH: x  Gluc: x / Ketone: Negative  / Bili: Negative / Urobili: Negative mg/dL   Blood: x / Protein: 30 mg/dL / Nitrite: Negative   Leuk Esterase: Trace / RBC: x / WBC 3-5   Sq Epi: x / Non Sq Epi: Few / Bacteria: x    -----------------------------------------------------------------------------  PHYSICAL EXAM  Constitutional - NAD, Comfortable  Extremities - No C/C/E, No calf tenderness   Neurologic Exam -                    Cognitive - Awake, Alert, AAO to self, hospital, year     Motor - Limited evaluation due to pain/participation                    LEFT    UE - ShAB 3/5, EF 3/5,  4/5                    RIGHT UE - ShAB 3/5, EF 3/5,  4/5                    LEFT    LE - HF 1/5, KE 1/5, DF 3/5                     RIGHT LE - HF 1/5, KE 1/5, DF 3/5   Psychiatric - Flat, keeps eyes closed  ----------------------------------------------------------------------------------------  ASSESSMENT/PLAN  89y Female with functional deficits after sustaining right 11th rib fracture and L1-L3 TP fractures   Pain - Tylenol, Neurontin, Lidoderms  DVT PPX - SCDs  Rehab - Daughter wants DC HOME with homecare. Work with PT and OT to assist with family training and transition to home. SLP to evaluate swallow.

## 2018-03-13 NOTE — PROGRESS NOTE ADULT - SUBJECTIVE AND OBJECTIVE BOX
INTERVAL HPI/OVERNIGHT EVENTS: No acute events overnight.     SUBJECTIVE: Cough improved. Patient tolerating diet without n/v/d. Working with physical and occupational therapy. Pain controlled. Hematoma stable. No fever, chills, chest pain, dyspnea.      MEDICATIONS  (STANDING):  acetaminophen   Tablet. 650 milliGRAM(s) Oral every 6 hours  ALBUTerol    90 MICROgram(s) HFA Inhaler 1 Puff(s) Inhalation every 4 hours  ALBUTerol/ipratropium for Nebulization 3 milliLiter(s) Nebulizer every 6 hours  dextrose 5%. 1000 milliLiter(s) (50 mL/Hr) IV Continuous <Continuous>  dextrose 50% Injectable 12.5 Gram(s) IV Push once  dextrose 50% Injectable 25 Gram(s) IV Push once  dextrose 50% Injectable 25 Gram(s) IV Push once  docusate sodium 100 milliGRAM(s) Oral daily  gabapentin 300 milliGRAM(s) Oral three times a day  hydrALAZINE 25 milliGRAM(s) Oral two times a day  insulin glargine Injectable (LANTUS) 8 Unit(s) SubCutaneous at bedtime  insulin lispro (HumaLOG) corrective regimen sliding scale   SubCutaneous three times a day before meals  insulin lispro Injectable (HumaLOG) 3 Unit(s) SubCutaneous three times a day before meals  labetalol 100 milliGRAM(s) Oral at bedtime  lactated ringers. 1000 milliLiter(s) (50 mL/Hr) IV Continuous <Continuous>  latanoprost 0.005% Ophthalmic Solution 1 Drop(s) Both EYES at bedtime  levETIRAcetam  Solution 250 milliGRAM(s) Oral daily  levETIRAcetam  Solution 500 milliGRAM(s) Oral at bedtime  lidocaine   Patch 3 Patch Transdermal daily  saccharomyces boulardii 250 milliGRAM(s) Oral two times a day  senna 2 Tablet(s) Oral at bedtime  simvastatin 20 milliGRAM(s) Oral at bedtime  timolol 0.25% Solution 1 Drop(s) Both EYES daily  tiotropium 18 MICROgram(s) Capsule 1 Capsule(s) Inhalation daily  tobramycin/dexamethasone Ointment 1 Application(s) Both EYES daily    MEDICATIONS  (PRN):  dextrose Gel 1 Dose(s) Oral once PRN Blood Glucose LESS THAN 70 milliGRAM(s)/deciliter  glucagon  Injectable 1 milliGRAM(s) IntraMuscular once PRN Glucose LESS THAN 70 milligrams/deciliter  sodium chloride 0.65% Nasal 1 Spray(s) Both Nostrils four times a day PRN Nasal Congestion      Vital Signs Last 24 Hrs  T(C): 36.7 (13 Mar 2018 09:00), Max: 37.1 (12 Mar 2018 23:49)  T(F): 98.1 (13 Mar 2018 09:00), Max: 98.7 (12 Mar 2018 23:49)  HR: 68 (13 Mar 2018 09:14) (58 - 80)  BP: 151/78 (13 Mar 2018 09:00) (114/47 - 151/78)  BP(mean): --  RR: 18 (13 Mar 2018 09:00) (16 - 22)  SpO2: 99% (13 Mar 2018 09:41) (88% - 99%)    Physical exam:  General: NAD, AOx3, resting comfortably in bed  HEENT: PERRLA, EOMI  Neck: supple, nontender  Respiratory: no respiratory distress, lungs CTAB  Heart: regular rate and rhythm, no murmurs  Abdomen: soft, nontender, nondistended. Normal bowel sounds. No guarding or rebound.  Extremities: no peripheral edema. Normal ROM.      I&O's Detail    12 Mar 2018 07:01  -  13 Mar 2018 07:00  --------------------------------------------------------  IN:    Lactated Ringers IV Bolus: 500 mL    lactated ringers.: 2300 mL  Total IN: 2800 mL    OUT:  Total OUT: 0 mL    Total NET: 2800 mL          LABS:                        9.6    7.1   )-----------( 186      ( 13 Mar 2018 06:46 )             28.7     03-13    141  |  105  |  43.0<H>  ----------------------------<  121<H>  4.4   |  24.0  |  1.57<H>    Ca    9.0      13 Mar 2018 06:46  Phos  4.2     03-13  Mg     2.1     03-13

## 2018-03-14 NOTE — PROGRESS NOTE ADULT - PROBLEM SELECTOR PLAN 2
incentive spirometer, pain control, oob to chair when able, encourage cough and deep breathing
ortho following, no operative intervention, brace for comfort only when out of bed, WBAT, pain control, follow up with Dr. Graves as an outpatient

## 2018-03-14 NOTE — CHART NOTE - NSCHARTNOTEFT_GEN_A_CORE
Patient will require a wheelchair in home upon discharge due to 1 right rib fracture, multiple transverse lumbar spine fractures. Patient is unable to ambulate with cane or walker. Patient has adequate space in home to maneuver the wheelchair. Without use of the wheelchair patient will not be able to perform the activities of daily living. Patient had a trial of using the wheelchair here and is able to self propel. Patient is willing to use at home.

## 2018-03-14 NOTE — PROGRESS NOTE ADULT - SUBJECTIVE AND OBJECTIVE BOX
Patient in bed, fatigued. Denies pain.   Spoke with daughter about DC planning. Will need therapy to recommend equipment for safest DC home possible.     FUNCTIONAL PROGRESS  3/12  Bed Mobility: Rolling/Turning:     · Level of [place]	moderate assist (50% patients effort)	  · Physical Assist/Nonphysical Assist	1 person assist	    Bed Mobility: Supine to Sit:     · Level of [place]	moderate assist (50% patients effort)	  · Physical Assist/Nonphysical Assist	1 person assist	    Bed Mobility Analysis:     · Impairments Contributing to Impaired Bed Mobility	decreased flexibility; pain; decreased ROM; decreased strength	    Transfer: Bed to Chair:     Transfer Skill: Bed to Chair   · Level of [place]	maximum assist (25% patients effort)	  · Physical Assist/Nonphysical Assist	2 person assist	  · Weight-Bearing Restrictions	full weight-bearing	  · Assistive Device	rolling walker	    REVIEW OF SYSTEMS  Constitutional - No fever,  +fatigue  Neurological - +loss of strength    VITALS  T(C): 36.4 (03-14-18 @ 08:44), Max: 37.1 (03-13-18 @ 20:43)  HR: 62 (03-14-18 @ 08:44) (62 - 85)  BP: 124/57 (03-14-18 @ 08:44) (109/55 - 166/74)  RR: 18 (03-14-18 @ 08:44) (18 - 20)  SpO2: 95% (03-14-18 @ 08:44) (93% - 99%)  Wt(kg): --    MEDICATIONS   acetaminophen   Tablet. 650 milliGRAM(s) every 6 hours  ALBUTerol    90 MICROgram(s) HFA Inhaler 1 Puff(s) every 4 hours  ALBUTerol/ipratropium for Nebulization 3 milliLiter(s) every 6 hours  dextrose 50% Injectable 12.5 Gram(s) once  dextrose 50% Injectable 25 Gram(s) once  dextrose 50% Injectable 25 Gram(s) once  dextrose Gel 1 Dose(s) once PRN  docusate sodium 100 milliGRAM(s) daily  gabapentin 300 milliGRAM(s) three times a day  glucagon  Injectable 1 milliGRAM(s) once PRN  heparin  Injectable 5000 Unit(s) every 8 hours  hydrALAZINE 25 milliGRAM(s) two times a day  insulin glargine Injectable (LANTUS) 8 Unit(s) at bedtime  insulin lispro (HumaLOG) corrective regimen sliding scale   three times a day before meals  insulin lispro Injectable (HumaLOG) 3 Unit(s) three times a day before meals  labetalol 100 milliGRAM(s) at bedtime  latanoprost 0.005% Ophthalmic Solution 1 Drop(s) at bedtime  levETIRAcetam  Solution 250 milliGRAM(s) daily  levETIRAcetam  Solution 500 milliGRAM(s) at bedtime  lidocaine   Patch 3 Patch daily  saccharomyces boulardii 250 milliGRAM(s) two times a day  senna 2 Tablet(s) at bedtime  simvastatin 20 milliGRAM(s) at bedtime  sodium chloride 0.65% Nasal 1 Spray(s) four times a day PRN  timolol 0.25% Solution 1 Drop(s) daily  tiotropium 18 MICROgram(s) Capsule 1 Capsule(s) daily  tobramycin/dexamethasone Ointment 1 Application(s) daily      RECENT LABS/IMAGING  CBC Full  -  ( 14 Mar 2018 07:48 )  WBC Count : 6.9 K/uL  Hemoglobin : 9.0 g/dL  Hematocrit : 27.8 %  Platelet Count - Automated : 164 K/uL  Mean Cell Volume : 92.7 fl  Mean Cell Hemoglobin : 30.0 pg  Mean Cell Hemoglobin Concentration : 32.4 g/dL  Auto Neutrophil # : 4.7 K/uL  Auto Lymphocyte # : 1.3 K/uL  Auto Monocyte # : 0.6 K/uL  Auto Eosinophil # : 0.2 K/uL  Auto Basophil # : 0.0 K/uL  Auto Neutrophil % : 68.9 %  Auto Lymphocyte % : 19.4 %  Auto Monocyte % : 8.3 %  Auto Eosinophil % : 2.8 %  Auto Basophil % : 0.3 %    03-14    144  |  109<H>  |  42.0<H>  ----------------------------<  134<H>  4.2   |  24.0  |  1.36<H>    Ca    8.7      14 Mar 2018 07:48  Phos  3.8     03-14  Mg     2.1     03-14        -----------------------------------------------------------------------------  PHYSICAL EXAM  Constitutional - NAD, Comfortable  Extremities - No C/C/E, No calf tenderness   Neurologic Exam -                    Cognitive - Awake, Alert, AAO to self      Motor - Limited evaluation due to pain/participation  Psychiatric - Flat, keeps eyes closed  ----------------------------------------------------------------------------------------  ASSESSMENT/PLAN  89y Female with functional deficits after sustaining right 11th rib fracture and L1-L3 TP fractures   Pain - Tylenol, Neurontin, Lidoderms  DVT PPX - SCDs, Heparin  Rehab - DC HOME with homecare. PT and OT to assist with safest transition with equipment and training.

## 2018-03-14 NOTE — PROGRESS NOTE ADULT - PROBLEM SELECTOR PLAN 1
TLSO brace for comfort, pulmonary toliet, incentive spirometer, pain control with tylenol
Continue picc protocol, encourage incentive spirometer, coughing, deep breathing, out of bed when able, PT/OT PM&R

## 2018-03-14 NOTE — PROGRESS NOTE ADULT - SUBJECTIVE AND OBJECTIVE BOX
SUBJECTIVE/24 hour events:  Patient is a 89yFemale s/p mechanical fall sustained a right flank hematoma, lumbar transverse spine fx and 1 right right rib fx, hemoglobin stable, bun/creatinine back to baseline. IV locked, dvt ppx started.  patient medically stable for discharge when all home equipment is delivered for safe return to home       Vital Signs Last 24 Hrs  T(C): 36.4 (14 Mar 2018 08:44), Max: 37.1 (13 Mar 2018 20:43)  T(F): 97.6 (14 Mar 2018 08:44), Max: 98.7 (13 Mar 2018 20:43)  HR: 62 (14 Mar 2018 08:44) (62 - 85)  BP: 124/57 (14 Mar 2018 08:44) (109/55 - 166/74)  BP(mean): --  RR: 18 (14 Mar 2018 08:44) (18 - 20)  SpO2: 95% (14 Mar 2018 08:44) (93% - 99%)  Drug Dosing Weight  Height (cm): 167.64 (10 Mar 2018 14:25)  Weight (kg): 70.3 (10 Mar 2018 14:25)  BMI (kg/m2): 25 (10 Mar 2018 14:25)  BSA (m2): 1.79 (10 Mar 2018 14:25)  I&O's Detail    13 Mar 2018 07:01  -  14 Mar 2018 07:00  --------------------------------------------------------  IN:    lactated ringers.: 500 mL    lactated ringers.: 900 mL  Total IN: 1400 mL    OUT:  Total OUT: 0 mL    Total NET: 1400 mL      14 Mar 2018 07:01  -  14 Mar 2018 13:30  --------------------------------------------------------  IN:    lactated ringers.: 150 mL  Total IN: 150 mL    OUT:  Total OUT: 0 mL    Total NET: 150 mL        Allergies    erythromycin (Unknown)  IV-Dye (Unknown)  sulfADIAZINE (Unknown)    Intolerances                              9.0    6.9   )-----------( 164      ( 14 Mar 2018 07:48 )             27.8   03-14    144  |  109<H>  |  42.0<H>  ----------------------------<  134<H>  4.2   |  24.0  |  1.36<H>    Ca    8.7      14 Mar 2018 07:48  Phos  3.8     03-14  Mg     2.1     03-14        ROS:    PHYSICAL EXAM:  Constitutional: lethargic but arousable     Respiratory: in no respiratory distress, no accessory muscle use, saturation >95% on ra    Cardiovascular: NSR    Gastrointestinal: abdomen soft, non-tender, right flank hematoma stable     Genitourinary: voiding spontaneously     Extremities: atraumatic     Neurological: GCS 15 when awake     Skin: warm, dry and no rashes           MEDICATIONS  (STANDING):  acetaminophen   Tablet. 650 milliGRAM(s) Oral every 6 hours  ALBUTerol    90 MICROgram(s) HFA Inhaler 1 Puff(s) Inhalation every 4 hours  ALBUTerol/ipratropium for Nebulization 3 milliLiter(s) Nebulizer every 6 hours  dextrose 50% Injectable 12.5 Gram(s) IV Push once  dextrose 50% Injectable 25 Gram(s) IV Push once  dextrose 50% Injectable 25 Gram(s) IV Push once  docusate sodium 100 milliGRAM(s) Oral daily  gabapentin 300 milliGRAM(s) Oral three times a day  heparin  Injectable 5000 Unit(s) SubCutaneous every 8 hours  hydrALAZINE 25 milliGRAM(s) Oral two times a day  insulin glargine Injectable (LANTUS) 8 Unit(s) SubCutaneous at bedtime  insulin lispro (HumaLOG) corrective regimen sliding scale   SubCutaneous three times a day before meals  insulin lispro Injectable (HumaLOG) 3 Unit(s) SubCutaneous three times a day before meals  labetalol 100 milliGRAM(s) Oral at bedtime  latanoprost 0.005% Ophthalmic Solution 1 Drop(s) Both EYES at bedtime  levETIRAcetam  Solution 250 milliGRAM(s) Oral daily  lidocaine   Patch 3 Patch Transdermal daily  saccharomyces boulardii 250 milliGRAM(s) Oral two times a day  senna 2 Tablet(s) Oral at bedtime  simvastatin 20 milliGRAM(s) Oral at bedtime  timolol 0.25% Solution 1 Drop(s) Both EYES daily  tiotropium 18 MICROgram(s) Capsule 1 Capsule(s) Inhalation daily  tobramycin/dexamethasone Ointment 1 Application(s) Both EYES daily    MEDICATIONS  (PRN):  dextrose Gel 1 Dose(s) Oral once PRN Blood Glucose LESS THAN 70 milliGRAM(s)/deciliter  glucagon  Injectable 1 milliGRAM(s) IntraMuscular once PRN Glucose LESS THAN 70 milligrams/deciliter  sodium chloride 0.65% Nasal 1 Spray(s) Both Nostrils four times a day PRN Nasal Congestion      RADIOLOGY STUDIES:    CULTURES:

## 2018-03-14 NOTE — PROGRESS NOTE ADULT - PROBLEM SELECTOR PLAN 3
h/h stable able to start dvt ppx
monitor size of hematoma, monitor H/H, monitor skin intergrity, hold on anticoagulation and nsaids until repeat cbc resulted

## 2018-03-15 NOTE — PROGRESS NOTE ADULT - ATTENDING COMMENTS
Daughter voices concern that pt's voice seems weak and raspy today.      Note junky cough and what appears to be food particles on pts chest.      Concern for aspiration.  Will have Speech therapy reevaluate.
Rib fracture, TP fxs, flank hematoma  Hemodynamically stable  Encourage IS  Multimodal pain regimen, limit narcotics, PICC protocol  PT  Monitor flank hematoma  MISSY baseline - monitor

## 2018-03-15 NOTE — SWALLOW BEDSIDE ASSESSMENT ADULT - SLP GENERAL OBSERVATIONS
Pt received in bed sleeping, arousable with cues, +cues to open eyes during assessment, Ox1, daughter present for second half of assessment

## 2018-03-15 NOTE — SWALLOW BEDSIDE ASSESSMENT ADULT - SWALLOW EVAL: DIAGNOSIS
Suspected pharyngeal stage dysphagia with thin and nectar thick liquids, reduced endurance impacts function with solids

## 2018-03-15 NOTE — PROGRESS NOTE ADULT - PROVIDER SPECIALTY LIST ADULT
Orthopedics
Rehab Medicine
Rehab Medicine
Trauma Surgery
Rehab Medicine
Trauma Surgery

## 2018-03-15 NOTE — PROGRESS NOTE ADULT - SUBJECTIVE AND OBJECTIVE BOX
Patient in bed, complaining of right sided thorax pain.  Reports no headaches, no dizziness.  Discussed with RN, who will review medications.   Daughter not at bedside.     FUNCTIONAL PROGRESS  3/14  Bed Mobility  Bed Mobility Training Sit-to-Supine: moderate assist (50% patient effort)  Bed Mobility Training Supine-to-Sit: moderate assist (50% patient effort)    Sit-Stand Transfer Training  Transfer Training Sit-to-Stand Transfer: moderate assist (50% patient effort);  1 person assist;  weight-bearing as tolerated   rolling walker  Transfer Training Stand-to-Sit Transfer: moderate assist (50% patient effort);  1 person assist;  weight-bearing as tolerated   rolling walker  Sit-to-Stand Transfer Training Transfer Safety Analysis: decreased balance    Gait Training  Gait Training: moderate assist (50% patient effort);  1 person assist;  verbal cues;  weight-bearing as tolerated   rolling walker;  5 feet  Gait Analysis: 3-point gait   decreased step length;  decreased stride length;  decreased strength;  impaired balance;  5 feet;  rolling walker  Gait Number of Times:: x 1        REVIEW OF SYSTEMS  Constitutional - No fever,  +fatigue  HEENT - No vertigo, No neck pain  Neurological - No headaches, No memory loss, +loss of strength, No numbness, No tremors  Skin - No rashes, No lesions   Musculoskeletal - +joint pain, No joint swelling, +muscle pain  Psychiatric - No depression, No anxiety    VITALS  T(C): 36.4 (03-15-18 @ 07:48), Max: 37.1 (03-14-18 @ 23:49)  HR: 65 (03-15-18 @ 07:48) (42 - 73)  BP: 118/62 (03-15-18 @ 07:48) (117/54 - 128/67)  RR: 18 (03-15-18 @ 07:48) (18 - 20)  SpO2: 99% (03-15-18 @ 09:06) (86% - 99%)  Wt(kg): --    MEDICATIONS   acetaminophen   Tablet. 650 milliGRAM(s) every 6 hours  ALBUTerol    90 MICROgram(s) HFA Inhaler 1 Puff(s) every 4 hours  ALBUTerol/ipratropium for Nebulization 3 milliLiter(s) every 6 hours  dextrose 50% Injectable 12.5 Gram(s) once  dextrose 50% Injectable 25 Gram(s) once  dextrose 50% Injectable 25 Gram(s) once  dextrose Gel 1 Dose(s) once PRN  docusate sodium 100 milliGRAM(s) daily  gabapentin 300 milliGRAM(s) three times a day  glucagon  Injectable 1 milliGRAM(s) once PRN  heparin  Injectable 5000 Unit(s) every 8 hours  hydrALAZINE 25 milliGRAM(s) two times a day  insulin glargine Injectable (LANTUS) 8 Unit(s) at bedtime  insulin lispro (HumaLOG) corrective regimen sliding scale   three times a day before meals  insulin lispro Injectable (HumaLOG) 3 Unit(s) three times a day before meals  labetalol 100 milliGRAM(s) at bedtime  latanoprost 0.005% Ophthalmic Solution 1 Drop(s) at bedtime  levETIRAcetam  Solution 250 milliGRAM(s) daily  lidocaine   Patch 3 Patch daily  saccharomyces boulardii 250 milliGRAM(s) two times a day  senna 2 Tablet(s) at bedtime  simvastatin 20 milliGRAM(s) at bedtime  sodium chloride 0.65% Nasal 1 Spray(s) four times a day PRN  timolol 0.25% Solution 1 Drop(s) daily  tiotropium 18 MICROgram(s) Capsule 1 Capsule(s) daily  tobramycin/dexamethasone Ointment 1 Application(s) daily      RECENT LABS/IMAGING  CBC Full  -  ( 15 Mar 2018 07:05 )  WBC Count : 5.1 K/uL  Hemoglobin : 8.4 g/dL  Hematocrit : 25.7 %  Platelet Count - Automated : 182 K/uL  Mean Cell Volume : 92.4 fl  Mean Cell Hemoglobin : 30.2 pg  Mean Cell Hemoglobin Concentration : 32.7 g/dL  Auto Neutrophil # : 3.0 K/uL  Auto Lymphocyte # : 1.4 K/uL  Auto Monocyte # : 0.4 K/uL  Auto Eosinophil # : 0.2 K/uL  Auto Basophil # : 0.0 K/uL  Auto Neutrophil % : 59.4 %  Auto Lymphocyte % : 27.9 %  Auto Monocyte % : 8.6 %  Auto Eosinophil % : 3.3 %  Auto Basophil % : 0.6 %    03-15    142  |  108<H>  |  43.0<H>  ----------------------------<  133<H>  4.3   |  24.0  |  1.38<H>    Ca    8.8      15 Mar 2018 07:05  Phos  3.8     03-15  Mg     2.3     03-15      -----------------------------------------------------------------------------  PHYSICAL EXAM  Constitutional - NAD, Uncomfortable  Extremities - No C/C/E, No calf tenderness   Neurologic Exam -                    Cognitive - Awake, Alert, AAO to self      Motor - Limited evaluation due to pain/participation  Psychiatric - Flat, keeps eyes closed  ----------------------------------------------------------------------------------------  ASSESSMENT/PLAN  89y Female with functional deficits after sustaining right 11th rib fracture and L1-L3 TP fractures   Pain - Tylenol, Neurontin, Lidoderms  DVT PPX - SCDs, Heparin  Rehab - DC HOME with homecare. Will sign off.

## 2018-03-15 NOTE — SWALLOW BEDSIDE ASSESSMENT ADULT - PHARYNGEAL PHASE
Within functional limits Multiple swallows/Wet vocal quality post oral intake Multiple swallows/Wet vocal quality post oral intake/Delayed cough post oral intake

## 2018-03-15 NOTE — PROGRESS NOTE ADULT - ASSESSMENT
89 y/o F with R 11th rib fx, L1-L3 TP fx, R flank hematoma  -Hemodynamically stable  -Cr increased to 1.88 from 1.42    Plan:  -IV Fluids for MISSY. Will repeat Iabs in PM  -monitor size of hematoma  -monitor H/H  -Continue PICC protocol  -encourage incentive spirometer, coughing, deep breathing, out of bed when able  -Continue home medications
89 y/o F with R 11th rib fx, L1-L3 TP fx, R flank hematoma, improving status. Hematoma stable, H/H stable, hemodynamically well. MISSY resolving.    Plan:  -Continue regular diet  -Continue pain control as needed  -Monitor hematoma  -Monitor H/H  -PIC protocol for rib fracture  -Encourage OOB, ambulation, IS  -Chest physiotherapy  -Continue home meds  -DVT ppx: currently not receiving chemoprophylaxis due to risk of worsening hematoma; SCDs bilaterally  -Dispo: PMR recommending home with home care
89 y/o F s/p fall from standing with L1-L3 TP fxs, R 11th rib fx, R flank ecchymosis. Patient hemodynamically stable, afebrile. Hematoma stable.    Plan:  -Continue pain control as needed  -Speech and swallow evaluation prior to discharge  -TLSO brace for comfort, pain control  -Encourage OOB, ambulation, IS  -Continue home medications   -Patient requires 2 or more people to get out of bed. [place] lift needed upon discharge.   -Dispo: home with home care, script for [place] lift

## 2018-03-15 NOTE — SWALLOW BEDSIDE ASSESSMENT ADULT - ASR SWALLOW ASPIRATION MONITOR
pneumonia/throat clearing/upper respiratory infection/fever/oral hygiene/cough/change of breathing pattern/position upright (89Y)/gurgly voice

## 2018-03-15 NOTE — PROGRESS NOTE ADULT - SUBJECTIVE AND OBJECTIVE BOX
Pt seen and examined bedside, no acute events overnight, slept comfortably saturating well on CPAP. Daughter wishes pt to be brought home. Tolerating diet, having BM    Vital Signs Last 24 Hrs  T(C): 36.4 (15 Mar 2018 07:48), Max: 37.1 (14 Mar 2018 23:49)  T(F): 97.6 (15 Mar 2018 07:48), Max: 98.8 (14 Mar 2018 23:49)  HR: 65 (15 Mar 2018 07:48) (42 - 73)  BP: 118/62 (15 Mar 2018 07:48) (117/54 - 128/67)  BP(mean): --  RR: 18 (15 Mar 2018 07:48) (18 - 20)  SpO2: 99% (15 Mar 2018 09:06) (86% - 99%)    NAD, AAOX3  Head NCAt, EOMI  Chest expansion symmetric  Lungs CTAB  Abd soft, ND, NTTP  Back: R flank ecchymosis healing, no hematoma, skin c/d/i      89 y/o F s/p fall from standing with L1-L3 TP fxs, R 11th rib fx, R flank ecchymosis  -TLSO brace for comfort, pain control  -[place]cor[place] IS  -hematoma stable  Dispo: Plan for dc home vs [facility] today, will f/u final PT recs Pt seen and examined bedside, no acute events overnight, slept comfortably saturating well on CPAP. Daughter wishes pt to be brought home. Tolerating diet, having BM    Vital Signs Last 24 Hrs  T(C): 36.4 (15 Mar 2018 07:48), Max: 37.1 (14 Mar 2018 23:49)  T(F): 97.6 (15 Mar 2018 07:48), Max: 98.8 (14 Mar 2018 23:49)  HR: 65 (15 Mar 2018 07:48) (42 - 73)  BP: 118/62 (15 Mar 2018 07:48) (117/54 - 128/67)  BP(mean): --  RR: 18 (15 Mar 2018 07:48) (18 - 20)  SpO2: 99% (15 Mar 2018 09:06) (86% - 99%)    NAD, AAOX3  Head NCAt, EOMI  Chest expansion symmetric  Lungs CTAB  Abd soft, ND, NTTP  Back: R flank ecchymosis healing, no hematoma, skin c/d/i

## 2018-03-15 NOTE — SWALLOW BEDSIDE ASSESSMENT ADULT - SLP PERTINENT HISTORY OF CURRENT PROBLEM
Swallow eval attempted 3/12 and 3/13, +lethargy, not appropriate for evaluation. Reattempted 3/13 in pm where ST was told that pt was tolerating meals and assessment was not warranted.

## 2018-03-15 NOTE — PROGRESS NOTE ADULT - SUBJECTIVE AND OBJECTIVE BOX
LINDA CARTER    [id number]    History:  The patient is currently being treated non-operatively for a lumbar transverse process fractures. Patient is doing well. The patient's pain is controlled using the prescribed pain medications. The patient is participating in physical therapy. Denies nausea, vomiting, chest pain, shortness of breath, abdominal pain or fever. No new complaints. No acute motor or sensory changes are reported. LSO brace is being used when out of bed.     Vital Signs Last 24 Hrs  T(C): 36.4 (15 Mar 2018 04:46), Max: 37.1 (14 Mar 2018 23:49)  T(F): 97.6 (15 Mar 2018 04:46), Max: 98.8 (14 Mar 2018 23:49)  HR: 67 (15 Mar 2018 04:46) (42 - 73)  BP: 126/55 (15 Mar 2018 04:46) (117/54 - 128/67)  BP(mean): --  RR: 20 (15 Mar 2018 04:46) (18 - 20)  SpO2: 97% (15 Mar 2018 04:46) (86% - 97%)                 MEDICATIONS  (STANDING):  acetaminophen   Tablet. 650 milliGRAM(s) Oral every 6 hours  ALBUTerol    90 MICROgram(s) HFA Inhaler 1 Puff(s) Inhalation every 4 hours  ALBUTerol/ipratropium for Nebulization 3 milliLiter(s) Nebulizer every 6 hours  dextrose 50% Injectable 12.5 Gram(s) IV Push once  dextrose 50% Injectable 25 Gram(s) IV Push once  dextrose 50% Injectable 25 Gram(s) IV Push once  docusate sodium 100 milliGRAM(s) Oral daily  gabapentin 300 milliGRAM(s) Oral three times a day  heparin  Injectable 5000 Unit(s) SubCutaneous every 8 hours  hydrALAZINE 25 milliGRAM(s) Oral two times a day  insulin glargine Injectable (LANTUS) 8 Unit(s) SubCutaneous at bedtime  insulin lispro (HumaLOG) corrective regimen sliding scale   SubCutaneous three times a day before meals  insulin lispro Injectable (HumaLOG) 3 Unit(s) SubCutaneous three times a day before meals  labetalol 100 milliGRAM(s) Oral at bedtime  latanoprost 0.005% Ophthalmic Solution 1 Drop(s) Both EYES at bedtime  levETIRAcetam  Solution 250 milliGRAM(s) Oral daily  lidocaine   Patch 3 Patch Transdermal daily  saccharomyces boulardii 250 milliGRAM(s) Oral two times a day  senna 2 Tablet(s) Oral at bedtime  simvastatin 20 milliGRAM(s) Oral at bedtime  timolol 0.25% Solution 1 Drop(s) Both EYES daily  tiotropium 18 MICROgram(s) Capsule 1 Capsule(s) Inhalation daily  tobramycin/dexamethasone Ointment 1 Application(s) Both EYES daily    MEDICATIONS  (PRN):  dextrose Gel 1 Dose(s) Oral once PRN Blood Glucose LESS THAN 70 milliGRAM(s)/deciliter  glucagon  Injectable 1 milliGRAM(s) IntraMuscular once PRN Glucose LESS THAN 70 milligrams/deciliter  sodium chloride 0.65% Nasal 1 Spray(s) Both Nostrils four times a day PRN Nasal Congestion      Physical exam: Sensation to light touch of the lower extremities is grossly intact without focal deficit and is symmetric bilaterally. No tremor or clonus. Babinski test is negative. Motor function is 5/5 without focal deficit. No calf tenderness. There is good passive range of motion of the hips and knees without noted joint pain provocation. 2+ dorsalis pedis pulse. Capillary refill is less than 2 seconds. No cyanosis.    Primary Orthopedic Assessment:  • non-operative lumbar transverse process fractures    Secondary  Orthopedic Assessment(s):   •     Secondary  Medical Assessment(s):   •     Plan:   • DVT prophylaxis as prescribed, including use of compression devices and ankle pumps  • Continue physical therapy  • Weightbearing as tolerated of the lower extremities with assistance of a walker  • Incentive spirometry encouraged  • Pain control as clinically indicated  • LSO brace to be used when out of bed and ambulating  • Discharge planning – ortho ready for discharge once medically stable

## 2018-03-19 NOTE — ED ADULT NURSE NOTE - OBJECTIVE STATEMENT
Code team at bedside for pt BIBA, nonverbal, unresponsive sent to ED for increased respiratory effort.  Unable to obtain hx from pt at this time.  LArge amount of bruising to right side.  Anasarca present.

## 2018-03-19 NOTE — H&P ADULT - NEUROLOGICAL DETAILS
pt. is fatigued , tired but easily arouse able. when asked to move her extremities she did that. no focal deficit appreciated.

## 2018-03-19 NOTE — ED PROVIDER NOTE - EYES, MLM
Pupils equal, round and reactive to light. Conjunctival discharge yellowish in color from bilateral eyes.

## 2018-03-19 NOTE — ED PROVIDER NOTE - CARE PLAN
Principal Discharge DX:	Sepsis  Secondary Diagnosis:	NSTEMI (non-ST elevated myocardial infarction)  Secondary Diagnosis:	Conjunctivitis

## 2018-03-19 NOTE — ED PROVIDER NOTE - SKIN, MLM
Large bruise to L flank, as per EMS is from a recent trauma (fall) that pt was treated for at [facility]. Skin is hot to touch.

## 2018-03-19 NOTE — H&P ADULT - PMH
Hypertension    Rib fracture    Seizure Fracture  lumbar fracture.  Hypertension    Rib fracture    Seizure Diabetes mellitus    Fracture  lumbar fracture.  Hypertension    Rib fracture    Seizure

## 2018-03-19 NOTE — H&P ADULT - HISTORY OF PRESENT ILLNESS
89 y/o female was BIBA as pt's daughter noted her to have fever of 102 and was fatigued and tired. Pt. has h/o chronic UTI's. pt. was discharged from [facility] on 3/15/18 after she had a mechanical fall at home and sustained rt. 11 th rib and L1,2,3 fractures. pt. was under trauma service and was followed by ortho team as well. pt. was sent home on brace and lumbar fractures were managed non-operatively. This time pt. did not have any fall. no cough. no phlegm. no cp. no abd. pain. no n/v. In the ER at the time of admission pt. is resting / sleeping but easily arouse able but does not give much history. pt's daughter noted that her conjunctivitis sympt. came back after she went home, she spoke to her ophtalmologist and was advised to use her tobramycin drops. 89 y/o female was BIBA as pt's daughter noted her to have fever of 102 and was fatigued and tired. Pt. has h/o chronic UTI's. pt. was discharged from [facility] on 3/15/18 after she had a mechanical fall at home and sustained rt. 11 th rib and L1,2,3 fractures. pt. was under trauma service and was followed by ortho team as well. pt. was sent home on brace and lumbar fractures were managed non-operatively. This time pt. did not have any fall. no cough. no phlegm. no cp. no abd. pain. no n/v. In the ER at the time of admission pt. is resting / sleeping but easily arouse able but does not give much history. pt's daughter noted that her conjunctivitis sympt. came back after she went home, she spoke to her ophtalmologist and was advised to use her tobramycin drops.   pt. was supposed to get home PT but she came to hospital.   Last admission pt. was under Mr # [id number] and real name is Noemi Pemberton. 89 y/o female was BIBA as pt's daughter noted her to have fever of 102 and was fatigued and tired. Pt. has h/o chronic UTI's. pt. was discharged from [facility] on 3/15/18 after she had a mechanical fall at home and sustained rt. 11 th rib and L1,2,3 fractures. pt. was under trauma service and was followed by ortho team as well. pt. was sent home on brace and lumbar fractures were managed non-operatively. This time pt. did not have any fall. no cough. no phlegm. no cp. no abd. pain. no n/v.  no seizure activity as per daughter. In the ER at the time of admission pt. is resting / sleeping but easily arouse able but does not give much history. pt's daughter noted that her conjunctivitis sympt. came back after she went home, she spoke to her ophtalmologist and was advised to use her tobramycin drops.   pt. was supposed to get home PT but she came to hospital.   Last admission pt. was under Mr # [id number] and real name is Noemi Pemberton. 89 y/o female was BIBA as pt's daughter noted her to have fever of 102 and was fatigued and tired. Pt. has h/o chronic UTI's. pt. was discharged from [facility] on 3/15/18 after she had a mechanical fall at home and sustained rt. 11 th rib and L1,2,3 fractures. pt. was under trauma service and was followed by ortho team as well. pt. was sent home on brace and lumbar fractures were managed non-operatively. This time pt. did not have any fall. no cough. no phlegm. no cp. no abd. pain. no n/v.  no seizure activity as per daughter. was somewhat confused.  In the ER at the time of admission pt. is resting / sleeping but easily arouse able but does not give much history. pt's daughter noted that her conjunctivitis sympt. came back after she went home, she spoke to her ophthalmologist and was advised to use her tobramycin drops.   pt. was supposed to get home PT but she came to hospital.   Last admission pt. was under Mr # [id number] and real name is Noemi Pemberton.

## 2018-03-19 NOTE — H&P ADULT - GASTROINTESTINAL DETAILS
bowel sounds normal/soft/no distention/obese. pt. not in pain on abd. palpation. rectal deferred at this point.

## 2018-03-19 NOTE — H&P ADULT - NSHPLABSRESULTS_GEN_ALL_CORE
ekg is sinus 88 with pac, rbbb, t wave inversions in inf. lateral leads no sig. change from last ekg done about 1 week ago.

## 2018-03-19 NOTE — H&P ADULT - ASSESSMENT
pt. is admitted for uti, unspecified site , no hematuria, follow urine cx, rocephin for now.     Sepsis, unspecified organism, follow blood , urine cx. ct chest did not show any pnemonia, flu negative. due to recent rib frcature, will keep on incentive spiromtery.    Elevated troponin. will trend, might be related to elevated Cr, echo, b.blk. asa. cardio consult [place].     Essential htn, will keep on labetalol hydralazine, iv hydralazine prn.     Lumbar fractures, non operative management as per recent ortho f/u notes when pt. was admitted at [facility] under  trauma service.     Renal insufficiency chronic compared to old blood work . clinically appeared dehydrated, got iv fluids, follow am labs.

## 2018-03-19 NOTE — ED PROVIDER NOTE - PROGRESS NOTE DETAILS
Conversation with daughter:   As per family, pt spiked a fever (highest 102.7) today. Pt was released from [facility] on Friday due to a fall and broken bones. Since being released she has been "moaning and groaning". Pt's daughter also noticed hand swelling. Pt also has frequent urinary incontinence. Prior to coming to the hospital, the pt was verbal and capable of having a conversation but today, she has not been speaking at baseline.     Pt has history of strokes and seizures (taking Keppra). She is also currently taking medications for DM and HTN. She also has a history of drug sensitivity and white blood cells in her blood. Daughter is concerned that the pt might be having a reaction to Gabapentin. Conversation with daughter:   As per family, pt spiked a fever (highest 102.7) today. Pt was just discharged from [facility] for traumatic injuries from a fall. She was walking and talking, except for past few days where she has decreased movements and gradually decreasing mental status. B/l conjunctival discharge which she has been treated for. History of recurrent UTI. PAtient's name is Jing Pemberton. Compared current ekg with ekg from last week, no significant changes.

## 2018-03-20 NOTE — CONSULT NOTE ADULT - ASSESSMENT
1. 89 yo F admitted with fever and AMS. Hx of recurrent UTI's-cultured and empirically started on rocephin  2. elevated troponin-trend to see if there is further rise and decline-given age conservative rx. Would obtain an echo to look for wall motion abnl. No evidence of chf.  3. hpt  4. seizure disorder-on keppra  5. old cva's  6. anemia  7. renal impairment-likely chronic-may be cause of trop elevation.  8. IDDM  9. conjunctivitis.

## 2018-03-20 NOTE — CONSULT NOTE ADULT - SUBJECTIVE AND OBJECTIVE BOX
Chief Complaint: 89 yo F brought to ER by dtr with fever and AMS.    HPI: Hx from dtr. Pt recently at [facility] with traumatic right rib and vertebral fx's from a fall-rx'd medically. Recently returned home. Yesterday had fever to 102 F with AMS. PMH + for hypertension/shahnaz/iddm/cva's/ap/left arm surgery for fx/seizure disorder. Dtr denies any hx of MI/cp/arrythmia/renal (stones and lithotripsy)/hepatic/heme or thyroid hx. Nonsmoker/etoh. ROS+ for recent hand edema. No hx of chf/pedal edema/orthopnea and pnd. Hx of cognative dysfunction. Allergy to sulfa/iodine and erythromycin. Hx of chronic UTI's and had been on macrodantin. Recent infections with resistant proteus. OP meds 81 asa/eye gtts and tobramicin eye gtts/neurontin/keppra/hydralazine/insulin/labetalol 50 qd. Pt cultured and started on rocephin.    PAST MEDICAL & SURGICAL HISTORY:  Diabetes mellitus  Fracture: lumbar fracture.  Rib fracture  Seizure  Hypertension  H/O cataract extraction  History of appendectomy      PREVIOUS DIAGNOSTIC TESTING:      ECHO  FINDINGS: unknown    STRESS  FINDINGS:    CATHETERIZATION  FINDINGS:    MEDICATIONS  (STANDING):  aspirin enteric coated 81 milliGRAM(s) Oral daily  brimonidine 0.2% Ophthalmic Solution 1 Drop(s) Both EYES daily  cefTRIAXone   IVPB 1 Gram(s) IV Intermittent every 24 hours  dextrose 5%. 1000 milliLiter(s) (50 mL/Hr) IV Continuous <Continuous>  dextrose 50% Injectable 12.5 Gram(s) IV Push once  dextrose 50% Injectable 25 Gram(s) IV Push once  dextrose 50% Injectable 25 Gram(s) IV Push once  heparin  Injectable 5000 Unit(s) SubCutaneous every 8 hours  hydrALAZINE 50 milliGRAM(s) Oral every 8 hours  insulin lispro (HumaLOG) corrective regimen sliding scale   SubCutaneous three times a day before meals  insulin lispro (HumaLOG) corrective regimen sliding scale   SubCutaneous at bedtime  labetalol 300 milliGRAM(s) Oral two times a day  lactobacillus acidophilus 1 Tablet(s) Oral two times a day with meals  latanoprost 0.005% Ophthalmic Solution 1 Drop(s) Both EYES at bedtime  levETIRAcetam 250 milliGRAM(s) Oral daily  levETIRAcetam 500 milliGRAM(s) Oral at bedtime  timolol 0.25% Solution 1 Drop(s) Both EYES daily  tobramycin 0.3% Ophthalmic Solution 1 Drop(s) Both EYES every 4 hours    MEDICATIONS  (PRN):  acetaminophen   Tablet. 650 milliGRAM(s) Oral every 6 hours PRN for temp of 100.4 or greater / pain  dextrose Gel 1 Dose(s) Oral once PRN Blood Glucose LESS THAN 70 milliGRAM(s)/deciliter  glucagon  Injectable 1 milliGRAM(s) IntraMuscular once PRN Glucose LESS THAN 70 milligrams/deciliter  hydrALAZINE Injectable 10 milliGRAM(s) IV Push every 6 hours PRN for sbp above or equal to 170.      FAMILY HISTORY:  No pertinent family history      SOCIAL HISTORY: lives w dtr-very inactive. former nuclear med tech.    CIGARETTES: 0    ALCOHOL: 0    ROS: Negative other than as mentioned in HPI.    Vital Signs Last 24 Hrs  T(C): 36.5 (20 Mar 2018 08:05), Max: 38.9 (19 Mar 2018 20:22)  T(F): 97.7 (20 Mar 2018 08:05), Max: 102 (19 Mar 2018 20:22)  HR: 60 (20 Mar 2018 08:05) (60 - 98)  BP: 151/70 (20 Mar 2018 08:05) (151/70 - 226/88)  BP(mean): --  RR: 14 (20 Mar 2018 08:05) (18 - 63)  SpO2: 98% (20 Mar 2018 08:05) (96% - 99%)    PHYSICAL EXAM:  General: very elderly afebrile F moaning but can attempt to answer questions and follow commands. NAD  HEENT: Head; normocephalic, atraumatic.  Eyes;   suggests conjuctivitis  Neck; Supple, no nodes adenopathy, no NVD or carotid bruit or thyromegaly.  CARDIOVASCULAR; No murmur, rub, gallop or lift. Normal S1 and S2.  LUNGS; No rales, rhonchi or wheeze. Normal breath sounds bilaterally. difficult to examine thoroughly.  ABDOMEN ; Soft, nontender without mass or organomegaly. bowel sounds normoactive.  EXTREMITIES; No clubbing, cyanosis pedal edema. + edema of hands. Distal pulses not felt. ROM grossly normal.  SKIN; warm and dry with normal turgor.  NEURO; Awake and responsive but difficult to evaluate   PSYCH; can't assess.            INTERPRETATION OF TELEMETRY:    ECG: SR RBBB/apc's diffuse nonspecific ST and T abnl. No sig. change  cxr-badly roated portable  I&O's Detail    CT chest: very small pleural effusions. CT head-old left cerebellar cva and bilat lacunar basal infarcts.  LABS:                        9.7    6.6   )-----------( 246      ( 19 Mar 2018 20:36 )             29.7     03-    145  |  107  |  38.0<H>  ----------------------------<  208<H>  5.0   |  23.0  |  1.45<H>    Ca    9.6      19 Mar 2018 20:36  Mg     2.2         TPro  6.4<L>  /  Alb  3.7  /  TBili  0.5  /  DBili  x   /  AST  25  /  ALT  15  /  AlkPhos  47  -    CARDIAC MARKERS ( 19 Mar 2018 20:36 )  x     / 0.24 ng/mL / 209 U/L / x     / 3.4 ng/mL      PT/INR - ( 19 Mar 2018 23:46 )   PT: 11.9 sec;   INR: 1.08 ratio         PTT - ( 19 Mar 2018 23:46 )  PTT:28.1 sec  Urinalysis Basic - ( 19 Mar 2018 21:03 )    Color: Yellow / Appearance: Cloudy / S.020 / pH: x  Gluc: x / Ketone: Small  / Bili: Negative / Urobili: Negative mg/dL   Blood: x / Protein: 500 mg/dL / Nitrite: Negative   Leuk Esterase: Small / RBC: 3-5 /HPF / WBC 3-5   Sq Epi: x / Non Sq Epi: Occasional / Bacteria: x      I&O's Summary      RADIOLOGY & ADDITIONAL STUDIES:

## 2018-03-20 NOTE — ED ADULT NURSE REASSESSMENT NOTE - NS ED NURSE REASSESS COMMENT FT1
1000cc NS infused at this time, lactate 1.3.  EKG/CT completed.  Discussed with Dr. Morena bray'ing IVF bolus due to anasarca and negative lactate- verbal orders to hold further NS bolus at this time to avoid respiratory distress.
Dr. Berg at bedside with daughter discussing plan.
Pt with troponin 0.24, Dr. Berg made aware, will order ASA suppository AFTER priority CT to r/o bleed.
STAT EKG done upon returning from priority ct.
Dr. Bob aware of patients blood pressure will put in order for hydralazine 10mg
RVP sent to lab, pt responsive to painful stimuli at this time.

## 2018-03-20 NOTE — PROGRESS NOTE ADULT - SUBJECTIVE AND OBJECTIVE BOX
CC: fever. (19 Mar 2018 23:43)    HPI: 89 y/o female was BIBA as pt's daughter noted her to have fever of 102 and was fatigued and tired. Pt. has h/o chronic UTI's. pt. was discharged from [facility] on 3/15/18 after she had a mechanical fall at home and sustained rt. 11 th rib and L1,2,3 fractures. pt. was under trauma service and was followed by ortho team as well. pt. was sent home on brace and lumbar fractures were managed non-operatively. This time pt. did not have any fall. no cough. no phlegm. no cp. no abd. pain. no n/v.  no seizure activity as per daughter. was somewhat confused.  In the ER at the time of admission pt. is resting / sleeping but easily arouse able but does not give much history. pt's daughter noted that her conjunctivitis sympt. came back after she went home, she spoke to her ophthalmologist and was advised to use her tobramycin drops.   pt. was supposed to get home PT but she came to hospital.   Last admission pt. was under Mr # [id number] and real name is Noemi Pemberton. (19 Mar 2018 23:43)    INTERVAL HPI/OVERNIGHT EVENTS: pt denies any complaints, comfortable in the bed, ROS pos for lower  back pain relieved by tylenol, + generalized edema, daughter by bedside  Other ROS reviewed and neg     Vital Signs Last 24 Hrs  T(C): 36.5 (20 Mar 2018 08:05), Max: 38.9 (19 Mar 2018 20:22)  T(F): 97.7 (20 Mar 2018 08:05), Max: 102 (19 Mar 2018 20:22)  HR: 62 (20 Mar 2018 12:44) (60 - 98)  BP: 173/79 (20 Mar 2018 12:44) (151/70 - 226/88)  RR: 18 (20 Mar 2018 12:44) (18 - 63)  SpO2: 100% (20 Mar 2018 12:44) (96% - 100%)    CARDIAC MARKERS ( 19 Mar 2018 20:36 )  x     / 0.24 ng/mL / 209 U/L / x     / 3.4 ng/mL                       9.2    10.3  )-----------( 210      ( 20 Mar 2018 11:33 )             28.3   19 Mar 2018 20:36  145    |  107    |  38.0   ----------------------------<  208    5.0     |  23.0   |  1.45   Ca    9.6        19 Mar 2018 20:36  Mg     2.2       19 Mar 2018 20:36  TPro  6.4    /  Alb  3.7    /  TBili  0.5    /  DBili  x      /  AST  25     /  ALT  15     /  AlkPhos  47     19 Mar 2018 20:36  PT/INR - ( 19 Mar 2018 23:46 )   PT: 11.9 sec;   INR: 1.08 ratio    PTT - ( 19 Mar 2018 23:46 )  PTT:28.1 sec  POCT Blood Glucose.: 270 mg/dL (20 Mar 2018 11:53)  POCT Blood Glucose.: 239 mg/dL (20 Mar 2018 07:56)  POCT Blood Glucose.: 191 mg/dL (20 Mar 2018 03:46)    LIVER FUNCTIONS - ( 19 Mar 2018 20:36 )  Alb: 3.7 g/dL / Pro: 6.4 g/dL / ALK PHOS: 47 U/L / ALT: 15 U/L / AST: 25 U/L / GGT: x           Urinalysis Basic - ( 19 Mar 2018 21:03 )    Color: Yellow / Appearance: Cloudy / S.020 / pH: x  Gluc: x / Ketone: Small  / Bili: Negative / Urobili: Negative mg/dL   Blood: x / Protein: 500 mg/dL / Nitrite: Negative   Leuk Esterase: Small / RBC: 3-5 /HPF / WBC 3-5   Sq Epi: x / Non Sq Epi: Occasional / Bacteria: x    MEDICATIONS  (STANDING):  aspirin enteric coated 81 milliGRAM(s) Oral daily  brimonidine 0.2% Ophthalmic Solution 1 Drop(s) Both EYES daily  cefTRIAXone   IVPB 1 Gram(s) IV Intermittent every 24 hours  dextrose 5%. 1000 milliLiter(s) (50 mL/Hr) IV Continuous <Continuous>  dextrose 50% Injectable 12.5 Gram(s) IV Push once  dextrose 50% Injectable 25 Gram(s) IV Push once  dextrose 50% Injectable 25 Gram(s) IV Push once  heparin  Injectable 5000 Unit(s) SubCutaneous every 8 hours  hydrALAZINE 50 milliGRAM(s) Oral every 8 hours  insulin glargine Injectable (LANTUS) 8 Unit(s) SubCutaneous at bedtime  insulin lispro (HumaLOG) corrective regimen sliding scale   SubCutaneous three times a day before meals  labetalol 100 milliGRAM(s) Oral every 12 hours  lactobacillus acidophilus 1 Tablet(s) Oral two times a day with meals  latanoprost 0.005% Ophthalmic Solution 1 Drop(s) Both EYES at bedtime  levETIRAcetam 250 milliGRAM(s) Oral daily  levETIRAcetam 500 milliGRAM(s) Oral at bedtime  lidocaine   Patch 1 Patch Transdermal daily  polyethylene glycol 3350 17 Gram(s) Oral daily  timolol 0.25% Solution 1 Drop(s) Both EYES daily  tobramycin 0.3% Ophthalmic Solution 1 Drop(s) Both EYES every 4 hours    MEDICATIONS  (PRN):  acetaminophen   Tablet. 650 milliGRAM(s) Oral every 6 hours PRN for temp of 100.4 or greater / pain  dextrose Gel 1 Dose(s) Oral once PRN Blood Glucose LESS THAN 70 milliGRAM(s)/deciliter  glucagon  Injectable 1 milliGRAM(s) IntraMuscular once PRN Glucose LESS THAN 70 milligrams/deciliter  hydrALAZINE Injectable 10 milliGRAM(s) IV Push every 6 hours PRN for sbp above or equal to 170.    RADIOLOGY & ADDITIONAL TESTS: personally visualized    PHYSICAL EXAM:    General: elderly female in no acute distress  Eyes: + yellow discharge from eyes  Head: Normocephalic; atraumatic  ENMT: No nasal discharge; airway clear  Neck: Supple; non tender; no masses  Respiratory: No wheezes, rales or rhonchi, decreased BS at bases  Cardiovascular: Regular rate and rhythm. S1 and S2  Gastrointestinal: Soft abdomen, mildly distended, NT, + BS  Genitourinary: No costovertebral angle tenderness  Extremities: No clubbing, cyanosis, + BL UE edema + 1-2, trace BL LE trace edema  Vascular: Peripheral pulses palpable 2+ bilaterally  Neurological: Alert and oriented x 3  Skin: Warm and dry.   Musculoskeletal: Normal tone, without deformities  Psychiatric: Cooperative

## 2018-03-21 NOTE — PROGRESS NOTE ADULT - SUBJECTIVE AND OBJECTIVE BOX
Chief Complaint: chart and lab reviewed.    HPI: 89 yo F admitted with fever. Adm lab showed elevated troponin/creatinine. Hx of seizure disorder/falls/diabetes. Attentive dtr at bedside.    PAST MEDICAL & SURGICAL HISTORY:  Diabetes mellitus  Fracture: lumbar fracture.  Rib fracture  Seizure  Hypertension  H/O cataract extraction  History of appendectomy      PREVIOUS DIAGNOSTIC TESTING:      ECHO  FINDINGS: 3/20/18-LVEF 65% with grade II dd. mild to mod mr and tr.    STRESS  FINDINGS:    CATHETERIZATION  FINDINGS:    MEDICATIONS  (STANDING):  aspirin enteric coated 81 milliGRAM(s) Oral daily  brimonidine 0.2% Ophthalmic Solution 1 Drop(s) Both EYES daily  cefTRIAXone Injectable. 1000 milliGRAM(s) IV Push every 24 hours  dextrose 5%. 1000 milliLiter(s) (50 mL/Hr) IV Continuous <Continuous>  dextrose 50% Injectable 12.5 Gram(s) IV Push once  dextrose 50% Injectable 25 Gram(s) IV Push once  dextrose 50% Injectable 25 Gram(s) IV Push once  heparin  Injectable 5000 Unit(s) SubCutaneous every 8 hours  hydrALAZINE 50 milliGRAM(s) Oral every 8 hours  insulin glargine Injectable (LANTUS) 8 Unit(s) SubCutaneous at bedtime  insulin lispro (HumaLOG) corrective regimen sliding scale   SubCutaneous three times a day before meals  labetalol 100 milliGRAM(s) Oral every 12 hours  lactobacillus acidophilus 1 Tablet(s) Oral two times a day with meals  latanoprost 0.005% Ophthalmic Solution 1 Drop(s) Both EYES at bedtime  levETIRAcetam 250 milliGRAM(s) Oral daily  levETIRAcetam 500 milliGRAM(s) Oral at bedtime  lidocaine   Patch 1 Patch Transdermal daily  polyethylene glycol 3350 17 Gram(s) Oral daily  timolol 0.25% Solution 1 Drop(s) Both EYES daily  tobramycin 0.3% Ophthalmic Solution 1 Drop(s) Both EYES every 4 hours    MEDICATIONS  (PRN):  acetaminophen   Tablet. 650 milliGRAM(s) Oral every 6 hours PRN for temp of 100.4 or greater / pain  dextrose Gel 1 Dose(s) Oral once PRN Blood Glucose LESS THAN 70 milliGRAM(s)/deciliter  glucagon  Injectable 1 milliGRAM(s) IntraMuscular once PRN Glucose LESS THAN 70 milligrams/deciliter  hydrALAZINE Injectable 10 milliGRAM(s) IV Push every 6 hours PRN for sbp above or equal to 170.      FAMILY HISTORY:  No pertinent family history      ROS: Negative other than as mentioned in HPI.    Vital Signs Last 24 Hrs  T(C): 37.2 (21 Mar 2018 05:09), Max: 37.2 (21 Mar 2018 05:09)  T(F): 99 (21 Mar 2018 05:09), Max: 99 (21 Mar 2018 05:09)  HR: 69 (21 Mar 2018 05:09) (60 - 69)  BP: 120/80 manually ra (21 Mar 2018 05:09) (128/58 - 173/79)  BP(mean): --  RR: 14 non labored. (21 Mar 2018 05:09) (15 - 18)  SpO2: 100% (21 Mar 2018 05:09) (100% - 100%)    PHYSICAL EXAM:  General: Appears well developed, well nourished alert and cooperative.  HEENT: Head; normocephalic, atraumatic.  Eyes;   Pupils reactive, cornea wnl.  Neck; Supple, no nodes adenopathy, no NVD or carotid bruit or thyromegaly.  CARDIOVASCULAR; No murmur, rub, gallop or lift. Normal S1 and S2.  LUNGS; No rales, rhonchi or wheeze. Normal breath sounds bilaterally.  ABDOMEN ; Soft, nontender without mass or organomegaly. bowel sounds normoactive.  EXTREMITIES; No clubbing, cyanosis or edema.   SKIN; warm and dry with normal turgor.  NEURO; Alert/oriented x 3/normal motor exam. Pt more alert this AM answers questions and moves all extremities.    PSYCH; not assessed.            INTERPRETATION OF TELEMETRY:    ECG: monitor: SR  with ivcd    I&O's Detail      LABS:                        8.8    7.2   )-----------( 207      ( 21 Mar 2018 06:59 )             27.5     03-21    148<H>  |  111<H>  |  41.0<H>  ----------------------------<  209<H>  4.5   |  24.0  |  1.39<H>    Ca    9.0      21 Mar 2018 06:59  Mg     2.2     03-19    TPro  6.4<L>  /  Alb  3.7  /  TBili  0.5  /  DBili  x   /  AST  25  /  ALT  15  /  AlkPhos  47  03-19    CARDIAC MARKERS ( 21 Mar 2018 06:59 )  x     / 0.21 ng/mL / x     / x     / x      CARDIAC MARKERS ( 20 Mar 2018 11:33 )  x     / 0.26 ng/mL / 190 U/L / x     / 8.6 ng/mL  CARDIAC MARKERS ( 19 Mar 2018 20:36 )  x     / 0.24 ng/mL / 209 U/L / x     / 3.4 ng/mL      PT/INR - ( 19 Mar 2018 23:46 )   PT: 11.9 sec;   INR: 1.08 ratio         PTT - ( 19 Mar 2018 23:46 )  PTT:28.1 sec  Urinalysis Basic - ( 19 Mar 2018 21:03 )    Color: Yellow / Appearance: Cloudy / S.020 / pH: x  Gluc: x / Ketone: Small  / Bili: Negative / Urobili: Negative mg/dL   Blood: x / Protein: 500 mg/dL / Nitrite: Negative   Leuk Esterase: Small / RBC: 3-5 /HPF / WBC 3-5   Sq Epi: x / Non Sq Epi: Occasional / Bacteria: x      I&O's Summary      RADIOLOGY & ADDITIONAL STUDIES:

## 2018-03-21 NOTE — PROGRESS NOTE ADULT - SUBJECTIVE AND OBJECTIVE BOX
LINDA CARTER     Chief Complaint: Patient is a 89y old  Female who presents with a chief complaint of fever. (19 Mar 2018 23:43)      PAST MEDICAL & SURGICAL HISTORY:  Diabetes mellitus  Fracture: lumbar fracture.  Rib fracture  Seizure  Hypertension  H/O cataract extraction  History of appendectomy      HPI/OVERNIGHT EVENTS: Patient lethargic, arousable. Mild hyperglycemia. I had a long discussion with the daughters regarding long term care.    MEDICATIONS  (STANDING):  aspirin enteric coated 81 milliGRAM(s) Oral daily  brimonidine 0.2% Ophthalmic Solution 1 Drop(s) Both EYES daily  cefTRIAXone Injectable. 1000 milliGRAM(s) IV Push every 24 hours  dextrose 5%. 1000 milliLiter(s) (50 mL/Hr) IV Continuous <Continuous>  dextrose 50% Injectable 12.5 Gram(s) IV Push once  dextrose 50% Injectable 25 Gram(s) IV Push once  dextrose 50% Injectable 25 Gram(s) IV Push once  heparin  Injectable 5000 Unit(s) SubCutaneous every 8 hours  hydrALAZINE 50 milliGRAM(s) Oral every 8 hours  insulin glargine Injectable (LANTUS) 8 Unit(s) SubCutaneous at bedtime  insulin lispro (HumaLOG) corrective regimen sliding scale   SubCutaneous three times a day before meals  labetalol 100 milliGRAM(s) Oral every 12 hours  lactobacillus acidophilus 1 Tablet(s) Oral two times a day with meals  latanoprost 0.005% Ophthalmic Solution 1 Drop(s) Both EYES at bedtime  levETIRAcetam 250 milliGRAM(s) Oral daily  levETIRAcetam 500 milliGRAM(s) Oral at bedtime  lidocaine   Patch 1 Patch Transdermal daily  polyethylene glycol 3350 17 Gram(s) Oral daily  timolol 0.25% Solution 1 Drop(s) Both EYES daily  tobramycin 0.3% Ophthalmic Solution 1 Drop(s) Both EYES every 4 hours      Vital Signs Last 24 Hrs  T(C): 36.9 (21 Mar 2018 10:04), Max: 37.2 (21 Mar 2018 05:09)  T(F): 98.5 (21 Mar 2018 10:04), Max: 99 (21 Mar 2018 05:09)  HR: 67 (21 Mar 2018 10:04) (60 - 69)  BP: 121/57 (21 Mar 2018 10:04) (121/57 - 173/79)  BP(mean): --  RR: 18 (21 Mar 2018 10:04) (15 - 18)  SpO2: 100% (21 Mar 2018 10:04) (100% - 100%)    PHYSICAL EXAM:  Constitutional: Frail elderly female  HEENT: PERRLA, EOMI, Normal Hearing, MMM  Neck: No LAD, No JVD  Back: Normal spine flexure, No CVA tenderness  Respiratory: CTAB Cardiovascular: S1 and S2, RRR, no M/G/R  Gastrointestinal: BS+, soft, NT/ND  Extremities: No peripheral edema  Vascular: 2+ peripheral pulses  Neurological: Oriented to person and place but not time.  Unable to ambulate.    CAPILLARY BLOOD GLUCOSE    LABS:                        8.8    7.2   )-----------( 207      ( 21 Mar 2018 06:59 )             27.5     03-21    148<H>  |  111<H>  |  41.0<H>  ----------------------------<  209<H>  4.5   |  24.0  |  1.39<H>    Ca    9.0      21 Mar 2018 06:59  Mg     2.2     -19    TPro  6.4<L>  /  Alb  3.7  /  TBili  0.5  /  DBili  x   /  AST  25  /  ALT  15  /  AlkPhos  47  03-19    PT/INR - ( 19 Mar 2018 23:46 )   PT: 11.9 sec;   INR: 1.08 ratio         PTT - ( 19 Mar 2018 23:46 )  PTT:28.1 sec  Urinalysis Basic - ( 19 Mar 2018 21:03 )    Color: Yellow / Appearance: Cloudy / S.020 / pH: x  Gluc: x / Ketone: Small  / Bili: Negative / Urobili: Negative mg/dL   Blood: x / Protein: 500 mg/dL / Nitrite: Negative   Leuk Esterase: Small / RBC: 3-5 /HPF / WBC 3-5   Sq Epi: x / Non Sq Epi: Occasional / Bacteria: x        RADIOLOGY & ADDITIONAL TESTS:

## 2018-03-22 NOTE — PHYSICAL THERAPY INITIAL EVALUATION ADULT - ADDITIONAL COMMENTS
pt reporting that prior to last admission, she was able to ambulate with a RW and stand by assistance. As of late has required assistance for bed mobility and transfers. Her daughter will be home with her, but reports she will require more assistance if returning home.

## 2018-03-22 NOTE — CONSULT NOTE ADULT - PROBLEM SELECTOR RECOMMENDATION 6
Met with daughters. Discussed consideration for hospice services given mother's decline in functional and cognitive decline. They are open to it. Hospice referral made.

## 2018-03-22 NOTE — DISCHARGE NOTE ADULT - PLAN OF CARE
Prevent further seizure Keppra dose has been gently increased and patient has been tolerating it well. Stable cardiac function Follow up with cardiology as an outpatient Tolerable glycemia Continue low dose Lantus and Humalog targeting glucose levels around 150. Tolerbale BP Resolved Patient now back to baseline mental status Cardiology input appreciated Elevated troponin likely secondary to renal impairment.  Follow up with cardiology as an outpatient Tolerable BP

## 2018-03-22 NOTE — DISCHARGE NOTE ADULT - SECONDARY DIAGNOSIS.
Chronic diastolic heart failure Type 2 diabetes mellitus with other neurologic complication, with long-term current use of insulin Essential hypertension Acute metabolic encephalopathy Elevated troponin

## 2018-03-22 NOTE — DISCHARGE NOTE ADULT - MEDICATION SUMMARY - MEDICATIONS TO TAKE
I will START or STAY ON the medications listed below when I get home from the hospital:    aspirin 81 mg oral tablet  -- 1 tab(s) by mouth once a day  -- Indication: For Chronic diastolic heart failure    Keppra 500 mg oral tablet  -- 1 tab(s) by mouth once a day (at bedtime)  -- Indication: For Seizure    Lantus Solostar Pen 100 units/mL subcutaneous solution  -- 10 unit(s) subcutaneous once a day (at bedtime)  -- Indication: For Diabetes mellitus    Trandate 100 mg oral tablet  -- 1 tab(s) by mouth every 12 hours  -- Indication: For Essential hypertension    lidocaine 5% topical film  -- Apply on skin to affected area once a day  -- Indication: For Pain    polyethylene glycol 3350 oral powder for reconstitution  -- 17 gram(s) by mouth once a day  -- Indication: For Constipation    Timoptic Ocudose 0.25% ophthalmic solution  -- 1 drop(s) to each affected eye once a day  -- Indication: For Glaucoma    Xalatan 0.005% ophthalmic solution  -- 1 drop(s) to each affected eye once a day (at bedtime)  -- Indication: For Glaucoma    brimonidine 0.2% ophthalmic solution  -- 1 drop(s) to each affected eye once a day  -- Indication: For Glaucoma    hydrALAZINE 25 mg oral tablet  -- orally 2 times a day  -- Indication: For HTN    calcium (as carbonate)-vitamin D 250 mg-125 intl units oral tablet  -- 1 tab(s) by mouth 2 times a day  -- Indication: For Osteopenia    cholecalciferol oral tablet  -- 2000 unit(s) by mouth once a day  -- Indication: For Vitamin D Deficiency

## 2018-03-22 NOTE — PROGRESS NOTE ADULT - SUBJECTIVE AND OBJECTIVE BOX
LINDA CARTER     Chief Complaint: Patient is a 89y old  Female who presents with a chief complaint of fever. (22 Mar 2018 09:07)      PAST MEDICAL & SURGICAL HISTORY:  Diabetes mellitus  Fracture: lumbar fracture.  Rib fracture  Seizure  Hypertension  H/O cataract extraction  History of appendectomy      HPI/OVERNIGHT EVENTS: Patient was arousable earlier, she fed herself breakfast, and now she is sleeping but arousable.    MEDICATIONS  (STANDING):  aspirin enteric coated 81 milliGRAM(s) Oral daily  brimonidine 0.2% Ophthalmic Solution 1 Drop(s) Both EYES daily  cefTRIAXone Injectable. 1000 milliGRAM(s) IV Push every 24 hours  dextrose 5%. 1000 milliLiter(s) (50 mL/Hr) IV Continuous <Continuous>  dextrose 50% Injectable 12.5 Gram(s) IV Push once  dextrose 50% Injectable 25 Gram(s) IV Push once  dextrose 50% Injectable 25 Gram(s) IV Push once  heparin  Injectable 5000 Unit(s) SubCutaneous every 8 hours  hydrALAZINE 50 milliGRAM(s) Oral every 8 hours  insulin glargine Injectable (LANTUS) 8 Unit(s) SubCutaneous at bedtime  insulin lispro (HumaLOG) corrective regimen sliding scale   SubCutaneous three times a day before meals  labetalol 100 milliGRAM(s) Oral every 12 hours  lactobacillus acidophilus 1 Tablet(s) Oral two times a day with meals  latanoprost 0.005% Ophthalmic Solution 1 Drop(s) Both EYES at bedtime  levETIRAcetam 250 milliGRAM(s) Oral daily  levETIRAcetam 500 milliGRAM(s) Oral at bedtime  lidocaine   Patch 1 Patch Transdermal daily  polyethylene glycol 3350 17 Gram(s) Oral daily  timolol 0.25% Solution 1 Drop(s) Both EYES daily  tobramycin 0.3% Ophthalmic Solution 1 Drop(s) Both EYES every 4 hours      Vital Signs Last 24 Hrs  T(C): 36.3 (22 Mar 2018 08:31), Max: 37.1 (21 Mar 2018 23:14)  T(F): 97.4 (22 Mar 2018 08:31), Max: 98.8 (21 Mar 2018 23:14)  HR: 60 (22 Mar 2018 08:31) (57 - 70)  BP: 150/71 (22 Mar 2018 08:31) (100/60 - 150/71)  BP(mean): --  RR: 19 (22 Mar 2018 09:50) (18 - 20)  SpO2: 93% (22 Mar 2018 09:50) (93% - 100%)    PHYSICAL EXAM:  Constitutional:  Frail elderly female  HEENT: PERRLA, EOMI, Normal Hearing, MMM  Neck: No LAD, No JVD  Back: Normal spine flexure, No CVA tenderness  Respiratory: CTAB Cardiovascular: S1 and S2, RRR, no M/G/R  Gastrointestinal: BS+, soft, NT/ND  Extremities: No peripheral edema  Vascular: 2+ peripheral pulses  Neurological:  arousable    CAPILLARY BLOOD GLUCOSE    LABS:                        8.8    7.2   )-----------( 207      ( 21 Mar 2018 06:59 )             27.5     03-22    147<H>  |  110<H>  |  36.0<H>  ----------------------------<  127<H>  4.1   |  25.0  |  1.15    Ca    8.9      22 Mar 2018 08:39            RADIOLOGY & ADDITIONAL TESTS:

## 2018-03-22 NOTE — CONSULT NOTE ADULT - PROBLEM SELECTOR RECOMMENDATION 2
Patient with baseline cognitive dysfunction, appears to have worsened since recent stress of falls and fractures. Decline in functional status  Check prealbumin

## 2018-03-22 NOTE — CONSULT NOTE ADULT - ASSESSMENT
89yr woman, hx baseline dementia,  functionally dependent on ADLs, CVAs, seizure DM recent fall sustaining tx of right ribs and lumbar spine, admitted with fever, CHF.  Patient with decline in cognitive and functional status likely related to recent hospitalizations.

## 2018-03-22 NOTE — CDI QUERY NOTE - NSCDISEPSISTXTBX_GEN_ALL_CORE_HH
Pt admitted with Fever; BIBA w/AMS. ED triage note states "Chief Complaint Quote from home unresponsive, fever increased respiratory efforts"   ED PDX: "sepsis"  treated with NS IV Bolus as well as Rocephin.   H&P states "pt. is admitted for uti, unspecified site , no hematuria, follow urine cx, rocephin for now. Sepsis, unspecified organism"   V.S: Temp-102, HR-98, RR-24, /86   ProCal elevated 0.19

## 2018-03-22 NOTE — DISCHARGE NOTE ADULT - HOSPITAL COURSE
89 year old female with PMHx of fall with compression fx in L-spine, recurrent UTIs, Seizure Dx on keppra p/w confusion, unresponsiveness. Presumed UTI. Pt finished cycle of abx on 3/22. Pt medically stable. Palliative and hospice consults appreciated, family has decided on rehab.        Problem/Plan - 1:  ·  Problem: Acute metabolic encephalopathy.  Plan: Short course abx course finished yesterday  Pt more arousable/alert.      Problem/Plan - 2:  ·  Problem: Seizure.  Plan: On Keppra - recently increased dose - patiently tolerating the medicine well.      Problem/Plan - 3:  ·  Problem: Essential hypertension.  Plan: BP has been stable.      Problem/Plan - 4:  ·  Problem: Type 2 diabetes mellitus with hyperglycemia, with long-term current use of insulin.  Plan: Fragile - HISS post meal with lantus at bedtime  Allow hyperglycemia to avoid hypoglycemia.      Problem/Plan - 5:  ·  Problem: Palliative care patient.  Plan: Palliative input appreciated, Patient may benefit from hospice after rehab is complete.      Problem/Plan - 6:  Problem: Chronic diastolic heart failure. Plan: Stable cardiac function.     Problem/Plan - 7:  ·  Problem: Elevated troponin.  Plan: Minimally elevated troponin  Pt asymptomatic, no chest pain  Cardio consult appreciated - all approx 0.20 not suggestive of ACS-may be related to renal impairment. Nl LVEF on echo-given age - conservative therapy.  Monitor.      Problem/Plan - 8:  ·  Problem: Renal insufficiency.  Plan: Appears chronic  MISSY on CKD?  Noted with improvement  Will repeat bmp in am.     Attending Attestation:   I was physically present for the key portions of the evaluation and management (E/M) service provided.  I agree with the above history, physical, and plan which I have reviewed and edited where appropriate.     45 minutes spent on total encounter; more than 50% of the visit was spent counseling and/or coordinating care by the attending physician. 89 year old female with PMHx of fall with compression fx in L-spine, recurrent UTIs, Seizure Dx on keppra p/w confusion, unresponsiveness. Presumed UTI. Pt finished cycle of abx on 3/22. Pt medically stable. Palliative and hospice consults appreciated, family has decided on rehab.      Problem/Plan - 1:  ·  Problem: Acute metabolic encephalopathy.  Plan: Short course abx course finished yesterday  Pt more arousable/alert.      Problem/Plan - 2:  ·  Problem: Seizure.  Plan: On Keppra - recently increased dose - patiently tolerating the medicine well.      Problem/Plan - 3:  ·  Problem: Essential hypertension.  Plan: BP has been stable.      Problem/Plan - 4:  ·  Problem: Type 2 diabetes mellitus with hyperglycemia, with long-term current use of insulin.  Plan: Fragile - HISS post meal with lantus at bedtime  Allow hyperglycemia to avoid hypoglycemia.      Problem/Plan - 5:  ·  Problem: Palliative care patient.  Plan: Palliative input appreciated, Patient may benefit from hospice after rehab is complete.      Problem/Plan - 6:  Problem: Chronic diastolic heart failure. Plan: Stable cardiac function.     Problem/Plan - 7:  ·  Problem: Elevated troponin.  Plan: Minimally elevated troponin  Pt asymptomatic, no chest pain  Cardio consult appreciated - all approx 0.20 not suggestive of ACS-may be related to renal impairment. Nl LVEF on echo-given age - conservative therapy.  Monitor.      Problem/Plan - 8:  ·  Problem: Renal insufficiency.  Plan: Appears chronic  MISSY on CKD?  Noted with improvement  Will repeat bmp in am.     Attending Attestation:   I was physically present for the key portions of the evaluation and management (E/M) service provided.  I agree with the above history, physical, and plan which I have reviewed and edited where appropriate.     45 minutes spent on total encounter; more than 50% of the visit was spent counseling and/or coordinating care by the attending physician.

## 2018-03-22 NOTE — DISCHARGE NOTE ADULT - MEDICATION SUMMARY - MEDICATIONS TO STOP TAKING
I will STOP taking the medications listed below when I get home from the hospital:    tobramycin-dexamethasone 0.3%-0.1% ophthalmic suspension  -- 2 drop(s) to each affected eye 4 times a day

## 2018-03-22 NOTE — DISCHARGE NOTE ADULT - PATIENT PORTAL LINK FT
You can access the [email or web address] Patient Portal, offered by [facility], by registering with the following website: [email or web address]

## 2018-03-22 NOTE — DISCHARGE NOTE ADULT - CARE PROVIDER_API CALL
Usama Tripp), Cardiovascular Disease; Critical Care Medicine; Internal Medicine  [address] [place] [address]  Phone: [phone]  Fax: [phone]

## 2018-03-22 NOTE — PROGRESS NOTE ADULT - ATTENDING COMMENTS
Evidence of chronic lacunar infarcts on CT head  Bedridden  [facility] discussion with daughter at bedside 35 min
dc in am

## 2018-03-22 NOTE — DISCHARGE NOTE ADULT - CARE PLAN
Principal Discharge DX:	Seizure  Secondary Diagnosis:	Chronic diastolic heart failure  Secondary Diagnosis:	Type 2 diabetes mellitus with other neurologic complication, with long-term current use of insulin  Secondary Diagnosis:	Essential hypertension Principal Discharge DX:	Seizure  Goal:	Prevent further seizure  Assessment and plan of treatment:	Keppra dose has been gently increased and patient has been tolerating it well.  Secondary Diagnosis:	Chronic diastolic heart failure  Goal:	Stable cardiac function  Assessment and plan of treatment:	Follow up with cardiology as an outpatient  Secondary Diagnosis:	Type 2 diabetes mellitus with other neurologic complication, with long-term current use of insulin  Goal:	Tolerable glycemia  Assessment and plan of treatment:	Continue low dose Lantus and Humalog targeting glucose levels around 150.  Secondary Diagnosis:	Essential hypertension  Goal:	Tolerbale BP  Secondary Diagnosis:	Acute metabolic encephalopathy  Goal:	Resolved  Assessment and plan of treatment:	Patient now back to baseline mental status  Secondary Diagnosis:	Elevated troponin Principal Discharge DX:	Seizure  Goal:	Prevent further seizure  Assessment and plan of treatment:	Keppra dose has been gently increased and patient has been tolerating it well.  Secondary Diagnosis:	Chronic diastolic heart failure  Goal:	Stable cardiac function  Assessment and plan of treatment:	Follow up with cardiology as an outpatient  Secondary Diagnosis:	Type 2 diabetes mellitus with other neurologic complication, with long-term current use of insulin  Goal:	Tolerable glycemia  Assessment and plan of treatment:	Continue low dose Lantus and Humalog targeting glucose levels around 150.  Secondary Diagnosis:	Essential hypertension  Goal:	Tolerbale BP  Secondary Diagnosis:	Acute metabolic encephalopathy  Goal:	Resolved  Assessment and plan of treatment:	Patient now back to baseline mental status  Secondary Diagnosis:	Elevated troponin  Goal:	Cardiology input appreciated  Assessment and plan of treatment:	Elevated troponin likely secondary to renal impairment.  Follow up with cardiology as an outpatient Principal Discharge DX:	Seizure  Goal:	Prevent further seizure  Assessment and plan of treatment:	Keppra dose has been gently increased and patient has been tolerating it well.  Secondary Diagnosis:	Chronic diastolic heart failure  Goal:	Stable cardiac function  Assessment and plan of treatment:	Follow up with cardiology as an outpatient  Secondary Diagnosis:	Type 2 diabetes mellitus with other neurologic complication, with long-term current use of insulin  Goal:	Tolerable glycemia  Assessment and plan of treatment:	Continue low dose Lantus and Humalog targeting glucose levels around 150.  Secondary Diagnosis:	Essential hypertension  Goal:	Tolerable BP  Secondary Diagnosis:	Acute metabolic encephalopathy  Goal:	Resolved  Assessment and plan of treatment:	Patient now back to baseline mental status  Secondary Diagnosis:	Elevated troponin  Goal:	Cardiology input appreciated  Assessment and plan of treatment:	Elevated troponin likely secondary to renal impairment.  Follow up with cardiology as an outpatient

## 2018-03-23 NOTE — GOALS OF CARE CONVERSATION - PERSONAL ADVANCE DIRECTIVE - CONVERSATION DETAILS
RN spoke with sherrie Greenberg today, family seeking [facility]. [facility] contact information provided for future use.
hospice program and service explained  consents reviewed list of private hire HHa agencies provided, daughter inquired about medicaid office number provided . Daughter will review hospice consents and services  with other family members and  make a decision . Clinical documentation sent to for medical director review  . will remain available  continue to follow

## 2018-03-23 NOTE — PROGRESS NOTE ADULT - PROBLEM SELECTOR PLAN 1
Short course abx course finished yesterday Short course abx course finished yesterday  Pt more arousable/alert

## 2018-03-23 NOTE — PROGRESS NOTE ADULT - PROBLEM SELECTOR PLAN 1
Patient with baseline cognitive dysfunction, appears to have worsened since recent stress of falls and fractures. Decline in functional status  Approved for home hospice services

## 2018-03-23 NOTE — PROGRESS NOTE ADULT - PROBLEM SELECTOR PLAN 7
Minimally elevated troponin  Pt asymptomatic, no chest pain  Cardio consult appreciated - all approx 0.20 not suggestive of ACS-may be related to renal impairment. Nl LVEF on echo-given age - conservative therapy.  Monitor

## 2018-03-23 NOTE — PROGRESS NOTE ADULT - PROBLEM SELECTOR PLAN 5
Consult Palliative care  Hospice referral -  Daughter will review hospice consents and services  with other family members and make a decision Consult Palliative care  Hospice referral -  Daughter will review hospice consents and services  with other family members and make a decision.. considering [facility] vs home with pt vs home hospice services

## 2018-03-23 NOTE — PROGRESS NOTE ADULT - SUBJECTIVE AND OBJECTIVE BOX
89 year old female with PMHx of fall with compression fx in L-spine, recurrent UTIs, Seizure Dx on keppra p/w confusion, unresponsiveness. Presumed UTI.    HPI/OVERNIGHT EVENTS: Palliative and hospice consult. Daughter reviewing hospice consents and services with other family members to make decision, otherwise medically stable for discharge.     Pt seen and examined at bedside, daughter. Pt more arousable, alert/awake. Responds to questions. Pt denies any medical complaints.     MEDICATIONS  (STANDING):  aspirin enteric coated 81 milliGRAM(s) Oral daily  brimonidine 0.2% Ophthalmic Solution 1 Drop(s) Both EYES daily  cefTRIAXone Injectable. 1000 milliGRAM(s) IV Push every 24 hours  dextrose 5%. 1000 milliLiter(s) (50 mL/Hr) IV Continuous <Continuous>  dextrose 50% Injectable 12.5 Gram(s) IV Push once  dextrose 50% Injectable 25 Gram(s) IV Push once  dextrose 50% Injectable 25 Gram(s) IV Push once  heparin  Injectable 5000 Unit(s) SubCutaneous every 8 hours  hydrALAZINE 50 milliGRAM(s) Oral every 8 hours  insulin glargine Injectable (LANTUS) 8 Unit(s) SubCutaneous at bedtime  insulin lispro (HumaLOG) corrective regimen sliding scale   SubCutaneous three times a day before meals  labetalol 100 milliGRAM(s) Oral every 12 hours  lactobacillus acidophilus 1 Tablet(s) Oral two times a day with meals  latanoprost 0.005% Ophthalmic Solution 1 Drop(s) Both EYES at bedtime  levETIRAcetam 250 milliGRAM(s) Oral daily  levETIRAcetam 500 milliGRAM(s) Oral at bedtime  lidocaine   Patch 1 Patch Transdermal daily  polyethylene glycol 3350 17 Gram(s) Oral daily  timolol 0.25% Solution 1 Drop(s) Both EYES daily  tobramycin 0.3% Ophthalmic Solution 1 Drop(s) Both EYES every 4 hours    Vital Signs Last 24 Hrs  T(C): 36.6 (23 Mar 2018 08:03), Max: 37 (22 Mar 2018 23:25)  T(F): 97.9 (23 Mar 2018 08:03), Max: 98.6 (22 Mar 2018 23:25)  HR: 63 (23 Mar 2018 08:03) (58 - 70)  BP: 127/59 (23 Mar 2018 08:03) (119/60 - 128/66)  BP(mean): --  RR: 18 (23 Mar 2018 08:03) (18 - 20)  SpO2: 95% (23 Mar 2018 08:03) (93% - 95%)    PAST MEDICAL & SURGICAL HISTORY:  Diabetes mellitus  Fracture: lumbar fracture.  Rib fracture  Seizure  Hypertension  H/O cataract extraction  History of appendectomy    PHYSICAL EXAM:  Constitutional:  Frail elderly female  HEENT: PERRLA, EOMI, Normal Hearing   Respiratory: CTAB   Cardiovascular: S1 and S2, RRR, no M/G/R  Gastrointestinal: BS+, soft, NT/ND  Extremities: No peripheral edema  Vascular: 2+ peripheral pulses  Neurological: arousable    CAPILLARY BLOOD GLUCOSE    LABS:                        8.8    7.2   )-----------( 207      ( 21 Mar 2018 06:59 )             27.5     03-22    147<H>  |  110<H>  |  36.0<H>  ----------------------------<  127<H>  4.1   |  25.0  |  1.15    Ca    8.9      22 Mar 2018 08:39

## 2018-03-23 NOTE — PROGRESS NOTE ADULT - SUBJECTIVE AND OBJECTIVE BOX
INTERVAL HPI/OVERNIGHT EVENTS:    PRESENT SYMPTOMS: SOURCE:  Patient/Family/Team    PAIN SCALE:  0 = none  1 = mild   2 = moderate  3 = severe    Pain: appears more comfortable - sitting up and eating    Dyspnea:  [ ] YES x ] NO  Anxiety:  [ ] YES [x ] NO  Fatigue: [x] YES [ ] NO  Nausea: [ ] YES [x NO  Loss of Appetite: [ ] YES [ x] NO  Other symptoms: __________    MEDICATIONS  (STANDING):  aspirin enteric coated 81 milliGRAM(s) Oral daily  brimonidine 0.2% Ophthalmic Solution 1 Drop(s) Both EYES daily  calcium carbonate  625 mG + Vitamin D (OsCal 250 + D) 1 Tablet(s) Oral two times a day  cholecalciferol 2000 Unit(s) Oral daily  dextrose 5%. 1000 milliLiter(s) (50 mL/Hr) IV Continuous <Continuous>  dextrose 50% Injectable 12.5 Gram(s) IV Push once  dextrose 50% Injectable 25 Gram(s) IV Push once  dextrose 50% Injectable 25 Gram(s) IV Push once  heparin  Injectable 5000 Unit(s) SubCutaneous every 8 hours  hydrALAZINE 50 milliGRAM(s) Oral every 8 hours  insulin glargine Injectable (LANTUS) 8 Unit(s) SubCutaneous at bedtime  insulin lispro (HumaLOG) corrective regimen sliding scale   SubCutaneous three times a day before meals  labetalol 100 milliGRAM(s) Oral every 12 hours  latanoprost 0.005% Ophthalmic Solution 1 Drop(s) Both EYES at bedtime  levETIRAcetam 250 milliGRAM(s) Oral daily  levETIRAcetam 500 milliGRAM(s) Oral at bedtime  lidocaine   Patch 1 Patch Transdermal daily  lidocaine   Patch 1 Patch Transdermal daily  polyethylene glycol 3350 17 Gram(s) Oral daily  timolol 0.25% Solution 1 Drop(s) Both EYES daily  tobramycin 0.3% Ophthalmic Solution 1 Drop(s) Both EYES every 4 hours    MEDICATIONS  (PRN):  acetaminophen   Tablet. 650 milliGRAM(s) Oral every 6 hours PRN for temp of 100.4 or greater / pain  dextrose Gel 1 Dose(s) Oral once PRN Blood Glucose LESS THAN 70 milliGRAM(s)/deciliter  glucagon  Injectable 1 milliGRAM(s) IntraMuscular once PRN Glucose LESS THAN 70 milligrams/deciliter  hydrALAZINE Injectable 10 milliGRAM(s) IV Push every 6 hours PRN for sbp above or equal to 170.      Allergies    contrast media (iodine-based) (Urticaria)      Karnofsky Performance Score/Palliative Performance Status Version 2:  40   %    Vital Signs Last 24 Hrs  T(C): 36.6 (23 Mar 2018 08:03), Max: 37 (22 Mar 2018 23:25)  T(F): 97.9 (23 Mar 2018 08:03), Max: 98.6 (22 Mar 2018 23:25)  HR: 63 (23 Mar 2018 08:03) (58 - 70)  BP: 127/59 (23 Mar 2018 08:03) (119/60 - 128/66)  BP(mean): --  RR: 18 (23 Mar 2018 08:03) (18 - 20)  SpO2: 95% (23 Mar 2018 08:03) (93% - 95%)    PHYSICAL EXAM:    General:  More alert today, NAD    HEENT: [x ] normal  [ ] dry mouth  [ ] ET tube/trach    Lungs: [x comfortable [ ] tachypnea/labored breathing  [ ] excessive secretions    CV: [x normal  [ ] tachycardia    GI: [x normal  [ ] distended  [ ] tender  [ ] no BS               [ ] PEG/NG/OG tube    : [ x] normal  [ ] incontinent  [ ] oliguria/anuria  [ ] walter    MSK: [ ] normal  [ x] weakness  [ ] edema             [ ] ambulatory  [ ] bedbound/wheelchair bound    Skin: [ ] normal  [ ] pressure ulcers- Stage_____  [x ] no rash    LABS:    03-22    147<H>  |  110<H>  |  36.0<H>  ----------------------------<  127<H>  4.1   |  25.0  |  1.15    Ca    8.9      22 Mar 2018 08:39      Thank you for the opportunity to assist with the care of this patient.   [facility] [phone].

## 2018-03-24 NOTE — PROGRESS NOTE ADULT - PROBLEM SELECTOR PLAN 6
Patient was approved for home hospice services.   Spoke to Janel, Hospice nurse, family leaning to [facility].  Family may elect hospice services after [facility] if they wish.  Support
Stable cardiac function

## 2018-03-24 NOTE — PROGRESS NOTE ADULT - PROBLEM SELECTOR PROBLEM 6
Encounter for palliative care
Chronic diastolic heart failure

## 2018-03-24 NOTE — PROGRESS NOTE ADULT - PROBLEM SELECTOR PROBLEM 3
Essential hypertension
Compression fracture of first lumbar vertebra
Essential hypertension

## 2018-03-24 NOTE — PROGRESS NOTE ADULT - PROBLEM SELECTOR PROBLEM 1
Acute metabolic encephalopathy
Acute metabolic encephalopathy
Dementia
Acute metabolic encephalopathy

## 2018-03-24 NOTE — PROGRESS NOTE ADULT - PROBLEM SELECTOR PLAN 3
BP has been stable
monitor BP
Cont Lidocaine patch and Tylenol  Cont Brace
Monitor BP

## 2018-03-24 NOTE — PROGRESS NOTE ADULT - ASSESSMENT
1. [age] yo F admitted with fever and hx of recurrent UTI's-cultures pending.  2. elevated troponins-all approx 0.20 not suggestive of ACS-may be related to renal impairment. Nl LVEF on echo-given age-conservative therapy.  3. seizure disorder  4. hx of hypertension  5. fall risk.
89 y.o. female with PMHx of fall with compression fx in L-spine, recurrent UTIs, Seizure Dx on keppra p/w confusion, unresponsiveness
89 year old female with PMHx of fall with compression fx in L-spine, recurrent UTIs, Seizure Dx on keppra p/w confusion, unresponsiveness. Presumed UTI. On 3/22 she is completting her cycle of antibiotics and will be stable for discharge in am.
89yr woman, hx baseline dementia,  functionally dependent on ADLs, CVAs, seizure DM recent fall sustaining tx of right ribs and lumbar spine, admitted with fever, CHF.  Patient with decline in cognitive and functional status likely related to recent hospitalizations.
89 year old female with PMHx of fall with compression fx in L-spine, recurrent UTIs, Seizure Dx on keppra p/w confusion, unresponsiveness. Presumed UTI. Pt finished cycle of abx yesterday 3/22. Pt medically stable. Palliative and hospice consults appreciated, family to discuss/decide on hospice. Physical therapy recommends home with PT/24 hr assist vs [facility]. Awaiting family decision.
89 year old female with PMHx of fall with compression fx in L-spine, recurrent UTIs, Seizure Dx on keppra p/w confusion, unresponsiveness. Presumed UTI.
89 year old female with PMHx of fall with compression fx in L-spine, recurrent UTIs, Seizure Dx on keppra p/w confusion, unresponsiveness. Presumed UTI. Pt finished cycle of abx on 3/22. Pt medically stable. Palliative and hospice consults appreciated, family has decided on rehab.

## 2018-03-24 NOTE — PROGRESS NOTE ADULT - PROBLEM SELECTOR PROBLEM 5
Palliative care patient
Seizure

## 2018-03-24 NOTE — PROGRESS NOTE ADULT - SUBJECTIVE AND OBJECTIVE BOX
LINDA CARTER     Chief Complaint: Patient is a 89y old  Female who presents with a chief complaint of fever. (22 Mar 2018 09:07)      PAST MEDICAL & SURGICAL HISTORY:  Diabetes mellitus  Fracture: lumbar fracture.  Rib fracture  Seizure  Hypertension  H/O cataract extraction  History of appendectomy      HPI/OVERNIGHT EVENTS: Patient lying in bed in no distress    MEDICATIONS  (STANDING):  aspirin enteric coated 81 milliGRAM(s) Oral daily  brimonidine 0.2% Ophthalmic Solution 1 Drop(s) Both EYES daily  calcium carbonate  625 mG + Vitamin D (OsCal 250 + D) 1 Tablet(s) Oral two times a day  cholecalciferol 2000 Unit(s) Oral daily  dextrose 5%. 1000 milliLiter(s) (50 mL/Hr) IV Continuous <Continuous>  dextrose 50% Injectable 12.5 Gram(s) IV Push once  dextrose 50% Injectable 25 Gram(s) IV Push once  dextrose 50% Injectable 25 Gram(s) IV Push once  heparin  Injectable 5000 Unit(s) SubCutaneous every 8 hours  hydrALAZINE 50 milliGRAM(s) Oral every 8 hours  insulin glargine Injectable (LANTUS) 8 Unit(s) SubCutaneous at bedtime  insulin lispro (HumaLOG) corrective regimen sliding scale   SubCutaneous three times a day before meals  labetalol 100 milliGRAM(s) Oral every 12 hours  latanoprost 0.005% Ophthalmic Solution 1 Drop(s) Both EYES at bedtime  levETIRAcetam 250 milliGRAM(s) Oral daily  levETIRAcetam 500 milliGRAM(s) Oral at bedtime  lidocaine   Patch 1 Patch Transdermal daily  lidocaine   Patch 1 Patch Transdermal daily  polyethylene glycol 3350 17 Gram(s) Oral daily  timolol 0.25% Solution 1 Drop(s) Both EYES daily  tobramycin 0.3% Ophthalmic Solution 1 Drop(s) Both EYES every 4 hours      Vital Signs Last 24 Hrs  T(C): 36.4 (24 Mar 2018 07:51), Max: 37.2 (23 Mar 2018 23:20)  T(F): 97.5 (24 Mar 2018 07:51), Max: 98.9 (23 Mar 2018 23:20)  HR: 71 (24 Mar 2018 07:51) (65 - 71)  BP: 145/63 (24 Mar 2018 07:51) (145/63 - 159/67)  BP(mean): --  RR: 18 (24 Mar 2018 07:51) (18 - 18)  SpO2: 97% (24 Mar 2018 07:51) (95% - 98%)    PHYSICAL EXAM:  Constitutional:  Frail elderly female  HEENT: PERRLA, EOMI, Normal Hearing, MMM  Neck: No LAD, No JVD  Back: Normal spine flexure, No CVA tenderness  Respiratory: CTAB Cardiovascular: S1 and S2, RRR, no M/G/R  Gastrointestinal: BS+, soft, NT/ND  Extremities: No peripheral edema  Vascular: 2+ peripheral pulses  Neurological: Awake and alert unable to ambulate    CAPILLARY BLOOD GLUCOSE    LABS:    03-23    143  |  107  |  33.0<H>  ----------------------------<  187<H>  4.0   |  24.0  |  1.17    Ca    9.1      23 Mar 2018 12:52            RADIOLOGY & ADDITIONAL TESTS:

## 2018-03-24 NOTE — PROGRESS NOTE ADULT - PROBLEM SELECTOR PROBLEM 4
Type 2 diabetes mellitus with hyperglycemia, with long-term current use of insulin
Chronic diastolic heart failure
Type 2 diabetes mellitus with hyperglycemia, with long-term current use of insulin

## 2018-03-24 NOTE — PROGRESS NOTE ADULT - PROBLEM SELECTOR PLAN 4
Fragile - HISS post meal with lantus at bedtime  Allow hyperglycemia to avoid hypoglycemia
fragile - HISS post meal with lantus at bedtime  Allow hyperglycemia to avoid hypoglycemia
fragile - HISS post meal with lantus at bedtime
fragile - HISS post meal with lantus at bedtime  Allow hyperglycemia to avoid hypoglycemia
Cardiology consulted, Echo reviewed.
Fragile - HISS post meal with lantus at bedtime  Allow hyperglycemia to avoid hypoglycemia

## 2018-03-24 NOTE — PROGRESS NOTE ADULT - PROBLEM SELECTOR PLAN 8
Appears chronic  MISSY on CKD?  Noted with improvement  Will repeat bmp in am
Appears chronic  MISSY on CKD?  Noted with improvement  Will repeat bmp in am

## 2018-05-02 PROBLEM — T14.8XXA OTHER INJURY OF UNSPECIFIED BODY REGION, INITIAL ENCOUNTER: Chronic | Status: ACTIVE | Noted: 2018-01-01

## 2018-05-09 PROBLEM — S32.009A LUMBAR TRANSVERSE PROCESS FRACTURE: Status: ACTIVE | Noted: 2018-01-01

## 2018-05-14 PROBLEM — Z86.69 HISTORY OF GLAUCOMA: Status: RESOLVED | Noted: 2018-01-01 | Resolved: 2018-01-01

## 2018-05-14 PROBLEM — Z80.9 FAMILY HISTORY OF MALIGNANT NEOPLASM: Status: ACTIVE | Noted: 2018-01-01

## 2018-05-14 PROBLEM — Z86.39 HISTORY OF VITAMIN D DEFICIENCY: Status: RESOLVED | Noted: 2018-01-01 | Resolved: 2018-01-01

## 2018-05-14 PROBLEM — Z82.61 FAMILY HISTORY OF ARTHRITIS: Status: ACTIVE | Noted: 2018-01-01

## 2018-05-14 PROBLEM — Z87.19 HISTORY OF CONSTIPATION: Status: RESOLVED | Noted: 2018-01-01 | Resolved: 2018-01-01

## 2018-05-21 PROBLEM — R56.9 UNSPECIFIED CONVULSIONS: Chronic | Status: ACTIVE | Noted: 2018-01-01

## 2018-05-21 PROBLEM — E11.9 TYPE 2 DIABETES MELLITUS WITHOUT COMPLICATIONS: Chronic | Status: ACTIVE | Noted: 2018-01-01

## 2018-05-21 PROBLEM — I10 ESSENTIAL (PRIMARY) HYPERTENSION: Chronic | Status: ACTIVE | Noted: 2018-01-01

## 2018-05-21 PROBLEM — S22.39XA FRACTURE OF ONE RIB, UNSPECIFIED SIDE, INITIAL ENCOUNTER FOR CLOSED FRACTURE: Chronic | Status: ACTIVE | Noted: 2018-01-01

## 2018-06-19 PROBLEM — Z86.79 HISTORY OF HYPERTENSION: Status: RESOLVED | Noted: 2018-01-01 | Resolved: 2018-01-01

## 2018-06-19 PROBLEM — Z86.69 HISTORY OF SEIZURE DISORDER: Status: RESOLVED | Noted: 2018-01-01 | Resolved: 2018-01-01

## 2018-06-19 PROBLEM — Z86.73 HISTORY OF CEREBROVASCULAR ACCIDENT: Status: RESOLVED | Noted: 2018-01-01 | Resolved: 2018-01-01

## 2018-06-19 PROBLEM — R13.10 DYSPHAGIA: Status: ACTIVE | Noted: 2018-01-01

## 2018-06-19 PROBLEM — J18.9 COMMUNITY ACQUIRED PNEUMONIA: Status: ACTIVE | Noted: 2018-01-01

## 2018-06-19 PROBLEM — Z86.39 HISTORY OF DIABETES MELLITUS: Status: RESOLVED | Noted: 2018-01-01 | Resolved: 2018-01-01

## 2018-06-19 PROBLEM — T17.900A ASPIRATION SYNDROME: Status: ACTIVE | Noted: 2018-01-01

## 2018-06-19 PROBLEM — Z86.59 HISTORY OF DEMENTIA: Status: RESOLVED | Noted: 2018-01-01 | Resolved: 2018-01-01

## 2018-06-19 PROBLEM — G47.33 OSA (OBSTRUCTIVE SLEEP APNEA): Status: ACTIVE | Noted: 2018-01-01

## 2018-09-21 NOTE — PROGRESS NOTE ADULT - PROBLEM/PLAN-1
DISPLAY PLAN FREE TEXT
unable

## 2018-11-17 NOTE — ED ADULT NURSE NOTE - PMH
Acid Reflux    Acute Pulmonary Edema Syndrome  Summer 2011  Anemia    CAD (Coronary Artery Disease)    Calcium Nephrolithiasis    Carpal Tunnel Syndrome  bilateral  Controlled Type 2 Diabetes with Neuropathy    Diabetes mellitus    Fracture  lumbar fracture.  Glaucoma, Low Tension    Heart Murmur    Hepatitis in Viral Disease    History of Pulmonary Hypertension    Hyperlipidemia LDL Goal < 100    Hypertension    Obstructive Sleep Apnea    Pericarditis  Summer 2011  Rib fracture    Seizure

## 2018-11-17 NOTE — ED ADULT NURSE NOTE - NSIMPLEMENTINTERV_GEN_ALL_ED
Implemented All Fall with Harm Risk Interventions:  [place] to call system. Call bell, personal items and telephone within reach. Instruct patient to call for assistance. Room bathroom lighting operational. Non-slip footwear when patient is off stretcher. Physically safe environment: no spills, clutter or unnecessary equipment. Stretcher in lowest position, wheels locked, appropriate side rails in place. Provide visual cue, wrist band, yellow gown, etc. Monitor gait and stability. Monitor for mental status changes and reorient to person, place, and time. Review medications for side effects contributing to fall risk. Reinforce activity limits and safety measures with patient and family. Provide visual clues: red socks.

## 2018-11-17 NOTE — ED ADULT NURSE REASSESSMENT NOTE - NS ED NURSE REASSESS COMMENT FT1
Pt daughter at bedside, states pt with history of dysphagia s/p CVA and currently on nectar thick diet and ate soup and bread for dinner last night and began coughing last night. Unsure if patient aspirated. States came to ED because pt with yellow mucus. No acute distress present. PIV placed and labs drawn and sent. Awaiting results and Xray. Updated on plan of care and safety maintained. RN to continue to monitor and reassess closely.

## 2018-11-17 NOTE — ED ADULT NURSE NOTE - CHPI ED NUR SYMPTOMS NEG
no edema/no headache/no chest pain/no body aches/no wheezing/no chills/no shortness of breath/no hemoptysis/no diaphoresis

## 2018-11-17 NOTE — ED PROVIDER NOTE - OBJECTIVE STATEMENT
89 yo F hx of  DM, rib fractures, 89 yo F hx of  DM, rib fractures, dysphagia, pericardits, CAD presented with 1 day of cough. Daughter reported that she had a few cough after eating yesteray and today the cough sounded more "moucous". Daughter also reported flooding of the floor and that maybe it was due to allergies. No fever at home. She eats with thick diet. patient was just started on a new medication for chronic UTI.

## 2018-11-17 NOTE — ED PROVIDER NOTE - NS ED ROS FT
Review of Systems  •	CONSTITUTIONAL - no  fever, no diaphoresis, no weight change  •	SKIN - no rash  •	HEMATOLOGIC - no bleeding, no bruising  •	EYES - no eye pain, no blurred vision  •	ENT - no change in hearing, no pain  •	RESPIRATORY - no shortness of breath, (+) cough  •	CARDIAC - no chest pain, no palpitations  •	GI - no abd pain, no nausea, no vomiting, no diarrhea, no constipation, no bleeding  •	GENITO-URINARY - no discharge, no dysuria; no hematuria,   •	ENDO - no polydypsia, no polyurea, no heat/no cold intolerance  •	MUSCULOSKELETAL - no joint pain, no swelling, no redness  •	NEUROLOGIC - no weakness, no headache, no anesthesia, no paresthesias  •	PSYCH - no anxiety, non suicidal, non homicidal, no hallucination, no depression

## 2018-11-17 NOTE — ED ADULT NURSE REASSESSMENT NOTE - NS ED NURSE REASSESS COMMENT FT1
Report received from off going RN, charting as noted. Patient A&Ox4, complaining of feeling hungry & stated wanted to eat. Denies any chest pain, shortness of breath, nausea or dizziness. Respirations even & unlabored, denies any numbness or tingling. Saline lock in place, patent, negative s/s phlebitis or infiltration. Family at bedside. Plan of care discussed, all questions answered. Will monitor.

## 2018-11-17 NOTE — ED ADULT TRIAGE NOTE - CHIEF COMPLAINT QUOTE
comes from home per ems daughter states pt started new med for uti prevention and now daughter thinks she has pneumonia, pt with cough states always has a cough, pt denies complaints. pt awake confused unknown baseline mental status

## 2018-11-17 NOTE — ED PROVIDER NOTE - PROGRESS NOTE DETAILS
cxr negative, labs unremarkable, patient tolerated solid food without active cough or aspiration. probnp elevated but no signes of fluid over load, no significant SOB or leg edema. Daughter feels comfortable going home.

## 2018-11-17 NOTE — ED ADULT NURSE NOTE - PSH
Benign Breast Disease  removal of cyst- 1963  Bilateral Cataracts  excision 2006  Cataract    Fracture of Humerus  repair with harware right  Glaucoma  relieve pressure left eye along with cataract extraction  H/O cataract extraction    History of appendectomy    Kidney Stones  lithotripsy 2005, 2006  S/P Appendectomy  1937

## 2018-11-26 NOTE — CONSULT NOTE ADULT - SUBJECTIVE AND OBJECTIVE BOX
REASON FOR Tele-evaluation    SUBJECTIVE: AMS    HPI: 89 yr old female with known history of DM, htn , CAD, seizure disorder presented with an episode of not being actively responding to her daughter, Asper daughter she first checked on patient around 10;30 this morning and patient was OK and then got busy with work and when she came back at 1:30 to check again the patient was not responding well or talking or recognizing her , daughter called EMS and patient was brought to ED. Patient was weaned off her seizure medication (Keppra) about 6 weeks ago and since her being taken off the Keppra she has been more alert and interactive and has been able to go out on dinner and talk with family. NO fver, no Dysuria, no cough. episode of being very confused yesterday when she was insisting that her daughter is her sister and that they were in [place] patient was in [place] during [place])            ROS negative as per HPI      T(C): 37.1 (11-26-18 @ 13:33), Max: 37.1 (11-26-18 @ 13:33)  HR: 55 (11-26-18 @ 16:17) (49 - 55)  BP: 221/91 (11-26-18 @ 16:17) (176/89 - 221/91)  RR: 20 (11-26-18 @ 16:17) (20 - 20)  SpO2: 99% (11-26-18 @ 16:17) (99% - 99%)    PHYSICAL EXAM: evaluation precludes physical exam. Pertinent physical exam findings as per video conference with nurse at bedside is as follow: face symmetric sitting up in bed , eating cookies , moving all extremities follows simple commands like lift arms or eye brows                             11.6   4.4   )-----------( 206      ( 26 Nov 2018 15:15 )             35.5   11-26    143  |  105  |  24.0<H>  ----------------------------<  137<H>  4.5   |  27.0  |  0.94    Ca    10.0      26 Nov 2018 15:15    TPro  6.8  /  Alb  4.4  /  TBili  0.4  /  DBili  x   /  AST  17  /  ALT  10  /  AlkPhos  41  11-26        Radiology findings   < from: CT Head No Cont (11.26.18 @ 13:25) >  IMPRESSION:  Severe chronic microvascular changes without evidence of an   acute transcortical infarction or hemorrhage. MR is a more sensitive   imaging modality for the evaluation of an acute infarction.     < end of copied text >        Medication reconciliation done             Survey offered to patient

## 2018-11-26 NOTE — ED PROVIDER NOTE - OBJECTIVE STATEMENT
89 y/o female at home unattended by family for 3 hours ,after three hours pt was unable to follow commands and assist with moving in bed was not able to speak pt does have a h/o sleep apnea and seizures , was weaned off seizure meds a few weeks ago   upon arrival pt awake alert and oriented    moving all extremities   NIH score 0   code stroke however symptoms compleely resolved not candidate for TPA

## 2018-11-26 NOTE — ED ADULT NURSE NOTE - CHPI ED NUR SYMPTOMS POS
as per facility patient had slurred speech and right sided weakness. Symptoms at this time have resolved prior to arrival.

## 2018-11-26 NOTE — H&P ADULT - NSHPSOCIALHISTORY_GEN_ALL_CORE
lives with her daughter  ambulates with a walker    Family history: no history of seizure or cva in either father or mother

## 2018-11-26 NOTE — ED ADULT TRIAGE NOTE - CHIEF COMPLAINT QUOTE
Patient BIBA, as per EMS family at house stating patient was normal at 10:30am, normally able to carry a conversation, found patient with right sided facial droop and aphasic, MD Gandara at bedside upon ED arrival, code stroke announce, sent to [facility]scan

## 2018-11-26 NOTE — ED PROVIDER NOTE - FAMILY HISTORY
No pertinent family history     Sibling  Still living? Unknown  Family history of cerebrovascular accident (CVA), Age at diagnosis: Age Unknown

## 2018-11-26 NOTE — ED ADULT NURSE NOTE - NSIMPLEMENTINTERV_GEN_ALL_ED
Implemented All Fall Risk Interventions:  [place] to call system. Call bell, personal items and telephone within reach. Instruct patient to call for assistance. Room bathroom lighting operational. Non-slip footwear when patient is off stretcher. Physically safe environment: no spills, clutter or unnecessary equipment. Stretcher in lowest position, wheels locked, appropriate side rails in place. Provide visual cue, wrist band, yellow gown, etc. Monitor gait and stability. Monitor for mental status changes and reorient to person, place, and time. Review medications for side effects contributing to fall risk. Reinforce activity limits and safety measures with patient and family.

## 2018-11-26 NOTE — ED ADULT NURSE NOTE - OBJECTIVE STATEMENT
Patient BIBA from home, as per daughter patient had an episode of slurred speech and righ sided weakness, reports HX of TIAs, patient currently A/O X3, resp even/unlabored, lungs CTAB, equal strength in all extremities, denies pain.

## 2018-11-26 NOTE — CONSULT NOTE ADULT - SUBJECTIVE AND OBJECTIVE BOX
CHIEF COMPLAINT: loss of speech    HPI: [id number]  The patient is a 89 year old female with a history of CVA< seizure disorder, hypertension and diabetes mellitus who was brought to the ER for complaints of altered mental status. Patient was diagnosed with seizure disorder last year and started on keppra po. Over the past few months the family has noted she has been more lethargic and confused. She saw Dr Mcallister, neurologist at [facility]. According to the daughter she had a number of tests which indicated she did not have seizure disorder and she was advised to taper off the keppra. Her daughter tapered it off over the past 2 weeks. This morning she noted her mother became unresponsive, fell to her side, noted facial droop, her eyes were "fluttering" and her tongue was rolled back. After the episode she remained lethargic for about 3 hours. In the ER, NIH was 0. Patient was more alert and responsive. CT head was negative.     Patient was last seen in our practice by Aneudy COBURN NP.  She has h/o mild right hemiparesis secondary to lacunar stroke several years ago.  In 2017 she had episodes of confusion. EEG suggested epileptiform activity and she was placed on Keppra with improved mentation. Last office visit was Jan 2018.    PAST MEDICAL & SURGICAL HISTORY:  Diabetes mellitus  Fracture: lumbar fracture.  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  Heart Murmur  Calcium Nephrolithiasis  Hyperlipidemia LDL Goal < 100  Glaucoma, Low Tension  History of Pulmonary Hypertension  CAD (Coronary Artery Disease)  Obstructive Sleep Apnea  Controlled Type 2 Diabetes with Neuropathy  H/O cataract extraction  History of appendectomy  Fracture of Humerus: repair with harware right  Glaucoma: relieve pressure left eye along with cataract extraction  Bilateral Cataracts: excision 2006  Benign Breast Disease: removal of cyst- 1963  Cataract  Kidney Stones: lithotripsy 2005, 2006  S/P Appendectomy: 1937    MEDICATIONS  (STANDING):  aspirin  chewable 81 milliGRAM(s) Oral daily  brimonidine 0.2% Ophthalmic Solution 1 Drop(s) Both EYES two times a day  cephalexin 250 milliGRAM(s) Oral daily  dextrose 5%. 1000 milliLiter(s) (50 mL/Hr) IV Continuous <Continuous>  dextrose 50% Injectable 12.5 Gram(s) IV Push once  dextrose 50% Injectable 25 Gram(s) IV Push once  dextrose 50% Injectable 25 Gram(s) IV Push once  enoxaparin Injectable 40 milliGRAM(s) SubCutaneous daily  hydrALAZINE 25 milliGRAM(s) Oral two times a day  insulin lispro (HumaLOG) corrective regimen sliding scale   SubCutaneous three times a day before meals  labetalol 200 milliGRAM(s) Oral Once  latanoprost 0.005% Ophthalmic Solution 1 Drop(s) Both EYES at bedtime  levETIRAcetam  IVPB 500 milliGRAM(s) IV Intermittent every 12 hours  saccharomyces boulardii 250 milliGRAM(s) Oral two times a day  simvastatin 20 milliGRAM(s) Oral at bedtime  timolol 0.25% Solution 1 Drop(s) Both EYES two times a day    MEDICATIONS  (PRN):  dextrose 40% Gel 15 Gram(s) Oral once PRN Blood Glucose LESS THAN 70 milliGRAM(s)/deciliter  glucagon  Injectable 1 milliGRAM(s) IntraMuscular once PRN Glucose LESS THAN 70 milligrams/deciliter  hydrALAZINE Injectable 5 milliGRAM(s) IV Push every 6 hours PRN sbp >180    Allergies    contrast media (iodine-based) (Urticaria)  erythromycin (Unknown)  IV-Dye (Unknown)  sulfADIAZINE (Unknown)    Intolerances        FAMILY HISTORY:  No pertinent family history  Family history of cerebrovascular accident (CVA) (Sibling)          SOCIAL HISTORY:    Tobacco:  no  Alcohol:no    Drugs:  no      REVIEW OF SYSTEMS:    Relevant systems are negative except as noted in the chart, HPI, and PMH      VITAL SIGNS:  Vital Signs Last 24 Hrs  T(C): 37.1 (26 Nov 2018 13:33), Max: 37.1 (26 Nov 2018 13:33)  T(F): 98.8 (26 Nov 2018 13:33), Max: 98.8 (26 Nov 2018 13:33)  HR: 59 (26 Nov 2018 17:19) (49 - 59)  BP: 166/84 (26 Nov 2018 17:19) (166/84 - 221/91)  BP(mean): --  RR: 20 (26 Nov 2018 17:19) (20 - 20)  SpO2: 99% (26 Nov 2018 17:19) (99% - 99%)    PHYSICAL EXAMINATION:    General: Well-developed, well nourished, in no acute distress.  Cardiac:  Regular rate and rhythm. No carotid bruits appreciated.  Eyes: Fundoscopic examination was deferred.  Neurologic:  - Mental Status:  Alert, awake, oriented to person, place, and time; Speech is fluent. Language is normal. Follows commands well.  Insight and knowledge appear appropriate.  Cranial Nerves II-XII:    II:  Visual acuity is normal for age ; Visual fields are full to confrontation; Pupils are equal, round, and reactive to light.  III, IV, VI:  Extraocular movements are intact without nystagmus.  V:  Facial sensation is intact in the V1-V3 distribution bilaterally.  VII:  Face is symmetric with normal eye closure and smile  VIII:  Hearing is grossly intact  IX, X, XII:  speech is clear  XI:  Head turning and shoulder shrug are intact.  - Motor:  Strength is 5/5 x 4.   There is no pronator drift. .  - Reflexes:  2+ and symmetric at the knees.  Plantar responses flexor.  - Sensory:  Symmetric to light touch  - Coordination:  Finger-nose-finger is normal. Rapid alternating hand and foot  movements are intact. Dexterity appears normal      LABS:                          11.6   4.4   )-----------( 206      ( 26 Nov 2018 15:15 )             35.5     26 Nov 2018 15:15    143    |  105    |  24.0   ----------------------------<  137    4.5     |  27.0   |  0.94     Ca    10.0       26 Nov 2018 15:15    TPro  6.8    /  Alb  4.4    /  TBili  0.4    /  DBili  x      /  AST  17     /  ALT  10     /  AlkPhos  41     26 Nov 2018 15:15    LIVER FUNCTIONS - ( 26 Nov 2018 15:15 )  Alb: 4.4 g/dL / Pro: 6.8 g/dL / ALK PHOS: 41 U/L / ALT: 10 U/L / AST: 17 U/L / GGT: x                 RADIOLOGY & ADDITIONAL STUDIES:    < from: CT Head No Cont (11.26.18 @ 13:25) >  IMPRESSION:  Severe chronic microvascular changes without evidence of an   acute transcortical infarction or hemorrhage. MR is a more sensitive   imaging modality for the evaluation of an acute infarction.       < end of copied text >    IMPRESSION:    Episode of speech loss/ altered mentation.  Possible TIA or seizure as noted in HPI.  Keppra was discontinued 2 weeks ago after seeing outside neurologist.    PLAN:  1. Stroke/ TIA protocols  2. MRI/ MRAs  3. EEG  4. Restart keppra at 250 mg BID  5.Medical and Cardiac evaluation and treatment as indicated  6.Cerebrovascular risk factor assessment and management  7.Antiplatelet therapy as prescribed. CHIEF COMPLAINT: loss of speech    HPI: [id number]  The patient is a 89 year old female with a history of CVA< seizure disorder, hypertension and diabetes mellitus who was brought to the ER for complaints of altered mental status. Patient was diagnosed with seizure disorder last year and started on keppra po. Over the past few months the family has noted she has been more lethargic and confused. She saw Dr Mcallister, neurologist at [facility]. According to the daughter she had a number of tests which indicated she did not have seizure disorder and she was advised to taper off the keppra. Her daughter tapered it off over the past 2 weeks. This morning she noted her mother became unresponsive, fell to her side, noted facial droop, her eyes were "fluttering" and her tongue was rolled back. After the episode she remained lethargic for about 3 hours. In the ER, NIH was 0. Patient was more alert and responsive. CT head was negative.     Patient was last seen in our practice by Aneudy COBURN NP.  She has h/o mild right hemiparesis secondary to lacunar stroke several years ago.  In 2017 she had episodes of confusion. EEG suggested epileptiform activity and she was placed on Keppra with improved mentation. Last office visit was Jan 2018.    PAST MEDICAL & SURGICAL HISTORY:  Diabetes mellitus  Fracture: lumbar fracture.  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  Heart Murmur  Calcium Nephrolithiasis  Hyperlipidemia LDL Goal < 100  Glaucoma, Low Tension  History of Pulmonary Hypertension  CAD (Coronary Artery Disease)  Obstructive Sleep Apnea  Controlled Type 2 Diabetes with Neuropathy  H/O cataract extraction  History of appendectomy  Fracture of Humerus: repair with harware right  Glaucoma: relieve pressure left eye along with cataract extraction  Bilateral Cataracts: excision 2006  Benign Breast Disease: removal of cyst- 1963  Cataract  Kidney Stones: lithotripsy 2005, 2006  S/P Appendectomy: 1937    MEDICATIONS  (STANDING):  aspirin  chewable 81 milliGRAM(s) Oral daily  brimonidine 0.2% Ophthalmic Solution 1 Drop(s) Both EYES two times a day  cephalexin 250 milliGRAM(s) Oral daily  dextrose 5%. 1000 milliLiter(s) (50 mL/Hr) IV Continuous <Continuous>  dextrose 50% Injectable 12.5 Gram(s) IV Push once  dextrose 50% Injectable 25 Gram(s) IV Push once  dextrose 50% Injectable 25 Gram(s) IV Push once  enoxaparin Injectable 40 milliGRAM(s) SubCutaneous daily  hydrALAZINE 25 milliGRAM(s) Oral two times a day  insulin lispro (HumaLOG) corrective regimen sliding scale   SubCutaneous three times a day before meals  labetalol 200 milliGRAM(s) Oral Once  latanoprost 0.005% Ophthalmic Solution 1 Drop(s) Both EYES at bedtime  levETIRAcetam  IVPB 500 milliGRAM(s) IV Intermittent every 12 hours  saccharomyces boulardii 250 milliGRAM(s) Oral two times a day  simvastatin 20 milliGRAM(s) Oral at bedtime  timolol 0.25% Solution 1 Drop(s) Both EYES two times a day    MEDICATIONS  (PRN):  dextrose 40% Gel 15 Gram(s) Oral once PRN Blood Glucose LESS THAN 70 milliGRAM(s)/deciliter  glucagon  Injectable 1 milliGRAM(s) IntraMuscular once PRN Glucose LESS THAN 70 milligrams/deciliter  hydrALAZINE Injectable 5 milliGRAM(s) IV Push every 6 hours PRN sbp >180    Allergies    contrast media (iodine-based) (Urticaria)  erythromycin (Unknown)  IV-Dye (Unknown)  sulfADIAZINE (Unknown)    Intolerances        FAMILY HISTORY:  No pertinent family history  Family history of cerebrovascular accident (CVA) (Sibling)          SOCIAL HISTORY:    Tobacco:  no  Alcohol:no    Drugs:  no      REVIEW OF SYSTEMS:    Relevant systems are negative except as noted in the chart, HPI, and PMH      VITAL SIGNS:  Vital Signs Last 24 Hrs  T(C): 37.1 (26 Nov 2018 13:33), Max: 37.1 (26 Nov 2018 13:33)  T(F): 98.8 (26 Nov 2018 13:33), Max: 98.8 (26 Nov 2018 13:33)  HR: 59 (26 Nov 2018 17:19) (49 - 59)  BP: 166/84 (26 Nov 2018 17:19) (166/84 - 221/91)  BP(mean): --  RR: 20 (26 Nov 2018 17:19) (20 - 20)  SpO2: 99% (26 Nov 2018 17:19) (99% - 99%)    PHYSICAL EXAMINATION:    General: Well-developed, well nourished, in no acute distress.  Cardiac:  Regular rate and rhythm. No carotid bruits appreciated.  Eyes: Fundoscopic examination was deferred.  Neurologic:  - Mental Status:  Alert, awake, oriented to person, place, and time; Speech is fluent. Language is normal. Follows commands well.  Insight and knowledge appear appropriate.  Cranial Nerves II-XII:    II:  Visual acuity is normal for age ; Visual fields are full to confrontation; Pupils are equal, round, and reactive to light.  III, IV, VI:  Extraocular movements are intact without nystagmus.  V:  Facial sensation is intact in the V1-V3 distribution bilaterally.  VII:  Face is symmetric with normal eye closure and smile  VIII:  Hearing is grossly intact  IX, X, XII:  speech is clear  XI:  Head turning and shoulder shrug are intact.  - Motor:  Strength is 5/5 x 4.   There is no pronator drift. .  - Reflexes:  2+ and symmetric at the knees.  Plantar responses flexor.  - Sensory:  Symmetric to light touch  - Coordination:  Finger-nose-finger is normal. Rapid alternating hand and foot  movements are intact. Dexterity appears normal      LABS:                          11.6   4.4   )-----------( 206      ( 26 Nov 2018 15:15 )             35.5     26 Nov 2018 15:15    143    |  105    |  24.0   ----------------------------<  137    4.5     |  27.0   |  0.94     Ca    10.0       26 Nov 2018 15:15    TPro  6.8    /  Alb  4.4    /  TBili  0.4    /  DBili  x      /  AST  17     /  ALT  10     /  AlkPhos  41     26 Nov 2018 15:15    LIVER FUNCTIONS - ( 26 Nov 2018 15:15 )  Alb: 4.4 g/dL / Pro: 6.8 g/dL / ALK PHOS: 41 U/L / ALT: 10 U/L / AST: 17 U/L / GGT: x                 RADIOLOGY & ADDITIONAL STUDIES:    < from: CT Head No Cont (11.26.18 @ 13:25) >  IMPRESSION:  Severe chronic microvascular changes without evidence of an   acute transcortical infarction or hemorrhage. MR is a more sensitive   imaging modality for the evaluation of an acute infarction.       < end of copied text >    IMPRESSION:    Episode of speech loss/ altered mentation.  Possible TIA or seizure as noted in HPI.  Keppra was discontinued 2 weeks ago after seeing outside neurologist.    PLAN:  1. Stroke/ TIA protocols  2. MRI, carotid duples  3. EEG  4.keppra 500 mg IV q!2h started  5.Medical and Cardiac evaluation and treatment as indicated  6.Cerebrovascular risk factor assessment and management  7.Antiplatelet therapy as prescribed. CHIEF COMPLAINT: loss of speech    HPI: 89yFemale  The patient is a 89 year old female with a history of CVA< seizure disorder, hypertension and diabetes mellitus who was brought to the ER for complaints of altered mental status. Patient was diagnosed with seizure disorder last year and started on keppra po. Over the past few months the family has noted she has been more lethargic and confused. She saw Dr Mcallister, neurologist at [facility]. According to the daughter she had a number of tests which indicated she did not have seizure disorder and she was advised to taper off the keppra. Her daughter tapered it off over the past 2 weeks. This morning she noted her mother became unresponsive, fell to her side, noted facial droop, her eyes were "fluttering" and her tongue was rolled back. After the episode she remained lethargic for about 3 hours. In the ER, NIH was 0. Patient was more alert and responsive. CT head was negative.     Patient was last seen in our practice by Aneudy COBURN NP.  She has h/o mild right hemiparesis secondary to lacunar stroke several years ago.  In 2017 she had episodes of confusion. EEG suggested epileptiform activity and she was placed on Keppra with improved mentation. Last office visit was Jan 2018.    PAST MEDICAL & SURGICAL HISTORY:  Diabetes mellitus  Fracture: lumbar fracture.  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  Heart Murmur  Calcium Nephrolithiasis  Hyperlipidemia LDL Goal < 100  Glaucoma, Low Tension  History of Pulmonary Hypertension  CAD (Coronary Artery Disease)  Obstructive Sleep Apnea  Controlled Type 2 Diabetes with Neuropathy  H/O cataract extraction  History of appendectomy  Fracture of Humerus: repair with harware right  Glaucoma: relieve pressure left eye along with cataract extraction  Bilateral Cataracts: excision 2006  Benign Breast Disease: removal of cyst- 1963  Cataract  Kidney Stones: lithotripsy 2005, 2006  S/P Appendectomy: 1937    MEDICATIONS  (STANDING):  aspirin  chewable 81 milliGRAM(s) Oral daily  brimonidine 0.2% Ophthalmic Solution 1 Drop(s) Both EYES two times a day  cephalexin 250 milliGRAM(s) Oral daily  dextrose 5%. 1000 milliLiter(s) (50 mL/Hr) IV Continuous <Continuous>  dextrose 50% Injectable 12.5 Gram(s) IV Push once  dextrose 50% Injectable 25 Gram(s) IV Push once  dextrose 50% Injectable 25 Gram(s) IV Push once  enoxaparin Injectable 40 milliGRAM(s) SubCutaneous daily  hydrALAZINE 25 milliGRAM(s) Oral two times a day  insulin lispro (HumaLOG) corrective regimen sliding scale   SubCutaneous three times a day before meals  labetalol 200 milliGRAM(s) Oral Once  latanoprost 0.005% Ophthalmic Solution 1 Drop(s) Both EYES at bedtime  levETIRAcetam  IVPB 500 milliGRAM(s) IV Intermittent every 12 hours  saccharomyces boulardii 250 milliGRAM(s) Oral two times a day  simvastatin 20 milliGRAM(s) Oral at bedtime  timolol 0.25% Solution 1 Drop(s) Both EYES two times a day    MEDICATIONS  (PRN):  dextrose 40% Gel 15 Gram(s) Oral once PRN Blood Glucose LESS THAN 70 milliGRAM(s)/deciliter  glucagon  Injectable 1 milliGRAM(s) IntraMuscular once PRN Glucose LESS THAN 70 milligrams/deciliter  hydrALAZINE Injectable 5 milliGRAM(s) IV Push every 6 hours PRN sbp >180    Allergies    contrast media (iodine-based) (Urticaria)  erythromycin (Unknown)  IV-Dye (Unknown)  sulfADIAZINE (Unknown)    Intolerances        FAMILY HISTORY:  No pertinent family history  Family history of cerebrovascular accident (CVA) (Sibling)          SOCIAL HISTORY:    Tobacco:  no  Alcohol:no    Drugs:  no      REVIEW OF SYSTEMS:    Relevant systems are negative except as noted in the chart, HPI, and PMH      VITAL SIGNS:  Vital Signs Last 24 Hrs  T(C): 37.1 (26 Nov 2018 13:33), Max: 37.1 (26 Nov 2018 13:33)  T(F): 98.8 (26 Nov 2018 13:33), Max: 98.8 (26 Nov 2018 13:33)  HR: 59 (26 Nov 2018 17:19) (49 - 59)  BP: 166/84 (26 Nov 2018 17:19) (166/84 - 221/91)  BP(mean): --  RR: 20 (26 Nov 2018 17:19) (20 - 20)  SpO2: 99% (26 Nov 2018 17:19) (99% - 99%)    PHYSICAL EXAMINATION:    General: Well-developed, well nourished, in no acute distress.  Cardiac:  Regular rate and rhythm. No carotid bruits appreciated.  Eyes: Fundoscopic examination was deferred.  Neurologic:  - Mental Status:  Alert, lethargic but easily awakens, oriented to person, place, and time; Speech is fluent. Language is normal. Follows commands well.   Cranial Nerves II-XII:    II:  Visual acuity is normal for age ; Visual fields are full to confrontation; Pupils are equal, round, and reactive to light.  III, IV, VI:  Extraocular movements are intact without nystagmus.  V:  Facial sensation is intact in the V1-V3 distribution bilaterally.  VII:  Face is symmetric with normal eye closure and smile  VIII:  Hearing is grossly intact  IX, X, XII:  speech is clear  XI:  Head turning and shoulder shrug are intact.  - Motor:  Strength is 5/5 x 4.   There is no pronator drift. .  - Reflexes:  1+ and symmetric at the knees.  Plantar responses flexor.  - Sensory:  Symmetric to light touch        LABS:                          11.6   4.4   )-----------( 206      ( 26 Nov 2018 15:15 )             35.5     26 Nov 2018 15:15    143    |  105    |  24.0   ----------------------------<  137    4.5     |  27.0   |  0.94     Ca    10.0       26 Nov 2018 15:15    TPro  6.8    /  Alb  4.4    /  TBili  0.4    /  DBili  x      /  AST  17     /  ALT  10     /  AlkPhos  41     26 Nov 2018 15:15    LIVER FUNCTIONS - ( 26 Nov 2018 15:15 )  Alb: 4.4 g/dL / Pro: 6.8 g/dL / ALK PHOS: 41 U/L / ALT: 10 U/L / AST: 17 U/L / GGT: x                 RADIOLOGY & ADDITIONAL STUDIES:    < from: CT Head No Cont (11.26.18 @ 13:25) >  IMPRESSION:  Severe chronic microvascular changes without evidence of an   acute transcortical infarction or hemorrhage. MR is a more sensitive   imaging modality for the evaluation of an acute infarction.       < end of copied text >    < from: US Duplex Carotid Arteries Complete, Bilateral (11.26.18 @ 18:19) >    On the right there is moderate calcified plaque at the carotid bulb   extending into the proximal right internal and external carotid artery.   Additional moderate calcific plaque in the mid right internal carotid   artery. Peak systolic velocity in the proximal right internal carotid   artery measures 113 cm/s and at the carotid bulb measures 120 cm/s,which   is borderline elevated corresponding to 50-69% stenosis.    On the left there is soft plaque in the mid to distal common carotid   artery. Significant mixed plaque at the carotid bulb extending into the   proximal internal and external carotid artery. A color bruit at the   carotid bulb with elevated velocities measuring larger 58 cm/s   corresponding to 50-69% stenosis.    Vertebral artery flow is antegrade bilaterally.  Patent bilateral external carotid arteries.    IMPRESSION:      Findings compatible with 50-69% stenosis on the left with top normal   velocities on the right which may represent 50-69% stenosis.        < end of copied text >      IMPRESSION:    Episode of speech loss/ altered mentation.  Possible TIA or seizure as noted in HPI.  Keppra was discontinued 2 weeks ago after seeing outside neurologist. Daughter feels keppra caused speech difficulties, may try alternative med  Carotid stenosis    PLAN:  1. Stroke/ TIA protocols  2. MRI,   3. EEG  4.keppra 500 mg IV q!2h started  5.Medical and Cardiac evaluation and treatment as indicated  6.Cerebrovascular risk factor assessment and management  7.Antiplatelet therapy as prescribed.

## 2018-11-26 NOTE — H&P ADULT - ASSESSMENT
The patient is a 89 year old female with a history of CVA< seizure disorder, hypertension and diabetes mellitus who was brought to the ER for complaints of altered mental status. Patient was diagnosed with seizure disorder last year and started on keppra po. Over the past few months the family has noted she has been more lethargic and confused. She saw Dr Mcallister, neurologist at [place]. According to the daughter she had a number of tests which indicated she did not have seizure disorder and she was advised to taper off the keppra. Her daughter tapered it off over the past 2 weeks. This morning she noted her mother became unresponsive, fell to her side, noted facial droop, her eyes were "fluttering" and her tongue was rolled back. After the episode she remained lethargic for about 3 hours. In the ER, NIH was 0. Patient was more alert and responsive. CT head was negative.     Assessment/Plan:    1. Period of confusion possible seizure given recently weaned off keppra vs TIA: Resume keppra 500mg PO   EEG ordered  Dr Conti consulted for recommendations  TIA work up ordered- Echo, Carotid duplex, PT/OT/St evaluation  Aspirin and lipitor  Check Hb1ac and lipid panel    2. Hypertensive urgency: Resume labetalol and hydralazine   IV hydralazine 5mg x 1 now  Monitor Blood pressure    3. Diabetes mellitus type 2; HSS. Monitor BSL    4. History of CVA    5. Chronic UTI on Cefazolin daily: Check UA and urine culture. Currently afebrile with no leukocytosis.     VTE_ lovenox subcut    Plan was discussed in detail with the patient's daughter at the bedside.

## 2018-11-27 NOTE — PROGRESS NOTE ADULT - SUBJECTIVE AND OBJECTIVE BOX
Stable this morning  See my revised/completed consult from yesterday    Await EEG and MRI  continue keppra  Medical management of moderated carotid stenosis

## 2018-11-27 NOTE — CHART NOTE - NSCHARTNOTEFT_GEN_A_CORE
Called by RN for complaints of right arm pain, right sided jaw pain and elevated BP.    Evaluated patient, in no acute distress. Denies any chest pain or shortness of breath. Per daughter at the bedside she has had right shoulder pain for years since her surgery.     EKG reviewed with no acute changes. Troponin and CK ordered stat. IV lopressor 5mg X1./ Hydralazine increased to 50mg Q8 hours  [facility] called for evaluation.    Plan discussed with RN and daughter at the bedside

## 2018-11-27 NOTE — PROGRESS NOTE ADULT - ASSESSMENT
The patient is a 89 year old female with a history of CVA< seizure disorder, hypertension and diabetes mellitus who was brought to the ER for complaints of altered mental status. Patient was diagnosed with seizure disorder last year and started on keppra po. Over the past few months the family has noted she has been more lethargic and confused. She saw Dr Mcallister, neurologist at [facility]. According to the daughter she had a number of tests which indicated she did not have seizure disorder and she was advised to taper off the keppra. Her daughter tapered it off over the past 2 weeks. This morning she noted her mother became unresponsive, fell to her side, noted facial droop, her eyes were "fluttering" and her tongue was rolled back. After the episode she remained lethargic for about 3 hours. In the ER, NIH was 0. Patient was more alert and responsive. CT head was negative.     Assessment/Plan:    1. Period of confusion possible seizure given recently weaned off keppra vs TIA: Spoke at length with the patient's daughter at the bedside; she is adamant lethargy is secondary to keppra and requesting a lower dose. Discussed the possibility of a recurrent seizure.   EEG ordered. MRI pending  Neurology following  Echocardiogram reviewed  Carotid duplex with stenosis  Hyperlipidemia noted; Patient's daughter refusing statin therapy due to previous history of myalgias. WOuld like to continue garlic pills and cholestat OTC. I explained the risk of recurrent stroke. worsening plaque disease and the importance of secondary prevention.   Continue aspirin therapy  ST and PT to follow    2. Hypertensive urgency: Resume labetalol and hydralazine   Incrase hydralazine to 50mg Q8 hours  IV labetalol 5mg x 1 now  Monitor Blood pressure    3. Diabetes mellitus type 2; HSS. Monitor BSL    4. History of CVA    5. Chronic UTI on Cefazolin daily: Check UA and urine culture. Currently afebrile with no leukocytosis.     VTE_ lovenox subcut    Plan was discussed in detail with the patient's daughter at the bedside.

## 2018-11-27 NOTE — ED ADULT NURSE REASSESSMENT NOTE - GENERAL PATIENT STATE
cooperative/resting/sleeping/family/SO at bedside/smiling/interactive/comfortable appearance
comfortable appearance

## 2018-11-27 NOTE — CONSULT NOTE ADULT - SUBJECTIVE AND OBJECTIVE BOX
[facility]                                   [address] [place] [address]                                                      PHONE: [phone]                                                         FAX: [phone]  [email or web address]  ---------------------------------------------------------------------------------------------------------------------------------    Reason for Consult: AMS TIA UNCONTROLLED HTN    HPI:  LINDA CARTER is an 89y Female WITH Hx of HTN past CVA DM recurrent UTIs presenting to [facility] ER after she experienced an episode of AMS presently improved had been w/o for TIA seen by neurologist found to have uncontrolled HTN with SBP above 190 MM hg.  History limited mostly from EMR and discussion with daughter at bedside.     PAST MEDICAL & SURGICAL HISTORY:  Diabetes mellitus  Fracture: lumbar fracture.  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  Heart Murmur  Calcium Nephrolithiasis  Hyperlipidemia LDL Goal < 100  Glaucoma, Low Tension  History of Pulmonary Hypertension  CAD (Coronary Artery Disease)  Obstructive Sleep Apnea  Controlled Type 2 Diabetes with Neuropathy  H/O cataract extraction  History of appendectomy  Fracture of Humerus: repair with harware right  Glaucoma: relieve pressure left eye along with cataract extraction  Bilateral Cataracts: excision 2006  Benign Breast Disease: removal of cyst- 1963  Cataract  Kidney Stones: lithotripsy 2005, 2006  S/P Appendectomy: 1937      contrast media (iodine-based) (Urticaria)  erythromycin (Unknown)  IV-Dye (Unknown)  sulfADIAZINE (Unknown)      MEDICATIONS  (STANDING):  aspirin  chewable 81 milliGRAM(s) Oral daily  brimonidine 0.2% Ophthalmic Solution 1 Drop(s) Both EYES two times a day  cephalexin 250 milliGRAM(s) Oral daily  dextrose 5%. 1000 milliLiter(s) (50 mL/Hr) IV Continuous <Continuous>  dextrose 50% Injectable 12.5 Gram(s) IV Push once  dextrose 50% Injectable 25 Gram(s) IV Push once  dextrose 50% Injectable 25 Gram(s) IV Push once  enoxaparin Injectable 40 milliGRAM(s) SubCutaneous daily  hydrALAZINE 50 milliGRAM(s) Oral every 8 hours  insulin lispro (HumaLOG) corrective regimen sliding scale   SubCutaneous three times a day before meals  labetalol 200 milliGRAM(s) Oral daily  latanoprost 0.005% Ophthalmic Solution 1 Drop(s) Both EYES at bedtime  levETIRAcetam  IVPB 250 milliGRAM(s) IV Intermittent every 12 hours  saccharomyces boulardii 250 milliGRAM(s) Oral two times a day  timolol 0.25% Solution 1 Drop(s) Both EYES two times a day    MEDICATIONS  (PRN):  dextrose 40% Gel 15 Gram(s) Oral once PRN Blood Glucose LESS THAN 70 milliGRAM(s)/deciliter  glucagon  Injectable 1 milliGRAM(s) IntraMuscular once PRN Glucose LESS THAN 70 milligrams/deciliter  hydrALAZINE Injectable 5 milliGRAM(s) IV Push every 6 hours PRN sbp >180      Social History:  Cigarettes:                    Alchohol:                 Illicit Drug Abuse:      REVIEW OF SYSTEMS:    Constitutional: No fever, weight loss or fatigue  Eyes: No eye pain, visual disturbances, or discharge  ENMT:  No difficulty hearing, tinnitus, vertigo; No sinus or throat pain  Neck: No pain or stiffness  Respiratory: No cough, wheezing, chills or hemoptysis  Cardiovascular: No chest pain, palpitations, shortness of breath, dizziness or leg swelling  Gastrointestinal: No abdominal or epigastric pain. No nausea, vomiting or hematemesis; No diarrhea or constipation. No melena or hematochezia.  Genitourinary: No dysuria, frequency, hematuria or incontinence  Rectal: No pain, hemorrhoids or incontinence  Neurological: No headaches, memory loss, loss of strength, numbness or tremors  Skin: No itching, burning, rashes or lesions   Lymph Nodes: No enlarged glands  Endocrine: No heat or cold intolerance; No hair loss  Musculoskeletal: No joint pain or swelling; No muscle, back or extremity pain  Psychiatric: No depression, anxiety, mood swings or difficulty sleeping  Heme/Lymph: No easy bruising or bleeding gums  Allergy and Immunologic: No hives or eczema    Vital Signs Last 24 Hrs  T(C): 36.9 (27 Nov 2018 08:01), Max: 37.1 (26 Nov 2018 20:32)  T(F): 98.4 (27 Nov 2018 08:01), Max: 98.8 (26 Nov 2018 20:32)  HR: 64 (27 Nov 2018 10:59) (57 - 66)  BP: 183/76 (27 Nov 2018 11:23) (145/75 - 190/84)  BP(mean): --  RR: 18 (27 Nov 2018 10:59) (18 - 20)  SpO2: 97% (27 Nov 2018 10:59) (97% - 100%)  ICU Vital Signs Last 24 Hrs  LINDA CARTER  I&O's Detail    I&O's Summary    Drug Dosing Weight  LINDA CARTER      PHYSICAL EXAM:  General: Appears well developed, well nourished alert and cooperative.  HEENT: Head; normocephalic, atraumatic.  Eyes: Pupils reactive, cornea wnl.  Neck: Supple, no nodes adenopathy, no NVD or carotid bruit or thyromegaly.  CARDIOVASCULAR: Normal S1 and S2, No murmur, rub, gallop or lift.   LUNGS: No rales, rhonchi or wheeze. Normal breath sounds bilaterally.  ABDOMEN: Soft, nontender without mass or organomegaly. bowel sounds normoactive.  EXTREMITIES: No clubbing, cyanosis or edema. Distal pulses wnl.   SKIN: warm and dry with normal turgor.  NEURO: Alert/oriented x 3/normal motor exam. No pathologic reflexes.    PSYCH: normal affect.        LABS:                        11.6   4.4   )-----------( 206      ( 26 Nov 2018 15:15 )             35.5     11-27    141  |  104  |  23.0<H>  ----------------------------<  118<H>  4.4   |  24.0  |  0.93    Ca    9.8      27 Nov 2018 05:12    TPro  6.8  /  Alb  4.4  /  TBili  0.4  /  DBili  x   /  AST  17  /  ALT  10  /  AlkPhos  41  11-26    LINDA CARTER  CARDIAC MARKERS ( 27 Nov 2018 14:43 )  x     / 0.01 ng/mL / 45 U/L / x     / x                RADIOLOGY & ADDITIONAL STUDIES:    INTERPRETATION OF TELEMETRY (personally reviewed):    ECG: < from: 12 Lead ECG (11.26.18 @ 15:40) >  Diagnosis Line *** Poor data quality, interpretation may be adversely affected  Sinus bradycardia  Right bundle branch block  Abnormal ECG    < end of copied text >      ECHO: < from: TTE Echo Complete w/Doppler (11.26.18 @ 18:28) >  Summary:   1. Left ventricular ejection fraction, by visual estimation, is 65 to   70%.   2. Normal global left ventricular systolic function.   3. Increased LV wall thickness.   4. There is mild concentric left ventricular hypertrophy.   5. Mild mitral annular calcification.   6. Thickening and calcification of the anterior and posterior mitral   valve leaflets.   7. Mild tricuspid regurgitation.   8. Sclerotic aortic valve with normal opening.   9. Trace pulmonic valve regurgitation.  10. Endocardial visualization was enhanced with intravenous echo contrast.    < end of copied text >        ACTIVE PROBLEMS:  HEALTH ISSUES - PROBLEM Dx:  Type 2 diabetes mellitus without complication, with long-term current use of insulin: Type 2 diabetes mellitus without complication, with long-term current use of insulin  Coronary artery disease without angina pectoris, unspecified vessel or lesion type, unspecified whether native or transplanted heart: Coronary artery disease without angina pectoris, unspecified vessel or lesion type, unspecified whether native or transplanted heart  Essential hypertension: Essential hypertension  TIA (transient ischemic attack): TIA (transient ischemic attack)          Assessment and Plan:    In summary,   LINDA CARTER is an 89y Female WITH Hx of HTN past CVA DM recurrent UTIs presenting to [facility] ER after she experienced an episode of AMS presently improved had been w/o for TIA seen by neurologist found to have uncontrolled HTN with SBP above 190 MM hg.  History limited mostly from EMR and discussion with daughter at bedside.     Telemetry monitoring  Will optimize BP control cont IV Labetalol and Hydralazine up to patient clear to swallow PO meds. Neuro FUP  Discussed in detail with daughter at bedside    COURTNEY THOMAS MD, FACC, YEIMI

## 2018-11-27 NOTE — PROGRESS NOTE ADULT - SUBJECTIVE AND OBJECTIVE BOX
CC: Lethargy     INTERVAL HPI/OVERNIGHT EVENTS: Patient seen and examined, lethargic this morning. Elevated BP. ST unable to complete eval due to lethargy.       Vital Signs Last 24 Hrs  T(C): 36.9 (27 Nov 2018 08:01), Max: 37.1 (26 Nov 2018 20:32)  T(F): 98.4 (27 Nov 2018 08:01), Max: 98.8 (26 Nov 2018 20:32)  HR: 64 (27 Nov 2018 10:59) (49 - 66)  BP: 183/76 (27 Nov 2018 11:23) (145/75 - 221/91)  BP(mean): --  RR: 18 (27 Nov 2018 10:59) (18 - 20)  SpO2: 97% (27 Nov 2018 10:59) (97% - 100%)    PHYSICAL EXAM:    GENERAL: NAD, AOX2  HEAD:  Atraumatic, Normocephalic  ENMT: Moist mucous membranes  NECK: Supple, No JVD  NERVOUS SYSTEM:  Alert & Oriented X3, Mild right hemiparesis   CHEST/LUNG: Clear to auscultation bilaterally; No rales, rhonchi, wheezing, or rubs  HEART: Regular rate and rhythm; No murmurs, rubs, or gallops  ABDOMEN: Soft, Nontender, Nondistended; Bowel sounds present  EXTREMITIES:  2+ Peripheral Pulses, No clubbing, cyanosis, or edema        MEDICATIONS  (STANDING):  aspirin  chewable 81 milliGRAM(s) Oral daily  brimonidine 0.2% Ophthalmic Solution 1 Drop(s) Both EYES two times a day  cephalexin 250 milliGRAM(s) Oral daily  dextrose 5%. 1000 milliLiter(s) (50 mL/Hr) IV Continuous <Continuous>  dextrose 50% Injectable 12.5 Gram(s) IV Push once  dextrose 50% Injectable 25 Gram(s) IV Push once  dextrose 50% Injectable 25 Gram(s) IV Push once  enoxaparin Injectable 40 milliGRAM(s) SubCutaneous daily  hydrALAZINE 25 milliGRAM(s) Oral every 8 hours  insulin lispro (HumaLOG) corrective regimen sliding scale   SubCutaneous three times a day before meals  labetalol 200 milliGRAM(s) Oral daily  latanoprost 0.005% Ophthalmic Solution 1 Drop(s) Both EYES at bedtime  levETIRAcetam  IVPB 250 milliGRAM(s) IV Intermittent every 12 hours  saccharomyces boulardii 250 milliGRAM(s) Oral two times a day  timolol 0.25% Solution 1 Drop(s) Both EYES two times a day    MEDICATIONS  (PRN):  dextrose 40% Gel 15 Gram(s) Oral once PRN Blood Glucose LESS THAN 70 milliGRAM(s)/deciliter  glucagon  Injectable 1 milliGRAM(s) IntraMuscular once PRN Glucose LESS THAN 70 milligrams/deciliter  hydrALAZINE Injectable 5 milliGRAM(s) IV Push every 6 hours PRN sbp >180      Allergies    contrast media (iodine-based) (Urticaria)  erythromycin (Unknown)  IV-Dye (Unknown)  sulfADIAZINE (Unknown)    Intolerances          LABS:                          11.6   4.4   )-----------( 206      ( 26 Nov 2018 15:15 )             35.5     11-27    141  |  104  |  23.0<H>  ----------------------------<  118<H>  4.4   |  24.0  |  0.93    Ca    9.8      27 Nov 2018 05:12    TPro  6.8  /  Alb  4.4  /  TBili  0.4  /  DBili  x   /  AST  17  /  ALT  10  /  AlkPhos  41  11-26          RADIOLOGY & ADDITIONAL TESTS:

## 2018-11-27 NOTE — PATIENT PROFILE ADULT - COMPLETE THE FOLLOWING IF THE PATIENT REFUSES THE INFLUENZA VACCINE:
Risks/benefits discussed with patient/surrogate Risks/benefits discussed with patient/surrogate/Vaccine Information Sheet (VIS) provided-VIS date: 8/07/15

## 2018-11-27 NOTE — ED ADULT NURSE REASSESSMENT NOTE - NS ED NURSE REASSESS COMMENT FT1
MD at bedside aware of pt bp.
called by monitor tech for inverted T waves, 1st degree HB, PVC's, truong, STAT EKG obtained, MD notified of above, and possible EKG changes pts blood pressure remains elevated MD aware.
pt is unable to complete dysphagia by RN and speech d/t lethargy MD called made aware that pt has PO medication ordered MD will be down to see pt.  Daughter requesting to see MD VILLANUEVA made aware.  pt daughter educated on plan.
pt status unchanged, refer to flowsheet and chart, pt safety maintained, pt hemodynamically stable
pt status unchanged, refer to flowsheet and chart, pt safety maintained, pt hemodynamically stable. Pt pending transfer to [facility]. Pt no longer hypertensive
pt sustaining high bp MD Nitesh notified, awaiting CPOE orders
report given to Jeri on 4 tower
Pt resting comfortably on stretcher, per family @ bedside pt ms improving. NSR on cm, vitals per fs, prn hydralazine administered for BP. Pt tolerate PO medications crushed with applesauce. #20G IV noted to right hand, site asymp. RR even and unlabored, skin warm and dry. Safety maintained, sz precautions and aspiration cautions maintained, call bell in reach, family @ bedside. Pt with  no complaints @ this time, pending bed assignment
Pt reassessed.  Pt is currently a&ox2.  Disoriented to time.  As per daughter, this is an improvement from earlier in the day when pt was unable to speak.  No acute distress noted.  Awaiting transport to [facility] tw[facility].  Will continue to monitor.

## 2018-11-27 NOTE — PATIENT PROFILE ADULT - NSPROHMDIABETBLDGLCTST_GEN_A_NUR
3 times a day about 15 min into the meal, and based on the amount of food she eats-her daughter gives her insulin, around 2 to 3 units

## 2018-11-28 NOTE — PROGRESS NOTE ADULT - SUBJECTIVE AND OBJECTIVE BOX
INTERVAL HISTORY:        VITAL SIGNS:  Vital Signs Last 24 Hrs  T(C): 36.8 (28 Nov 2018 06:07), Max: 36.8 (28 Nov 2018 06:07)  T(F): 98.3 (28 Nov 2018 06:07), Max: 98.3 (28 Nov 2018 06:07)  HR: 77 (28 Nov 2018 06:07) (60 - 77)  BP: 160/60 (28 Nov 2018 06:07) (158/67 - 198/84)  BP(mean): --  RR: 18 (28 Nov 2018 06:07) (16 - 20)  SpO2: 96% (28 Nov 2018 06:07) (96% - 100%)    PHYSICAL EXAMINATION:    Mentation:    Language/Speech:  CN:  Visual Fields:  Motor:  Sensory:  DTR:  Babinski:      MEDS:  MEDICATIONS  (STANDING):  aspirin Suppository 300 milliGRAM(s) Rectal daily  brimonidine 0.2% Ophthalmic Solution 1 Drop(s) Both EYES two times a day  ceFAZolin   IVPB 250 milliGRAM(s) IV Intermittent every 24 hours  dextrose 5%. 1000 milliLiter(s) (50 mL/Hr) IV Continuous <Continuous>  dextrose 50% Injectable 12.5 Gram(s) IV Push once  dextrose 50% Injectable 25 Gram(s) IV Push once  dextrose 50% Injectable 25 Gram(s) IV Push once  enoxaparin Injectable 40 milliGRAM(s) SubCutaneous daily  insulin lispro (HumaLOG) corrective regimen sliding scale   SubCutaneous three times a day before meals  labetalol Injectable      labetalol Injectable 10 milliGRAM(s) IV Push once  labetalol Injectable 10 milliGRAM(s) IV Push every 8 hours  latanoprost 0.005% Ophthalmic Solution 1 Drop(s) Both EYES at bedtime  levETIRAcetam  IVPB 250 milliGRAM(s) IV Intermittent every 12 hours  timolol 0.25% Solution 1 Drop(s) Both EYES two times a day    MEDICATIONS  (PRN):  dextrose 40% Gel 15 Gram(s) Oral once PRN Blood Glucose LESS THAN 70 milliGRAM(s)/deciliter  glucagon  Injectable 1 milliGRAM(s) IntraMuscular once PRN Glucose LESS THAN 70 milligrams/deciliter  hydrALAZINE Injectable 10 milliGRAM(s) IV Push every 8 hours PRN sbp >170  or dbp >100      LABS:                          11.0   4.3   )-----------( 209      ( 28 Nov 2018 06:23 )             33.7     11-28    146<H>  |  108<H>  |  21.0<H>  ----------------------------<  111  4.1   |  25.0  |  0.97    Ca    9.8      28 Nov 2018 06:23    TPro  6.8  /  Alb  4.4  /  TBili  0.4  /  DBili  x   /  AST  17  /  ALT  10  /  AlkPhos  41  11-26    LIVER FUNCTIONS - ( 26 Nov 2018 15:15 )  Alb: 4.4 g/dL / Pro: 6.8 g/dL / ALK PHOS: 41 U/L / ALT: 10 U/L / AST: 17 U/L / GGT: x               RADIOLOGY & ADDITIONAL STUDIES:    EEG- performed, will review  MRI still pending    IMPRESSION & PLAN:    TIA vs Seizure  Bilateral carotid stenosis  Continue Keppra  Cerebrovascular risk factor assessment and management  Antiplatelet therapy as prescribed. INTERVAL HISTORY:  stable,  prelim report of +blood cultures      VITAL SIGNS:  Vital Signs Last 24 Hrs  T(C): 36.8 (28 Nov 2018 06:07), Max: 36.8 (28 Nov 2018 06:07)  T(F): 98.3 (28 Nov 2018 06:07), Max: 98.3 (28 Nov 2018 06:07)  HR: 77 (28 Nov 2018 06:07) (60 - 77)  BP: 160/60 (28 Nov 2018 06:07) (158/67 - 198/84)  BP(mean): --  RR: 18 (28 Nov 2018 06:07) (16 - 20)  SpO2: 96% (28 Nov 2018 06:07) (96% - 100%)    PHYSICAL EXAMINATION:    Mentation:  awakens and follows commands  Language/Speech:  CN: nl  Visual Fields: full  Motor: no focal weakness  Sensory:  DTR:  Babinski:      MEDS:  MEDICATIONS  (STANDING):  aspirin Suppository 300 milliGRAM(s) Rectal daily  brimonidine 0.2% Ophthalmic Solution 1 Drop(s) Both EYES two times a day  ceFAZolin   IVPB 250 milliGRAM(s) IV Intermittent every 24 hours  dextrose 5%. 1000 milliLiter(s) (50 mL/Hr) IV Continuous <Continuous>  dextrose 50% Injectable 12.5 Gram(s) IV Push once  dextrose 50% Injectable 25 Gram(s) IV Push once  dextrose 50% Injectable 25 Gram(s) IV Push once  enoxaparin Injectable 40 milliGRAM(s) SubCutaneous daily  insulin lispro (HumaLOG) corrective regimen sliding scale   SubCutaneous three times a day before meals  labetalol Injectable      labetalol Injectable 10 milliGRAM(s) IV Push once  labetalol Injectable 10 milliGRAM(s) IV Push every 8 hours  latanoprost 0.005% Ophthalmic Solution 1 Drop(s) Both EYES at bedtime  levETIRAcetam  IVPB 250 milliGRAM(s) IV Intermittent every 12 hours  timolol 0.25% Solution 1 Drop(s) Both EYES two times a day    MEDICATIONS  (PRN):  dextrose 40% Gel 15 Gram(s) Oral once PRN Blood Glucose LESS THAN 70 milliGRAM(s)/deciliter  glucagon  Injectable 1 milliGRAM(s) IntraMuscular once PRN Glucose LESS THAN 70 milligrams/deciliter  hydrALAZINE Injectable 10 milliGRAM(s) IV Push every 8 hours PRN sbp >170  or dbp >100      LABS:                          11.0   4.3   )-----------( 209      ( 28 Nov 2018 06:23 )             33.7     11-28    146<H>  |  108<H>  |  21.0<H>  ----------------------------<  111  4.1   |  25.0  |  0.97    Ca    9.8      28 Nov 2018 06:23    TPro  6.8  /  Alb  4.4  /  TBili  0.4  /  DBili  x   /  AST  17  /  ALT  10  /  AlkPhos  41  11-26    LIVER FUNCTIONS - ( 26 Nov 2018 15:15 )  Alb: 4.4 g/dL / Pro: 6.8 g/dL / ALK PHOS: 41 U/L / ALT: 10 U/L / AST: 17 U/L / GGT: x               RADIOLOGY & ADDITIONAL STUDIES:    EEG- performed, will review  MRI still pending    IMPRESSION & PLAN:    TIA vs Seizure  Bilateral carotid stenosis  Continue Keppra  ASA - suppository.  Advance to oral when cleared to swallow meds  Cerebrovascular risk factor assessment and management  Antiplatelet therapy as prescribed.

## 2018-11-28 NOTE — PROGRESS NOTE ADULT - SUBJECTIVE AND OBJECTIVE BOX
CC: Bacteremia     INTERVAL HPI/OVERNIGHT EVENTS: Patient seen and examined, lethargic this morning. Will answer to her name. + Blood  cultures. Failed swallow evaluation due to lethargy. NPO. BP still elevated.       Vital Signs Last 24 Hrs  T(C): 36.8 (28 Nov 2018 06:07), Max: 36.8 (28 Nov 2018 06:07)  T(F): 98.3 (28 Nov 2018 06:07), Max: 98.3 (28 Nov 2018 06:07)  HR: 77 (28 Nov 2018 06:07) (60 - 77)  BP: 160/60 (28 Nov 2018 06:07) (158/67 - 198/84)  BP(mean): --  RR: 18 (28 Nov 2018 06:07) (16 - 20)  SpO2: 96% (28 Nov 2018 06:07) (96% - 100%)    PHYSICAL EXAM:    GENERAL: NAD, AOX1 dysarthria   HEAD:  Atraumatic, Normocephalics  NECK: Supple, No JVD  NERVOUS SYSTEM:  Alert & Oriented X 1 dysarthria. Follows commands.   CHEST/LUNG: Clear to auscultation bilaterally; No rales, rhonchi, wheezing, or rubs  HEART: Regular rate and rhythm; No murmurs, rubs, or gallops  ABDOMEN: Soft, Nontender, Nondistended; Bowel sounds present  EXTREMITIES:  2+ Peripheral Pulses, No clubbing, cyanosis, or edema        MEDICATIONS  (STANDING):  aspirin Suppository 300 milliGRAM(s) Rectal daily  brimonidine 0.2% Ophthalmic Solution 1 Drop(s) Both EYES two times a day  dextrose 5% + sodium chloride 0.45%. 1000 milliLiter(s) (75 mL/Hr) IV Continuous <Continuous>  dextrose 5%. 1000 milliLiter(s) (50 mL/Hr) IV Continuous <Continuous>  dextrose 50% Injectable 12.5 Gram(s) IV Push once  dextrose 50% Injectable 25 Gram(s) IV Push once  dextrose 50% Injectable 25 Gram(s) IV Push once  enoxaparin Injectable 40 milliGRAM(s) SubCutaneous daily  insulin lispro (HumaLOG) corrective regimen sliding scale   SubCutaneous three times a day before meals  labetalol Injectable      labetalol Injectable 10 milliGRAM(s) IV Push once  labetalol Injectable 10 milliGRAM(s) IV Push every 8 hours  latanoprost 0.005% Ophthalmic Solution 1 Drop(s) Both EYES at bedtime  levETIRAcetam  IVPB 250 milliGRAM(s) IV Intermittent every 12 hours  timolol 0.25% Solution 1 Drop(s) Both EYES two times a day  vancomycin  IVPB        MEDICATIONS  (PRN):  dextrose 40% Gel 15 Gram(s) Oral once PRN Blood Glucose LESS THAN 70 milliGRAM(s)/deciliter  glucagon  Injectable 1 milliGRAM(s) IntraMuscular once PRN Glucose LESS THAN 70 milligrams/deciliter  hydrALAZINE Injectable 10 milliGRAM(s) IV Push every 8 hours PRN sbp >170  or dbp >100      Allergies    contrast media (iodine-based) (Urticaria)  erythromycin (Unknown)  IV-Dye (Unknown)  sulfADIAZINE (Unknown)    Intolerances          LABS:                          11.0   4.3   )-----------( 209      ( 28 Nov 2018 06:23 )             33.7     11-28    146<H>  |  108<H>  |  21.0<H>  ----------------------------<  111  4.1   |  25.0  |  0.97    Ca    9.8      28 Nov 2018 06:23    TPro  6.8  /  Alb  4.4  /  TBili  0.4  /  DBili  x   /  AST  17  /  ALT  10  /  AlkPhos  41  11-26          RADIOLOGY & ADDITIONAL TESTS:

## 2018-11-28 NOTE — SWALLOW BEDSIDE ASSESSMENT ADULT - SLP PERTINENT HISTORY OF CURRENT PROBLEM
As per h&p: history of CVA< seizure disorder, hypertension and diabetes mellitus who was brought to the ER for complaints of altered mental status. Patient was diagnosed with seizure disorder last year and started on keppra po. Over the past few months the family has noted she has been more lethargic and confused. She saw Dr Mcallister, neurologist at [place]. According to the daughter she had a number of tests which indicated she did not have seizure disorder and she was advised to taper off the keppra. Her daughter tapered it off over the past 2 weeks. This morning she noted her mother became unresponsive, fell to her side, noted facial droop, her eyes were "fluttering" and her tongue was rolled back. After the episode she remained lethargic for about 3 hours. As per daughter, pts baseline diet is chopped with nectar liquids. Pt had MBS July 2018 @ [facility] who recommended regular with nectar, + aspiration for thin liquids per daughter (pts daughter cuts up pts food).

## 2018-11-28 NOTE — PROGRESS NOTE ADULT - SUBJECTIVE AND OBJECTIVE BOX
[facility], [facility]                                   [address] [place] [address]                                                      PHONE: [phone]                                                         FAX: [phone]  [email or web address]  ---------------------------------------------------------------------------------------------------------------------------------    Reason for Consult: AMS TIA UNCONTROLLED HTN    HPI:  LINDA CARTER is an 89y Female WITH Hx of HTN past CVA DM recurrent UTIs presenting to [facility] ER after she experienced an episode of AMS presently improved had been w/o for TIA seen by neurologist found to have uncontrolled HTN with SBP above 190 MM hg.  History limited mostly from EMR and discussion with daughter at bedside.       BP better today    PAST MEDICAL & SURGICAL HISTORY:  Diabetes mellitus  Fracture: lumbar fracture.  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  Heart Murmur  Calcium Nephrolithiasis  Hyperlipidemia LDL Goal < 100  Glaucoma, Low Tension  History of Pulmonary Hypertension  CAD (Coronary Artery Disease)  Obstructive Sleep Apnea  Controlled Type 2 Diabetes with Neuropathy  H/O cataract extraction  History of appendectomy  Fracture of Humerus: repair with harware right  Glaucoma: relieve pressure left eye along with cataract extraction  Bilateral Cataracts: excision 2006  Benign Breast Disease: removal of cyst- 1963  Cataract  Kidney Stones: lithotripsy 2005, 2006  S/P Appendectomy: 1937      contrast media (iodine-based) (Urticaria)  erythromycin (Unknown)  IV-Dye (Unknown)  sulfADIAZINE (Unknown)      MEDICATIONS  (STANDING):  aspirin  chewable 81 milliGRAM(s) Oral daily  brimonidine 0.2% Ophthalmic Solution 1 Drop(s) Both EYES two times a day  cephalexin 250 milliGRAM(s) Oral daily  dextrose 5%. 1000 milliLiter(s) (50 mL/Hr) IV Continuous <Continuous>  dextrose 50% Injectable 12.5 Gram(s) IV Push once  dextrose 50% Injectable 25 Gram(s) IV Push once  dextrose 50% Injectable 25 Gram(s) IV Push once  enoxaparin Injectable 40 milliGRAM(s) SubCutaneous daily  hydrALAZINE 50 milliGRAM(s) Oral every 8 hours  insulin lispro (HumaLOG) corrective regimen sliding scale   SubCutaneous three times a day before meals  labetalol 200 milliGRAM(s) Oral daily  latanoprost 0.005% Ophthalmic Solution 1 Drop(s) Both EYES at bedtime  levETIRAcetam  IVPB 250 milliGRAM(s) IV Intermittent every 12 hours  saccharomyces boulardii 250 milliGRAM(s) Oral two times a day  timolol 0.25% Solution 1 Drop(s) Both EYES two times a day    MEDICATIONS  (PRN):  dextrose 40% Gel 15 Gram(s) Oral once PRN Blood Glucose LESS THAN 70 milliGRAM(s)/deciliter  glucagon  Injectable 1 milliGRAM(s) IntraMuscular once PRN Glucose LESS THAN 70 milligrams/deciliter  hydrALAZINE Injectable 5 milliGRAM(s) IV Push every 6 hours PRN sbp >180      Social History:  Cigarettes:                    Alchohol:                 Illicit Drug Abuse:      REVIEW OF SYSTEMS:    Constitutional: No fever, weight loss or fatigue  Eyes: No eye pain, visual disturbances, or discharge  ENMT:  No difficulty hearing, tinnitus, vertigo; No sinus or throat pain  Neck: No pain or stiffness  Respiratory: No cough, wheezing, chills or hemoptysis  Cardiovascular: No chest pain, palpitations, shortness of breath, dizziness or leg swelling  Gastrointestinal: No abdominal or epigastric pain. No nausea, vomiting or hematemesis; No diarrhea or constipation. No melena or hematochezia.  Genitourinary: No dysuria, frequency, hematuria or incontinence  Rectal: No pain, hemorrhoids or incontinence  Neurological: No headaches, memory loss, loss of strength, numbness or tremors  Skin: No itching, burning, rashes or lesions   Lymph Nodes: No enlarged glands  Endocrine: No heat or cold intolerance; No hair loss  Musculoskeletal: No joint pain or swelling; No muscle, back or extremity pain  Psychiatric: No depression, anxiety, mood swings or difficulty sleeping  Heme/Lymph: No easy bruising or bleeding gums  Allergy and Immunologic: No hives or eczema    ICU Vital Signs Last 24 Hrs  T(C): 36.8 (28 Nov 2018 06:07), Max: 36.8 (28 Nov 2018 06:07)  T(F): 98.3 (28 Nov 2018 06:07), Max: 98.3 (28 Nov 2018 06:07)  HR: 77 (28 Nov 2018 06:07) (60 - 77)  BP: 160/60 (28 Nov 2018 06:07) (158/67 - 198/84)  BP(mean): --  ABP: --  ABP(mean): --  RR: 18 (28 Nov 2018 06:07) (16 - 20)  SpO2: 96% (28 Nov 2018 06:07) (96% - 100%)  LINDA CARTER  I&O's Detail    I&O's Summary    Drug Dosing Weight  LINDA CARTER      PHYSICAL EXAM:  General: Appears well developed, well nourished alert and cooperative.  HEENT: Head; normocephalic, atraumatic.  Eyes: Pupils reactive, cornea wnl.  Neck: Supple, no nodes adenopathy, no NVD or carotid bruit or thyromegaly.  CARDIOVASCULAR: Normal S1 and S2, No murmur, rub, gallop or lift.   LUNGS: No rales, rhonchi or wheeze. Normal breath sounds bilaterally.  ABDOMEN: Soft, nontender without mass or organomegaly. bowel sounds normoactive.  EXTREMITIES: No clubbing, cyanosis or edema. Distal pulses wnl.   SKIN: warm and dry with normal turgor.  NEURO: Alert/oriented x 3/normal motor exam. No pathologic reflexes.    PSYCH: normal affect.        LABS:                        11.6   4.4   )-----------( 206      ( 26 Nov 2018 15:15 )             35.5     11-27    141  |  104  |  23.0<H>  ----------------------------<  118<H>  4.4   |  24.0  |  0.93    Ca    9.8      27 Nov 2018 05:12    TPro  6.8  /  Alb  4.4  /  TBili  0.4  /  DBili  x   /  AST  17  /  ALT  10  /  AlkPhos  41  11-26    LINDA CATRER  CARDIAC MARKERS ( 27 Nov 2018 14:43 )  x     / 0.01 ng/mL / 45 U/L / x     / x                RADIOLOGY & ADDITIONAL STUDIES:    INTERPRETATION OF TELEMETRY (personally reviewed):    ECG: < from: 12 Lead ECG (11.26.18 @ 15:40) >  Diagnosis Line *** Poor data quality, interpretation may be adversely affected  Sinus bradycardia  Right bundle branch block  Abnormal ECG    < end of copied text >      ECHO: < from: TTE Echo Complete w/Doppler (11.26.18 @ 18:28) >  Summary:   1. Left ventricular ejection fraction, by visual estimation, is 65 to   70%.   2. Normal global left ventricular systolic function.   3. Increased LV wall thickness.   4. There is mild concentric left ventricular hypertrophy.   5. Mild mitral annular calcification.   6. Thickening and calcification of the anterior and posterior mitral   valve leaflets.   7. Mild tricuspid regurgitation.   8. Sclerotic aortic valve with normal opening.   9. Trace pulmonic valve regurgitation.  10. Endocardial visualization was enhanced with intravenous echo contrast.    < end of copied text >        ACTIVE PROBLEMS:  HEALTH ISSUES - PROBLEM Dx:  Type 2 diabetes mellitus without complication, with long-term current use of insulin: Type 2 diabetes mellitus without complication, with long-term current use of insulin  Coronary artery disease without angina pectoris, unspecified vessel or lesion type, unspecified whether native or transplanted heart: Coronary artery disease without angina pectoris, unspecified vessel or lesion type, unspecified whether native or transplanted heart  Essential hypertension: Essential hypertension  TIA (transient ischemic attack): TIA (transient ischemic attack)          Assessment and Plan:    In summary,   LINDA CARTER is an 89y Female WITH Hx of HTN past CVA DM recurrent UTIs presenting to [facility] ER after she experienced an episode of AMS presently improved had been w/o for TIA seen by neurologist found to have uncontrolled HTN with SBP above 190 MM hg.  History limited mostly from EMR and discussion with daughter at bedside.     Telemetry monitoring  Will optimize BP control cont IV Labetalol and Hydralazine up to patient clear to swallow PO meds. Neuro FUP  Discussed in detail with daughter at bedside    COURTNEY THOMAS MD, FACC, YEIMI

## 2018-11-28 NOTE — PROGRESS NOTE ADULT - ASSESSMENT
The patient is a 89 year old female with a history of CVA< seizure disorder, hypertension and diabetes mellitus who was brought to the ER for complaints of altered mental status. Patient was diagnosed with seizure disorder last year and started on keppra po. Over the past few months the family has noted she has been more lethargic and confused. She saw Dr Mcallister, neurologist at [place]. According to the daughter she had a number of tests which indicated she did not have seizure disorder and she was advised to taper off the keppra. Her daughter tapered it off over the past 2 weeks. This morning she noted her mother became unresponsive, fell to her side, noted facial droop, her eyes were "fluttering" and her tongue was rolled back. After the episode she remained lethargic for about 3 hours. In the ER, NIH was 0. Patient was more alert and responsive. CT head was negative.     Assessment/Plan:    1. Period of confusion possible seizure given recently weaned off keppra vs TIA:   EEG complete- results pending. Reduced dose if keppra BID until EEG results  Seizure precautions  MRI pending  Speech therapy following- rectal aspirin until able to swallow  Patient's daughter refusing statin therapy due to myalgia  Echocardiogram reviewed  Carotid duplex with stenosis    2. Hypertensive urgency: IV labetalol and hydralazine ordered  Monitor Blood pressure    3. Diabetes mellitus type 2; HSS. Monitor BSL    4. Gram positive bacteremia: Repeat cultures; IV Vancomycin Straight cath for uA and urine culture ordered    5. Chronic UTI: Check UA and urine culture.     VTE_ lovenox subcut    Plan was discussed in detail with the patient's daughter at the bedside. The patient is a 89 year old female with a history of CVA< seizure disorder, hypertension and diabetes mellitus who was brought to the ER for complaints of altered mental status. Patient was diagnosed with seizure disorder last year and started on keppra po. Over the past few months the family has noted she has been more lethargic and confused. She saw Dr Mcallister, neurologist at [place]. According to the daughter she had a number of tests which indicated she did not have seizure disorder and she was advised to taper off the keppra. Her daughter tapered it off over the past 2 weeks. This morning she noted her mother became unresponsive, fell to her side, noted facial droop, her eyes were "fluttering" and her tongue was rolled back. After the episode she remained lethargic for about 3 hours. In the ER, NIH was 0. Patient was more alert and responsive. CT head was negative.     Assessment/Plan:    1. Period of confusion possible seizure given recently weaned off keppra vs TIA:   EEG complete- results pending. Reduced dose if keppra BID until EEG results  Seizure precautions  MRI pending  Speech therapy following- rectal aspirin until able to swallow  Patient's daughter refusing statin therapy due to myalgia  Echocardiogram reviewed  Carotid duplex with stenosis    2. Hypertensive urgency: IV labetalol and hydralazine ordered  Monitor Blood pressure    3. Diabetes mellitus type 2; HSS. Monitor BSL    4. Gram positive bacteremia: Repeat cultures; IV Vancomycin Straight cath for uA and urine culture ordered    5. Chronic UTI: Check UA and urine culture.     6. Hypernatremia: IV d5w with 0.45% ordered. monitor bmp     VTE_ lovenox subcut    Plan was discussed in detail with the patient's daughter at the bedside.

## 2018-11-28 NOTE — PHYSICAL THERAPY INITIAL EVALUATION ADULT - ADDITIONAL COMMENTS
per daughter, pt requires assistance with all ADLs. Pt works with PT in the home, and is able to stand with assist and a gait belt however reports that her mother has not really walked since rehab. pt's daughter has a mechanical lift and a W/C at home. Pt has a ramp to enter the home.

## 2018-11-28 NOTE — PHYSICAL THERAPY INITIAL EVALUATION ADULT - GENERAL OBSERVATIONS, REHAB EVAL
Pt rec'd in room, HOB elevated. Pt observed removing CPAP from face, daughter in recliner in bedside.

## 2018-11-29 NOTE — PROGRESS NOTE ADULT - SUBJECTIVE AND OBJECTIVE BOX
CC: Dysphagia    INTERVAL HPI/OVERNIGHT EVENTS: Patient seen and examined,  more awake this morning. Answering questions and following commands.       Vital Signs Last 24 Hrs  T(C): 36.7 (2018 05:14), Max: 36.9 (2018 10:50)  T(F): 98.1 (2018 05:14), Max: 98.4 (2018 10:50)  HR: 65 (2018 05:14) (45 - 68)  BP: 174/60 (2018 05:14) (140/50 - 191/74)  BP(mean): --  RR: 18 (2018 05:14) (16 - 18)  SpO2: 96% (2018 05:14) (94% - 96%)    PHYSICAL EXAM:    GENERAL: NAD, AOX2  HEAD:  Atraumatic, Normocephalic  EYES: EOMI, PERRLA, conjunctiva and sclera clear  ENMT: Moist mucous membranes  NECK: Supple, No JVD  NERVOUS SYSTEM:  Alert & Oriented X2, Motor Strength 4/5 B/L upper and lower extremitiesCHEST/LUNG: Clear to auscultation bilaterally; No rales, rhonchi, wheezing, or rubs  HEART: Regular rate and rhythm; No murmurs, rubs, or gallops  ABDOMEN: Soft, Nontender, Nondistended; Bowel sounds present  EXTREMITIES:  2+ Peripheral Pulses, No clubbing, cyanosis, or edema        MEDICATIONS  (STANDING):  aspirin Suppository 300 milliGRAM(s) Rectal daily  brimonidine 0.2% Ophthalmic Solution 1 Drop(s) Both EYES two times a day  dextrose 5%. 1000 milliLiter(s) (50 mL/Hr) IV Continuous <Continuous>  dextrose 50% Injectable 12.5 Gram(s) IV Push once  dextrose 50% Injectable 25 Gram(s) IV Push once  dextrose 50% Injectable 25 Gram(s) IV Push once  enoxaparin Injectable 40 milliGRAM(s) SubCutaneous daily  insulin lispro (HumaLOG) corrective regimen sliding scale   SubCutaneous three times a day before meals  labetalol Injectable      labetalol Injectable 10 milliGRAM(s) IV Push every 8 hours  lacosamide IVPB 50 milliGRAM(s) IV Intermittent every 12 hours  latanoprost 0.005% Ophthalmic Solution 1 Drop(s) Both EYES at bedtime  timolol 0.25% Solution 1 Drop(s) Both EYES daily  vancomycin  IVPB 750 milliGRAM(s) IV Intermittent every 12 hours  vancomycin  IVPB        MEDICATIONS  (PRN):  dextrose 40% Gel 15 Gram(s) Oral once PRN Blood Glucose LESS THAN 70 milliGRAM(s)/deciliter  glucagon  Injectable 1 milliGRAM(s) IntraMuscular once PRN Glucose LESS THAN 70 milligrams/deciliter  hydrALAZINE Injectable 10 milliGRAM(s) IV Push every 8 hours PRN sbp >170  or dbp >100      Allergies    contrast media (iodine-based) (Urticaria)  erythromycin (Unknown)  IV-Dye (Unknown)  sulfADIAZINE (Unknown)    Intolerances          LABS:                          10.7   3.3   )-----------( 147      ( 2018 07:40 )             32.4         137  |  104  |  17.0  ----------------------------<  209<H>  4.2   |  21.0<L>  |  0.83    Ca    9.2      2018 07:40        Urinalysis Basic - ( 2018 11:04 )    Color: Yellow / Appearance: Clear / S.015 / pH: x  Gluc: x / Ketone: Small  / Bili: Negative / Urobili: Negative mg/dL   Blood: x / Protein: 100 mg/dL / Nitrite: Negative   Leuk Esterase: Negative / RBC: 0-2 /HPF / WBC 0-2   Sq Epi: x / Non Sq Epi: Few / Bacteria: x        RADIOLOGY & ADDITIONAL TESTS:

## 2018-11-29 NOTE — PROGRESS NOTE ADULT - SUBJECTIVE AND OBJECTIVE BOX
[facility], [facility]                                   [address] [address]                                                      PHONE: [phone]                                                         FAX: [phone]  ----------------------------------------------------------------------------------------------------  Pt seen and examined. FU for CVA    Overnight events/Complaints: Pt without complains. On BiPAP. Family at bedside.    Vital Signs Last 24 Hrs  T(C): 37.1 (29 Nov 2018 10:18), Max: 37.1 (29 Nov 2018 10:18)  T(F): 98.7 (29 Nov 2018 10:18), Max: 98.7 (29 Nov 2018 10:18)  HR: 53 (29 Nov 2018 12:17) (50 - 68)  BP: 140/64 (29 Nov 2018 12:17) (140/50 - 191/74)  BP(mean): --  RR: 16 (29 Nov 2018 10:18) (16 - 18)  SpO2: 96% (29 Nov 2018 10:18) (94% - 96%)  I&O's Summary    28 Nov 2018 07:01  -  29 Nov 2018 07:00  --------------------------------------------------------  IN: 600 mL / OUT: 0 mL / NET: 600 mL        PHYSICAL EXAM:  Constitutional: Appears well developed, well nourished; alert and co-operative  HEENT:     Head: Normocephalic and atraumatic  Eyes: Conjunctiva normal, No scleral icterus  Neck: supple. No JVD  Mouth/Throat: Mucous membranes are normal. Mucosa are not pale or dry.  Cardiovascular: Normal S1 S2, No murmurs  Respiratory: Lungs clear to auscultation; no crepitations, no wheeze  Gastrointestinal:  Soft, Non-tender, + BS	  Musculoskeletal: Normal range of motion. No edema  Skin: Warm and dry. No cyanosis . No diaphoresis.  Neurologic: Alert oriented to time place and person. Normal strength and no tremor.  Psychiatric: Normal mood and affect, Speech is normal and behavior is normal.        LABS:                        10.7   3.3   )-----------( 147      ( 29 Nov 2018 07:40 )             32.4     11-29    137  |  104  |  17.0  ----------------------------<  209<H>  4.2   |  21.0<L>  |  0.83    Ca    9.2      29 Nov 2018 07:40              RADIOLOGY & ADDITIONAL STUDIES: (reviewed)  CAROTID DUPLEX:  On the right there is moderate calcified plaque at the carotid bulb   extending into the proximal right internal and external carotid artery.   Additional moderate calcific plaque in the mid right internal carotid   artery. Peak systolic velocity in the proximal right internal carotid   artery measures 113 cm/s and at the carotid bulb measures 120 cm/s, which   is borderline elevated corresponding to 50-69% stenosis.    On the left there is soft plaque in the mid to distal common carotid   artery. Significant mixed plaque at the carotid bulb extending into the   proximal internal and external carotid artery. A color bruit at the   carotid bulb with elevated velocities measuring larger 58 cm/s   corresponding to 50-69% stenosis.    Vertebral artery flow is antegrade bilaterally.  Patent bilateral external carotid arteries.    IMPRESSION:      Findings compatible with 50-69% stenosis on the left with top normal   velocities on the right which may represent 50-69% stenosis.    CARDIOLOGY TESTING:(reviewed)     TELEMETRY (Independent visualization) : sinus bradycardia with sinus pause    ECHOCARDIOGRAM:  < from: TTE Echo Complete w/Doppler (11.26.18 @ 18:28) >  PHYSICIAN INTERPRETATION:  Left Ventricle: Endocardial visualization was enhanced with intravenous   echo contrast. The left ventricular internal cavity size is normal.Left   ventricular wall thickness is increased.  Global LV systolic function was normal. Left ventricular ejection   fraction, by visual estimation, is 65 to 70%. Spectral Doppler shows   normal pattern of LV diastolic filling.  Right Ventricle: The right ventricular size is normal. RV systolic   function is normal.  Left Atrium: Severely enlarged left atrium.  Right Atrium: Normal right atrial size.  Pericardium: There is no evidence of pericardial effusion.  Mitral Valve: Thickening and calcification of the anterior and posterior   mitral valve leaflets. There is mild mitral annular calcification. Mild   mitral valve regurgitation is seen.  Tricuspid Valve: The tricuspid valve is normal. Mild tricuspid   regurgitation is visualized.  AorticValve: The aortic valve is trileaflet. Sclerotic aortic valve with   normal opening. No evidence of aortic valve regurgitation is seen.  Pulmonic Valve: The pulmonic valve is normal. Trace pulmonic valve   regurgitation.  Aorta: The aortic root is normal in size and structure.  Pulmonary Artery: The pulmonary artery is of normal size and origin.  Venous: The inferior vena cava is normal. The inferior vena cava was   normal sized, with respiratory size variation greater than 50%.       Summary:   1. Left ventricular ejection fraction, by visual estimation, is 65 to   70%.   2. Normal global left ventricular systolic function.   3. Increased LV wall thickness.   4. There is mild concentric left ventricular hypertrophy.   5. Mild mitral annular calcification.   6. Thickening and calcification of the anterior and posterior mitral   valve leaflets.   7. Mild tricuspid regurgitation.   8. Sclerotic aortic valve with normal opening.   9. Trace pulmonic valve regurgitation.  10. Endocardial visualization was enhanced with intravenous echo contrast.    [id number] Ravi Skinner MD, Electronically signed on 11/26/2018 at   10:39:19 PM       < end of copied text >    MEDICATIONS:(reviewed)  MEDICATIONS  (STANDING):  aspirin Suppository 300 milliGRAM(s) Rectal daily  brimonidine 0.2% Ophthalmic Solution 1 Drop(s) Both EYES two times a day  dextrose 5%. 1000 milliLiter(s) (50 mL/Hr) IV Continuous <Continuous>  dextrose 50% Injectable 12.5 Gram(s) IV Push once  dextrose 50% Injectable 25 Gram(s) IV Push once  dextrose 50% Injectable 25 Gram(s) IV Push once  enoxaparin Injectable 40 milliGRAM(s) SubCutaneous daily  hydrALAZINE Injectable 10 milliGRAM(s) IV Push two times a day  insulin lispro (HumaLOG) corrective regimen sliding scale   SubCutaneous three times a day before meals  lacosamide IVPB 50 milliGRAM(s) IV Intermittent every 12 hours  latanoprost 0.005% Ophthalmic Solution 1 Drop(s) Both EYES at bedtime  timolol 0.25% Solution 1 Drop(s) Both EYES daily  vancomycin  IVPB 750 milliGRAM(s) IV Intermittent every 12 hours  vancomycin  IVPB        MEDICATIONS  (PRN):  dextrose 40% Gel 15 Gram(s) Oral once PRN Blood Glucose LESS THAN 70 milliGRAM(s)/deciliter  glucagon  Injectable 1 milliGRAM(s) IntraMuscular once PRN Glucose LESS THAN 70 milligrams/deciliter  hydrALAZINE Injectable 10 milliGRAM(s) IV Push every 8 hours PRN sbp >170  or dbp >100      ASSESSMENT AND PLAN:    89 year old female with a history of CVA, seizure disorder, hypertension and diabetes mellitus who was brought to the ER for complaints of altered mental status. MRI positive for acute right frontal CVA. Noted to have episodes of bradycardia with sinus pause. BP documented to be stable at those times with no obvious symptoms as per family at bedside.    1. Continue telemetry monitoring for now  2. Avoid AVN blockers including labetalol for BP control  3. Episodes of sinus pause but no obvious BP compromise. Would continue with watchful waiting unless prolonged pauses with hemodynamic compromise prior to considering PPM.    Discussed with family at bedside in detail.    Extensive chart review performed. Case discussed with family. Time 40 mins.

## 2018-11-29 NOTE — PROGRESS NOTE ADULT - ASSESSMENT
The patient is a 89 year old female with a history of CVA< seizure disorder, hypertension and diabetes mellitus who was brought to the ER for complaints of altered mental status. Patient was diagnosed with seizure disorder last year and started on keppra po. Over the past few months the family has noted she has been more lethargic and confused. She saw Dr Mcallister, neurologist at [place]. According to the daughter she had a number of tests which indicated she did not have seizure disorder and she was advised to taper off the keppra. Her daughter tapered it off over the past 2 weeks. The morning of admission she noted her mother became unresponsive, fell to her side, noted facial droop, her eyes were "fluttering" and her tongue was rolled back. After the episode she remained lethargic for about 1 hours. In the ER, NIH was 0. Patient was more alert and responsive. CT head was negative. Initially started on IV Keppra EEG positive for seizure activity. Spoke with neurology regarding family's concerns about Keppra causing lethargy; Changed to lacosamide. MRI positive for acute right frontal cva. Discussed the benefits of statin therapy with the patient's family in regards to secondary prevention; in agreement with statin therapy- LDL of 253. HbAc of 6. Blood cultures 1of 2 positive for CNS; UA negative. Started on iv Vancomycin repeat blood cultures ordered on 11/28. Speech therapy to follow.     Assessment/Plan:    1. Acute encephalopathy secondary to seizure confirmed on EEG and Acute right frontal CVA: improving todya.   Started on lacosamide  Seizure precautions  Continue rectal aspirin and PO statin when able to swallow  Speech therapy to follow up and re-assess. May need repeat MBS.   Echocardiogram reviewed  Carotid duplex with stenosis    2. Hypertensive urgency: IV labetalol and hydralazine ordered until able to tolerate PO   Monitor Blood pressure    3. Diabetes mellitus type 2; HSS. Monitor BSL    4. Gram positive bacteremia: 1 of 2 cultures positive for CNS- contamination? Continue iv vancomcyin- d/c if repeat cultures form 11/28 are negative.   UA negative    5. Chronic UTI: Ua negative. Urine culture pending    6. Hypernatremia: Resolved with iv hydration    VTE_ lovenox subcut    Plan was discussed in detail with the patient's daughter at the bedside and son Marco Antonio via phone call

## 2018-11-29 NOTE — PROGRESS NOTE ADULT - SUBJECTIVE AND OBJECTIVE BOX
INTERVAL HISTORY:  awake, conversant and follows commands      VITAL SIGNS:  Vital Signs Last 24 Hrs  T(C): 37.1 (29 Nov 2018 10:18), Max: 37.1 (29 Nov 2018 10:18)  T(F): 98.7 (29 Nov 2018 10:18), Max: 98.7 (29 Nov 2018 10:18)  HR: 50 (29 Nov 2018 10:18) (45 - 68)  BP: 147/61 (29 Nov 2018 10:18) (140/50 - 191/74)  BP(mean): --  RR: 16 (29 Nov 2018 10:18) (16 - 18)  SpO2: 96% (29 Nov 2018 10:18) (94% - 96%)    PHYSICAL EXAMINATION:    Mentation:  awake  Language/Speech: moderate dysarthria, lang- nl  CN: nl  Visual Fields: full  Motor: no gross focal weakness  Sensory:  DTR:  Babinski:      MEDS:  MEDICATIONS  (STANDING):  aspirin Suppository 300 milliGRAM(s) Rectal daily  brimonidine 0.2% Ophthalmic Solution 1 Drop(s) Both EYES two times a day  dextrose 5%. 1000 milliLiter(s) (50 mL/Hr) IV Continuous <Continuous>  dextrose 50% Injectable 12.5 Gram(s) IV Push once  dextrose 50% Injectable 25 Gram(s) IV Push once  dextrose 50% Injectable 25 Gram(s) IV Push once  enoxaparin Injectable 40 milliGRAM(s) SubCutaneous daily  insulin lispro (HumaLOG) corrective regimen sliding scale   SubCutaneous three times a day before meals  labetalol Injectable      labetalol Injectable 10 milliGRAM(s) IV Push every 8 hours  lacosamide IVPB 50 milliGRAM(s) IV Intermittent every 12 hours  latanoprost 0.005% Ophthalmic Solution 1 Drop(s) Both EYES at bedtime  timolol 0.25% Solution 1 Drop(s) Both EYES daily  vancomycin  IVPB 750 milliGRAM(s) IV Intermittent every 12 hours  vancomycin  IVPB        MEDICATIONS  (PRN):  dextrose 40% Gel 15 Gram(s) Oral once PRN Blood Glucose LESS THAN 70 milliGRAM(s)/deciliter  glucagon  Injectable 1 milliGRAM(s) IntraMuscular once PRN Glucose LESS THAN 70 milligrams/deciliter  hydrALAZINE Injectable 10 milliGRAM(s) IV Push every 8 hours PRN sbp >170  or dbp >100      LABS:                          10.7   3.3   )-----------( 147      ( 29 Nov 2018 07:40 )             32.4     11-29    137  |  104  |  17.0  ----------------------------<  209<H>  4.2   |  21.0<L>  |  0.83    Ca    9.2      29 Nov 2018 07:40            RADIOLOGY & ADDITIONAL STUDIES:      IMPRESSION & PLAN:    Epilepsy  Carotid stenosis  Encephalopathy- improved  Bacteremia/urosepsis- ? await final culture results- as per ID    REC  Continue Vimpat 50mg BID - can change to oral when able- same dosing  Will not actively follow.   Neurologically cleared for discharge/disposition.  Please recontact as needed.  Follow up in office in 4-6 weeks.

## 2018-11-30 NOTE — DISCHARGE NOTE ADULT - CARE PLAN
Principal Discharge DX:	Cerebrovascular accident (CVA), unspecified mechanism  Secondary Diagnosis:	Seizure  Secondary Diagnosis:	Dysphagia, unspecified type  Secondary Diagnosis:	Essential hypertension  Secondary Diagnosis:	Bradycardia  Secondary Diagnosis:	Type 2 diabetes mellitus without complication, with long-term current use of insulin  Secondary Diagnosis:	Hyperlipidemia LDL goal <100 Principal Discharge DX:	Cerebrovascular accident (CVA), unspecified mechanism  Goal:	Secondary prevention  Assessment and plan of treatment:	Continue oral aspirin therapy  Continue pravastatin  PT and speech therapy as an outpatient  Secondary Diagnosis:	Seizure  Assessment and plan of treatment:	Lacosamide 50mg PO OD  Follow up with your neurologist in 1-2 weeks  Secondary Diagnosis:	Dysphagia, unspecified type  Assessment and plan of treatment:	Soft diet with nectar thick fluid  aspiration precautions  Secondary Diagnosis:	Essential hypertension  Assessment and plan of treatment:	Hydralazine increased to 25mg Three times per day  Secondary Diagnosis:	Bradycardia  Assessment and plan of treatment:	Discontinue labetalol  Avoid all blood pressure medications that will slow your heart rate  Secondary Diagnosis:	Type 2 diabetes mellitus without complication, with long-term current use of insulin  Assessment and plan of treatment:	continue home medications  Secondary Diagnosis:	Hyperlipidemia LDL goal <100  Assessment and plan of treatment:	Pravastatin po

## 2018-11-30 NOTE — DISCHARGE NOTE ADULT - PATIENT PORTAL LINK FT
You can access the [facility] Patient Portal, offered by [facility], by registering with the following website: [email or web address]

## 2018-11-30 NOTE — DISCHARGE NOTE ADULT - HOSPITAL COURSE
The patient is a 89 year old female with a history of CVA< seizure disorder, hypertension and diabetes mellitus who was brought to the ER for complaints of altered mental status. Patient was diagnosed with seizure disorder last year and started on keppra po. Over the past few months the family has noted she has been more lethargic and confused. She saw Dr Mcallister, neurologist at [place]. According to the daughter she had a number of tests which indicated she did not have seizure disorder and she was advised to taper off the keppra. Her daughter tapered it off over the past 2 weeks. The morning of admission she noted her mother became unresponsive, fell to her side, noted facial droop, her eyes were "fluttering" and her tongue was rolled back. After the episode she remained lethargic for about 1 hours. In the ER, NIH was 0. Patient was more alert and responsive. CT head was negative. Initially started on IV Keppra EEG positive for seizure activity. Spoke with neurology regarding family's concerns about Keppra causing lethargy; Changed to lacosamide. MRI positive for acute right frontal cva. Discussed the benefits of statin therapy with the patient's family in regards to secondary prevention; in agreement with statin therapy- LDL of 253. HbAc of 6. Blood cultures 1of 2 positive for CNS; UA negative. Started on iv Vancomycin repeat blood cultures ordered on 11/28. Passed swallow evaluation; started on PO soft diet with nectar fluid. Seen by PT; stable for home with 24 hour assistance from daughter and PT. Daughter refusing homecare; will arrange home pt herself. Noted to have episodes of bradycardia with sinus pause. Spoke with cardiology, iv labetalol discontinued with improvement. Recommend no AV jordan blocking agents. Hydralazine increased to 25mg TID. Aspirin to continue. Family refusing lipitor or zocor for history of myalgia. Recommend pravastatin on discharge. Discharge home pending follow up blood cultures. The patient is a 89 year old female with a history of CVA< seizure disorder, hypertension and diabetes mellitus who was brought to the ER for complaints of altered mental status. Patient was diagnosed with seizure disorder last year and started on keppra po. Over the past few months the family has noted she has been more lethargic and confused. She saw Dr Mcallister, neurologist at [place]. According to the daughter she had a number of tests which indicated she did not have seizure disorder and she was advised to taper off the keppra. Her daughter tapered it off over the past 2 weeks. The morning of admission she noted her mother became unresponsive, fell to her side, noted facial droop, her eyes were "fluttering" and her tongue was rolled back. After the episode she remained lethargic for about 1 hours. In the ER, NIH was 0. Patient was more alert and responsive. CT head was negative. Initially started on IV Keppra EEG positive for seizure activity. Spoke with neurology regarding family's concerns about Keppra causing lethargy; Changed to lacosamide. MRI positive for acute right frontal cva. Discussed the benefits of statin therapy with the patient's family in regards to secondary prevention; in agreement with statin therapy- LDL of 253. HbAc of 6. Blood cultures 1of 2 positive for CNS; UA negative. Started on iv Vancomycin repeat blood cultures ordered on 11/28. Passed swallow evaluation; started on PO soft diet with nectar fluid. Seen by PT; stable for home with 24 hour assistance from daughter and PT. Daughter refusing homecare; will arrange home pt herself. Noted to have episodes of bradycardia with sinus pause. Spoke with cardiology, iv labetalol discontinued with improvement. Recommend no AV jordan blocking agents. Hydralazine increased to 25mg TID. Aspirin to continue. Family refusing lipitor or zocor for history of myalgia. Recommend pravastatin on discharge. Repeat blood cultures negative x 2. IV vancomcyin discontinued

## 2018-11-30 NOTE — DISCHARGE NOTE ADULT - SECONDARY DIAGNOSIS.
Seizure Dysphagia, unspecified type Essential hypertension Bradycardia Type 2 diabetes mellitus without complication, with long-term current use of insulin Hyperlipidemia LDL goal <100

## 2018-11-30 NOTE — PROGRESS NOTE ADULT - SUBJECTIVE AND OBJECTIVE BOX
CC: Sinus pause and bradycardia     INTERVAL HPI/OVERNIGHT EVENTS: Patient seen and examined, more awake today. Was able to pass swallow evaluation and diet started. BP improved. HR improving on monitor of IV labetalol.       Vital Signs Last 24 Hrs  T(C): 36.9 (30 Nov 2018 10:10), Max: 37.2 (29 Nov 2018 15:14)  T(F): 98.4 (30 Nov 2018 10:10), Max: 99 (29 Nov 2018 15:14)  HR: 57 (30 Nov 2018 10:10) (53 - 68)  BP: 136/56 (30 Nov 2018 10:10) (136/56 - 170/60)  BP(mean): --  RR: 17 (30 Nov 2018 10:10) (16 - 18)  SpO2: 97% (30 Nov 2018 10:10) (94% - 100%)    PHYSICAL EXAM:    GENERAL: NAD, AOx2  ENMT: Moist mucous membranes  NECK: Supple, No JVD  NERVOUS SYSTEM:  Alert & Oriented X3, Motor Strength 5/5 B/L upper and lower extremitie  CHEST/LUNG: Clear to auscultation bilaterally; No rales, rhonchi, wheezing, or rubs  HEART: Regular rate and rhythm; No murmurs, rubs, or gallops  ABDOMEN: Soft, Nontender, Nondistended; Bowel sounds present  EXTREMITIES:  2+ Peripheral Pulses, No clubbing, cyanosis, or edema        MEDICATIONS  (STANDING):  aspirin enteric coated 81 milliGRAM(s) Oral daily  brimonidine 0.2% Ophthalmic Solution 1 Drop(s) Both EYES two times a day  dextrose 5%. 1000 milliLiter(s) (50 mL/Hr) IV Continuous <Continuous>  dextrose 50% Injectable 12.5 Gram(s) IV Push once  dextrose 50% Injectable 25 Gram(s) IV Push once  dextrose 50% Injectable 25 Gram(s) IV Push once  enoxaparin Injectable 40 milliGRAM(s) SubCutaneous daily  hydrALAZINE 25 milliGRAM(s) Oral three times a day  insulin lispro (HumaLOG) corrective regimen sliding scale   SubCutaneous three times a day before meals  lacosamide 50 milliGRAM(s) Oral two times a day  latanoprost 0.005% Ophthalmic Solution 1 Drop(s) Both EYES at bedtime  saccharomyces boulardii 250 milliGRAM(s) Oral two times a day  timolol 0.25% Solution 1 Drop(s) Both EYES daily  vancomycin  IVPB 500 milliGRAM(s) IV Intermittent every 12 hours    MEDICATIONS  (PRN):  dextrose 40% Gel 15 Gram(s) Oral once PRN Blood Glucose LESS THAN 70 milliGRAM(s)/deciliter  glucagon  Injectable 1 milliGRAM(s) IntraMuscular once PRN Glucose LESS THAN 70 milligrams/deciliter  hydrALAZINE Injectable 10 milliGRAM(s) IV Push every 8 hours PRN sbp >170  or dbp >100      Allergies    contrast media (iodine-based) (Urticaria)  erythromycin (Unknown)  IV-Dye (Unknown)  sulfADIAZINE (Unknown)    Intolerances          LABS:                          11.2   4.1   )-----------( 216      ( 30 Nov 2018 06:09 )             33.4     11-30    142  |  104  |  17.0  ----------------------------<  105  3.6   |  22.0  |  0.94    Ca    9.6      30 Nov 2018 06:09            RADIOLOGY & ADDITIONAL TESTS:

## 2018-11-30 NOTE — PROGRESS NOTE ADULT - ASSESSMENT
The patient is a 89 year old female with a history of CVA< seizure disorder, hypertension and diabetes mellitus who was brought to the ER for complaints of altered mental status. Patient was diagnosed with seizure disorder last year and started on keppra po. Over the past few months the family has noted she has been more lethargic and confused. She saw Dr Mcallister, neurologist at [place]. According to the daughter she had a number of tests which indicated she did not have seizure disorder and she was advised to taper off the keppra. Her daughter tapered it off over the past 2 weeks. The morning of admission she noted her mother became unresponsive, fell to her side, noted facial droop, her eyes were "fluttering" and her tongue was rolled back. After the episode she remained lethargic for about 1 hours. In the ER, NIH was 0. Patient was more alert and responsive. CT head was negative. Initially started on IV Keppra EEG positive for seizure activity. Spoke with neurology regarding family's concerns about Keppra causing lethargy; Changed to lacosamide. MRI positive for acute right frontal cva. Discussed the benefits of statin therapy with the patient's family in regards to secondary prevention; in agreement with statin therapy- LDL of 253. HbAc of 6. Blood cultures 1of 2 positive for CNS; UA negative. Started on iv Vancomycin repeat blood cultures ordered on 11/28. Passed swallow evaluation; started on PO soft diet with nectar fluid. Seen by PT; stable for home with 24 hour assistance from daughter and PT. Daughter refusing homecare; will arrange home pt herself. Noted to have episodes of bradycardia with sinus pause. Spoke with cardiology, iv labetalol discontinued with improvement. Recommend no AV jordan blocking agents. Hydralazine increased to 25mg TID. Aspirin to continue. Family refusing lipitor or zocor for history of myalgia. Recommend pravastatin on discharge. Discharge home pending follow up blood cultures.     Assessment/Plan:    1. Acute encephalopathy secondary to seizure confirmed on EEG and Acute right frontal CVA: Resolved  Diet resumed  Continue po aspirin  Family refusing lipitor or zocor due to history of mylagias. Pravastatin on discharge  PO lacosamide  Echocardiogram reviewed  Carotid duplex with stenosis- medical management with aspirin and statin therapy  HOme PT on diischarge     2. Hypertensive urgency: BP improved  D.c labetalol; no av jordan blockign agents for bradycardia and pause  Increase hydralazine to Q 8hours   Monitor Blood pressure    3. Diabetes mellitus type 2; HSS. Monitor BSL    4. Gram positive bacteremia: 1 of 2 cultures positive for CNS- contamination? Continue iv vancomcyin- d/c if repeat cultures form 11/28 are negative.   UA negative    5. Chronic UTI: Ua negative. Urine culture negative  Resume PO keflex prophylaxis on discharge if repeat blood cultures are negative    6. Hypernatremia: Resolved with iv hydration    VTE_ lovenox subcut    Discharge planning: Home in am if repeat blood cultures negative. daughter refusing home care.     Plan was discussed in detail with the patient's daughter at the bedside and son Marco Antonio via phone call The patient is a 89 year old female with a history of CVA< seizure disorder, hypertension and diabetes mellitus who was brought to the ER for complaints of altered mental status. Patient was diagnosed with seizure disorder last year and started on keppra po. Over the past few months the family has noted she has been more lethargic and confused. She saw Dr Mcallister, neurologist at [place]. According to the daughter she had a number of tests which indicated she did not have seizure disorder and she was advised to taper off the keppra. Her daughter tapered it off over the past 2 weeks. The morning of admission she noted her mother became unresponsive, fell to her side, noted facial droop, her eyes were "fluttering" and her tongue was rolled back. After the episode she remained lethargic for about 1 hours. In the ER, NIH was 0. Patient was more alert and responsive. CT head was negative. Initially started on IV Keppra EEG positive for seizure activity. Spoke with neurology regarding family's concerns about Keppra causing lethargy; Changed to lacosamide. MRI positive for acute right frontal cva. Discussed the benefits of statin therapy with the patient's family in regards to secondary prevention; in agreement with statin therapy- LDL of 253. HbAc of 6. Blood cultures 1of 2 positive for CNS; UA negative. Started on iv Vancomycin repeat blood cultures ordered on 11/28. Passed swallow evaluation; started on PO soft diet with nectar fluid. Seen by PT; stable for home with 24 hour assistance from daughter and PT. Daughter refusing homecare; will arrange home pt herself. Noted to have episodes of bradycardia with sinus pause. Spoke with cardiology, iv labetalol discontinued with improvement. Recommend no AV jordan blocking agents. Hydralazine increased to 25mg TID. Aspirin to continue. Family refusing lipitor or zocor for history of myalgia. Recommend pravastatin on discharge. Repeat blood cultures x 2     Assessment/Plan:    1. Acute encephalopathy secondary to seizure confirmed on EEG and Acute right frontal CVA: Resolved  Diet resumed  Continue po aspirin  Family refusing lipitor or zocor due to history of mylagias. Pravastatin on discharge  PO lacosamide 50mg BID   Echocardiogram reviewed  Carotid duplex with stenosis- medical management with aspirin and statin therapy  HOme PT on diischarge     2. Hypertensive urgency: BP improved  D.c labetalol; no av jordan blocking agents for bradycardia and pause  Increase hydralazine to Q 8hours   Monitor Blood pressure    3. Diabetes mellitus type 2; HSS. Monitor BSL    4. Gram positive bacteremia: 1 of 2 cultures positive for CNS- contamination- Likely contamination. Repeat blood cultures negative x 2. D/c vancomycin  UA and Ucx negative     5. Chronic UTI: Ua negative. Urine culture negative  Resume PO keflex prophylaxis on discharge     6. Hypernatremia: Resolved with iv hydration    VTE_ lovenox subcut    Discharge planning: Home in am if tolerating diet and hr stable. Plan discussed with patient's daughter and son via phone  at the bedside in detail.   Patient's daughter refused home care, CM aware.

## 2018-11-30 NOTE — PHARMACOTHERAPY INTERVENTION NOTE - COMMENTS
Spoke to daughter at bedside. Patient has been on cephalexin once daily for last several months for UTI prophylaxis due to recurrent UTIs and hospitalization for sepsis. She was also weaned off levetiracetam about 6 weeks ago due to lethargy.
Code attended: Stroke
Continue vancomycin 750mg every 12 hours. Vanco trough level ordered for 11/30 @5am (1 hour before giving 6am dose) -- pre 4th dose
Vancomycin decreased to 500mg every 12 hours for coag neg staph bacteremia. Trough was 18 while on 750mg q 12hours after receiving 3 doses - pt not yet at steady state, anticipate a higher level once she is at steady state.    Next trough level ordered for 12/2 @5am (1 hour before giving 6am dose)

## 2018-11-30 NOTE — DISCHARGE NOTE ADULT - PLAN OF CARE
Secondary prevention Continue oral aspirin therapy  Continue pravastatin  PT and speech therapy as an outpatient Lacosamide 50mg PO OD  Follow up with your neurologist in 1-2 weeks Soft diet with nectar thick fluid  aspiration precautions Hydralazine increased to 25mg Three times per day Discontinue labetalol  Avoid all blood pressure medications that will slow your heart rate continue home medications Pravastatin po

## 2018-11-30 NOTE — PROGRESS NOTE ADULT - SUBJECTIVE AND OBJECTIVE BOX
[facility], [facility]                                   [address] [address]                                                      PHONE: [phone]                                                         FAX: [phone]  ----------------------------------------------------------------------------------------------------  Pt seen and examined. FU for CVA  Tele no events overnight after her labetalol was DC    Overnight events/Complaints: Pt without complains. On BiPAP. Family at bedside.    Vital Signs Last 24 Hrs  T(C): 37.1 (29 Nov 2018 10:18), Max: 37.1 (29 Nov 2018 10:18)  T(F): 98.7 (29 Nov 2018 10:18), Max: 98.7 (29 Nov 2018 10:18)  HR: 53 (29 Nov 2018 12:17) (50 - 68)  BP: 140/64 (29 Nov 2018 12:17) (140/50 - 191/74)  BP(mean): --  RR: 16 (29 Nov 2018 10:18) (16 - 18)  SpO2: 96% (29 Nov 2018 10:18) (94% - 96%)  I&O's Summary    28 Nov 2018 07:01  -  29 Nov 2018 07:00  --------------------------------------------------------  IN: 600 mL / OUT: 0 mL / NET: 600 mL        PHYSICAL EXAM:  Constitutional: Appears well developed, well nourished; alert and co-operative  HEENT:     Head: Normocephalic and atraumatic  Eyes: Conjunctiva normal, No scleral icterus  Neck: supple. No JVD  Mouth/Throat: Mucous membranes are normal. Mucosa are not pale or dry.  Cardiovascular: Normal S1 S2, No murmurs  Respiratory: Lungs clear to auscultation; no crepitations, no wheeze  Gastrointestinal:  Soft, Non-tender, + BS	  Musculoskeletal: Normal range of motion. No edema  Skin: Warm and dry. No cyanosis . No diaphoresis.  Neurologic: Alert oriented to time place and person. Normal strength and no tremor.  Psychiatric: Normal mood and affect, Speech is normal and behavior is normal.        LABS:                        10.7   3.3   )-----------( 147      ( 29 Nov 2018 07:40 )             32.4     11-29    137  |  104  |  17.0  ----------------------------<  209<H>  4.2   |  21.0<L>  |  0.83    Ca    9.2      29 Nov 2018 07:40              RADIOLOGY & ADDITIONAL STUDIES: (reviewed)  CAROTID DUPLEX:  On the right there is moderate calcified plaque at the carotid bulb   extending into the proximal right internal and external carotid artery.   Additional moderate calcific plaque in the mid right internal carotid   artery. Peak systolic velocity in the proximal right internal carotid   artery measures 113 cm/s and at the carotid bulb measures 120 cm/s, which   is borderline elevated corresponding to 50-69% stenosis.    On the left there is soft plaque in the mid to distal common carotid   artery. Significant mixed plaque at the carotid bulb extending into the   proximal internal and external carotid artery. A color bruit at the   carotid bulb with elevated velocities measuring larger 58 cm/s   corresponding to 50-69% stenosis.    Vertebral artery flow is antegrade bilaterally.  Patent bilateral external carotid arteries.    IMPRESSION:      Findings compatible with 50-69% stenosis on the left with top normal   velocities on the right which may represent 50-69% stenosis.    CARDIOLOGY TESTING:(reviewed)     TELEMETRY (Independent visualization) : sinus bradycardia with sinus pause    ECHOCARDIOGRAM:  < from: TTE Echo Complete w/Doppler (11.26.18 @ 18:28) >  PHYSICIAN INTERPRETATION:  Left Ventricle: Endocardial visualization was enhanced with intravenous   echo contrast. The left ventricular internal cavity size is normal.Left   ventricular wall thickness is increased.  Global LV systolic function was normal. Left ventricular ejection   fraction, by visual estimation, is 65 to 70%. Spectral Doppler shows   normal pattern of LV diastolic filling.  Right Ventricle: The right ventricular size is normal. RV systolic   function is normal.  Left Atrium: Severely enlarged left atrium.  Right Atrium: Normal right atrial size.  Pericardium: There is no evidence of pericardial effusion.  Mitral Valve: Thickening and calcification of the anterior and posterior   mitral valve leaflets. There is mild mitral annular calcification. Mild   mitral valve regurgitation is seen.  Tricuspid Valve: The tricuspid valve is normal. Mild tricuspid   regurgitation is visualized.  AorticValve: The aortic valve is trileaflet. Sclerotic aortic valve with   normal opening. No evidence of aortic valve regurgitation is seen.  Pulmonic Valve: The pulmonic valve is normal. Trace pulmonic valve   regurgitation.  Aorta: The aortic root is normal in size and structure.  Pulmonary Artery: The pulmonary artery is of normal size and origin.  Venous: The inferior vena cava is normal. The inferior vena cava was   normal sized, with respiratory size variation greater than 50%.       Summary:   1. Left ventricular ejection fraction, by visual estimation, is 65 to   70%.   2. Normal global left ventricular systolic function.   3. Increased LV wall thickness.   4. There is mild concentric left ventricular hypertrophy.   5. Mild mitral annular calcification.   6. Thickening and calcification of the anterior and posterior mitral   valve leaflets.   7. Mild tricuspid regurgitation.   8. Sclerotic aortic valve with normal opening.   9. Trace pulmonic valve regurgitation.  10. Endocardial visualization was enhanced with intravenous echo contrast.    [id number] Ravi Skinner MD, Electronically signed on 11/26/2018 at   10:39:19 PM       < end of copied text >    MEDICATIONS:(reviewed)  MEDICATIONS  (STANDING):  aspirin Suppository 300 milliGRAM(s) Rectal daily  brimonidine 0.2% Ophthalmic Solution 1 Drop(s) Both EYES two times a day  dextrose 5%. 1000 milliLiter(s) (50 mL/Hr) IV Continuous <Continuous>  dextrose 50% Injectable 12.5 Gram(s) IV Push once  dextrose 50% Injectable 25 Gram(s) IV Push once  dextrose 50% Injectable 25 Gram(s) IV Push once  enoxaparin Injectable 40 milliGRAM(s) SubCutaneous daily  hydrALAZINE Injectable 10 milliGRAM(s) IV Push two times a day  insulin lispro (HumaLOG) corrective regimen sliding scale   SubCutaneous three times a day before meals  lacosamide IVPB 50 milliGRAM(s) IV Intermittent every 12 hours  latanoprost 0.005% Ophthalmic Solution 1 Drop(s) Both EYES at bedtime  timolol 0.25% Solution 1 Drop(s) Both EYES daily  vancomycin  IVPB 750 milliGRAM(s) IV Intermittent every 12 hours  vancomycin  IVPB        MEDICATIONS  (PRN):  dextrose 40% Gel 15 Gram(s) Oral once PRN Blood Glucose LESS THAN 70 milliGRAM(s)/deciliter  glucagon  Injectable 1 milliGRAM(s) IntraMuscular once PRN Glucose LESS THAN 70 milligrams/deciliter  hydrALAZINE Injectable 10 milliGRAM(s) IV Push every 8 hours PRN sbp >170  or dbp >100      ASSESSMENT AND PLAN:    89 year old female with a history of CVA, seizure disorder, hypertension and diabetes mellitus who was brought to the ER for complaints of altered mental status. MRI positive for acute right frontal CVA. Noted to have episodes of bradycardia with sinus pause. BP documented to be stable at those times with no obvious symptoms as per family at bedside.    1. Continue telemetry monitoring for now  2. Avoid AVN blockers including labetalol for BP control  3. Episodes of sinus pause resolved  4. increase Hydralazine 25 mg PO TID    Discussed with family at bedside in detail.    Extensive chart review performed. Case discussed with family. Time 40 mins.

## 2018-12-01 NOTE — PROGRESS NOTE ADULT - SUBJECTIVE AND OBJECTIVE BOX
CHIEF COMPLAINT/INTERVAL HISTORY:    Patient is a 89y old  Female who presents with a chief complaint of Altered mental status (01 Dec 2018 14:34)    SUBJECTIVE & OBJECTIVE: Pt seen and examined at bedside. No overnight events. Labs remarkable for acute kidney injury today. Patient also noted to by hypertensive; which responded to IV hydralazine.    ROS: No chest pain, palpitations, SOB, light headedness, dizziness, headache, nausea/vomiting, fevers/chills, abdominal pain, dysuria or increased urinary frequency.    ICU Vital Signs Last 24 Hrs  T(C): 36.9 (01 Dec 2018 12:52), Max: 37.1 (2018 15:59)  T(F): 98.4 (01 Dec 2018 12:52), Max: 98.7 (2018 15:59)  HR: 64 (01 Dec 2018 15:15) (62 - 68)  BP: 136/64 (01 Dec 2018 15:15) (136/64 - 213/89)  RR: 18 (01 Dec 2018 14:41) (18 - 18)  SpO2: 100% (01 Dec 2018 14:41) (95% - 100%)    MEDICATIONS  (STANDING):  aspirin enteric coated 81 milliGRAM(s) Oral daily  brimonidine 0.2% Ophthalmic Solution 1 Drop(s) Both EYES daily  dextrose 5%. 1000 milliLiter(s) (50 mL/Hr) IV Continuous <Continuous>  dextrose 50% Injectable 12.5 Gram(s) IV Push once  dextrose 50% Injectable 25 Gram(s) IV Push once  dextrose 50% Injectable 25 Gram(s) IV Push once  enoxaparin Injectable 40 milliGRAM(s) SubCutaneous daily  hydrALAZINE 25 milliGRAM(s) Oral three times a day  hydrALAZINE Injectable 10 milliGRAM(s) IV Push once  insulin lispro (HumaLOG) corrective regimen sliding scale   SubCutaneous three times a day before meals  lacosamide 50 milliGRAM(s) Oral two times a day  latanoprost 0.005% Ophthalmic Solution 1 Drop(s) Both EYES at bedtime  saccharomyces boulardii 250 milliGRAM(s) Oral two times a day  timolol 0.25% Solution 1 Drop(s) Both EYES daily    MEDICATIONS  (PRN):  acetaminophen   Tablet .. 650 milliGRAM(s) Oral every 6 hours PRN Mild Pain (1 - 3)  dextrose 40% Gel 15 Gram(s) Oral once PRN Blood Glucose LESS THAN 70 milliGRAM(s)/deciliter  glucagon  Injectable 1 milliGRAM(s) IntraMuscular once PRN Glucose LESS THAN 70 milligrams/deciliter  hydrALAZINE Injectable 10 milliGRAM(s) IV Push every 8 hours PRN sbp >170  or dbp >100      LABS:                        11.0   4.4   )-----------( 203      ( 01 Dec 2018 06:28 )             33.1         142  |  106  |  22.0<H>  ----------------------------<  136<H>  3.8   |  23.0  |  1.42<H>    Ca    9.6      01 Dec 2018 06:28        Urinalysis Basic - ( 01 Dec 2018 14:36 )    Color: Yellow / Appearance: Clear / S.020 / pH: x  Gluc: x / Ketone: Trace  / Bili: Negative / Urobili: Negative mg/dL   Blood: x / Protein: 30 mg/dL / Nitrite: Negative   Leuk Esterase: Trace / RBC: 0-2 /HPF / WBC 0-2   Sq Epi: x / Non Sq Epi: Occasional / Bacteria: x      CAPILLARY BLOOD GLUCOSE      POCT Blood Glucose.: 176 mg/dL (01 Dec 2018 11:59)  POCT Blood Glucose.: 143 mg/dL (01 Dec 2018 08:25)  POCT Blood Glucose.: 214 mg/dL (2018 22:15)  POCT Blood Glucose.: 159 mg/dL (2018 17:45)      PHYSICAL EXAM:    GENERAL: elderly female, laying in bed, NAD  HEAD:  Atraumatic, Normocephalic  EYES: EOMI, PERRLA, conjunctiva and sclera clear  ENMT: Moist mucous membranes  NECK: Supple  CHEST/LUNG: bilateral air entry  HEART: Regular rate and rhythm; + S1/S2  ABDOMEN: Soft, Nontender, Nondistended; Bowel sounds present  EXTREMITIES:  no pedal edema

## 2018-12-01 NOTE — PROGRESS NOTE ADULT - SUBJECTIVE AND OBJECTIVE BOX
[facility], [facility]                                   [address] [address]                                                      PHONE: [phone]                                                         FAX: [phone]  ----------------------------------------------------------------------------------------------------  Pt seen and examined. FU for CVA  Tele no further events overnight after her labetalol was DC    Overnight events/Complaints: Pt without complains. On BiPAP. Family at bedside.    Vital Signs Last 24 Hrs  T(C): 37.1 (29 Nov 2018 10:18), Max: 37.1 (29 Nov 2018 10:18)  T(F): 98.7 (29 Nov 2018 10:18), Max: 98.7 (29 Nov 2018 10:18)  HR: 53 (29 Nov 2018 12:17) (50 - 68)  BP: 140/64 (29 Nov 2018 12:17) (140/50 - 191/74)  BP(mean): --  RR: 16 (29 Nov 2018 10:18) (16 - 18)  SpO2: 96% (29 Nov 2018 10:18) (94% - 96%)  I&O's Summary    28 Nov 2018 07:01  -  29 Nov 2018 07:00  --------------------------------------------------------  IN: 600 mL / OUT: 0 mL / NET: 600 mL        PHYSICAL EXAM:  Constitutional: Appears well developed, well nourished; alert and co-operative  HEENT:     Head: Normocephalic and atraumatic  Eyes: Conjunctiva normal, No scleral icterus  Neck: supple. No JVD  Mouth/Throat: Mucous membranes are normal. Mucosa are not pale or dry.  Cardiovascular: Normal S1 S2, No murmurs  Respiratory: Lungs clear to auscultation; no crepitations, no wheeze  Gastrointestinal:  Soft, Non-tender, + BS	  Musculoskeletal: Normal range of motion. No edema  Skin: Warm and dry. No cyanosis . No diaphoresis.  Neurologic: Alert oriented to time place and person. Normal strength and no tremor.  Psychiatric: Normal mood and affect, Speech is normal and behavior is normal.        LABS:                        10.7   3.3   )-----------( 147      ( 29 Nov 2018 07:40 )             32.4     11-29    137  |  104  |  17.0  ----------------------------<  209<H>  4.2   |  21.0<L>  |  0.83    Ca    9.2      29 Nov 2018 07:40              RADIOLOGY & ADDITIONAL STUDIES: (reviewed)  CAROTID DUPLEX:  On the right there is moderate calcified plaque at the carotid bulb   extending into the proximal right internal and external carotid artery.   Additional moderate calcific plaque in the mid right internal carotid   artery. Peak systolic velocity in the proximal right internal carotid   artery measures 113 cm/s and at the carotid bulb measures 120 cm/s, which   is borderline elevated corresponding to 50-69% stenosis.    On the left there is soft plaque in the mid to distal common carotid   artery. Significant mixed plaque at the carotid bulb extending into the   proximal internal and external carotid artery. A color bruit at the   carotid bulb with elevated velocities measuring larger 58 cm/s   corresponding to 50-69% stenosis.    Vertebral artery flow is antegrade bilaterally.  Patent bilateral external carotid arteries.    IMPRESSION:      Findings compatible with 50-69% stenosis on the left with top normal   velocities on the right which may represent 50-69% stenosis.    CARDIOLOGY TESTING:(reviewed)     TELEMETRY (Independent visualization) : sinus bradycardia with sinus pause    ECHOCARDIOGRAM:  < from: TTE Echo Complete w/Doppler (11.26.18 @ 18:28) >  PHYSICIAN INTERPRETATION:  Left Ventricle: Endocardial visualization was enhanced with intravenous   echo contrast. The left ventricular internal cavity size is normal.Left   ventricular wall thickness is increased.  Global LV systolic function was normal. Left ventricular ejection   fraction, by visual estimation, is 65 to 70%. Spectral Doppler shows   normal pattern of LV diastolic filling.  Right Ventricle: The right ventricular size is normal. RV systolic   function is normal.  Left Atrium: Severely enlarged left atrium.  Right Atrium: Normal right atrial size.  Pericardium: There is no evidence of pericardial effusion.  Mitral Valve: Thickening and calcification of the anterior and posterior   mitral valve leaflets. There is mild mitral annular calcification. Mild   mitral valve regurgitation is seen.  Tricuspid Valve: The tricuspid valve is normal. Mild tricuspid   regurgitation is visualized.  AorticValve: The aortic valve is trileaflet. Sclerotic aortic valve with   normal opening. No evidence of aortic valve regurgitation is seen.  Pulmonic Valve: The pulmonic valve is normal. Trace pulmonic valve   regurgitation.  Aorta: The aortic root is normal in size and structure.  Pulmonary Artery: The pulmonary artery is of normal size and origin.  Venous: The inferior vena cava is normal. The inferior vena cava was   normal sized, with respiratory size variation greater than 50%.       Summary:   1. Left ventricular ejection fraction, by visual estimation, is 65 to   70%.   2. Normal global left ventricular systolic function.   3. Increased LV wall thickness.   4. There is mild concentric left ventricular hypertrophy.   5. Mild mitral annular calcification.   6. Thickening and calcification of the anterior and posterior mitral   valve leaflets.   7. Mild tricuspid regurgitation.   8. Sclerotic aortic valve with normal opening.   9. Trace pulmonic valve regurgitation.  10. Endocardial visualization was enhanced with intravenous echo contrast.    [id number] Ravi Skinner MD, Electronically signed on 11/26/2018 at   10:39:19 PM       < end of copied text >    MEDICATIONS:(reviewed)  MEDICATIONS  (STANDING):  aspirin Suppository 300 milliGRAM(s) Rectal daily  brimonidine 0.2% Ophthalmic Solution 1 Drop(s) Both EYES two times a day  dextrose 5%. 1000 milliLiter(s) (50 mL/Hr) IV Continuous <Continuous>  dextrose 50% Injectable 12.5 Gram(s) IV Push once  dextrose 50% Injectable 25 Gram(s) IV Push once  dextrose 50% Injectable 25 Gram(s) IV Push once  enoxaparin Injectable 40 milliGRAM(s) SubCutaneous daily  hydrALAZINE Injectable 10 milliGRAM(s) IV Push two times a day  insulin lispro (HumaLOG) corrective regimen sliding scale   SubCutaneous three times a day before meals  lacosamide IVPB 50 milliGRAM(s) IV Intermittent every 12 hours  latanoprost 0.005% Ophthalmic Solution 1 Drop(s) Both EYES at bedtime  timolol 0.25% Solution 1 Drop(s) Both EYES daily  vancomycin  IVPB 750 milliGRAM(s) IV Intermittent every 12 hours  vancomycin  IVPB        MEDICATIONS  (PRN):  dextrose 40% Gel 15 Gram(s) Oral once PRN Blood Glucose LESS THAN 70 milliGRAM(s)/deciliter  glucagon  Injectable 1 milliGRAM(s) IntraMuscular once PRN Glucose LESS THAN 70 milligrams/deciliter  hydrALAZINE Injectable 10 milliGRAM(s) IV Push every 8 hours PRN sbp >170  or dbp >100      ASSESSMENT AND PLAN:    89 year old female with a history of CVA, seizure disorder, hypertension and diabetes mellitus who was brought to the ER for complaints of altered mental status. MRI positive for acute right frontal CVA. Noted to have episodes of bradycardia with sinus pause. BP documented to be stable at those times with no obvious symptoms as per family at bedside.  Tele no further events overnight after her labetalol was DC    1. DC tele  2. Avoid AVN blockers including labetalol for BP control  3. Episodes of sinus pause resolved  4. increase Hydralazine 25 mg PO TID    Discussed with family at bedside in detail.  No further cardiac intervention at present

## 2018-12-01 NOTE — PROGRESS NOTE ADULT - SUBJECTIVE AND OBJECTIVE BOX
Called to insert IV, IV inserted without difficulty - see procedure report.  Patient also noted with hypertension, stat dose of apresoline given.    Hypertension  give due medications  Tele - stable no bradycardia noted  Stat dose of apresoline 10 IV  repeat 30 minutes and notify MD with continue elevation.

## 2018-12-01 NOTE — PROGRESS NOTE ADULT - ASSESSMENT
Patient is a 89 year old female with a history of CVA, seizure disorder, hypertension and diabetes mellitus who was brought to the ER for complaints of altered mental status. Patient was diagnosed with seizure disorder last year and started on keppra po. Over the past few months the family has noted she has been more lethargic and confused. She saw Dr Mcallister, neurologist at [place]. According to the daughter she had a number of tests which indicated she did not have seizure disorder and she was advised to taper off the keppra. Her daughter tapered it off over the past 2 weeks. The morning of admission she noted her mother became unresponsive, fell to her side, noted facial droop, her eyes were "fluttering" and her tongue was rolled back. After the episode she remained lethargic for about 1 hours. In the ER, NIH was 0. Patient was more alert and responsive. CT head was negative. Initially started on IV Keppra EEG positive for seizure activity. Spoke with neurology regarding family's concerns about Keppra causing lethargy; Changed to lacosamide. MRI positive for acute right frontal cva. Discussed the benefits of statin therapy with the patient's family in regards to secondary prevention; in agreement with statin therapy- LDL of 253. HbAc of 6. Blood cultures 1of 2 positive for CNS; UA negative. Started on iv Vancomycin repeat blood cultures ordered on 11/28. Passed swallow evaluation; started on PO soft diet with nectar fluid. Seen by PT; stable for home with 24 hour assistance from daughter and PT. Daughter refusing homecare; will arrange home pt herself. Noted to have episodes of bradycardia with sinus pause. Spoke with cardiology, iv labetalol discontinued with improvement. Recommend no AV jordan blocking agents. Hydralazine increased to 25mg TID. Aspirin to continue. Family refusing lipitor or zocor for history of myalgia. Recommend pravastatin on discharge. Repeat blood cultures x 2 negative.    1. Acute encephalopathy secondary to seizure confirmed on EEG and Acute right frontal CVA   -encephalopathy resolved; patient at baseline mental status per daughter  -EEG, CT head and MRI reviewed  -continue dysphagia diet, but change to pureed and will have SLP follow up in AM given coughing after meals  -continue ASA; family refusing statin due to history of myalgias    -continue lacosamide 50 mg BID   -TTE reviewed  -Carotid duplex with stenosis- medical management with aspirin and statin therapy  -Home PT    2. Hypertensive urgency   -BP stable after IV hydralazine today  -No AV jordan blocking agents for bradycardia and pause  -Increase hydralazine to 25 mg Q 8hours   -D/c cardiac monitor   -cardio recommendations appreciated    3. Diabetes mellitus type 2  -HbA1c 6.0  -continue ISS    4. CoNS bacteremia: 1 of 2 cultures positive for CNS- contamination   Repeat blood cultures negative x 2   UA and Ucx negative   Discontinued Vanco    5. Chronic UTI  -Ua negative. Urine culture negative  -Resume PO keflex prophylaxis on discharge     6. Acute Kidney Injury.   -Likely secondary to prerenal azotemia  -Repeat UA  -Check Urine lytes  -Check bladder scan  -Strict I/Os, daily weights  -Judicious hydration  -Avoid nephrotoxic agents    DVT prophylaxis - Lovenox SC

## 2018-12-02 NOTE — PROGRESS NOTE ADULT - ASSESSMENT
Patient is a 89 year old female with a history of CVA, seizure disorder, hypertension and diabetes mellitus who was brought to the ER for complaints of altered mental status. Patient was diagnosed with seizure disorder last year and started on keppra po. Over the past few months the family has noted she has been more lethargic and confused. She saw Dr Mcallister, neurologist at [place]. According to the daughter she had a number of tests which indicated she did not have seizure disorder and she was advised to taper off the keppra. Her daughter tapered it off over the past 2 weeks. The morning of admission she noted her mother became unresponsive, fell to her side, noted facial droop, her eyes were "fluttering" and her tongue was rolled back. After the episode she remained lethargic for about 1 hours. In the ER, NIH was 0. Patient was more alert and responsive. CT head was negative. Initially started on IV Keppra EEG positive for seizure activity. Spoke with neurology regarding family's concerns about Keppra causing lethargy; Changed to lacosamide. MRI positive for acute right frontal cva. Discussed the benefits of statin therapy with the patient's family in regards to secondary prevention; in agreement with statin therapy- LDL of 253. HbAc of 6. Blood cultures 1of 2 positive for CNS; UA negative. Started on iv Vancomycin repeat blood cultures ordered on 11/28. Passed swallow evaluation; started on PO soft diet with nectar fluid. Seen by PT; stable for home with 24 hour assistance from daughter and PT. Daughter refusing homecare; will arrange home pt herself. Noted to have episodes of bradycardia with sinus pause. Spoke with cardiology, iv labetalol discontinued with improvement. Recommend no AV jordan blocking agents. Hydralazine increased to 25mg TID. Aspirin to continue. Family refusing lipitor or zocor for history of myalgia. Recommend pravastatin on discharge. Repeat blood cultures x 2 negative.    1. Acute encephalopathy secondary to seizure confirmed on EEG and Acute right frontal CVA   -lethargic earlier today, no evidence of CO2 narcosis or acute pathology on CT  -now at baseline mental status per daughter  -EEG, CT head and MRI reviewed  -continue dysphagia diet, SLP follow up in AM given coughing after meals  -continue ASA; family refusing statin due to history of myalgias    -continue lacosamide 50 mg BID   -TTE reviewed  -Carotid duplex with stenosis- medical management with aspirin and statin therapy  -Home PT; family declined [facility] or home services    2. Hypertensive urgency   -BP elevated  -No AV jordan blocking agents for bradycardia and pause  -Continue hydralazine to 25 mg Q 8hours   -Add norvasc 5 mg daily  -cardio recommendations appreciated    3. Diabetes mellitus type 2  -HbA1c 6.0  -continue ISS    4. CoNS bacteremia: 1 of 2 cultures positive for CNS- contamination   Repeat blood cultures negative x 2   UA and Ucx negative   Discontinued Vanco    5. Chronic UTI  -Ua negative. Urine culture negative  -Resume PO keflex prophylaxis on discharge     6. Acute Kidney Injury.   -Likely secondary to prerenal azotemia  -Repeat UA bland  -Urine lytes reviewed  -creatinine improved with judicious hydration  -Strict I/Os, daily weights  -Avoid nephrotoxic agents    DVT prophylaxis - Lovenox SC    Attending Attestation:   Plan discussed with patient, daughter, RN.    Discharge in 24 hours.

## 2018-12-03 NOTE — PROGRESS NOTE ADULT - SUBJECTIVE AND OBJECTIVE BOX
CHIEF COMPLAINT/INTERVAL HISTORY:    Patient is a 89y old  Female who presents with a chief complaint of Altered mental status (03 Dec 2018 10:11)    SUBJECTIVE & OBJECTIVE: Pt seen and examined at bedside. No overnight events. Patient lethargic again today; Na+ 146 and creatinine trending up. Will start hypotonic fluids.    ROS: Unobtainable      ICU Vital Signs Last 24 Hrs  T(C): 37.2 (03 Dec 2018 07:31), Max: 37.2 (03 Dec 2018 07:31)  T(F): 98.9 (03 Dec 2018 07:31), Max: 98.9 (03 Dec 2018 07:31)  HR: 81 (03 Dec 2018 07:31) (65 - 81)  BP: 146/75 (03 Dec 2018 07:31) (146/75 - 178/70)  RR: 19 (03 Dec 2018 07:31) (19 - 20)  SpO2: 97% (03 Dec 2018 07:31) (97% - 100%)    MEDICATIONS  (STANDING):  amLODIPine   Tablet 5 milliGRAM(s) Oral daily  aspirin enteric coated 81 milliGRAM(s) Oral daily  brimonidine 0.2% Ophthalmic Solution 1 Drop(s) Both EYES daily  dextrose 5%. 1000 milliLiter(s) (50 mL/Hr) IV Continuous <Continuous>  dextrose 50% Injectable 12.5 Gram(s) IV Push once  dextrose 50% Injectable 25 Gram(s) IV Push once  dextrose 50% Injectable 25 Gram(s) IV Push once  enoxaparin Injectable 40 milliGRAM(s) SubCutaneous daily  hydrALAZINE 25 milliGRAM(s) Oral three times a day  hydrALAZINE Injectable 10 milliGRAM(s) IV Push once  insulin lispro (HumaLOG) corrective regimen sliding scale   SubCutaneous three times a day before meals  lacosamide 50 milliGRAM(s) Oral two times a day  latanoprost 0.005% Ophthalmic Solution 1 Drop(s) Both EYES at bedtime  saccharomyces boulardii 250 milliGRAM(s) Oral two times a day  sodium chloride 0.45%. 1000 milliLiter(s) (60 mL/Hr) IV Continuous <Continuous>  timolol 0.25% Solution 1 Drop(s) Both EYES daily    MEDICATIONS  (PRN):  acetaminophen   Tablet .. 650 milliGRAM(s) Oral every 6 hours PRN Mild Pain (1 - 3)  dextrose 40% Gel 15 Gram(s) Oral once PRN Blood Glucose LESS THAN 70 milliGRAM(s)/deciliter  glucagon  Injectable 1 milliGRAM(s) IntraMuscular once PRN Glucose LESS THAN 70 milligrams/deciliter  hydrALAZINE Injectable 10 milliGRAM(s) IV Push every 8 hours PRN sbp >170  or dbp >100      LABS:                        10.4   5.3   )-----------( 198      ( 03 Dec 2018 07:08 )             31.6     12-03    146<H>  |  110<H>  |  18.0  ----------------------------<  133<H>  3.8   |  22.0  |  1.13    Ca    9.5      03 Dec 2018 07:08  Phos  3.1     12-03  Mg     1.9     12-03      CAPILLARY BLOOD GLUCOSE      POCT Blood Glucose.: 142 mg/dL (03 Dec 2018 12:23)  POCT Blood Glucose.: 143 mg/dL (03 Dec 2018 08:05)  POCT Blood Glucose.: 163 mg/dL (02 Dec 2018 18:06)      PHYSICAL EXAM:    GENERAL: elderly female, laying in bed, drowsy  HEAD:  Atraumatic, Normocephalic  EYES: EOMI, PERRLA, conjunctiva and sclera clear  ENMT: Moist mucous membranes  NECK: Supple  CHEST/LUNG: bilateral air entry  HEART: Regular rate and rhythm; + S1/S2  ABDOMEN: Soft, Nontender, Nondistended; Bowel sounds present  EXTREMITIES:  no pedal edema

## 2018-12-03 NOTE — SWALLOW BEDSIDE ASSESSMENT ADULT - ORAL PHASE
significant delayed oral transit time greater than 10 seconds due to lethargy/Delayed oral transit time/Decreased anterior-posterior movement of the bolus
delayed oral transit time 10-15 seconds; benefitted from verbal cue to elicits swallow/Decreased anterior-posterior movement of the bolus

## 2018-12-03 NOTE — PROGRESS NOTE ADULT - ASSESSMENT
1. 89 yo F adm with AMS and hypertensive urgency. MRI revealed a very small frontal lobe cva.  2. bp appears controlled with norvasc and hydralzaine  3. Pt developed pauses on beta blocker-dc'd.

## 2018-12-03 NOTE — PROGRESS NOTE ADULT - ASSESSMENT
Patient is a 89 year old female with a history of CVA, seizure disorder, hypertension and diabetes mellitus who was brought to the ER for complaints of altered mental status. Patient was diagnosed with seizure disorder last year and started on keppra po. Over the past few months the family has noted she has been more lethargic and confused. She saw Dr Mcallister, neurologist at [place]. According to the daughter she had a number of tests which indicated she did not have seizure disorder and she was advised to taper off the keppra. Her daughter tapered it off over the past 2 weeks. The morning of admission she noted her mother became unresponsive, fell to her side, noted facial droop, her eyes were "fluttering" and her tongue was rolled back. After the episode she remained lethargic for about 1 hours. In the ER, NIH was 0. Patient was more alert and responsive. CT head was negative. Initially started on IV Keppra EEG positive for seizure activity. Spoke with neurology regarding family's concerns about Keppra causing lethargy; Changed to lacosamide. MRI positive for acute right frontal cva. Discussed the benefits of statin therapy with the patient's family in regards to secondary prevention; in agreement with statin therapy- LDL of 253. HbAc of 6. Blood cultures 1of 2 positive for CNS; UA negative. Started on iv Vancomycin repeat blood cultures ordered on 11/28. Passed swallow evaluation; started on PO soft diet with nectar fluid. Seen by PT; stable for home with 24 hour assistance from daughter and PT. Daughter refusing homecare; will arrange home pt herself. Noted to have episodes of bradycardia with sinus pause. Spoke with cardiology, iv labetalol discontinued with improvement. Recommend no AV jordan blocking agents. Hydralazine increased to 25mg TID. Aspirin to continue. Family refusing lipitor or zocor for history of myalgia. Recommend pravastatin on discharge. Repeat blood cultures x 2 negative.    1. Acute encephalopathy secondary to seizure confirmed on EEG and Acute right frontal CVA   -intermittent drowsiness, no evidence of CO2 narcosis or acute pathology on CT  -Na+ 146 - started on IVF  -EEG, CT head and MRI reviewed  -continue dysphagia diet, SLP follow up in AM given coughing after meals  -continue ASA; family refusing statin due to history of myalgias    -continue lacosamide 50 mg BID   -TTE reviewed  -Carotid duplex with stenosis- medical management with aspirin and statin therapy  -Home PT; family declined [facility] or home services  -discussed with Dr. Rudolph; Vimpat less likely to be contributing to lethargy compared to keppra - continue current dose  -if lethargy does not improve after hydration and improvement in hyponatremia and uremia; will re-call neuro in 24 hours    2. Hypertensive urgency   -BP stable  -No AV jordan blocking agents for bradycardia and pause  -Continue hydralazine to 25 mg Q 8hours and norvasc 5 mg daily  -cardio recommendations appreciated    3. Diabetes mellitus type 2  -HbA1c 6.0  -continue ISS    4. CoNS bacteremia: 1 of 2 cultures positive for CNS- contamination   Repeat blood cultures negative x 2   UA and Ucx negative   Discontinued Vanco    5. Chronic UTI  -Ua negative. Urine culture negative  -Resume PO keflex prophylaxis on discharge     6. Acute Kidney Injury.   -Likely secondary to prerenal azotemia  -Repeat UA bland  -Urine lytes reviewed  -creatinine improved with judicious hydration but trending up after fluids discontinued due to poor PO intake  -Strict I/Os, daily weights  -Avoid nephrotoxic agents    7. Hypernatremia  -due to poor PO intake  -start hypotonic fluids  -repeat BMP    DVT prophylaxis - Lovenox SC    Attending Attestation:   Plan discussed with patient, daughter, Dr. Rudolph, RN.    Discharge in 24 hours if lethargy improves.

## 2018-12-03 NOTE — SWALLOW BEDSIDE ASSESSMENT ADULT - SLP GENERAL OBSERVATIONS
pt in bed intermittently awake, with eyes closed, Ox1 with pts daughter present. Pt agreeable to assessment once informed po was going to be given, pt perked up.
Pt received asleep in bed, opened eyes to voice and tactile cues, and maintained arousal for a brief period, however soon returned to sleep.  Pt able to state her name and daughters name, follow simple commands when awake

## 2018-12-03 NOTE — PROGRESS NOTE ADULT - SUBJECTIVE AND OBJECTIVE BOX
Chief Complaint: chart and hx reviewed.    HPI: 89 yo F adm with ams and hypertensive urgency. MRI showed a very small acute frontal cva. Comorbidites include IDDM. Allergy to sulfa and iodine.    PAST MEDICAL & SURGICAL HISTORY:  Diabetes mellitus  Fracture: lumbar fracture.  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  Heart Murmur  Calcium Nephrolithiasis  Hyperlipidemia LDL Goal < 100  Glaucoma, Low Tension  History of Pulmonary Hypertension  CAD (Coronary Artery Disease)  Obstructive Sleep Apnea  Controlled Type 2 Diabetes with Neuropathy  H/O cataract extraction  History of appendectomy  Fracture of Humerus: repair with harware right  Glaucoma: relieve pressure left eye along with cataract extraction  Bilateral Cataracts: excision   Benign Breast Disease: removal of cyst- 1963  Cataract  Kidney Stones: lithotripsy ,   S/P Appendectomy: 1937      PREVIOUS DIAGNOSTIC TESTING:      ECHO  FINDINGS: preserved EF/mild lvh    STRESS  FINDINGS:    CATHETERIZATION  FINDINGS:    MEDICATIONS  (STANDING):  amLODIPine   Tablet 5 milliGRAM(s) Oral daily  aspirin enteric coated 81 milliGRAM(s) Oral daily  brimonidine 0.2% Ophthalmic Solution 1 Drop(s) Both EYES daily  dextrose 5%. 1000 milliLiter(s) (50 mL/Hr) IV Continuous <Continuous>  dextrose 50% Injectable 12.5 Gram(s) IV Push once  dextrose 50% Injectable 25 Gram(s) IV Push once  dextrose 50% Injectable 25 Gram(s) IV Push once  enoxaparin Injectable 40 milliGRAM(s) SubCutaneous daily  hydrALAZINE 25 milliGRAM(s) Oral three times a day  hydrALAZINE Injectable 10 milliGRAM(s) IV Push once  insulin lispro (HumaLOG) corrective regimen sliding scale   SubCutaneous three times a day before meals  lacosamide 50 milliGRAM(s) Oral two times a day  latanoprost 0.005% Ophthalmic Solution 1 Drop(s) Both EYES at bedtime  saccharomyces boulardii 250 milliGRAM(s) Oral two times a day  sodium chloride 0.45%. 1000 milliLiter(s) (100 mL/Hr) IV Continuous <Continuous>  timolol 0.25% Solution 1 Drop(s) Both EYES daily    MEDICATIONS  (PRN):  acetaminophen   Tablet .. 650 milliGRAM(s) Oral every 6 hours PRN Mild Pain (1 - 3)  dextrose 40% Gel 15 Gram(s) Oral once PRN Blood Glucose LESS THAN 70 milliGRAM(s)/deciliter  glucagon  Injectable 1 milliGRAM(s) IntraMuscular once PRN Glucose LESS THAN 70 milligrams/deciliter  hydrALAZINE Injectable 10 milliGRAM(s) IV Push every 8 hours PRN sbp >170  or dbp >100      FAMILY HISTORY:  No pertinent family history  Family history of cerebrovascular accident (CVA) (Sibling)      ROS: Negative other than as mentioned in HPI.    Vital Signs Last 24 Hrs  T(C): 37.2 (03 Dec 2018 07:31), Max: 37.2 (03 Dec 2018 07:)  T(F): 98.9 (03 Dec 2018 07:), Max: 98.9 (03 Dec 2018 07:)  HR: 70 (03 Dec 2018 07:) (65 - 81)  BP: 120/80 manually  (03 Dec 2018 07:) (146/75 - 178/70)  BP(mean): --  RR: 14 (03 Dec 2018 07:) (18 - 20)  SpO2: 97% (03 Dec 2018 07:) (96% - 100%)    PHYSICAL EXAM:  General: elderly somnolent F nad. Dtr at bedside   HEENT: Head; normocephalic, atraumatic.  Eyes;   Pupils reactive, cornea wnl.  Neck; Supple, no nodes adenopathy, no NVD or carotid bruit or thyromegaly.  CARDIOVASCULAR; No murmur, rub, gallop or lift. Normal S1 and S2.  LUNGS; good bs bilat.  ABDOMEN ; Soft, nontender without mass or organomegaly. bowel sounds normoactive.  EXTREMITIES; No clubbing, cyanosis or edema.   SKIN; warm and dry with normal turgor.  NEURO; somnolent but arousable   PSYCH; normal affect.            INTERPRETATION OF TELEMETRY:    ECG: SR RBBB first degree avb    I&O's Detail      LABS:                        10.4   5.3   )-----------( 198      ( 03 Dec 2018 07:08 )             31.6     12-03    146<H>  |  110<H>  |  18.0  ----------------------------<  133<H>  3.8   |  22.0  |  1.13    Ca    9.5      03 Dec 2018 07:08  Phos  3.1     12-03  Mg     1.9     12-03            Urinalysis Basic - ( 01 Dec 2018 14:36 )    Color: Yellow / Appearance: Clear / S.020 / pH: x  Gluc: x / Ketone: Trace  / Bili: Negative / Urobili: Negative mg/dL   Blood: x / Protein: 30 mg/dL / Nitrite: Negative   Leuk Esterase: Trace / RBC: 0-2 /HPF / WBC 0-2   Sq Epi: x / Non Sq Epi: Occasional / Bacteria: x      I&O's Summary      RADIOLOGY & ADDITIONAL STUDIES:

## 2018-12-03 NOTE — SWALLOW BEDSIDE ASSESSMENT ADULT - SWALLOW EVAL: RECOMMENDED DIET
NPO with alternative short term means of nutrition and hydration
Defer to MD 2* limited assessment; will follow to reassess

## 2018-12-03 NOTE — SWALLOW BEDSIDE ASSESSMENT ADULT - SLP PERTINENT HISTORY OF CURRENT PROBLEM
Pt admitted with AMD, MRI +for acute right frontal CVA. Pt last seen by this service 11/30 with a rx for soft with nectar liquids. Pt is s/p an MBS study at [facility] 7/2018 with a rx for regular solids and nectar thickened liquids.

## 2018-12-03 NOTE — SWALLOW BEDSIDE ASSESSMENT ADULT - ADDITIONAL RECOMMENDATIONS
Will continue to follow
Pt's d/c is possibly pending today. Pt's daughter educated at length regarding aspiration precautions and need to feed pt only when awake and alert. Pt's daughter verbalized understanding, noting " that is what we do at home - she eats at all different times depending on when she is awake"

## 2018-12-03 NOTE — SWALLOW BEDSIDE ASSESSMENT ADULT - ASR SWALLOW ASPIRATION MONITOR
oral hygiene/fever/throat clearing/pneumonia/change of breathing pattern/position upright (90Y)/cough/gurgly voice

## 2018-12-04 NOTE — GOALS OF CARE CONVERSATION - PERSONAL ADVANCE DIRECTIVE - CONVERSATION DETAILS
Hospice services explained to patient's daughter Ronel Pemberton. As per daughter the plan is for the patient to return home. Home care vs hospice discussed. Hospice consents explained but not signed by daughter. Daughter does not feel that her mother is ready for hospice at this time but she will discuss it with her other siblings before making a final decision. Hospice contact information given to the daughter.  made aware. Emperatriz SCHAEFFER

## 2018-12-04 NOTE — PROGRESS NOTE ADULT - SUBJECTIVE AND OBJECTIVE BOX
INTERVAL HISTORY:  Was called for re-eval at request of daughter as she wanted more information about Vimpat.  Pt was switched over from Keppra due to excessive lethargy by Dr Conti.  Pt has been doing well without any further seizure events.  Daughter was concerned about lethargy and eagerly awaits for discharge.    contrast media (iodine-based) (Urticaria)  erythromycin (Unknown)  IV-Dye (Unknown)  sulfADIAZINE (Unknown)      VITAL SIGNS:  Vital Signs Last 24 Hrs  T(C): 37 (04 Dec 2018 08:07), Max: 37.1 (03 Dec 2018 23:08)  T(F): 98.6 (04 Dec 2018 08:07), Max: 98.8 (03 Dec 2018 23:08)  HR: 77 (04 Dec 2018 08:07) (77 - 83)  BP: 151/76 (04 Dec 2018 08:07) (150/80 - 171/80)  BP(mean): --  RR: 20 (04 Dec 2018 08:07) (20 - 20)  SpO2: 99% (04 Dec 2018 08:07) (97% - 100%)    PHYSICAL EXAMINATION:  General: Well-developed, well nourished, in no acute distress.  Eyes: Conjunctiva and sclera clear.  Neurologic:  - Mental Status:  Asleep and arousable, Speech is dysarthric; follows commands.  - Cranial Nerves: Pupil 4mm b/l reactive; + corneal; no facial; + dolls and + gag.  - Motor:  Symmetric and spontaneous movements all 4 ext  Normal muscle bulk and tone throughout.  - Reflexes:  2+ throughout  - Sensory:  WD to noxious throughout.  - Coordination:  Pt unable.    MEDS:  MEDICATIONS  (STANDING):  amLODIPine   Tablet 5 milliGRAM(s) Oral daily  aspirin enteric coated 81 milliGRAM(s) Oral daily  brimonidine 0.2% Ophthalmic Solution 1 Drop(s) Both EYES daily  dextrose 5%. 1000 milliLiter(s) (50 mL/Hr) IV Continuous <Continuous>  dextrose 50% Injectable 12.5 Gram(s) IV Push once  dextrose 50% Injectable 25 Gram(s) IV Push once  dextrose 50% Injectable 25 Gram(s) IV Push once  enoxaparin Injectable 40 milliGRAM(s) SubCutaneous daily  hydrALAZINE 25 milliGRAM(s) Oral three times a day  hydrALAZINE Injectable 10 milliGRAM(s) IV Push once  insulin lispro (HumaLOG) corrective regimen sliding scale   SubCutaneous three times a day before meals  lacosamide 50 milliGRAM(s) Oral two times a day  latanoprost 0.005% Ophthalmic Solution 1 Drop(s) Both EYES at bedtime  sodium chloride 0.45%. 1000 milliLiter(s) (50 mL/Hr) IV Continuous <Continuous>  timolol 0.25% Solution 1 Drop(s) Both EYES daily    MEDICATIONS  (PRN):  acetaminophen   Tablet .. 650 milliGRAM(s) Oral every 6 hours PRN Mild Pain (1 - 3)  dextrose 40% Gel 15 Gram(s) Oral once PRN Blood Glucose LESS THAN 70 milliGRAM(s)/deciliter  glucagon  Injectable 1 milliGRAM(s) IntraMuscular once PRN Glucose LESS THAN 70 milligrams/deciliter  hydrALAZINE Injectable 10 milliGRAM(s) IV Push every 8 hours PRN sbp >170  or dbp >100      LABS:                          10.5   4.3   )-----------( 203      ( 04 Dec 2018 08:17 )             31.0     12-04    142  |  106  |  19.0  ----------------------------<  117<H>  3.8   |  21.0<L>  |  1.08    Ca    9.5      04 Dec 2018 08:17  Phos  3.4     12-04  Mg     1.8     12-04            RADIOLOGY & ADDITIONAL STUDIES:      CT Head No Cont (12.02.18 @ 15:32) >  1)  multifocal chronic ischemic changes with atrophy. Similar findings   were noted on prior CT. A tiny acute infarct on the MRI of 11/28/2018 is   not discernible.  2)  no hemorrhagic lesion or mass noted. Extensive atherosclerotic   changes intracranially.    MR Head No Cont (11.28.18 @ 15:24) >  Acute tiny right frontal lobe cortical infarct.    US Duplex Carotid Arteries Complete, Bilateral (11.26.18 @ 18:19) >  Findings compatible with 50-69% stenosis on the left with top normal   velocities on the right which may represent 50-69% stenosis.    TTE Echo Complete w/Doppler (11.26.18 @ 18:28) >  Summary:   1. Left ventricular ejection fraction, by visual estimation, is 65 to   70%.   2. Normal global left ventricular systolic function.   3. Increased LV wall thickness.   4. There is mild concentric left ventricular hypertrophy.   5. Mild mitral annular calcification.   6. Thickening and calcification of the anterior and posterior mitral   valve leaflets.   7. Mild tricuspid regurgitation.   8. Sclerotic aortic valve with normal opening.   9. Trace pulmonic valve regurgitation.  10. Endocardial visualization was enhanced with intravenous echo contrast.

## 2018-12-04 NOTE — PROGRESS NOTE ADULT - ASSESSMENT
Patient is a 89 year old female with a history of CVA, seizure disorder, hypertension and diabetes mellitus who was brought to the ER for complaints of altered mental status. Patient was diagnosed with seizure disorder last year and started on keppra po. Over the past few months the family has noted she has been more lethargic and confused. She saw Dr Mcallister, neurologist at [facility]. According to the daughter she had a number of tests which indicated she did not have seizure disorder and she was advised to taper off the keppra. Her daughter tapered it off over the past 2 weeks. The morning of admission she noted her mother became unresponsive, fell to her side, noted facial droop, her eyes were "fluttering" and her tongue was rolled back. After the episode she remained lethargic for about 1 hours. In the ER, NIH was 0. Patient was more alert and responsive. CT head was negative. Initially started on IV Keppra EEG positive for seizure activity. Spoke with neurology regarding family's concerns about Keppra causing lethargy; Changed to lacosamide. MRI positive for acute right frontal cva. Discussed the benefits of statin therapy with the patient's family in regards to secondary prevention; in agreement with statin therapy- LDL of 253. HbAc of 6. Blood cultures 1of 2 positive for CNS; UA negative. Started on iv Vancomycin repeat blood cultures ordered on 11/28. Passed swallow evaluation; started on PO soft diet with nectar fluid. Seen by PT; stable for home with 24 hour assistance from daughter and PT. Daughter refusing homecare; will arrange home pt herself. Noted to have episodes of bradycardia with sinus pause. Spoke with cardiology, iv labetalol discontinued with improvement. Recommend no AV jordan blocking agents. Hydralazine increased to 25mg TID. Aspirin to continue. Family refusing lipitor or zocor for history of myalgia. Recommend pravastatin on discharge. Repeat blood cultures x 2 negative. Hospital course complicated by worsening lethargy. Metabolic parameters acceptable; hypernatremia and uremia resolved. Neurology re-consulted; recommending to continue Vimpat. Palliative care consult.     1. Acute encephalopathy secondary to seizure confirmed on EEG and Acute right frontal CVA   -intermittent drowsiness, no evidence of CO2 narcosis or acute pathology on CT  -hypernatremia and uremia resolved  -EEG, CT head and MRI reviewed  -continue dysphagia diet as speech recommendations  -continue ASA; family refusing statin due to history of myalgias    -continue lacosamide 50 mg BID per neurology  -TTE reviewed  -Carotid duplex with stenosis- medical management with aspirin and statin therapy  -Home PT; family declined [facility] or home services but agreeable to palliative care consult and possible home hospice  -discussed with Dr. Rudolph; Vimpat less likely to be contributing to lethargy compared to keppra - continue current dose  -neuro recommendations noted    2. Hypertensive urgency   -BP stable  -No AV jordan blocking agents for bradycardia and pause  -Continue hydralazine to 25 mg Q 8hours and norvasc 5 mg daily  -cardio recommendations appreciated    3. Diabetes mellitus type 2  -HbA1c 6.0  -continue ISS    4. CoNS bacteremia: 1 of 2 cultures positive for CNS- contamination   Repeat blood cultures negative x 2   UA and Ucx negative   Discontinued Vanco    5. Chronic UTI  -Ua negative. Urine culture negative  -Resume PO keflex prophylaxis on discharge     6. Acute Kidney Injury.   -Likely secondary to prerenal azotemia  -Repeat UA bland  -Urine lytes reviewed  -creatinine improved with judicious hydration   -Strict I/Os, daily weights  -Avoid nephrotoxic agents    7. Hypernatremia - resolved  -due to poor PO intake  -continue parenteral free water x 24 hours    DVT prophylaxis - Lovenox SC    Attending Attestation:   Plan discussed with patient, daughter, Dr. Rudolph, RN.    Palliative care consult; possible home hospice if family agreeable.

## 2018-12-04 NOTE — PROGRESS NOTE ADULT - ASSESSMENT
1. 89 yo F adm with AMS and hypertensive urgency. MRI revealed a very small frontal lobe cva.  2. bp appears controlled with norvasc and hydralzaine  3. Pt developed pauses on beta blocker-dc'd.      No further cardiac intervention  Discussed with daughter in detail

## 2018-12-04 NOTE — PROGRESS NOTE ADULT - SUBJECTIVE AND OBJECTIVE BOX
CHIEF COMPLAINT/INTERVAL HISTORY:    Patient is a 89y old  Female who presents with a chief complaint of Altered mental status (04 Dec 2018 10:27)    SUBJECTIVE & OBJECTIVE: Pt seen and examined at bedside. No overnight events. Patient still lethargic this morning despite improvement in metabolic parameters. Neuro re-consulted given family's request to discontinue Vimpat. Ammonia level WNL. TSH WNL. Family agreeable to palliative care consult and possible home hospice.     ROS: Unobtainable    ICU Vital Signs Last 24 Hrs  T(C): 37 (04 Dec 2018 08:07), Max: 37.1 (03 Dec 2018 23:08)  T(F): 98.6 (04 Dec 2018 08:07), Max: 98.8 (03 Dec 2018 23:08)  HR: 77 (04 Dec 2018 08:07) (77 - 83)  BP: 151/76 (04 Dec 2018 08:07) (150/80 - 171/80)  RR: 20 (04 Dec 2018 08:07) (20 - 20)  SpO2: 99% (04 Dec 2018 08:07) (97% - 100%)    MEDICATIONS  (STANDING):  amLODIPine   Tablet 5 milliGRAM(s) Oral daily  aspirin enteric coated 81 milliGRAM(s) Oral daily  brimonidine 0.2% Ophthalmic Solution 1 Drop(s) Both EYES daily  dextrose 5%. 1000 milliLiter(s) (50 mL/Hr) IV Continuous <Continuous>  dextrose 50% Injectable 12.5 Gram(s) IV Push once  dextrose 50% Injectable 25 Gram(s) IV Push once  dextrose 50% Injectable 25 Gram(s) IV Push once  enoxaparin Injectable 40 milliGRAM(s) SubCutaneous daily  hydrALAZINE 25 milliGRAM(s) Oral three times a day  hydrALAZINE Injectable 10 milliGRAM(s) IV Push once  insulin lispro (HumaLOG) corrective regimen sliding scale   SubCutaneous three times a day before meals  lacosamide 50 milliGRAM(s) Oral two times a day  latanoprost 0.005% Ophthalmic Solution 1 Drop(s) Both EYES at bedtime  sodium chloride 0.45%. 1000 milliLiter(s) (50 mL/Hr) IV Continuous <Continuous>  timolol 0.25% Solution 1 Drop(s) Both EYES daily    MEDICATIONS  (PRN):  acetaminophen   Tablet .. 650 milliGRAM(s) Oral every 6 hours PRN Mild Pain (1 - 3)  dextrose 40% Gel 15 Gram(s) Oral once PRN Blood Glucose LESS THAN 70 milliGRAM(s)/deciliter  glucagon  Injectable 1 milliGRAM(s) IntraMuscular once PRN Glucose LESS THAN 70 milligrams/deciliter  hydrALAZINE Injectable 10 milliGRAM(s) IV Push every 8 hours PRN sbp >170  or dbp >100      LABS:                        10.5   4.3   )-----------( 203      ( 04 Dec 2018 08:17 )             31.0     12-04    142  |  106  |  19.0  ----------------------------<  117<H>  3.8   |  21.0<L>  |  1.08    Ca    9.5      04 Dec 2018 08:17  Phos  3.4     12-04  Mg     1.8     12-04        CAPILLARY BLOOD GLUCOSE      POCT Blood Glucose.: 125 mg/dL (04 Dec 2018 11:09)  POCT Blood Glucose.: 117 mg/dL (04 Dec 2018 08:29)  POCT Blood Glucose.: 83 mg/dL (03 Dec 2018 17:11)      PHYSICAL EXAM:    GENERAL: elderly female, laying in bed, drowsy  HEAD:  Atraumatic, Normocephalic  EYES: EOMI, PERRLA, conjunctiva and sclera clear  ENMT: Moist mucous membranes  NECK: Supple  CHEST/LUNG: bilateral air entry  HEART: Regular rate and rhythm; + S1/S2  ABDOMEN: Soft, Nontender, Nondistended; Bowel sounds present  EXTREMITIES:  no pedal edema

## 2018-12-04 NOTE — PROGRESS NOTE ADULT - ASSESSMENT
IMPRESSION & PLAN:    Recent CVA  Epilepsy  Encephalopathy multifactorial and sepsis - improved.    REC  Continue Vimpat 50mg BID - can change to oral when able- same dosing  Neurologically cleared for discharge/disposition.  Outpatient follow up at [facility] at [phone] or [phone] with Dr Conti primary treating Neurologist.  D/w daughter in great length.  DVT prophylaxis recommended.  Reconsult PRN.

## 2018-12-05 NOTE — PROGRESS NOTE ADULT - ASSESSMENT
Patient is a 89 year old female with a history of CVA, seizure disorder, hypertension and diabetes mellitus who was brought to the ER for complaints of altered mental status. Patient was diagnosed with seizure disorder last year and started on keppra po. Over the past few months the family has noted she has been more lethargic and confused. She saw Dr Mcallister, neurologist at [facility]. According to the daughter she had a number of tests which indicated she did not have seizure disorder and she was advised to taper off the keppra. Her daughter tapered it off over the past 2 weeks. The morning of admission she noted her mother became unresponsive, fell to her side, noted facial droop, her eyes were "fluttering" and her tongue was rolled back. After the episode she remained lethargic for about 1 hours. In the ER, NIH was 0. Patient was more alert and responsive. CT head was negative. Initially started on IV Keppra EEG positive for seizure activity. Spoke with neurology regarding family's concerns about Keppra causing lethargy; Changed to lacosamide. MRI positive for acute right frontal cva. Discussed the benefits of statin therapy with the patient's family in regards to secondary prevention; in agreement with statin therapy- LDL of 253. HbAc of 6. Blood cultures 1of 2 positive for CNS; UA negative. Started on iv Vancomycin repeat blood cultures ordered on 11/28. Passed swallow evaluation; started on PO soft diet with nectar fluid. Seen by PT; stable for home with 24 hour assistance from daughter and PT. Daughter refusing homecare; will arrange home pt herself. Noted to have episodes of bradycardia with sinus pause. Spoke with cardiology, iv labetalol discontinued with improvement. Recommend no AV jordan blocking agents. Hydralazine increased to 25mg TID. Aspirin to continue. Family refusing lipitor or zocor for history of myalgia. Recommend pravastatin on discharge. Repeat blood cultures x 2 negative. Hospital course complicated by worsening lethargy. Metabolic parameters acceptable; hypernatremia and uremia resolved. Neurology re-consulted; recommending to continue Vimpat. Palliative care consult appreciated; daughter refusing hospice services. Will arrange meeting with patient's children, including HCP (daughter in [place]).    1. Acute encephalopathy secondary to seizure confirmed on EEG and Acute right frontal CVA   -lethargic again today; no evidence of CO2 narcosis or acute pathology on CT  -hypernatremia and uremia resolved  -EEG, CT head and MRI reviewed  -continue dysphagia diet as speech recommendations  -continue ASA; family refusing statin due to history of myalgias    -continue lacosamide 50 mg BID per neurology but family refusing since patient's PCP recommended it to be discharged   -TTE reviewed  -Carotid duplex with stenosis- medical management with aspirin and statin therapy  -Home PT; family declined [facility] or home services but agreeable to palliative care consult. Daughter at bedside declining hospice services but requesting patient to be taken home with home care.  -neuro recommendations noted  -Will arrange for phone conference with patient's daughter in [place] who is the HCP and palliative care    2. Hypertensive urgency   -BP stable  -No AV jordan blocking agents for bradycardia and pause  -Continue hydralazine to 25 mg Q 8hours and norvasc 5 mg daily  -cardio recommendations appreciated    3. Diabetes mellitus type 2  -HbA1c 6.0  -continue ISS    4. CoNS bacteremia: 1 of 2 cultures positive for CNS- contamination   Repeat blood cultures negative x 2   UA and Ucx negative   Discontinued Vanco    5. Chronic UTI  -Ua negative. Urine culture negative  -Resume PO keflex prophylaxis on discharge     6. Acute Kidney Injury.   -Likely secondary to prerenal azotemia  -Repeat UA bland  -Urine lytes reviewed  -creatinine improved with judicious hydration   -Strict I/Os, daily weights  -Avoid nephrotoxic agents    7. Hypernatremia - resolved  -due to poor PO intake  -continue parenteral free water x 24 hours    DVT prophylaxis - Lovenox SC    Attending Attestation:   Plan discussed with patient, daughter, Dr. Gotti, RN.    Patient is chronically ill and acutely decompensating; now with worsening lethargy over the past few days and poor PO intake. Work up remains unrevealing; neuro follow up requested and vimpat deemed not to be contributing to lethargy. CO2 WNL. Repeat CT head stable. Palliative care consulted. Patient initially interested in home hospice, but now refusing and requesting to take patient home with home care. Daughter with unrealistic expectations about patient's overall medical condition. Daughter does not understand that patient will continue to decline without proper nutrition. GOC discussed; daughter states mom wants to "live" and never expressed her end of life wishes. Daughter unsure regarding tube feeds; but would like to speak to her siblings to discuss it further. At this time, writer will reach out to patient's other daughter in [place] who is the HCP to discuss goals of care and arrange for a family conference call on 12/6. Overall, poor prognosis. Time spent on discussion 25 minutes.

## 2018-12-05 NOTE — PROGRESS NOTE ADULT - SUBJECTIVE AND OBJECTIVE BOX
CHIEF COMPLAINT/INTERVAL HISTORY:    Patient is a 89y old  Female who presents with a chief complaint of Altered mental status (04 Dec 2018 14:30)    SUBJECTIVE & OBJECTIVE: Pt seen and examined at bedside. No overnight events. Patient remains lethargic this morning. Daughter at bedside refused Vimpat despite discussion with neurology and primary team. Detailed conversation held regarding GOC with daughter who states patient would not want to "die" and would like to bring her mother home without hospice services. Will reach out to patient's other daughter who is the HCP.    ROS: Unobtainable    ICU Vital Signs Last 24 Hrs  T(C): 37.7 (05 Dec 2018 09:00), Max: 37.7 (05 Dec 2018 09:00)  T(F): 99.8 (05 Dec 2018 09:00), Max: 99.8 (05 Dec 2018 09:00)  HR: 106 (05 Dec 2018 09:00) (68 - 106)  BP: 146/74 (05 Dec 2018 09:00) (146/74 - 170/76)  RR: 18 (05 Dec 2018 09:00) (18 - 20)  SpO2: 100% (05 Dec 2018 09:00) (98% - 100%)    MEDICATIONS  (STANDING):  amLODIPine   Tablet 5 milliGRAM(s) Oral daily  aspirin enteric coated 81 milliGRAM(s) Oral daily  brimonidine 0.2% Ophthalmic Solution 1 Drop(s) Both EYES daily  dextrose 5%. 1000 milliLiter(s) (50 mL/Hr) IV Continuous <Continuous>  dextrose 50% Injectable 12.5 Gram(s) IV Push once  dextrose 50% Injectable 25 Gram(s) IV Push once  dextrose 50% Injectable 25 Gram(s) IV Push once  enoxaparin Injectable 40 milliGRAM(s) SubCutaneous daily  hydrALAZINE 25 milliGRAM(s) Oral three times a day  hydrALAZINE Injectable 10 milliGRAM(s) IV Push once  insulin lispro (HumaLOG) corrective regimen sliding scale   SubCutaneous three times a day before meals  lacosamide 50 milliGRAM(s) Oral two times a day  latanoprost 0.005% Ophthalmic Solution 1 Drop(s) Both EYES at bedtime  timolol 0.25% Solution 1 Drop(s) Both EYES daily    MEDICATIONS  (PRN):  acetaminophen   Tablet .. 650 milliGRAM(s) Oral every 6 hours PRN Mild Pain (1 - 3)  dextrose 40% Gel 15 Gram(s) Oral once PRN Blood Glucose LESS THAN 70 milliGRAM(s)/deciliter  glucagon  Injectable 1 milliGRAM(s) IntraMuscular once PRN Glucose LESS THAN 70 milligrams/deciliter  hydrALAZINE Injectable 10 milliGRAM(s) IV Push every 8 hours PRN sbp >170  or dbp >100      LABS:                        9.9    4.8   )-----------( 212      ( 05 Dec 2018 07:27 )             29.9     12-05    142  |  106  |  22.0<H>  ----------------------------<  104  3.9   |  21.0<L>  |  1.16    Ca    9.5      05 Dec 2018 07:27  Phos  3.8     12-05  Mg     1.8     12-05      CAPILLARY BLOOD GLUCOSE      POCT Blood Glucose.: 116 mg/dL (05 Dec 2018 11:26)  POCT Blood Glucose.: 108 mg/dL (05 Dec 2018 08:55)  POCT Blood Glucose.: 97 mg/dL (04 Dec 2018 22:58)  POCT Blood Glucose.: 115 mg/dL (04 Dec 2018 17:19)      PHYSICAL EXAM:    GENERAL: elderly female, laying in bed, drowsy  HEAD:  Atraumatic, Normocephalic  EYES: EOMI, PERRLA, conjunctiva and sclera clear  ENMT: Moist mucous membranes  NECK: Supple  CHEST/LUNG: bilateral air entry  HEART: Regular rate and rhythm; + S1/S2  ABDOMEN: Soft, Nontender, Nondistended; Bowel sounds present  EXTREMITIES:  no pedal edema

## 2018-12-06 NOTE — CONSULT NOTE ADULT - PROBLEM SELECTOR RECOMMENDATION 9
ASA ordered- unable to take 2/2 mental status
possible TIA or seizure  aspirin, statin  Dr claros neurology called   Keppra to be restarted if OK with Dr Claros- was discontinued by a neurologist in [facility]

## 2018-12-06 NOTE — GOALS OF CARE CONVERSATION - PERSONAL ADVANCE DIRECTIVE - CONVERSATION DETAILS
daughter jason ask to speak to me to clarify the meaning of HCP  I did explain in detail to her and her brother the role of a hcp. they state they have this document and Rex is the  primary. family meting today in collaboration with palliative care attending dr cheryl camilo  and loida epperson  3pm today . Hospice will participate

## 2018-12-06 NOTE — CONSULT NOTE ADULT - SUBJECTIVE AND OBJECTIVE BOX
[facility]                                     Neurology at [facility]                                 Ashkan Rosenbaum & Christofer                                  [address]                                        [place], [place], [address]                                             [phone]    CC: second opinion  HPI:  The patient is a 89 year old female with a history of CVA< seizure disorder, hypertension and diabetes mellitus who was brought to the ER for complaints of altered mental status. Patient was diagnosed with seizure disorder last year and started on keppra po. Over the past few months the family has noted she has been more lethargic and confused. She saw Dr Mcallister, neurologist at [facility]. According to the daughter she had a number of tests which indicated she did not have seizure disorder and she was advised to taper off the keppra. Her daughter tapered it off over the past 2 weeks. This morning she noted her mother became unresponsive, fell to her side, noted facial droop, her eyes were "fluttering" and her tongue was rolled back. After the episode she remained lethargic for about 3 hours. In the ER, NIH was 0. Patient was more alert and responsive. CT head was negative. (26 Nov 2018 17:08)    The patient is a 89y Female who presented with AMS on 11/26/18. There was a question of seizure activity prior to admission.  She has h/o of abnormal EEGs with epileptiform discharges as both outpatient and inpatient during this admission.  She recently tapered keppra per a different outside neurologist.  She was restarted on keppra and then changed to low dose vimpat 50 mg bid.  She is still lethargic but has metabolic and infectious issues.  Her daughter is refusing vimpat at this time because of lethargy.  Neurology is asked to provide second opinion.    PAST MEDICAL & SURGICAL HISTORY:  Diabetes mellitus  Fracture: lumbar fracture.  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  Heart Murmur  Calcium Nephrolithiasis  Hyperlipidemia LDL Goal < 100  Glaucoma, Low Tension  History of Pulmonary Hypertension  CAD (Coronary Artery Disease)  Obstructive Sleep Apnea  Controlled Type 2 Diabetes with Neuropathy  H/O cataract extraction  History of appendectomy  Fracture of Humerus: repair with harware right  Glaucoma: relieve pressure left eye along with cataract extraction  Bilateral Cataracts: excision 2006  Benign Breast Disease: removal of cyst- 1963  Cataract  Kidney Stones: lithotripsy 2005, 2006  S/P Appendectomy: 1937      MEDICATIONS  (STANDING):  aspirin enteric coated 81 milliGRAM(s) Oral daily  brimonidine 0.2% Ophthalmic Solution 1 Drop(s) Both EYES daily  dextrose 5%. 1000 milliLiter(s) (50 mL/Hr) IV Continuous <Continuous>  dextrose 50% Injectable 12.5 Gram(s) IV Push once  dextrose 50% Injectable 25 Gram(s) IV Push once  dextrose 50% Injectable 25 Gram(s) IV Push once  enoxaparin Injectable 40 milliGRAM(s) SubCutaneous daily  insulin lispro (HumaLOG) corrective regimen sliding scale   SubCutaneous three times a day before meals  lacosamide 50 milliGRAM(s) Oral two times a day  latanoprost 0.005% Ophthalmic Solution 1 Drop(s) Both EYES at bedtime  sodium chloride 0.45% 1000 milliLiter(s) (50 mL/Hr) IV Continuous <Continuous>  timolol 0.25% Solution 1 Drop(s) Both EYES daily    MEDICATIONS  (PRN):  dextrose 40% Gel 15 Gram(s) Oral once PRN Blood Glucose LESS THAN 70 milliGRAM(s)/deciliter  glucagon  Injectable 1 milliGRAM(s) IntraMuscular once PRN Glucose LESS THAN 70 milligrams/deciliter  hydrALAZINE Injectable 10 milliGRAM(s) IV Push every 6 hours PRN sbp >150  or dbp >100      Allergies    contrast media (iodine-based) (Urticaria)  erythromycin (Unknown)  IV-Dye (Unknown)  sulfADIAZINE (Unknown)    Intolerances        SOCIAL HISTORY:  no tob,   no alcohol   no drugs    FAMILY HISTORY:  No pertinent family history  Family history of cerebrovascular accident (CVA) (Sibling)      ROS:  ROS: 14 point ROS negative other than what is present in HPI or below    Vital Signs Last 24 Hrs  T(C): 37.2 (06 Dec 2018 07:38), Max: 37.6 (05 Dec 2018 20:41)  T(F): 98.9 (06 Dec 2018 07:38), Max: 99.6 (05 Dec 2018 20:41)  HR: 82 (06 Dec 2018 07:38) (80 - 94)  BP: 167/80 (06 Dec 2018 07:38) (140/72 - 167/84)  BP(mean): --  RR: 19 (06 Dec 2018 07:38) (18 - 19)  SpO2: 100% (06 Dec 2018 00:46) (98% - 100%)      General: NAD    Detailed Neurologic Exam:    Mental status: The patient is lethargic, but arousable.  The patient is not speaking. The patient is not oriented to current events. The patient is able to, follow simple commands.    Cranial nerves: Pupils equal and react symmetrically to light. There is no visual field deficit to threat Extraocular motion is full by inspection. There is no ptosis. Facial sensation is intact. Facial musculature is symmetric. Unable to assess Palate elevation shoulder shrug or or tongue extension.    Motor: There is normal bulk and tone.  There is no tremor.  diffuse non focal weakness    Sensation: grimace to pinch in 4 extremities    Reflexes: 1+ throughout and plantar responses are flexor.    Cerebellar: Unable to test dysmetria on finger to nose testing.    Gait : deferred    LABS:                         10.1   4.7   )-----------( 212      ( 06 Dec 2018 07:36 )             30.4       12-06    144  |  107  |  26.0<H>  ----------------------------<  129<H>  4.0   |  19.0<L>  |  1.21    Ca    9.6      06 Dec 2018 07:36  Phos  3.6     12-06  Mg     1.8     12-06    RADIOLOGY & ADDITIONAL STUDIES (independently reviewed unless otherwise noted):  MRI brain punctate area of restricted diffusion right frontal lobe c/w acute CVA., no blood or mass    < from: EEG Awake and Asleep (11.28.18 @ 08:33) >    IMPRESSION: This is an abnormal EEG recording. Firstly it is generally   slow consistent with excessive drowsiness. Additionally there is   occasional left temporal sharp transient epileptiform activity is   recorded. There are no definite seizure discharges or sustained seizure   activity otherwise noted.

## 2018-12-06 NOTE — PROGRESS NOTE ADULT - ASSESSMENT
Patient is a 89 year old female with a history of CVA, seizure disorder, hypertension and diabetes mellitus who was brought to the ER for complaints of altered mental status. Patient was diagnosed with seizure disorder last year and started on keppra po. Over the past few months the family has noted she has been more lethargic and confused. She saw Dr Mcallister, neurologist at [facility]. According to the daughter she had a number of tests which indicated she did not have seizure disorder and she was advised to taper off the keppra. Her daughter tapered it off over the past 2 weeks. The morning of admission she noted her mother became unresponsive, fell to her side, noted facial droop, her eyes were "fluttering" and her tongue was rolled back. After the episode she remained lethargic for about 1 hours. In the ER, NIH was 0. Patient was more alert and responsive. CT head was negative. Initially started on IV Keppra EEG positive for seizure activity. Spoke with neurology regarding family's concerns about Keppra causing lethargy; Changed to lacosamide. MRI positive for acute right frontal cva. Discussed the benefits of statin therapy with the patient's family in regards to secondary prevention; in agreement with statin therapy- LDL of 253. HbAc of 6. Blood cultures 1of 2 positive for CNS; UA negative. Started on iv Vancomycin repeat blood cultures ordered on 11/28. Passed swallow evaluation; started on PO soft diet with nectar fluid. Seen by PT; stable for home with 24 hour assistance from daughter and PT. Daughter refusing homecare; will arrange home pt herself. Noted to have episodes of bradycardia with sinus pause. Spoke with cardiology, iv labetalol discontinued with improvement. Recommend no AV jordan blocking agents. Hydralazine increased to 25mg TID. Aspirin to continue. Family refusing lipitor or zocor for history of myalgia. Recommend pravastatin on discharge. Repeat blood cultures x 2 negative. Hospital course complicated by worsening lethargy. Metabolic parameters acceptable; hypernatremia and uremia resolved. Neurology re-consulted; recommending to continue Vimpat. Palliative care consult appreciated; daughter refusing hospice services. Family conference call arranged with patient's children from out of state; lengthy conversation held. Family would like to continue to hold Vimpat to see if withdrawing medication will improve patient's mentation and are willing to accept risk of seizures while withholding AEDs. Second neurology opinion obtained. Carnitine level ordered. Patient is NPO. Family will consider tube feeds if mentation does not improve within in the next 24-28 hours.    1. Acute toxic-metabolic encephalopathy - multifactorial   -remains lethargic; now NPO  -confirmed seizure activity on EEG   -Acute right frontal CVA   -hypernatremia resolved  -creatinine trending up to 1.2; possible contribution of uremia  -no evidence of CO2 narcosis or acute pathology on repeat CT  -?? possible contribution of Vimpat; although second neuro opinion requested by family and neurologist in agreement with primary neurologist that Vimpat is not the offending agent  -EEG, CT head and MRI reviewed  -hold ASA while NPO; family refusing statin due to history of myalgias    -continue to hold Vimpat per family's wish - family willing to accept risk of seizures while holding AEDs  -TTE reviewed  -Carotid duplex with stenosis- medical management with aspirin and statin therapy  -Family declined [facility] or home services but agreeable to palliative care consult. Daughter at bedside declining hospice services but requesting patient to be taken home with home care. Family conference call held today; lengthy conversation held. Family not agreeable to hospice services at this time. Family is hopeful the patient will become less lethargic once Vimpat is out of her system. Discussed with primary Neurologist - may take up to 72 hours for Vimpat to be cleared. Last dose of Vimpat was on 12/4.  -Family requesting Carnitine deficiency work up despite being informed that it is very unlikely given patient's age and several other explanations listed above. However, family persistent. Discussed with endocrinology who deferred Carnitine deficiency work up to neurology. Discussed with Dr. Rudolph - recommended to check carnitine level. If now, carnitine replacement can be done as outpatient. Goals of care conversation as summarized above, along with Dr. Gotti and Qi (hospice) approximately 90 minutes.     2. Hypertensive urgency   -BP stable  -No AV jordan blocking agents for bradycardia and pause  -IV hydralazine while NPO  -cardio recommendations appreciated    3. Diabetes mellitus type 2  -HbA1c 6.0  -accuchecks q 6hrs while NPO    4. CoNS bacteremia: 1 of 2 cultures positive for CNS- contamination   Repeat blood cultures negative x 2   UA and Ucx negative   Discontinued Vanco    5. Chronic UTI  -Ua negative. Urine culture negative  -Previously on daily suppressive PO keflex d    6. Acute Kidney Injury.   -Likely secondary to prerenal azotemia and poor solute intake  -Repeat UA bland  -Urine lytes reviewed  -creatinine again trending up to 1.2 today  -Judicious hydration  -Strict I/Os, daily weights  -Avoid nephrotoxic agents    7. Hypernatremia - resolved  -due to poor PO intake  -continue parenteral free water x 24 hours    DVT prophylaxis - Lovenox SC    Attending Attestation:   Plan discussed with patient, daughter Ronel at bedside, daughter Yari and son Rex, Dr. Gotti, RN, Dr. Rudolph, Dr. Luther, [facility] RN    Lengthy goals of care conversation held with daughter Ronel in person and multiple family members as outlined above. Patient's hospital course and current medical condition explained in great detail. Family expressed concerns regarding patient's ongoing lethargy and believe Vimpat is the primary etiology despite explaining that there are multiple factors that are likely contributing to the patient's current encephalopathic state Family willing to accept risk of seizures while holding Vimpat in efforts to determine if the patient's lethargy improves once Vimpat is out of her symptom. Discussed with Dr. Rudolph; Vimpat's half life is approximately 10-12 hours and it would take 5-6 half lifes for the medication to be out of the patient's system (approx 72 hours). Son updated that last dose of Vimpat was on the evening of 12/4 therefore realisitcally Vimpat would not be out of the patient's system until 12/8 the earliest. Family updated that the patient is now NPO and risks of tube feeds (NGT) discussed with family including risk of aspiration, discomfort, perforation, etc. Family at this time, would like to determine if patient's lethargy will improve prior to making the decision whether or not to pursue tube feeds. Family also requesting carnitine deficiency work up. Case briefly discussed with Endocrinologist who deferred work up to Neurology. Case then discussed with Dr. Rudolph - recommending to check Carnitine levels and if low, replacement as can be considered as outpatient. Carnitine level ordered. Family updated. All questions answered. Time spent on multiple conversations throughout the day 125 minutes.

## 2018-12-06 NOTE — CONSULT NOTE ADULT - PROBLEM SELECTOR RECOMMENDATION 4
NPO for now.   Discussed risks of feeding with daughter given poor mental status
FS with HISS after meals cause patient has unpredictable oral intake

## 2018-12-06 NOTE — CONSULT NOTE ADULT - PROBLEM SELECTOR RECOMMENDATION 6
Spoke with daughter with [facility]ney confirms that brother Rex is primary health care proxy, sister in [place] is secondary   and she is third.  Ronel questions if Vimpat causing lethargy as mother was more alert prior to starting meds. She and her siblings wish to hold med and willing to take the chance of another seizure  to see if mother's mental status will improve.  Discussed with Dr. Ruelas-  alternative med?  second opinion neurology eval.   Plan for family meeting later today.

## 2018-12-06 NOTE — CONSULT NOTE ADULT - PROBLEM SELECTOR RECOMMENDATION 2
Daughter reported refusing Vimpat.  Neurology input noted  Daughter feels maybe causing lethargy. She states willing to take chance being off medication despite risk for seizure
hydralazine and  labetalol

## 2018-12-06 NOTE — GOALS OF CARE CONVERSATION - PERSONAL ADVANCE DIRECTIVE - CONVERSATION DETAILS
Dr. Ruelas reviewed  in detail current condition and treatments.  Issue of poor mental status, unable to safely swallow now NPO.    Family concerned Vimpat causing lethargy, and wish to know half life to if enough time given off med to  see if able to become more alert. They are willing to Accept the risks of seizure while off medication.  Patient has not taken in for approx 36hours. Informed family of second neurology opinion who agrees with initial neurologist.   Discussed risks and benefits of NG and PEG tube. Informed of risks of aspiration, infection and that feeding tube does Not prevent aspiration.   Raphael with concerns  about  Carnitine deficiency causing change in MS. Though it was explained very unlikely  will get endocrinologist opinion.   Family had questions about hospice  for which Qi Jones reviewed in detail hospice plan of care

## 2018-12-06 NOTE — CONSULT NOTE ADULT - SUBJECTIVE AND OBJECTIVE BOX
Palliative Medicine Initial Consultation Note    HPI:  The patient is a 89 year old female with a history of CVA< seizure disorder, hypertension and diabetes mellitus who was brought to the ER for complaints of altered mental status. Patient was diagnosed with seizure disorder last year and started on keppra po. Over the past few months the family has noted she has been more lethargic and confused. She saw Dr Mcallister, neurologist at [facility]. According to the daughter she had a number of tests which indicated she did not have seizure disorder and she was advised to taper off the keppra. Her daughter tapered it off over the past 2 weeks. This morning she noted her mother became unresponsive, fell to her side, noted facial droop, her eyes were "fluttering" and her tongue was rolled back. After the episode she remained lethargic for about 3 hours.  PERTINENT PMH REVIEWED:  [x ] YES [ ] NO          Past Medical History:  Acid Reflux    Acute Pulmonary Edema Syndrome  Summer 2011  Anemia    CAD (Coronary Artery Disease)    Calcium Nephrolithiasis    Carpal Tunnel Syndrome  bilateral  Controlled Type 2 Diabetes with Neuropathy    Diabetes mellitus    Fracture  lumbar fracture.  Glaucoma, Low Tension    Heart Murmur    Hepatitis in Viral Disease    History of Pulmonary Hypertension    Hyperlipidemia LDL Goal < 100    Hypertension    Obstructive Sleep Apnea    Pericarditis  Summer 2011  Rib fracture    Seizure.     Past Surgical History:  Benign Breast Disease  removal of cyst- 1963  Bilateral Cataracts  excision 2006  Cataract    Fracture of Humerus  repair with harware right  Glaucoma  relieve pressure left eye along with cataract extraction  H/O cataract extraction    History of appendectomy    Kidney Stones  lithotripsy 2005, 2006  S/P Appendectomy    SOCIAL HISTORY:  EtOH [ ] Yes  [x ] No                                    Drugs [ ] Yes [ x] No                                   [ ] smoker [x ] nonsmoker                                    Admitted from: [ x] home [ ] SNF _________ [ ] [facility] ________    Surrogate/HCP/Guardian: son Rex Pemberton- primary HCP  FAMILY HISTORY:  No pertinent family history  Family history of cerebrovascular accident (CVA) (Sibling)      Baseline ADLs (prior to admission):  Independent [ ] moderately [ ] fully   Dependent   [ ] moderately [ x]fully    MEDICATIONS  (STANDING):  aspirin enteric coated 81 milliGRAM(s) Oral daily  brimonidine 0.2% Ophthalmic Solution 1 Drop(s) Both EYES daily  dextrose 5%. 1000 milliLiter(s) (50 mL/Hr) IV Continuous <Continuous>  dextrose 50% Injectable 12.5 Gram(s) IV Push once  dextrose 50% Injectable 25 Gram(s) IV Push once  dextrose 50% Injectable 25 Gram(s) IV Push once  enoxaparin Injectable 40 milliGRAM(s) SubCutaneous daily  insulin lispro (HumaLOG) corrective regimen sliding scale   SubCutaneous three times a day before meals  lacosamide 50 milliGRAM(s) Oral two times a day  latanoprost 0.005% Ophthalmic Solution 1 Drop(s) Both EYES at bedtime  sodium chloride 0.45% 1000 milliLiter(s) (50 mL/Hr) IV Continuous <Continuous>  timolol 0.25% Solution 1 Drop(s) Both EYES daily    MEDICATIONS  (PRN):  dextrose 40% Gel 15 Gram(s) Oral once PRN Blood Glucose LESS THAN 70 milliGRAM(s)/deciliter  glucagon  Injectable 1 milliGRAM(s) IntraMuscular once PRN Glucose LESS THAN 70 milligrams/deciliter  hydrALAZINE Injectable 10 milliGRAM(s) IV Push every 6 hours PRN sbp >150  or dbp >100      Allergies    contrast media (iodine-based) (Urticaria)  erythromycin (Unknown)  IV-Dye (Unknown)  sulfADIAZINE (Unknown)    Intolerances        REVIEW OF SYSTEMS   see HPI    [ x] Unable to obtain due to poor mentation       Karnofsky Performance Score/Palliative Performance Status Version 2: 20  %    Vital Signs Last 24 Hrs  T(C): 37.2 (06 Dec 2018 07:38), Max: 37.6 (05 Dec 2018 20:41)  T(F): 98.9 (06 Dec 2018 07:38), Max: 99.6 (05 Dec 2018 20:41)  HR: 82 (06 Dec 2018 07:38) (80 - 94)  BP: 167/80 (06 Dec 2018 07:38) (140/72 - 167/84)  BP(mean): --  RR: 19 (06 Dec 2018 07:38) (18 - 19)  SpO2: 100% (06 Dec 2018 00:46) (98% - 100%)    PHYSICAL EXAM:    General: Lethargic NAD  HEENT: [x ] normal  [ ] dry mouth  [ ] ET tube/trach    Lungs: [ x] comfortable  on CPAP    CV: [ x] normal  [ ] tachycardia    GI: [x ] normal  [ ] distended  [ ] tender  [ ] no BS               [ ] PEG/NG/OG tube    :  normal  MSK: [ ] normal  [ x] weakness  [ ] edema             [ ] ambulatory  [ ] bedbound/wheelchair bound    Skin: [ ] normal  [ ] pressure ulcers- Stage_____  [ x] no rash    LABS:                        10.1   4.7   )-----------( 212      ( 06 Dec 2018 07:36 )             30.4     12-06    144  |  107  |  26.0<H>  ----------------------------<  129<H>  4.0   |  19.0<L>  |  1.21    Ca    9.6      06 Dec 2018 07:36  Phos  3.6     12-06  Mg     1.8     12-06      RADIOLOGY & ADDITIONAL STUDIES:  < from: CT Head No Cont (12.02.18 @ 15:32) >   EXAM:  CT BRAIN                          PROCEDURE DATE:  12/02/2018          INTERPRETATION:  INDICATION:  Stroke  TECHNIQUE:  A non contrast 2.5mm axial CT study of the brain was   performed from skull base to vertex. Coronal and sagittal reformations   were generated from the axial data.  COMPARISON EXAMINATION:  CT 11/26/2018    FINDINGS:      HEMISPHERES:  Lacunar infarcts and chronic vessel ischemic changes are   again noted in the basal ganglia thalami and white matter of both   hemispheres. There is generalized volume loss. Atherosclerotic   calcifications are noted of the carotid arteries and the anterior   cerebral circulation. No hemorrhagic lesion or acute territorial infarct   is suggested.  VENTRICLES:  Ex vacuo enlargement is noted  POSTERIOR FOSSA:  No acute abnormality is seen  EXTRACEREBRAL SPACES:  No subdural or epidural collections are noted.  SKULL BASE AND CALVARIUM:  Appears intact.  No fracture or destructive   lesion is identified.  SINUSES AND MASTOIDS:  Clear.  MISCELLANEOUS:  No orbital or suprasellar abnormality noted.      IMPRESSION:    1)  multifocal chronic ischemic changes with atrophy. Similar findings   were noted on prior CT. A tiny acute infarct on the MRI of 11/28/2018 is   not discernible.  2)  no hemorrhagic lesion or mass noted. Extensive atherosclerotic   changes intracranially.      < end of copied text >    < from: MR Head No Cont (11.28.18 @ 15:24) >  EXAM:  MR BRAIN                          PROCEDURE DATE:  11/28/2018          INTERPRETATION:  CLINICAL HISTORY: TIA, altered mental status    COMPARISON: Head dated 11/26/2018. MRI brain dated 2/27/2018    TECHNIQUE: MRI brain: Multiplanar, multisequence MR imaging of the brain   are obtained without the administration of intravenous gadolinium.     FINDINGS:  There is a punctate foci of abnormal restricted diffusion in the right   frontal lobe cortex image 26, series 4, compatible with an acute tiny   infarct. Scattered periventricular and subcortical white matter T2 /FLAIR   hyperintensities are seen without mass effect, nonspecific, likely   representing moderate to severe chronic microvascular changes. Old left   inferior cerebellar hemisphere infarct. Old small bilateral basal ganglia   and corona radiata lacunar infarcts.    Normal T2 flow-voids are seen within  the intracranial vasculature. The   lateral ventricles and cortical sulci are age-appropriate in size and   configuration. There is no mass, mass effect, or extra-axial fluid   collection. There is no susceptibility artifact to suggest hemorrhage.   Midline structures are normal.      The patient is status post bilateral ocular lens replacement surgery. The   visualized paranasal sinuses, mastoid air cells and orbits are   unremarkable.      IMPRESSION: Acute tiny right frontal lobe cortical infarct.        < end of copied text >      ADVANCE DIRECTIVES:  [ ] YES x[ ] NO   DNR [ ] YES [ x] NO  Completed on:                     MOLST  [ ] YES [x ] NO   Completed on:  Living Will  [ ] YES [x ] NO   Completed on:    Thank you for the opportunity to assist with the care of this patient.   [facility] [phone].

## 2018-12-06 NOTE — GOALS OF CARE CONVERSATION - PERSONAL ADVANCE DIRECTIVE - CONVERSATION DETAILS
met with family multiple times today  for extended times in collaboration with  Dr Minerva Gotti  & Dr Tyron Ruelas. Son Rex , Flavia bell son in law bertha  and daughter jason for  phone meeting in the afternoon at 3pm . Mds reveiwed  pts hospital course . family does not appear to be on a hospice page they are considering possible ng to possible peg  and further diagnostic test procedures and work up . Hospice goals and plan of care explained and was hospice program and services . Hospice will remain available

## 2018-12-06 NOTE — CONSULT NOTE ADULT - ASSESSMENT
The patient is a 89y Female who is followed by neurology because of encephalopathy    Encephalopathy/delirium  likely multi factorial  metabolic   possible underlying dementia    Seizure disorder  I explained need for an AED. The daughter feels that they make her mother sleepy and does not want her to take them.  She understands that she has had at least two abnormal EEGs and that she will be at risk for seizure without one.  She can follow up with Dr Mcallister to decide on an appropriate AED post discharge if she continues to decline vimpat or keppra.    defer AED to primary neurologist.    Thank you for allowing me to participate in the care of your patient    Rj Luther MD, PhD   [id number]

## 2018-12-06 NOTE — CONSULT NOTE ADULT - ASSESSMENT
89 yr old woman, history of CVA, seizure disorder, hypertension and diabetes mellitus  presented with hx of AMS, lethargy. MRI + acute right infarct.

## 2018-12-06 NOTE — PROGRESS NOTE ADULT - SUBJECTIVE AND OBJECTIVE BOX
CHIEF COMPLAINT/INTERVAL HISTORY:    Patient is a 89y old  Female who presents with a chief complaint of Altered mental status (06 Dec 2018 13:16)    SUBJECTIVE & OBJECTIVE: Pt seen and examined at bedside. No overnight events. Patient remains lethargic today; off Vimpat for approximately 48 hours; family accepting risk of seizures and would like to continue to have patient remain off Vimpat. Lengthy family meeting held; at this time patient is NPO and family would like time to see if Vimpat will wear off in hopes that lethargy will improve.    ROS: Unobtainable    ICU Vital Signs Last 24 Hrs  T(C): 37 (06 Dec 2018 17:38), Max: 37.2 (06 Dec 2018 07:38)  T(F): 98.6 (06 Dec 2018 17:38), Max: 98.9 (06 Dec 2018 07:38)  HR: 82 (06 Dec 2018 17:38) (80 - 94)  BP: 145/80 (06 Dec 2018 17:38) (145/80 - 167/84)  RR: 18 (06 Dec 2018 17:38) (18 - 19)  SpO2: 100% (06 Dec 2018 00:46) (100% - 100%)    MEDICATIONS  (STANDING):  brimonidine 0.2% Ophthalmic Solution 1 Drop(s) Both EYES daily  dextrose 5% + sodium chloride 0.45%. 1000 milliLiter(s) (50 mL/Hr) IV Continuous <Continuous>  dextrose 5%. 1000 milliLiter(s) (50 mL/Hr) IV Continuous <Continuous>  dextrose 50% Injectable 12.5 Gram(s) IV Push once  dextrose 50% Injectable 25 Gram(s) IV Push once  dextrose 50% Injectable 25 Gram(s) IV Push once  enoxaparin Injectable 40 milliGRAM(s) SubCutaneous daily  insulin lispro (HumaLOG) corrective regimen sliding scale   SubCutaneous three times a day before meals  latanoprost 0.005% Ophthalmic Solution 1 Drop(s) Both EYES at bedtime  timolol 0.25% Solution 1 Drop(s) Both EYES daily    MEDICATIONS  (PRN):  dextrose 40% Gel 15 Gram(s) Oral once PRN Blood Glucose LESS THAN 70 milliGRAM(s)/deciliter  glucagon  Injectable 1 milliGRAM(s) IntraMuscular once PRN Glucose LESS THAN 70 milligrams/deciliter  hydrALAZINE Injectable 10 milliGRAM(s) IV Push every 6 hours PRN sbp >150  or dbp >100      LABS:                        10.1   4.7   )-----------( 212      ( 06 Dec 2018 07:36 )             30.4     12-06    144  |  107  |  26.0<H>  ----------------------------<  129<H>  4.0   |  19.0<L>  |  1.21    Ca    9.6      06 Dec 2018 07:36  Phos  3.6     12-06  Mg     1.8     12-06    CAPILLARY BLOOD GLUCOSE      POCT Blood Glucose.: 136 mg/dL (06 Dec 2018 17:01)  POCT Blood Glucose.: 149 mg/dL (06 Dec 2018 12:31)  POCT Blood Glucose.: 128 mg/dL (06 Dec 2018 07:51)  POCT Blood Glucose.: 127 mg/dL (05 Dec 2018 21:30)    PHYSICAL EXAM:    GENERAL: elderly female, laying in bed, lethargic  HEAD:  Atraumatic, Normocephalic  EYES: EOMI, PERRLA, conjunctiva and sclera clear  ENMT: Moist mucous membranes  NECK: Supple  CHEST/LUNG: bilateral air entry  HEART: Regular rate and rhythm; + S1/S2  ABDOMEN: Soft, Nontender, Nondistended; Bowel sounds present  EXTREMITIES:  no pedal edema

## 2018-12-07 NOTE — PROGRESS NOTE ADULT - SUBJECTIVE AND OBJECTIVE BOX
CC: follow up GOC  INTERVAL HPI/OVERNIGHT EVENTS:    PRESENT SYMPTOMS: SOURCE:  Patient/Family/Team    PAIN SCALE:  0 = none  1 = mild   2 = moderate  3 = severe    Pain: appears comfortable    Dyspnea:  [ ] YES [ x] NO  Anxiety:  [ ] YES [ x] NO  Fatigue: [ x] YES [ ] NO    Nausea: [ ] YES [ ] NO  na  Loss of Appetite: [ ] YES [ ] NO  na NPO  Other symptoms: __________    MEDICATIONS  (STANDING):  brimonidine 0.2% Ophthalmic Solution 1 Drop(s) Both EYES daily  dextrose 5% + sodium chloride 0.45%. 1000 milliLiter(s) (50 mL/Hr) IV Continuous <Continuous>  dextrose 5%. 1000 milliLiter(s) (50 mL/Hr) IV Continuous <Continuous>  dextrose 50% Injectable 12.5 Gram(s) IV Push once  dextrose 50% Injectable 25 Gram(s) IV Push once  dextrose 50% Injectable 25 Gram(s) IV Push once  enoxaparin Injectable 40 milliGRAM(s) SubCutaneous daily  insulin lispro (HumaLOG) corrective regimen sliding scale   SubCutaneous three times a day before meals  latanoprost 0.005% Ophthalmic Solution 1 Drop(s) Both EYES at bedtime  timolol 0.25% Solution 1 Drop(s) Both EYES daily    MEDICATIONS  (PRN):  dextrose 40% Gel 15 Gram(s) Oral once PRN Blood Glucose LESS THAN 70 milliGRAM(s)/deciliter  glucagon  Injectable 1 milliGRAM(s) IntraMuscular once PRN Glucose LESS THAN 70 milligrams/deciliter  hydrALAZINE Injectable 10 milliGRAM(s) IV Push every 6 hours PRN sbp >150  or dbp >100      Allergies    contrast media (iodine-based) (Urticaria)  erythromycin (Unknown)  IV-Dye (Unknown)  sulfADIAZINE (Unknown)    Intolerances        Karnofsky Performance Score/Palliative Performance Status Version 2:   20  %    Vital Signs Last 24 Hrs  T(C): 37.2 (07 Dec 2018 08:43), Max: 37.2 (07 Dec 2018 08:43)  T(F): 99 (07 Dec 2018 08:43), Max: 99 (07 Dec 2018 08:43)  HR: 112 (07 Dec 2018 08:43) (82 - 112)  BP: 160/93 (07 Dec 2018 08:43) (140/80 - 160/93)  BP(mean): --  RR: 19 (07 Dec 2018 08:43) (18 - 19)  SpO2: 98% (07 Dec 2018 05:33) (98% - 98%)    PHYSICAL EXAM:    General: Lethargic NAD  HEENT: [ x] normal  [ ] dry mouth  [ ] ET tube/trach    Lungs: [x ] comfortable [ ] tachypnea/labored breathing  [ ] excessive secretions    CV: [x ] normal  [ ] tachycardia    GI: [ x] normal  [ ] distended  [ ] tender  [ ] no BS               [ ] PEG/NG/OG tube    : [ ] normal  [ x] incontinent  [ ] oliguria/anuria  [ ] walter    MSK: [ ] normal  [x ] weakness  [ ] edema             [ ] ambulatory  [x ] bedbound/wheelchair bound    Skin: [ ] normal  [ ] pressure ulcers- Stage_____  [x ] no rash    LABS:                        10.7   5.6   )-----------( 225      ( 07 Dec 2018 08:30 )             32.9     12-07    145  |  106  |  24.0<H>  ----------------------------<  210<H>  3.8   |  19.0<L>  |  1.20    Ca    9.7      07 Dec 2018 08:30  Phos  2.9     12-07  Mg     1.8     12-07          Thank you for the opportunity to assist with the care of this patient.   [facility] [phone].

## 2018-12-07 NOTE — CHART NOTE - NSCHARTNOTEFT_GEN_A_CORE
Source: Patient [ ]  Family [X]   other [ ]    Current Diet:   Diet, NPO (12-06-18 @ 10:07)    Patient reports [ ] nausea  [ ] vomiting [ ] diarrhea [ ] constipation  [X]chewing problems [X] swallowing issues  [ ] other:     PO intake:  < 50% [ ]   50-75%  [ ]   %  [ ]  other : NPO    Source for PO intake [ ] Patient [X] family [X] chart [X] staff [ ] other    Enteral /Parenteral Nutrition: Pt family deciding on TF's     Current Weight:   (12/1)     161lbs  (11/28)   168lbs      % Weight Change: 7lb weight loss noted, Question accuracy of weights. Daughter at bedside states UBW March 2018 178lbs (9.6% weight loss x 9 months)    Pertinent Medications: MEDICATIONS  (STANDING):  brimonidine 0.2% Ophthalmic Solution 1 Drop(s) Both EYES daily  dextrose 5% + sodium chloride 0.45%. 1000 milliLiter(s) (50 mL/Hr) IV Continuous <Continuous>  dextrose 5%. 1000 milliLiter(s) (50 mL/Hr) IV Continuous <Continuous>  dextrose 50% Injectable 12.5 Gram(s) IV Push once  dextrose 50% Injectable 25 Gram(s) IV Push once  dextrose 50% Injectable 25 Gram(s) IV Push once  enoxaparin Injectable 40 milliGRAM(s) SubCutaneous daily  insulin lispro (HumaLOG) corrective regimen sliding scale   SubCutaneous three times a day before meals  latanoprost 0.005% Ophthalmic Solution 1 Drop(s) Both EYES at bedtime  timolol 0.25% Solution 1 Drop(s) Both EYES daily    MEDICATIONS  (PRN):  dextrose 40% Gel 15 Gram(s) Oral once PRN Blood Glucose LESS THAN 70 milliGRAM(s)/deciliter  glucagon  Injectable 1 milliGRAM(s) IntraMuscular once PRN Glucose LESS THAN 70 milligrams/deciliter  hydrALAZINE Injectable 10 milliGRAM(s) IV Push every 6 hours PRN sbp >150  or dbp >100    Pertinent Labs: CBC Full  -  ( 07 Dec 2018 08:30 )  WBC Count : 5.6 K/uL  Hemoglobin : 10.7 g/dL  Hematocrit : 32.9 %  Platelet Count - Automated : 225 K/uL  Mean Cell Volume : 92.4 fl  Mean Cell Hemoglobin : 30.1 pg  Mean Cell Hemoglobin Concentration : 32.5 g/dL  Auto Neutrophil # : 4.1 K/uL  Auto Lymphocyte # : 1.0 K/uL  Auto Monocyte # : 0.4 K/uL  Auto Eosinophil # : 0.0 K/uL  Auto Basophil # : 0.0 K/uL  Auto Neutrophil % : 72.9 %  Auto Lymphocyte % : 17.6 %  Auto Monocyte % : 7.5 %  Auto Eosinophil % : 0.4 %  Auto Basophil % : 0.7 %  12-07 Na145 mmol/L Glu 210 mg/dL<H> K+ 3.8 mmol/L Cr  1.20 mg/dL BUN 24.0 mg/dL<H> Phos 2.9 mg/dL Alb n/a   PAB n/a       Skin: No skin breakdown/edema noted    Nutrition focused physical exam conducted - found signs of malnutrition [ ]absent [X]present    Subcutaneous fat loss: [X] Orbital fat pads region, [X]Buccal fat region, [ ]Triceps region,  [ ]Ribs region    Muscle wasting: [ ]Temples region, [X]Clavicle region, [X]Shoulder region, [ ]Scapula region, [ ]Interosseous region,  [ ]thigh region, [ ]Calf region    Estimated Needs:   [X] no change since previous assessment  [ ] recalculated:     Current Nutrition Diagnosis: Pt now with severe acute malnutrition related to inadequate energy intake in setting of advancing age, lethargy as evidenced by unintentional significant 17lb (9.5%) weight loss x 9 months, moderate muscle wasting/fat loss (temples, clavicle, ribs, buccal, orbital), Pt consuming <25% of estimated needs >7 days. Pt's daughter seen at bedside conducting interview, many questions about TF's, all answered with deferral to MD when appropriate. Pt seen lethargic, not awoken to voice, weaning off Vimpat at this time. Family to make decisions regarding enteral feedings tonight, RD to remain available.      Recommendations:   1) RD to provide recommendations based on Pt/family wishes  2) If decision is to provide TF's, when medically feasible recommend initiate Glucerna 1.5cal @ 30mL/hr, increased by 10mL every 8hrs to goal rate 65mL/hr to provide 1300mL (x20hrs), 1950cal. 107g protein, sufficient to meet needs and facilitate gradual weight gain.   3) Monitor BG, electrolytes   4) Daily weights for trend    Monitoring and Evaluation:   [ ] PO intake [ ] Tolerance to diet prescription [X] Weights  [X] Follow up per protocol [X] Labs:

## 2018-12-07 NOTE — PROGRESS NOTE ADULT - SUBJECTIVE AND OBJECTIVE BOX
CHIEF COMPLAINT/INTERVAL HISTORY:    Patient is a 89y old  Female who presents with a chief complaint of Altered mental status (07 Dec 2018 10:11)    SUBJECTIVE & OBJECTIVE: Pt seen and examined at bedside. No overnight events. Patient remains lethargic today. Family withholding Vimpat at the risk of seizures to determine if it contributed to patient's lethargy, despite being told by two different neurology groups that it was not the source. Family also requested carnitine level which is pending.    ROS: Unobtainable due to lethargy    ICU Vital Signs Last 24 Hrs  T(C): 37.2 (07 Dec 2018 08:43), Max: 37.2 (07 Dec 2018 08:43)  T(F): 99 (07 Dec 2018 08:43), Max: 99 (07 Dec 2018 08:43)  HR: 112 (07 Dec 2018 08:43) (82 - 112)  BP: 160/93 (07 Dec 2018 08:43) (140/80 - 160/93)  RR: 19 (07 Dec 2018 08:43) (18 - 19)  SpO2: 98% (07 Dec 2018 05:33) (98% - 98%)    MEDICATIONS  (STANDING):  brimonidine 0.2% Ophthalmic Solution 1 Drop(s) Both EYES daily  dextrose 5% + sodium chloride 0.45%. 1000 milliLiter(s) (50 mL/Hr) IV Continuous <Continuous>  dextrose 5%. 1000 milliLiter(s) (50 mL/Hr) IV Continuous <Continuous>  dextrose 50% Injectable 12.5 Gram(s) IV Push once  dextrose 50% Injectable 25 Gram(s) IV Push once  dextrose 50% Injectable 25 Gram(s) IV Push once  enoxaparin Injectable 40 milliGRAM(s) SubCutaneous daily  insulin lispro (HumaLOG) corrective regimen sliding scale   SubCutaneous three times a day before meals  latanoprost 0.005% Ophthalmic Solution 1 Drop(s) Both EYES at bedtime  timolol 0.25% Solution 1 Drop(s) Both EYES daily    MEDICATIONS  (PRN):  dextrose 40% Gel 15 Gram(s) Oral once PRN Blood Glucose LESS THAN 70 milliGRAM(s)/deciliter  glucagon  Injectable 1 milliGRAM(s) IntraMuscular once PRN Glucose LESS THAN 70 milligrams/deciliter  hydrALAZINE Injectable 10 milliGRAM(s) IV Push every 6 hours PRN sbp >150  or dbp >100      LABS:                        10.7   5.6   )-----------( 225      ( 07 Dec 2018 08:30 )             32.9     12-07    145  |  106  |  24.0<H>  ----------------------------<  210<H>  3.8   |  19.0<L>  |  1.20    Ca    9.7      07 Dec 2018 08:30  Phos  2.9     12-07  Mg     1.8     12-07      CAPILLARY BLOOD GLUCOSE      POCT Blood Glucose.: 196 mg/dL (07 Dec 2018 11:50)  POCT Blood Glucose.: 174 mg/dL (07 Dec 2018 05:28)  POCT Blood Glucose.: 136 mg/dL (07 Dec 2018 00:10)  POCT Blood Glucose.: 136 mg/dL (06 Dec 2018 17:01)      PHYSICAL EXAM:    GENERAL: elderly female, laying in bed, lethargic  HEAD:  Atraumatic, Normocephalic  EYES: EOMI, PERRLA, conjunctiva and sclera clear  ENMT: Moist mucous membranes  NECK: Supple  CHEST/LUNG: bilateral air entry  HEART: Regular rate and rhythm; + S1/S2  ABDOMEN: Soft, Nontender, Nondistended; Bowel sounds present  EXTREMITIES:  no pedal edema, left hand edema

## 2018-12-07 NOTE — CHART NOTE - NSCHARTNOTEFT_GEN_A_CORE
Palliative care social work note. Late entry.     SW and palliative physician Dr Gotti along with hospitalist DR Ruelas and Hospice RN rey met yesterday with pts dgt and had 3 other family members on conference call. Medical  review, symptoms, and prognosis discussed. family inquiring regarding other medical tests they want pursued as well have their own understanding of what may be causing pt lethargy.

## 2018-12-07 NOTE — PROGRESS NOTE ADULT - PROBLEM SELECTOR PLAN 5
Family meeting on 12/5- see Goals of care note for details.   Family wishes to hold off on Vimpate as they feel it is contributing to lethargy.   They understand risks for seizures.  Last dose of Vimpat 12/4 and per neuro would take 72 hours to be out of system.  Family  deciding about NG tube feedings to see if it will help with mental status.  Discussed risks and benefits of PEG tube  Family states :  son Rex is Primary proxy, Heide ( daughter from [place]) is secondary proxy,  daughter Ronel is third proxy. Family meeting on 12/5- see Goals of care note for details.   Family wishes to hold off on Vimpat as they feel it is contributing to lethargy.   They understand risks for seizures.  Last dose of Vimpat 12/4 and per neuro would take 72 hours to be out of system.  Family  deciding about NG tube feedings to see if it will help with mental status.  Discussed risks and benefits of PEG tube  Family requesting Carnitine level as they feel possible deficiency attributing to condition.   Family states :  son Rex is Primary proxy, Heide ( daughter from [place]) is secondary proxy,  daughter Ronel is third proxy.

## 2018-12-07 NOTE — PROGRESS NOTE ADULT - ASSESSMENT
Patient is a 89 year old female with a history of CVA, seizure disorder, hypertension and diabetes mellitus who was brought to the ER for complaints of altered mental status. Patient was diagnosed with seizure disorder last year and started on keppra po. Over the past few months the family has noted she has been more lethargic and confused. She saw Dr Mcallister, neurologist at [facility]. According to the daughter she had a number of tests which indicated she did not have seizure disorder and she was advised to taper off the keppra. Her daughter tapered it off over the past 2 weeks. The morning of admission she noted her mother became unresponsive, fell to her side, noted facial droop, her eyes were "fluttering" and her tongue was rolled back. After the episode she remained lethargic for about 1 hours. In the ER, NIH was 0. Patient was more alert and responsive. CT head was negative. Initially started on IV Keppra EEG positive for seizure activity. Spoke with neurology regarding family's concerns about Keppra causing lethargy; Changed to lacosamide. MRI positive for acute right frontal cva. Discussed the benefits of statin therapy with the patient's family in regards to secondary prevention; in agreement with statin therapy- LDL of 253. HbAc of 6. Blood cultures 1of 2 positive for CNS; UA negative. Started on iv Vancomycin repeat blood cultures ordered on 11/28. Passed swallow evaluation; started on PO soft diet with nectar fluid. Seen by PT; stable for home with 24 hour assistance from daughter and PT. Daughter refusing homecare; will arrange home pt herself. Noted to have episodes of bradycardia with sinus pause. Spoke with cardiology, iv labetalol discontinued with improvement. Recommend no AV jordan blocking agents. Hydralazine increased to 25mg TID. Aspirin to continue. Family refusing lipitor or zocor for history of myalgia. Recommend pravastatin on discharge. Repeat blood cultures x 2 negative. Hospital course complicated by worsening lethargy. Metabolic parameters acceptable; hypernatremia and uremia resolved. Neurology re-consulted; recommending to continue Vimpat. Palliative care consult appreciated; daughter refusing hospice services. Family conference call arranged with patient's children from out of state; lengthy conversation held. Family would like to continue to hold Vimpat to see if withdrawing medication will improve patient's mentation and are willing to accept risk of seizures while withholding AEDs. Second neurology opinion obtained. Carnitine level ordered. Patient is NPO. Family will consider tube feeds if mentation does not improve within in the next 24-28 hours.    1. Acute toxic-metabolic encephalopathy - multifactorial   -remains lethargic; NPO since 12/6  -confirmed seizure activity on EEG   -Acute right frontal CVA   -hypernatremia resolved  -creatinine trended up to 1.2; possible contribution of uremia  -no evidence of CO2 narcosis or acute pathology on repeat CT  -Repeat UA negative for infection  -TSH and ammonia levels WNL; B12 level elevated  -?? possible contribution of Vimpat; although second neuro opinion requested by family and neurologist in agreement with primary neurologist that Vimpat is not the offending agent  -EEG, CT head and MRI reviewed  -hold ASA while NPO; family refusing statin due to history of myalgias    -continue to hold Vimpat per family's wish - family willing to accept risk of seizures while holding AEDs  -TTE reviewed  -Carotid duplex with stenosis- medical management with aspirin and statin therapy  -Family initially declined [facility] or home services but agreeable to palliative care consult.   -Daughter at bedside declining hospice services but requesting patient to be taken home with home care. Family conference call held today; lengthy conversation held. Family not agreeable to hospice services at this time. Family is hopeful the patient will become less lethargic once Vimpat is out of her system. Discussed with primary Neurologist - may take up to 72 hours for Vimpat to be cleared. Last dose of Vimpat was on 12/4 in the evening.  -Family requesting Carnitine deficiency work up despite being informed that it is very unlikely given patient's age and several other explanations listed above. However, family persistent. Discussed with endocrinology who deferred Carnitine deficiency work up to neurology. Discussed with Dr. Rudolph - recommended to check carnitine level which is pending. If low, carnitine replacement can be done as outpatient. Long family meeting on 12/6 which is summarized in previous note.    2. Hypertensive urgency   -BP elevated  -No AV jordan blocking agents for bradycardia and pause  -IV hydralazine while NPO  -cardio recommendations appreciated    3. Diabetes mellitus type 2  -HbA1c 6.0  -accuchecks q 6hrs while NPO  -ISS    4. CoNS bacteremia: 1 of 2 cultures positive for CNS- contamination   Repeat blood cultures negative x 2   UA and Ucx negative   Discontinued Vanco    5. Chronic UTI  -Ua negative. Urine culture negative  -Previously on daily suppressive PO keflex      6. Acute Kidney Injury.   -Likely secondary to prerenal azotemia and poor solute intake  -Repeat UA bland  -Urine lytes reviewed  -creatinine again trending up to 1.2 today  -Judicious hydration  -Strict I/Os, daily weights  -Avoid nephrotoxic agents    7. Hypernatremia - resolved  -due to poor PO intake  -continue parenteral free water while NPO    DVT prophylaxis - Lovenox SC    Attending Attestation:   Plan discussed with patient, daughter Ronel at bedside, Dr. Gotti, RN     Patient's HCP 1. Son Rex [phone] . 2 Daughter Yari [phone] 2. Ronel at bedside

## 2018-12-08 NOTE — PROGRESS NOTE ADULT - SUBJECTIVE AND OBJECTIVE BOX
CC: Lethargy    INTERVAL HPI/OVERNIGHT EVENTS: Patient seen and examined, patient's daughter  at the bedside. Reports her mother was moaning as if in discomfort/ agitated during the night. Afebrile. Still lethargic; pocketing food in her mouth> NPO       Vital Signs Last 24 Hrs  T(C): 36.5 (08 Dec 2018 09:39), Max: 37.3 (07 Dec 2018 16:29)  T(F): 97.7 (08 Dec 2018 09:39), Max: 99.2 (07 Dec 2018 16:29)  HR: 82 (08 Dec 2018 09:39) (82 - 87)  BP: 168/86 (08 Dec 2018 09:39) (119/80 - 188/88)  BP(mean): --  RR: --  SpO2: 98% (08 Dec 2018 09:39) (98% - 98%)    PHYSICAL EXAM:    GENERAL: NAD, lethargic   HEAD:  Atraumatic, Normocephalic  NECK: Supple, No JVD  NERVOUS SYSTEM:  Alert & Oriented X0; lethargic, able to follow some commands.   CHEST/LUNG: Clear to auscultation bilaterally; No rales, rhonchi, wheezing, or rubs  HEART: Regular rate and rhythm; No murmurs, rubs, or gallops  ABDOMEN: Soft, Nontender, Nondistended; Bowel sounds present  EXTREMITIES:  2+ Peripheral Pulses, No clubbing, cyanosis, or edema        MEDICATIONS  (STANDING):  brimonidine 0.2% Ophthalmic Solution 1 Drop(s) Both EYES daily  dextrose 5% + sodium chloride 0.45%. 1000 milliLiter(s) (50 mL/Hr) IV Continuous <Continuous>  dextrose 5%. 1000 milliLiter(s) (50 mL/Hr) IV Continuous <Continuous>  dextrose 50% Injectable 12.5 Gram(s) IV Push once  dextrose 50% Injectable 25 Gram(s) IV Push once  dextrose 50% Injectable 25 Gram(s) IV Push once  enoxaparin Injectable 40 milliGRAM(s) SubCutaneous daily  insulin lispro (HumaLOG) corrective regimen sliding scale   SubCutaneous three times a day before meals  latanoprost 0.005% Ophthalmic Solution 1 Drop(s) Both EYES at bedtime  potassium chloride  10 mEq/100 mL IVPB 10 milliEquivalent(s) IV Intermittent every 1 hour  timolol 0.25% Solution 1 Drop(s) Both EYES daily    MEDICATIONS  (PRN):  dextrose 40% Gel 15 Gram(s) Oral once PRN Blood Glucose LESS THAN 70 milliGRAM(s)/deciliter  glucagon  Injectable 1 milliGRAM(s) IntraMuscular once PRN Glucose LESS THAN 70 milligrams/deciliter  hydrALAZINE Injectable 10 milliGRAM(s) IV Push every 6 hours PRN sbp >150  or dbp >100      Allergies    contrast media (iodine-based) (Urticaria)  erythromycin (Unknown)  IV-Dye (Unknown)  sulfADIAZINE (Unknown)    Intolerances          LABS:                          10.4   4.8   )-----------( 199      ( 08 Dec 2018 10:26 )             30.9     12-08    143  |  108<H>  |  20.0  ----------------------------<  163<H>  3.2<L>   |  23.0  |  0.96    Ca    9.7      08 Dec 2018 10:26  Phos  2.1     12-08  Mg     1.7     12-08            RADIOLOGY & ADDITIONAL TESTS:

## 2018-12-08 NOTE — CHART NOTE - NSCHARTNOTEFT_GEN_A_CORE
Had a long discussion with the patient's Son- HCP Sree and daughters Bren and Ronel. Discussed goals of care, patient's hospital course, test results, medications and prognosis in detail. Discussed various options including ng tube insertion, PEG tube placement, and hospice if no improvement in mentation. Patient's 3rd daughter Rafael and her  Raphael were phoned in. Explained in the risks vs benefits of NG tube insertion including discomfort, perforation and infection. Family at this time would like to proceed with a trial of tube feeds via NG tube to assess for improvement in mentation. NG tube order placed, CXR post placement for position and then tube feeds to be started. Fmaily concerned about frequent UTI- straight cath for ua and urine culture ordered.     90 minutes were spent with the patient's family discussing the above.

## 2018-12-08 NOTE — PROGRESS NOTE ADULT - ASSESSMENT
Patient is a 89 year old female with a history of CVA, seizure disorder, hypertension and diabetes mellitus who was brought to the ER for complaints of altered mental status. Patient was diagnosed with seizure disorder last year and started on keppra po. Over the past few months the family has noted she has been more lethargic and confused. She saw Dr Mcallister, neurologist at [facility]. According to the daughter she had a number of tests which indicated she did not have seizure disorder and she was advised to taper off the keppra. Her daughter tapered it off over the past 2 weeks. The morning of admission she noted her mother became unresponsive, fell to her side, noted facial droop, her eyes were "fluttering" and her tongue was rolled back. After the episode she remained lethargic for about 1 hours. In the ER, NIH was 0. Patient was more alert and responsive. CT head was negative. Initially started on IV Keppra EEG positive for seizure activity. Spoke with neurology regarding family's concerns about Keppra causing lethargy; Changed to lacosamide. MRI positive for acute right frontal cva. Discussed the benefits of statin therapy with the patient's family in regards to secondary prevention; in agreement with statin therapy- LDL of 253. HbAc of 6. Blood cultures 1of 2 positive for CNS; UA negative. Started on iv Vancomycin repeat blood cultures ordered on 11/28. Passed swallow evaluation; started on PO soft diet with nectar fluid.  Noted to have episodes of bradycardia with sinus pause. Spoke with cardiology, iv labetalol discontinued with improvement. Recommend no AV jordan blocking agents. Hydralazine increased to 25mg TID. Aspirin to continue. Family refusing lipitor or zocor for history of myalgia. Recommend pravastatin on discharge. Repeat blood cultures x 2 negative. Hospital course complicated by worsening lethargy. Metabolic parameters acceptable; hypernatremia and uremia resolved. Neurology re-consulted; recommending to continue Vimpat. Palliative care consult appreciated; daughter refusing hospice services. Family  conference call arranged by MD, family wanted to continue to hold VImpat to assess improvement in lethargy understanding the risks of seizures of AEDs.  Second neurology opinion was obtained; vimpat or keppra not contributing to lethargy- recommend continuing AEDs which was endorsed to the family.      Assessment/Plan;    1. Acute toxic-metabolic encephalopathy - multifactorial: Secondary to dementia: CVA and seizure disorder  -remains lethargic; NPO since 12/6  -confirmed seizure activity on EEG   -Acute right frontal CVA   -hypernatremia resolved  -no evidence of CO2 narcosis or acute pathology on repeat CT  -Repeat UA negative for infection  -TSH and ammonia levels WNL; B12 level elevated  -EEG, CT head and MRI reviewed  -hold ASA while NPO; family refusing statin due to history of myalgias    -continue to hold Vimpat per family's wish - family willing to accept risk of seizures while holding AEDs  -TTE reviewed  -Carotid duplex with stenosis- medical management with aspirin and statin therapy  -Per notes last dose of Vimpat on 12/4; not likely to contributing to continued lethargy at this time  - Family concerned about carnitine deficiency; levels ordered. However given patient's age unlikely to be cause   -Daughter at bedside declining hospice services but requesting patient to be taken home with home care. Family .    2. Hypertensive urgency   -BP elevated  -No AV jordan blocking agents for bradycardia and pause  -IV hydralazine while NPO  -cardio recommendations appreciated    3. Diabetes mellitus type 2  -HbA1c 6.0  -accuchecks q 6hrs while NPO  -ISS    4. CoNS bacteremia: 1 of 2 cultures positive for CNS- contamination   Repeat blood cultures negative x 2   UA and Ucx negative   Discontinued Vanco    5. Chronic UTI  -Ua negative. Urine culture negative  -Previously on daily suppressive PO keflex      6. Acute Kidney Injury.   -Likely secondary to prerenal azotemia and poor solute intake  -Repeat UA bland  -Urine lytes reviewed  -creatinine again trending up to 1.2 today  -Judicious hydration  -Strict I/Os, daily weights  -Avoid nephrotoxic agents    7. Hypernatremia - resolved  -due to poor PO intake  -continue parenteral free water while NPO    DVT prophylaxis - Lovenox SC    Attending Attestation:   Plan discussed with patient, daughter Ronel at bedside, Dr. Gotti, RN     Patient's HCP 1. Son Rex [phone] . 2 Daughter Yari [phone] 2. Ronel at bedside Patient is a 89 year old female with a history of CVA, seizure disorder, hypertension and diabetes mellitus who was brought to the ER for complaints of altered mental status. Patient was diagnosed with seizure disorder last year and started on keppra po. Over the past few months the family has noted she has been more lethargic and confused. She saw Dr Mcallister, neurologist at [facility]. According to the daughter she had a number of tests which indicated she did not have seizure disorder and she was advised to taper off the keppra. Her daughter tapered it off over the past 2 weeks. The morning of admission she noted her mother became unresponsive, fell to her side, noted facial droop, her eyes were "fluttering" and her tongue was rolled back. After the episode she remained lethargic for about 1 hours. In the ER, NIH was 0. Patient was more alert and responsive. CT head was negative. Initially started on IV Keppra EEG positive for seizure activity. Spoke with neurology regarding family's concerns about Keppra causing lethargy; Changed to lacosamide. MRI positive for acute right frontal cva. Discussed the benefits of statin therapy with the patient's family in regards to secondary prevention; in agreement with statin therapy- LDL of 253. HbAc of 6. Blood cultures 1of 2 positive for CNS; UA negative. Started on iv Vancomycin repeat blood cultures ordered on 11/28. Passed swallow evaluation; started on PO soft diet with nectar fluid.  Noted to have episodes of bradycardia with sinus pause. Spoke with cardiology, iv labetalol discontinued with improvement. Recommend no AV jordan blocking agents. Hydralazine increased to 25mg TID. Aspirin to continue. Family refusing lipitor or zocor for history of myalgia. Recommend pravastatin on discharge. Repeat blood cultures x 2 negative. Hospital course complicated by worsening lethargy. Metabolic parameters acceptable; hypernatremia and uremia resolved. Neurology re-consulted; recommending to continue Vimpat. Palliative care consult appreciated; daughter refusing hospice services. Family  conference call arranged by MD, family wanted to continue to hold VImpat to assess improvement in lethargy understanding the risks of seizures of AEDs.  Second neurology opinion was obtained; vimpat or keppra not contributing to lethargy- recommend continuing AEDs which was endorsed to the family.      Assessment/Plan;    1. Acute toxic-metabolic encephalopathy - multifactorial: Secondary to dementia: CVA and seizure disorder  -remains lethargic; NPO since 12/6  -confirmed seizure activity on EEG   -Acute right frontal CVA   -hypernatremia resolved  -no evidence of CO2 narcosis or acute pathology on repeat CT  -Repeat UA negative for infection  -TSH and ammonia levels WNL; B12 level elevated  -EEG, CT head and MRI reviewed  -hold ASA while NPO; family refusing statin due to history of myalgias    -continue to hold Vimpat per family's wish - family willing to accept risk of seizures while holding AEDs  -TTE reviewed  -Carotid duplex with stenosis- medical management with aspirin and statin therapy  -Per notes last dose of Vimpat on 12/4; not likely to contributing to continued lethargy at this time  - Family concerned about carnitine deficiency; levels ordered. However given patient's age unlikely to be cause   -Daughter at bedside declining hospice services but requesting patient to be taken home with home care. Family .    2. Hypertensive urgency   -BP elevated; BP fluctuating   -No AV jordan blocking agents for bradycardia and pause  -IV hydralazine while NPO  -cardio recommendations appreciated    3. Diabetes mellitus type 2  -HbA1c 6.0  -accuchecks q 6hrs while NPO  -ISS    4. CoNS bacteremia: 1 of 2 cultures positive for CNS- contamination   Repeat blood cultures negative x 2   UA and Ucx negative   Discontinued Vanco    5. Chronic UTI  -Ua negative. Urine culture negative  -Previously on daily suppressive PO keflex      6. Acute Kidney Injury: Resolved  -Likely secondary to prerenal azotemia and poor solute intake  -Avoid nephrotoxic agents    7. Hypernatremia - resolved  -due to poor PO intake  -continue parenteral free water while NPO    DVT prophylaxis - Lovenox SC    Spoke with patient's daughter Yari in detail at the bedside. There has been very little improvement in patient's mentation since admission. Spoke with her regarding risks vs benefits of PEG tube placement. TO speak to her siblings in regards to placing NG tube and further goals of care.

## 2018-12-09 NOTE — PROGRESS NOTE ADULT - SUBJECTIVE AND OBJECTIVE BOX
CC: Altered mental status     INTERVAL HPI/OVERNIGHT EVENTS: Patient seen and examined, NG tube was not inserted yesterday. Remains lethargic, will follow simple commands .       Vital Signs Last 24 Hrs  T(C): 36.8 (09 Dec 2018 07:25), Max: 36.8 (09 Dec 2018 07:25)  T(F): 98.3 (09 Dec 2018 07:25), Max: 98.3 (09 Dec 2018 07:25)  HR: 97 (09 Dec 2018 07:25) (94 - 130)  BP: 141/89 (09 Dec 2018 07:25) (141/89 - 168/73)  BP(mean): --  RR: 19 (09 Dec 2018 07:25) (19 - 19)  SpO2: 99% (09 Dec 2018 07:25) (99% - 100%)    PHYSICAL EXAM:    GENERAL: NAD, Lethargic   ENMT: Dry mucous membranes  NECK: Supple, No JVD  NERVOUS SYSTEM:  Alert & Oriented X0, will only follow simple commands  CHEST/LUNG: Clear to auscultation bilaterally; No rales, rhonchi, wheezing, or rubs  HEART: Regular rate and rhythm; No murmurs, rubs, or gallops  ABDOMEN: Soft, Nontender, Nondistended; Bowel sounds present  EXTREMITIES:  2+ Peripheral Pulses, No clubbing, cyanosis, or edema        MEDICATIONS  (STANDING):  brimonidine 0.2% Ophthalmic Solution 1 Drop(s) Both EYES daily  dextrose 5% + sodium chloride 0.45%. 1000 milliLiter(s) (50 mL/Hr) IV Continuous <Continuous>  dextrose 5%. 1000 milliLiter(s) (50 mL/Hr) IV Continuous <Continuous>  dextrose 50% Injectable 12.5 Gram(s) IV Push once  dextrose 50% Injectable 25 Gram(s) IV Push once  dextrose 50% Injectable 25 Gram(s) IV Push once  enoxaparin Injectable 40 milliGRAM(s) SubCutaneous daily  insulin lispro (HumaLOG) corrective regimen sliding scale   SubCutaneous three times a day before meals  latanoprost 0.005% Ophthalmic Solution 1 Drop(s) Both EYES at bedtime  timolol 0.25% Solution 1 Drop(s) Both EYES daily    MEDICATIONS  (PRN):  dextrose 40% Gel 15 Gram(s) Oral once PRN Blood Glucose LESS THAN 70 milliGRAM(s)/deciliter  glucagon  Injectable 1 milliGRAM(s) IntraMuscular once PRN Glucose LESS THAN 70 milligrams/deciliter  hydrALAZINE Injectable 10 milliGRAM(s) IV Push every 6 hours PRN sbp >150  or dbp >100      Allergies    contrast media (iodine-based) (Urticaria)  erythromycin (Unknown)  IV-Dye (Unknown)  sulfADIAZINE (Unknown)    Intolerances          LABS:                          11.2   6.5   )-----------( 248      ( 09 Dec 2018 08:59 )             34.2     12-09    141  |  105  |  21.0<H>  ----------------------------<  228<H>  4.0   |  19.0<L>  |  1.15    Ca    9.9      09 Dec 2018 08:59  Phos  2.1     12-08  Mg     1.7     12-08            RADIOLOGY & ADDITIONAL TESTS:

## 2018-12-09 NOTE — PROGRESS NOTE ADULT - ASSESSMENT
Patient is a 89 year old female with a history of CVA, seizure disorder, hypertension and diabetes mellitus who was brought to the ER for complaints of altered mental status. Patient was diagnosed with seizure disorder last year and started on keppra po. Over the past few months the family has noted she has been more lethargic and confused. She saw Dr Mcallister, neurologist at [facility]. According to the daughter she had a number of tests which indicated she did not have seizure disorder and she was advised to taper off the keppra. Her daughter tapered it off over the past 2 weeks. The morning of admission she noted her mother became unresponsive, fell to her side, noted facial droop, her eyes were "fluttering" and her tongue was rolled back. After the episode she remained lethargic for about 1 hours. In the ER, NIH was 0. Patient was more alert and responsive. CT head was negative. Initially started on IV Keppra EEG positive for seizure activity. Spoke with neurology regarding family's concerns about Keppra causing lethargy; Changed to lacosamide. MRI positive for acute right frontal cva. Discussed the benefits of statin therapy with the patient's family in regards to secondary prevention; in agreement with statin therapy- LDL of 253. HbAc of 6. Blood cultures 1of 2 positive for CNS; UA negative. Started on iv Vancomycin repeat blood cultures ordered on 11/28. Passed swallow evaluation; started on PO soft diet with nectar fluid.  Noted to have episodes of bradycardia with sinus pause. Spoke with cardiology, iv labetalol discontinued with improvement. Recommend no AV jordan blocking agents. Hydralazine increased to 25mg TID. Aspirin to continue. Family refusing lipitor or zocor for history of myalgia. Recommend pravastatin on discharge. Repeat blood cultures x 2 negative. Hospital course complicated by worsening lethargy. Metabolic parameters acceptable; hypernatremia and uremia resolved. Neurology re-consulted; recommending to continue Vimpat. Palliative care consult appreciated; daughter refusing hospice services. Family  conference call arranged by MD, family wanted to continue to hold VImpat to assess improvement in lethargy understanding the risks of seizures of AEDs.  Second neurology opinion was obtained; vimpat or keppra not contributing to lethargy- recommend continuing AEDs which was endorsed to the family.      Assessment/Plan;    1. Acute toxic-metabolic encephalopathy - multifactorial: Secondary to dementia:  Right frontal CVA and seizure disorder  -remains lethargic; NPO since 12/6  -confirmed seizure activity on EEG; family refusing AEDs despite being advised she may have recurrent seizures.   - Family requested repeat UA and urine culture- straight cath sample ordered; previous UA and urine cultures have been negative  -hypernatremia resolved  -no evidence of CO2 narcosis or acute pathology on repeat CT  -TSH and ammonia levels WNL; B12 level elevated  -EEG, CT head and MRI reviewed    2. Hypertensive urgency   -BP elevated; BP fluctuating   -No AV jordan blocking agents for bradycardia and pause  -IV hydralazine while NPO  -cardio recommendations appreciated    3. Diabetes mellitus type 2  -HbA1c 6.0  -accuchecks q 6hrs while NPO  -ISS    4. CoNS bacteremia: 1 of 2 cultures positive for CNS- contamination   Repeat blood cultures negative x 2   UA and Ucx negative   Discontinued Vanco    5. Chronic UTI  -Ua negative. Urine culture negative  - Straight cath for UA and urine culture ordered  -Previously on daily suppressive PO keflex      6. Acute Kidney Injury: Resolved  -Likely secondary to prerenal azotemia and poor solute intake  -Avoid nephrotoxic agents    7. Hypernatremia - resolved  -due to poor PO intake  -continue parenteral free water while NPO    DVT prophylaxis - Lovenox SC    Spoke with RN, PA to be called for NG tube insertion, Xray post placement for position and tube feeds to be started. UA and urine culture to be sent    I spoke with patient's daughter Ronel and Yari at bedside. Advised Ronel she cannot administer medications to her mother without the approval of an MD. She did given her mother 2 units of insulin last night. I advised her that interfering with her mothers care can be detrimental; if this were to occur again visitation would have to be restricted.     Poor overall prognosis

## 2018-12-09 NOTE — CHART NOTE - NSCHARTNOTEFT_GEN_A_CORE
Called in to insert NGT. Family at the bedside want to proceed with NGT and nutrition. They wanted to observe the procedure.  Dobhoff tube was lubricated and  inserted into right nostril. NGT was advanced to 87cm marking and secured well.   Pt's mouth was inspected no coiling was observed, and suctioning of oral mucus helped with initial insertion.  Pt's son and one of the daughter at the bedside. Family approve of B/L mittens to prevent pt. from pulling on lines.  Pt. tolerated procedure well. CXR low over diaphragm to be done and confirmed for further nutrition orders.

## 2018-12-10 NOTE — PROGRESS NOTE ADULT - SUBJECTIVE AND OBJECTIVE BOX
Chief Complaint: Asked to see pt for tachybrady arrhythmia.    HPI: Recent course reviewed. 89 yo F adm with AMS. CT has documented small cva. She had been on beta blocker therapy but this was discontinued when pauses were documented. An EKG done yesterday documented A-fib  with her chronic RBBB.    PAST MEDICAL & SURGICAL HISTORY:  Diabetes mellitus  Fracture: lumbar fracture.  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  Heart Murmur  Calcium Nephrolithiasis  Hyperlipidemia LDL Goal < 100  Glaucoma, Low Tension  History of Pulmonary Hypertension  CAD (Coronary Artery Disease)  Obstructive Sleep Apnea  Controlled Type 2 Diabetes with Neuropathy  H/O cataract extraction  History of appendectomy  Fracture of Humerus: repair with harware right  Glaucoma: relieve pressure left eye along with cataract extraction  Bilateral Cataracts: excision 2006  Benign Breast Disease: removal of cyst- 1963  Cataract  Kidney Stones: lithotripsy 2005, 2006  S/P Appendectomy: 1937      PREVIOUS DIAGNOSTIC TESTING:      ECHO  FINDINGS:    STRESS  FINDINGS:    CATHETERIZATION  FINDINGS:    MEDICATIONS  (STANDING):  aspirin  chewable 81 milliGRAM(s) Oral daily  brimonidine 0.2% Ophthalmic Solution 1 Drop(s) Both EYES daily  dextrose 5%. 1000 milliLiter(s) (50 mL/Hr) IV Continuous <Continuous>  dextrose 50% Injectable 12.5 Gram(s) IV Push once  dextrose 50% Injectable 25 Gram(s) IV Push once  dextrose 50% Injectable 25 Gram(s) IV Push once  enoxaparin Injectable 40 milliGRAM(s) SubCutaneous daily  hydrALAZINE 25 milliGRAM(s) Oral every 8 hours  insulin lispro (HumaLOG) corrective regimen sliding scale   SubCutaneous three times a day before meals  latanoprost 0.005% Ophthalmic Solution 1 Drop(s) Both EYES at bedtime  timolol 0.25% Solution 1 Drop(s) Both EYES daily    MEDICATIONS  (PRN):  dextrose 40% Gel 15 Gram(s) Oral once PRN Blood Glucose LESS THAN 70 milliGRAM(s)/deciliter  glucagon  Injectable 1 milliGRAM(s) IntraMuscular once PRN Glucose LESS THAN 70 milligrams/deciliter  hydrALAZINE Injectable 10 milliGRAM(s) IV Push every 6 hours PRN sbp >150  or dbp >100      FAMILY HISTORY:  No pertinent family history  Family history of cerebrovascular accident (CVA) (Sibling)      ROS: Negative other than as mentioned in HPI.    Vital Signs Last 24 Hrs  T(C): 36.4 (10 Dec 2018 07:18), Max: 37 (09 Dec 2018 18:57)  T(F): 97.6 (10 Dec 2018 07:18), Max: 98.6 (09 Dec 2018 18:57)  HR: 80 irreg(10 Dec 2018 07:18) (82 - 98)  BP: 120 systolic la manually (10 Dec 2018 07:18) (120/78 - 152/78)  BP(mean): --  RR: 16 flat nonlabored. (10 Dec 2018 07:18) (19 - 19)  SpO2: 100% (10 Dec 2018 00:10) (100% - 100%)    PHYSICAL EXAM:  General: Elderly F with NG tube. semicomatose  HEENT: Head; normocephalic, atraumatic.  Eyes;   Pupils reactive, cornea wnl.  Neck; Supple, no nodes adenopathy, no NVD or carotid bruit or thyromegaly.  CARDIOVASCULAR; No murmur, rub, gallop or lift. Normal S1 and S2. irreg rhythm  LUNGS; difficult to examine but good BS bilat.  ABDOMEN ; Soft, nontender without mass or organomegaly. bowel sounds normoactive.  EXTREMITIES; No clubbing, cyanosis or edema.   SKIN; warm and dry with normal turgor.  NEURO; semicomatose-minimal response to noxious stimuli.   PSYCH; unable to assess.            INTERPRETATION OF TELEMETRY:    ECG: Afib RBBB    I&O's Detail    09 Dec 2018 07:01  -  10 Dec 2018 07:00  --------------------------------------------------------  IN:    Glucerna 1.5: 80 mL  Total IN: 80 mL    OUT:  Total OUT: 0 mL    Total NET: 80 mL          LABS:                        11.1   5.8   )-----------( 224      ( 10 Dec 2018 08:36 )             33.6     12-10    145  |  109<H>  |  28.0<H>  ----------------------------<  234<H>  4.0   |  23.0  |  1.40<H>    Ca    10.1      10 Dec 2018 08:36              I&O's Summary    09 Dec 2018 07:01  -  10 Dec 2018 07:00  --------------------------------------------------------  IN: 80 mL / OUT: 0 mL / NET: 80 mL        RADIOLOGY & ADDITIONAL STUDIES:

## 2018-12-10 NOTE — PROGRESS NOTE ADULT - SUBJECTIVE AND OBJECTIVE BOX
CC: Afib    INTERVAL HPI/OVERNIGHT EVENTS: Patient seen and examined, tolerating tube feeds. Still lethargic following some commands will not open eyes. According to her daughters at bedside she has been more alert with her family.       Vital Signs Last 24 Hrs  T(C): 36.4 (10 Dec 2018 07:18), Max: 37 (09 Dec 2018 18:57)  T(F): 97.6 (10 Dec 2018 07:18), Max: 98.6 (09 Dec 2018 18:57)  HR: 82 (10 Dec 2018 07:18) (82 - 98)  BP: 152/78 (10 Dec 2018 07:18) (120/78 - 152/78)  BP(mean): --  RR: 19 (10 Dec 2018 07:18) (19 - 19)  SpO2: 98% (10 Dec 2018 07:18) (98% - 100%)    PHYSICAL EXAM:    GENERAL: NAD, lethargic   HEAD:  Atraumatic, Normocephalic  ENMT: Dry mucous membranes  + NG tube   NECK: Supple, No JVD  CHEST/LUNG: Clear to auscultation bilaterally; No rales, rhonchi, wheezing, or rubs  HEART: Regular rate and rhythm; No murmurs, rubs, or gallops  ABDOMEN: Soft, Nontender, Nondistended; Bowel sounds present  EXTREMITIES:  2+ Peripheral Pulses, No clubbing, cyanosis, or edema        MEDICATIONS  (STANDING):  aspirin  chewable 81 milliGRAM(s) Oral daily  brimonidine 0.2% Ophthalmic Solution 1 Drop(s) Both EYES daily  dextrose 5%. 1000 milliLiter(s) (50 mL/Hr) IV Continuous <Continuous>  dextrose 50% Injectable 12.5 Gram(s) IV Push once  dextrose 50% Injectable 25 Gram(s) IV Push once  dextrose 50% Injectable 25 Gram(s) IV Push once  enoxaparin Injectable 40 milliGRAM(s) SubCutaneous daily  hydrALAZINE 25 milliGRAM(s) Oral every 8 hours  insulin glargine Injectable (LANTUS) 5 Unit(s) SubCutaneous at bedtime  insulin lispro (HumaLOG) corrective regimen sliding scale   SubCutaneous three times a day before meals  latanoprost 0.005% Ophthalmic Solution 1 Drop(s) Both EYES at bedtime  timolol 0.25% Solution 1 Drop(s) Both EYES daily    MEDICATIONS  (PRN):  dextrose 40% Gel 15 Gram(s) Oral once PRN Blood Glucose LESS THAN 70 milliGRAM(s)/deciliter  glucagon  Injectable 1 milliGRAM(s) IntraMuscular once PRN Glucose LESS THAN 70 milligrams/deciliter  hydrALAZINE Injectable 10 milliGRAM(s) IV Push every 6 hours PRN sbp >150  or dbp >100      Allergies    contrast media (iodine-based) (Urticaria)  erythromycin (Unknown)  IV-Dye (Unknown)  sulfADIAZINE (Unknown)    Intolerances          LABS:                          11.1   5.8   )-----------( 224      ( 10 Dec 2018 08:36 )             33.6     12-10    145  |  109<H>  |  28.0<H>  ----------------------------<  234<H>  4.0   |  23.0  |  1.40<H>    Ca    10.1      10 Dec 2018 08:36            RADIOLOGY & ADDITIONAL TESTS:

## 2018-12-10 NOTE — CHART NOTE - NSCHARTNOTEFT_GEN_A_CORE
Called by RN for pt's HR of 120s.  Asked RN to do rectal temp. Resulted 98.8.  /60, manual pulse 126 irregular, R 18, Pulse ox 96% on RA.  Reviewed chart.  Note from cardiologist, pt with hx of tachybrady arrhythmia.  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  Pt is asymptomatic as per RN.  Will continue to monitor and RN informed to alert PA for change in status. Called by RN for pt's HR of 120s.  Asked RN to do rectal temp. Resulted 98.8.  /60, manual pulse 126 irregular, R 18, Pulse ox 96% on RA.  Reviewed chart.  Note from cardiologist, pt with hx of tachybrady arrhythmia.  As per PMD, no AV jordan blocking agents due to bradycardia and pause. Pt is asymptomatic as per RN.  Will continue to monitor and RN informed to alert PA for change in status. If increasing HR, will trial a low dose of IV beta blocker. Called by RN for pt's HR of 120s.  Asked RN to do rectal temp. Resulted 98.8.  /60, manual pulse 126 irregular, R 18, Pulse ox 96% on RA.  Reviewed chart.  Note from cardiologist, pt with hx of tachybrady arrhythmia.  Pt was bradycardic and with pause while on beta blocker.  Pt is asymptomatic as per RN.  Will continue to monitor and RN informed to alert PA for change in status. If increasing HR, will trial a low dose of IV beta blocker. Called by RN for pt's HR of 120s.  Asked RN to do rectal temp. Resulted 98.8.  /60, manual pulse 126 irregular, R 18, Pulse ox 96% on RA.  Reviewed chart.  Note from cardiologist, pt with hx of tachybrady arrhythmia.  Pt was bradycardic and had pause while on beta blocker.  Pt is asymptomatic as per RN.  Will continue to monitor and RN informed to alert PA for change in status. If increasing or sustaining high HR, will trial a low dose of IV beta blocker. Called by RN for pt's HR of 120s.  Asked RN to do rectal temp. Resulted 98.8.  /60, manual pulse 126 irregular, R 18, Pulse ox 96% on RA.  Reviewed chart.  Note from cardiologist, pt with hx of tachybrady arrhythmia.  Pt was bradycardic and had pause while on beta blocker.  Pt is asymptomatic as per RN.  Will continue to monitor and RN informed to alert PA for change in status. If increasing or sustaining high HR, will trial a low dose of IV beta blocker. Family doesn't want anything to lower HR for now.  As per RN, HR fluctuating from 100-120s.

## 2018-12-10 NOTE — CHART NOTE - NSCHARTNOTEFT_GEN_A_CORE
PA made attempt to insert Dobhoff tube for nutrition. Pt very lethargic and unable to follow commands. Pt noted w copious amount of oral secretions. First two attempts coiled in pt's oral cavity. Last attempt inserted to 70. CXR revealed location in right bronchus and tube removed immediately. Case discussed w Dr Dowling. Discussed w HCP Rex via telephone, he states he would like more attempts until it is successfully inserted. Plan is for overnight PA to make an attempt. If unsuccessful, can start IVF NS at 75 cc/hour and Dr Jain will reevaluate in the morning.

## 2018-12-10 NOTE — PROGRESS NOTE ADULT - SUBJECTIVE AND OBJECTIVE BOX
CC: follow up GOC  INTERVAL HPI/OVERNIGHT EVENTS:    PRESENT SYMPTOMS: SOURCE:  Patient/Family/Team    PAIN SCALE:  0 = none  1 = mild   2 = moderate  3 = severe    Pain: appears comfortabe    Dyspnea:  [ ] YES [ x] NO  Anxiety:  [ x] YES [ ] NO  Fatigue: [x ] YES [ ] NO  Nausea: [ ] YES [ ] NO  na  Loss of Appetite: [ ] YES [ ] NO  na NPO  Other symptoms: __________     MEDICATIONS  (STANDING):  aspirin  chewable 81 milliGRAM(s) Oral daily  brimonidine 0.2% Ophthalmic Solution 1 Drop(s) Both EYES daily  dextrose 5%. 1000 milliLiter(s) (50 mL/Hr) IV Continuous <Continuous>  dextrose 50% Injectable 12.5 Gram(s) IV Push once  dextrose 50% Injectable 25 Gram(s) IV Push once  dextrose 50% Injectable 25 Gram(s) IV Push once  enoxaparin Injectable 40 milliGRAM(s) SubCutaneous daily  hydrALAZINE 25 milliGRAM(s) Oral every 8 hours  insulin lispro (HumaLOG) corrective regimen sliding scale   SubCutaneous three times a day before meals  latanoprost 0.005% Ophthalmic Solution 1 Drop(s) Both EYES at bedtime  timolol 0.25% Solution 1 Drop(s) Both EYES daily    MEDICATIONS  (PRN):  dextrose 40% Gel 15 Gram(s) Oral once PRN Blood Glucose LESS THAN 70 milliGRAM(s)/deciliter  glucagon  Injectable 1 milliGRAM(s) IntraMuscular once PRN Glucose LESS THAN 70 milligrams/deciliter  hydrALAZINE Injectable 10 milliGRAM(s) IV Push every 6 hours PRN sbp >150  or dbp >100      Allergies    contrast media (iodine-based) (Urticaria)  erythromycin (Unknown)  IV-Dye (Unknown)  sulfADIAZINE (Unknown)    Intolerances      Karnofsky Performance Score/Palliative Performance Status Version 2:10  %    Vital Signs Last 24 Hrs  T(C): 36.4 (10 Dec 2018 07:18), Max: 37 (09 Dec 2018 18:57)  T(F): 97.6 (10 Dec 2018 07:18), Max: 98.6 (09 Dec 2018 18:57)  HR: 82 (10 Dec 2018 07:18) (82 - 98)  BP: 152/78 (10 Dec 2018 07:18) (120/78 - 152/78)  BP(mean): --  RR: 19 (10 Dec 2018 07:18) (19 - 19)  SpO2: 100% (10 Dec 2018 00:10) (100% - 100%)    PHYSICAL EXAM:    General: Elderly woman, NAD + NG tube,  non responsive to verbal stimuli, minimally responsive to tactile stimuli    HEENT:   NCAT + NGtube      Lungs: [x ] comfortable [ ] tachypnea/labored breathing  [ ] excessive secretions    CV: [x ] normal  [ ] tachycardia    GI: soft NTND  + NG tube    : [ ] normal  [ x] incontinent  [ ] oliguria/anuria  [ ] walter    MSK: [ ] normal  [x ] weakness  [ ] edema             [ ] ambulatory  [ x] bedbound/wheelchair bound    Skin: [ ] normal  [ ] pressure ulcers- Stage_____  [x ] no rash    LABS:                        11.1   5.8   )-----------( 224      ( 10 Dec 2018 08:36 )             33.6     12-10    145  |  109<H>  |  28.0<H>  ----------------------------<  234<H>  4.0   |  23.0  |  1.40<H>    Ca    10.1      10 Dec 2018 08:36  Phos  2.1     12-08  Mg     1.7     12-08          I&O's Summary    09 Dec 2018 07:01  -  10 Dec 2018 07:00  --------------------------------------------------------  IN: 80 mL / OUT: 0 mL / NET: 80 mL      Thank you for the opportunity to assist with the care of this patient.   [facility] [phone].

## 2018-12-10 NOTE — CHART NOTE - NSCHARTNOTEFT_GEN_A_CORE
Called by Rn to replace Dobhoff tube for feeding.      VSS    Dobhoff tube placed in left nostril without difficulty.  Placement confirmed with +auscultation.  Pt tolerated procedure well.    S/P Dobhoff tube placement    Portable CXR ordered to confirm tube placement.  RN to hold feedings until placement confirmed by cxr

## 2018-12-10 NOTE — CHART NOTE - NSCHARTNOTEFT_GEN_A_CORE
Source: Patient [ ]  Family [ ]   other [ ]  ID rounds    Current Diet:   Diet, NPO with Tube Feed:   Tube Feeding Modality: Nasogastric  Glucerna 1.5 Ari  Total Volume for 24 Hours (mL): 960  Continuous  Starting Tube Feed Rate {mL per Hour}: 10  Increase Tube Feed Rate by (mL): 10     Every 4 hours  Until Goal Tube Feed Rate (mL per Hour): 40  Tube Feed Duration (in Hours): 24  Tube Feed Start Time: 20:00 (12-08-18 @ 17:21)    Patient reports [ ] nausea  [ ] vomiting [ ] diarrhea [ ] constipation  [x]chewing problems [X] swallowing issues  [ ] other:     Enteral /Parenteral Nutrition: Current diet order: Glucerna 1.5cal @ goal rate 40mL/hr (x20 hrs) to provide 800mL daily (1200cal, 66g protein insufficient to meet needs at this time.     Current Weight:   (12/1)     161lbs  (11/28)   168lbs      % Weight Change: No recent weight documented    Pertinent Medications: MEDICATIONS  (STANDING):  aspirin  chewable 81 milliGRAM(s) Oral daily  brimonidine 0.2% Ophthalmic Solution 1 Drop(s) Both EYES daily  dextrose 5%. 1000 milliLiter(s) (50 mL/Hr) IV Continuous <Continuous>  dextrose 50% Injectable 12.5 Gram(s) IV Push once  dextrose 50% Injectable 25 Gram(s) IV Push once  dextrose 50% Injectable 25 Gram(s) IV Push once  enoxaparin Injectable 40 milliGRAM(s) SubCutaneous daily  hydrALAZINE 25 milliGRAM(s) Oral every 8 hours  insulin lispro (HumaLOG) corrective regimen sliding scale   SubCutaneous three times a day before meals  latanoprost 0.005% Ophthalmic Solution 1 Drop(s) Both EYES at bedtime  timolol 0.25% Solution 1 Drop(s) Both EYES daily    MEDICATIONS  (PRN):  dextrose 40% Gel 15 Gram(s) Oral once PRN Blood Glucose LESS THAN 70 milliGRAM(s)/deciliter  glucagon  Injectable 1 milliGRAM(s) IntraMuscular once PRN Glucose LESS THAN 70 milligrams/deciliter  hydrALAZINE Injectable 10 milliGRAM(s) IV Push every 6 hours PRN sbp >150  or dbp >100    Pertinent Labs: CBC Full  -  ( 10 Dec 2018 08:36 )  WBC Count : 5.8 K/uL  Hemoglobin : 11.1 g/dL  Hematocrit : 33.6 %  Platelet Count - Automated : 224 K/uL  Mean Cell Volume : 92.1 fl  Mean Cell Hemoglobin : 30.4 pg  Mean Cell Hemoglobin Concentration : 33.0 g/dL  12-10 Na145 mmol/L Glu 234 mg/dL<H> K+ 4.0 mmol/L Cr  1.40 mg/dL<H> BUN 28.0 mg/dL<H> Phos n/a   Alb n/a   PAB n/a         Skin: 1+ generalized edema noted; IAD    Nutrition focused physical exam conducted - found signs of malnutrition [ ]absent [X]present    Subcutaneous fat loss: [X] Orbital fat pads region, [X]Buccal fat region, [ ]Triceps region,  [ ]Ribs region    Muscle wasting: [ ]Temples region, [X]Clavicle region, [X]Shoulder region, [ ]Scapula region, [ ]Interosseous region,  [ ]thigh region, [ ]Calf region      Estimated Needs:   [X] no change since previous assessment  [ ] recalculated:     Current Nutrition Diagnosis: Pt remains at high nutrition risk secondary to severe acute malnutrition related to inadequate energy intake in setting of advancing age, lethargy as evidenced by unintentional significant 17lb (9.5%) weight loss x 9 months, moderate muscle wasting/fat loss (temples, clavicle, ribs, buccal, orbital), Pt consuming <25% of estimated needs >7 days. NGT inserted with feedings, running at 20mL with goal rate 40mL, insufficient to meet needs. Pt is tolerating TF, BG elevated 134-231 noted. RD to remain available.     Recommendations:   1) Monitor tolerance of TF  2) Pending tolerance/BG, consider inc TF to goal rate 65mL/hr to provide 1300mL (x20hrs), 1950cal, 107g protein, sufficient to meet needs and facilitate gradual weight gain.   3) Monitor daily weights    Monitoring and Evaluation:   [ ] PO intake [X] Tolerance to diet prescription [X] Weights  [X] Follow up per protocol [X] Labs: Source: Patient [ ]  Family [ ]   other [ ]  ID rounds    Current Diet:   Diet, NPO with Tube Feed:   Tube Feeding Modality: Nasogastric  Glucerna 1.5 Ari  Total Volume for 24 Hours (mL): 960  Continuous  Starting Tube Feed Rate {mL per Hour}: 10  Increase Tube Feed Rate by (mL): 10     Every 4 hours  Until Goal Tube Feed Rate (mL per Hour): 40  Tube Feed Duration (in Hours): 24  Tube Feed Start Time: 20:00 (12-08-18 @ 17:21)    Patient reports [ ] nausea  [ ] vomiting [ ] diarrhea [ ] constipation  [x]chewing problems [X] swallowing issues  [ ] other:     Enteral /Parenteral Nutrition: Current diet order: Glucerna 1.5cal @ goal rate 40mL/hr (x20 hrs) to provide 800mL daily (1200cal, 66g protein insufficient to meet needs at this time.     Current Weight:   (12/1)     161lbs  (11/28)   168lbs      % Weight Change: No recent weight documented    Pertinent Medications: MEDICATIONS  (STANDING):  aspirin  chewable 81 milliGRAM(s) Oral daily  brimonidine 0.2% Ophthalmic Solution 1 Drop(s) Both EYES daily  dextrose 5%. 1000 milliLiter(s) (50 mL/Hr) IV Continuous <Continuous>  dextrose 50% Injectable 12.5 Gram(s) IV Push once  dextrose 50% Injectable 25 Gram(s) IV Push once  dextrose 50% Injectable 25 Gram(s) IV Push once  enoxaparin Injectable 40 milliGRAM(s) SubCutaneous daily  hydrALAZINE 25 milliGRAM(s) Oral every 8 hours  insulin lispro (HumaLOG) corrective regimen sliding scale   SubCutaneous three times a day before meals  latanoprost 0.005% Ophthalmic Solution 1 Drop(s) Both EYES at bedtime  timolol 0.25% Solution 1 Drop(s) Both EYES daily    MEDICATIONS  (PRN):  dextrose 40% Gel 15 Gram(s) Oral once PRN Blood Glucose LESS THAN 70 milliGRAM(s)/deciliter  glucagon  Injectable 1 milliGRAM(s) IntraMuscular once PRN Glucose LESS THAN 70 milligrams/deciliter  hydrALAZINE Injectable 10 milliGRAM(s) IV Push every 6 hours PRN sbp >150  or dbp >100    Pertinent Labs: CBC Full  -  ( 10 Dec 2018 08:36 )  WBC Count : 5.8 K/uL  Hemoglobin : 11.1 g/dL  Hematocrit : 33.6 %  Platelet Count - Automated : 224 K/uL  Mean Cell Volume : 92.1 fl  Mean Cell Hemoglobin : 30.4 pg  Mean Cell Hemoglobin Concentration : 33.0 g/dL  12-10 Na145 mmol/L Glu 234 mg/dL<H> K+ 4.0 mmol/L Cr  1.40 mg/dL<H> BUN 28.0 mg/dL<H> Phos n/a   Alb n/a   PAB n/a         Skin: 1+ generalized edema noted; IAD    Nutrition focused physical exam conducted - found signs of malnutrition [ ]absent [X]present    Subcutaneous fat loss: [X] Orbital fat pads region, [X]Buccal fat region, [ ]Triceps region,  [ ]Ribs region    Muscle wasting: [ ]Temples region, [X]Clavicle region, [X]Shoulder region, [ ]Scapula region, [ ]Interosseous region,  [ ]thigh region, [ ]Calf region      Estimated Needs:   [X] no change since previous assessment  [ ] recalculated:     Current Nutrition Diagnosis: Pt remains at high nutrition risk secondary to severe acute malnutrition related to inadequate energy intake in setting of advancing age, lethargy as evidenced by unintentional significant 17lb (9.5%) weight loss x 9 months, moderate muscle wasting/fat loss (temples, clavicle, ribs, buccal, orbital), Pt consuming <25% of estimated needs >7 days. NGT inserted with feedings, running at 20mL with goal rate 40mL, insufficient to meet needs. Pt is tolerating TF, BG elevated 134-231 noted. RD to remain available.     Recommendations:   1) Monitor tolerance of TF  2) Pending tolerance/BG, consider inc TF to goal rate 65mL/hr to provide 1300mL (x20hrs), 1950cal, 107g protein, sufficient to meet needs and facilitate gradual weight gain.   3) Monitor daily weights  4) Consider MVI daily    Monitoring and Evaluation:   [ ] PO intake [X] Tolerance to diet prescription [X] Weights  [X] Follow up per protocol [X] Labs: Source: Patient [ ]  Family [ ]   other [ ]  ID rounds    Current Diet:   Diet, NPO with Tube Feed:   Tube Feeding Modality: Nasogastric  Glucerna 1.5 Ari  Total Volume for 24 Hours (mL): 960  Continuous  Starting Tube Feed Rate {mL per Hour}: 10  Increase Tube Feed Rate by (mL): 10     Every 4 hours  Until Goal Tube Feed Rate (mL per Hour): 40  Tube Feed Duration (in Hours): 24  Tube Feed Start Time: 20:00 (12-08-18 @ 17:21)    Patient reports [ ] nausea  [ ] vomiting [ ] diarrhea [ ] constipation  [x]chewing problems [X] swallowing issues  [ ] other:     Enteral /Parenteral Nutrition: Current diet order: Glucerna 1.5cal @ goal rate 40mL/hr (x20 hrs) to provide 800mL daily (1200cal, 66g protein insufficient to meet needs at this time.     Current Weight:   (12/1)     161lbs  (11/28)   168lbs      % Weight Change: No recent weight documented    Pertinent Medications: MEDICATIONS  (STANDING):  aspirin  chewable 81 milliGRAM(s) Oral daily  brimonidine 0.2% Ophthalmic Solution 1 Drop(s) Both EYES daily  dextrose 5%. 1000 milliLiter(s) (50 mL/Hr) IV Continuous <Continuous>  dextrose 50% Injectable 12.5 Gram(s) IV Push once  dextrose 50% Injectable 25 Gram(s) IV Push once  dextrose 50% Injectable 25 Gram(s) IV Push once  enoxaparin Injectable 40 milliGRAM(s) SubCutaneous daily  hydrALAZINE 25 milliGRAM(s) Oral every 8 hours  insulin lispro (HumaLOG) corrective regimen sliding scale   SubCutaneous three times a day before meals  latanoprost 0.005% Ophthalmic Solution 1 Drop(s) Both EYES at bedtime  timolol 0.25% Solution 1 Drop(s) Both EYES daily    MEDICATIONS  (PRN):  dextrose 40% Gel 15 Gram(s) Oral once PRN Blood Glucose LESS THAN 70 milliGRAM(s)/deciliter  glucagon  Injectable 1 milliGRAM(s) IntraMuscular once PRN Glucose LESS THAN 70 milligrams/deciliter  hydrALAZINE Injectable 10 milliGRAM(s) IV Push every 6 hours PRN sbp >150  or dbp >100    Pertinent Labs: CBC Full  -  ( 10 Dec 2018 08:36 )  WBC Count : 5.8 K/uL  Hemoglobin : 11.1 g/dL  Hematocrit : 33.6 %  Platelet Count - Automated : 224 K/uL  Mean Cell Volume : 92.1 fl  Mean Cell Hemoglobin : 30.4 pg  Mean Cell Hemoglobin Concentration : 33.0 g/dL  12-10 Na145 mmol/L Glu 234 mg/dL<H> K+ 4.0 mmol/L Cr  1.40 mg/dL<H> BUN 28.0 mg/dL<H> Phos n/a   Alb n/a   PAB n/a         Skin: 1+ generalized edema noted; IAD    Nutrition focused physical exam conducted - found signs of malnutrition [ ]absent [X]present    Subcutaneous fat loss: [X] Orbital fat pads region, [X]Buccal fat region, [ ]Triceps region,  [ ]Ribs region    Muscle wasting: [ ]Temples region, [X]Clavicle region, [X]Shoulder region, [ ]Scapula region, [ ]Interosseous region,  [ ]thigh region, [ ]Calf region      Estimated Needs:   [X] no change since previous assessment  [ ] recalculated:     Current Nutrition Diagnosis: Pt remains at high nutrition risk secondary to severe acute malnutrition related to inadequate energy intake in setting of advancing age, lethargy as evidenced by unintentional significant 17lb (9.5%) weight loss x 9 months, moderate muscle wasting/fat loss (temples, clavicle, ribs, buccal, orbital), Pt consuming <25% of estimated needs >7 days. NGT inserted with feedings, running at 20mL with goal rate 40mL, insufficient to meet needs. Pt is tolerating TF, BG elevated 134-231 noted. Advanced care conversations pending. RD to remain available.     Recommendations:   1) Monitor tolerance of TF  2) Pending tolerance/BG/Pt & family wishes, consider inc TF to goal rate 65mL/hr to provide 1300mL (x20hrs), 1950cal, 107g protein, sufficient to meet needs and facilitate gradual weight gain.   3) Monitor daily weights  4) Consider MVI daily    Monitoring and Evaluation:   [ ] PO intake [X] Tolerance to diet prescription [X] Weights  [X] Follow up per protocol [X] Labs:

## 2018-12-10 NOTE — PROGRESS NOTE ADULT - ASSESSMENT
1. 89 yo female presented with AMS. Imaging showed a small frontal cva (given her parox afib an embolic cva is certainly possible)-pt is not a candidate for systemic AC.  2. Tachybrady/SSS-ordinarily a pacemaker would be considered-but given her current condition and poor prognosis it is not indicated. Discussed in depth with 2 dtr's.

## 2018-12-10 NOTE — PROGRESS NOTE ADULT - ASSESSMENT
Patient is a 89 year old female with a history of CVA, seizure disorder, hypertension and diabetes mellitus who was brought to the ER for complaints of altered mental status. Patient was diagnosed with seizure disorder last year and started on keppra po. Over the past few months the family has noted she has been more lethargic and confused. She saw Dr Mcallister, neurologist at [place]. According to the daughter she had a number of tests which indicated she did not have seizure disorder and she was advised to taper off the keppra. Her daughter tapered it off over the past 2 weeks. The morning of admission she noted her mother became unresponsive, fell to her side, noted facial droop, her eyes were "fluttering" and her tongue was rolled back. After the episode she remained lethargic for about 1 hours. In the ER, NIH was 0. Patient was more alert and responsive. CT head was negative. Initially started on IV Keppra EEG positive for seizure activity. Spoke with neurology regarding family's concerns about Keppra causing lethargy; Changed to lacosamide. MRI positive for acute right frontal cva. Discussed the benefits of statin therapy with the patient's family in regards to secondary prevention; in agreement with statin therapy- LDL of 253. HbAc of 6. Blood cultures 1of 2 positive for CNS; UA negative. Started on iv Vancomycin repeat blood cultures ordered on 11/28. Passed swallow evaluation; started on PO soft diet with nectar fluid.  Noted to have episodes of bradycardia with sinus pause. Spoke with cardiology, iv labetalol discontinued with improvement. Recommend no AV jordan blocking agents. Hydralazine increased to 25mg TID. Aspirin to continue. Family refusing lipitor or zocor for history of myalgia. Recommend pravastatin on discharge. Repeat blood cultures x 2 negative. Hospital course complicated by worsening lethargy. Metabolic parameters acceptable; hypernatremia and uremia resolved. Neurology re-consulted; recommending to continue Vimpat. Palliative care consult appreciated; daughter refusing hospice services. Family  conference call arranged by MD, family wanted to continue to hold VImpat to assess improvement in lethargy understanding the risks of seizures of AEDs.  Second neurology opinion was obtained; vimpat or keppra not contributing to lethargy- recommend continuing AEDs which was endorsed to the family.  After a detailed discussion with the patient's family, NG tube was placed and tube feeds started. Noted to have intermittent tachycardia, EKG consistent with afib. 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     Assessment/Plan;    1. 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   - Currently tolerating NG tube feeds  -confirmed seizure activity on EEG; family refusing AEDs despite being advised she may have recurrent seizures.   - If patient continues to show slow/no improvement in mental status family to decide regarding PEG tube placement     2. Hypertensive urgency   -No AV jordan blocking agents for bradycardia and pause  -Po hydralazine via NG tube  -cardio recommendations appreciated    3. Afib with SSS/ tachy-truong syndrome: Cardiology re- consulted; patient a poor candidate for long term anticoagulation or pacemaker insertion.   Maybe cause for embolic cva.   Continue aspirin therapy    4. Diabetes mellitus type 2  -HbA1c 6.0  - Add lantus for hyperglycemia with tube feeds  - Monitor bsl    5. MISSY: Add free water via NG tube 250ml Q6 hours  MOnitor bmp     6. CoNS bacteremia: 1 of 2 cultures positive for CNS- contamination   Repeat blood cultures negative x 2   UA and Ucx negative   Discontinued Vanco    7. Chronic UTI  -Ua negative. Urine culture negative  - Straight cath for UA and urine culture ordered  -Previously on daily suppressive PO keflex      8. Hypernatremia - resolved  -due to poor PO intake  -continue parenteral free water while NPO    DVT prophylaxis - Lovenox SC    Poor overall prognosis Patient is a 89 year old female with a history of CVA, seizure disorder, hypertension and diabetes mellitus who was brought to the ER for complaints of altered mental status. Patient was diagnosed with seizure disorder last year and started on keppra po. Over the past few months the family has noted she has been more lethargic and confused. She saw Dr Mcallister, neurologist at [place]. According to the daughter she had a number of tests which indicated she did not have seizure disorder and she was advised to taper off the keppra. Her daughter tapered it off over the past 2 weeks. The morning of admission she noted her mother became unresponsive, fell to her side, noted facial droop, her eyes were "fluttering" and her tongue was rolled back. After the episode she remained lethargic for about 1 hours. In the ER, NIH was 0. Patient was more alert and responsive. CT head was negative. Initially started on IV Keppra EEG positive for seizure activity. Spoke with neurology regarding family's concerns about Keppra causing lethargy; Changed to lacosamide. MRI positive for acute right frontal cva. Discussed the benefits of statin therapy with the patient's family in regards to secondary prevention; in agreement with statin therapy- LDL of 253. HbAc of 6. Blood cultures 1of 2 positive for CNS; UA negative. Started on iv Vancomycin repeat blood cultures ordered on 11/28. Passed swallow evaluation; started on PO soft diet with nectar fluid.  Noted to have episodes of bradycardia with sinus pause. Spoke with cardiology, iv labetalol discontinued with improvement. Recommend no AV jordan blocking agents. Hydralazine increased to 25mg TID. Aspirin to continue. Family refusing lipitor or zocor for history of myalgia. Recommend pravastatin on discharge. Repeat blood cultures x 2 negative. Hospital course complicated by worsening lethargy. Metabolic parameters acceptable; hypernatremia and uremia resolved. Neurology re-consulted; recommending to continue Vimpat. Palliative care consult appreciated; daughter refusing hospice services. Family  conference call arranged by MD, family wanted to continue to hold VImpat to assess improvement in lethargy understanding the risks of seizures of AEDs.  Second neurology opinion was obtained; vimpat or keppra not contributing to lethargy- recommend continuing AEDs which was endorsed to the family.  After a detailed discussion with the patient's family, NG tube was placed and tube feeds started. Noted to have intermittent tachycardia, EKG consistent with afib. 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     Assessment/Plan;    1. 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   - Currently tolerating NG tube feeds  -confirmed seizure activity on EEG; family refusing AEDs despite being advised she may have recurrent seizures.   - If patient continues to show slow/no improvement in mental status family to decide regarding PEG tube placement     2. Hypertensive urgency   -No AV jordan blocking agents for bradycardia and pause  -Po hydralazine via NG tube  -cardio recommendations appreciated    3. Afib with SSS/ tachy-truong syndrome: Cardiology re- consulted; patient a poor candidate for long term anticoagulation or pacemaker insertion.   Maybe cause for embolic cva.   Continue aspirin therapy    4. Diabetes mellitus type 2  -HbA1c 6.0  - Add lantus for hyperglycemia with tube feeds  - Monitor bsl    5. MISSY: Add free water via NG tube 250ml Q6 hours  MOnitor bmp     6. CoNS bacteremia: 1 of 2 cultures positive for CNS- contamination   Repeat blood cultures negative x 2   UA and Ucx negative   Discontinued Vanco    7. Chronic UTI  -Ua negative. Urine culture negative  - Straight cath for UA and urine culture ordered  -Previously on daily suppressive PO keflex      8. Hypernatremia - resolved      DVT prophylaxis - Lovenox SC    Poor overall prognosis

## 2018-12-11 NOTE — PROGRESS NOTE ADULT - SUBJECTIVE AND OBJECTIVE BOX
CC: AMS    INTERVAL HPI/OVERNIGHT EVENTS: Patient seen and examined, dobhoff was pulled out by patient last night. After multiple attempts by PA it was placed again. Current on Tube feeds. No clinical change in mentation.       Vital Signs Last 24 Hrs  T(C): 37.3 (11 Dec 2018 07:26), Max: 37.3 (11 Dec 2018 07:26)  T(F): 99.2 (11 Dec 2018 07:26), Max: 99.2 (11 Dec 2018 07:26)  HR: 107 (11 Dec 2018 07:26) (85 - 126)  BP: 118/70 (11 Dec 2018 07:26) (118/68 - 158/96)  BP(mean): --  RR: 20 (11 Dec 2018 07:26) (18 - 24)  SpO2: 99% (11 Dec 2018 09:22) (96% - 99%)    PHYSICAL EXAM:    GENERAL: NAD  HEAD:  Atraumatic, Normocephalic  EYES: EOMI, PERRLA, conjunctiva and sclera clear  ENMT: Moist mucous membranes  CHEST/LUNG: Clear to auscultation bilaterally; No rales, rhonchi, wheezing, or rubs  HEART: Regular rate and rhythm; No murmurs, rubs, or gallops  ABDOMEN: Soft, Nontender, Nondistended; Bowel sounds present  EXTREMITIES:  2+ Peripheral Pulses, No clubbing, cyanosis, or edema        MEDICATIONS  (STANDING):  aspirin  chewable 81 milliGRAM(s) Oral daily  brimonidine 0.2% Ophthalmic Solution 1 Drop(s) Both EYES daily  dextrose 5%. 1000 milliLiter(s) (50 mL/Hr) IV Continuous <Continuous>  dextrose 50% Injectable 12.5 Gram(s) IV Push once  dextrose 50% Injectable 25 Gram(s) IV Push once  dextrose 50% Injectable 25 Gram(s) IV Push once  enoxaparin Injectable 40 milliGRAM(s) SubCutaneous daily  hydrALAZINE 25 milliGRAM(s) Oral every 8 hours  insulin glargine Injectable (LANTUS) 5 Unit(s) SubCutaneous at bedtime  insulin lispro (HumaLOG) corrective regimen sliding scale   SubCutaneous three times a day before meals  latanoprost 0.005% Ophthalmic Solution 1 Drop(s) Both EYES at bedtime  metoprolol tartrate 25 milliGRAM(s) Oral two times a day  sodium chloride 0.9%. 1000 milliLiter(s) (75 mL/Hr) IV Continuous <Continuous>  timolol 0.25% Solution 1 Drop(s) Both EYES daily    MEDICATIONS  (PRN):  acetaminophen    Suspension .. 765 milliGRAM(s) Oral every 6 hours PRN Moderate Pain (4 - 6)  dextrose 40% Gel 15 Gram(s) Oral once PRN Blood Glucose LESS THAN 70 milliGRAM(s)/deciliter  glucagon  Injectable 1 milliGRAM(s) IntraMuscular once PRN Glucose LESS THAN 70 milligrams/deciliter      Allergies    contrast media (iodine-based) (Urticaria)  erythromycin (Unknown)  IV-Dye (Unknown)  sulfADIAZINE (Unknown)    Intolerances          LABS:                          10.5   5.7   )-----------( 224      ( 11 Dec 2018 09:01 )             32.0     12-11    144  |  109<H>  |  41.0<H>  ----------------------------<  281<H>  4.3   |  24.0  |  1.49<H>    Ca    10.0      11 Dec 2018 09:01            RADIOLOGY & ADDITIONAL TESTS:

## 2018-12-11 NOTE — PROGRESS NOTE ADULT - ASSESSMENT
1. Again had an in depth discussion with dtrs; they have not come to a decision on whether pt will have a PEG. Discussed tachybrady and parox afib with pt. Still do not feel a pacemaker is warranted given her very poor prognosis. Would not transfer to a 4T bed. Can try low dose lopressor.

## 2018-12-11 NOTE — PROGRESS NOTE ADULT - ASSESSMENT
Patient is a 89 year old female with a history of CVA, seizure disorder, hypertension and diabetes mellitus who was brought to the ER for complaints of altered mental status. Patient was diagnosed with seizure disorder last year and started on keppra po. Over the past few months the family has noted she has been more lethargic and confused. She saw Dr Mcallister, neurologist at [place]. According to the daughter she had a number of tests which indicated she did not have seizure disorder and she was advised to taper off the keppra. Her daughter tapered it off over the past 2 weeks. The morning of admission she noted her mother became unresponsive, fell to her side, noted facial droop, her eyes were "fluttering" and her tongue was rolled back. After the episode she remained lethargic for about 1 hours. In the ER, NIH was 0. Patient was more alert and responsive. CT head was negative. Initially started on IV Keppra EEG positive for seizure activity. Spoke with neurology regarding family's concerns about Keppra causing lethargy; Changed to lacosamide. MRI positive for acute right frontal cva. Discussed the benefits of statin therapy with the patient's family in regards to secondary prevention; in agreement with statin therapy- LDL of 253. HbAc of 6. Blood cultures 1of 2 positive for CNS; UA negative. Started on iv Vancomycin repeat blood cultures ordered on 11/28. Passed swallow evaluation; started on PO soft diet with nectar fluid.  Noted to have episodes of bradycardia with sinus pause. Spoke with cardiology, iv labetalol discontinued with improvement. Recommend no AV jordan blocking agents. Hydralazine increased to 25mg TID. Aspirin to continue. Family refusing lipitor or zocor for history of myalgia. Recommend pravastatin on discharge. Repeat blood cultures x 2 negative. Hospital course complicated by worsening lethargy. Metabolic parameters acceptable; hypernatremia and uremia resolved. Neurology re-consulted; recommending to continue Vimpat. Palliative care consult appreciated; daughter refusing hospice services. Family  conference call arranged by MD, family wanted to continue to hold VImpat to assess improvement in lethargy understanding the risks of seizures of AEDs.  Second neurology opinion was obtained; vimpat or keppra not contributing to lethargy- recommend continuing AEDs which was endorsed to the family.  After a detailed discussion with the patient's family, NG tube was placed and tube feeds started. Noted to have intermittent tachycardia, EKG consistent with afib. 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     Assessment/Plan;    1. Acute toxic-metabolic encephalopathy -     Discussed again in detail. Patient is showing no/little signs of clinical improvement    Neuro- CT head ordered to rule out CVA given paroxysmal Afib  24 hour video EEG ordered to rule out subclinical seizure activity- Family refusing AEDs until seizure confirmed on 24 hour video EEG     Infectious: Afebrile, no leukocytosis. Blood cultures have been negative. No rash/ lesions noted.   Previous UA and urine cultures have been negative  Called lab, they did not receive specimen sent down on 12/9. Rn instructed to straight cath for repeat UA and urine culture  No indication for antibiotics at this time     Metabolic: MISSY noted- start IV fluids. MOnitor BMP  Hypernatremia resolved  Ammonia levels wnl  ABG did not show CO2 narcosis  LFTS WNL    Endocrine:   Last TSH of 2.4 WNL. T4 is WNL. T3 is on the lower side at 42, Likely sick euthyroid syndrome. NO treatment is indicated at this time  Cortisol level is pending  Carnitine level sent on 12/6 is still pending     2. Hypertensive urgency : BP Improved  -Po hydralazine via NG tube    3. Afib with SSS/ tachy-truong syndrome: Started on PO lopressor  Poor candidate for PPM insertion  Transfer to monitored bed  Poor candidate for anti-coagulation  Continue aspirin therapy    4. Diabetes mellitus type 2  -HbA1c 6.0  - Add lantus for hyperglycemia with tube feeds. Increase HSS  - AG is 11  - Monitor bsl    5. MISSY: Add free water via NG tube 250ml Q6 hours  Gentle IV hydration at 75ml/hr with NS.   MOnitor bmp     6. Chronic UTI  -Ua negative. Urine culture negative  - Straight cath for UA and urine culture repeat ordered  -Previously on daily suppressive PO keflex     7. Acute right frontal CVA on admission: Aspirin PO  Family refusing statin therapy as it causes myalgia and muscle weakness. They were counselled on the importance of statin therapy in secondary stroke prevention. They have refused simvastatin and atorvastatin.       DVT prophylaxis - Lovenox SC    45 minutes spent discussing the above with the patient's daughter Yari Rodney and son Marco Antonio ( HCP)     Spoke to logistics- will need monitor and 4brackett bed for 24 hour video EEG     Poor overall prognosis Patient is a 89 year old female with a history of CVA, seizure disorder, hypertension and diabetes mellitus who was brought to the ER for complaints of altered mental status. Patient was diagnosed with seizure disorder last year and started on keppra po. Over the past few months the family has noted she has been more lethargic and confused. She saw Dr Mcallister, neurologist at [place]. According to the daughter she had a number of tests which indicated she did not have seizure disorder and she was advised to taper off the keppra. Her daughter tapered it off over the past 2 weeks. The morning of admission she noted her mother became unresponsive, fell to her side, noted facial droop, her eyes were "fluttering" and her tongue was rolled back. After the episode she remained lethargic for about 1 hours. In the ER, NIH was 0. Patient was more alert and responsive. CT head was negative. Initially started on IV Keppra EEG positive for seizure activity. Spoke with neurology regarding family's concerns about Keppra causing lethargy; Changed to lacosamide. MRI positive for acute right frontal cva. Discussed the benefits of statin therapy with the patient's family in regards to secondary prevention; in agreement with statin therapy- LDL of 253. HbAc of 6. Blood cultures 1of 2 positive for CNS; UA negative. Started on iv Vancomycin repeat blood cultures ordered on 11/28. Passed swallow evaluation; started on PO soft diet with nectar fluid.  Noted to have episodes of bradycardia with sinus pause. Spoke with cardiology, iv labetalol discontinued with improvement. Recommend no AV jordan blocking agents. Hydralazine increased to 25mg TID. Aspirin to continue. Family refusing lipitor or zocor for history of myalgia. Recommend pravastatin on discharge. Repeat blood cultures x 2 negative. Hospital course complicated by worsening lethargy. Metabolic parameters acceptable; hypernatremia and uremia resolved. Neurology re-consulted; recommending to continue Vimpat. Palliative care consult appreciated; daughter refusing hospice services. Family  conference call arranged by MD, family wanted to continue to hold VImpat to assess improvement in lethargy understanding the risks of seizures of AEDs.  Second neurology opinion was obtained; vimpat or keppra not contributing to lethargy- recommend continuing AEDs which was endorsed to the family.  After a detailed discussion with the patient's family, NG tube was placed and tube feeds started. Noted to have intermittent tachycardia, EKG consistent with afib. 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     Assessment/Plan;    1. Acute toxic-metabolic encephalopathy -     Discussed again in detail. Patient is showing no/little signs of clinical improvement    Neuro- CT head ordered to rule out CVA given paroxysmal Afib  24 hour video EEG ordered to rule out subclinical seizure activity- Family refusing AEDs until seizure confirmed on 24 hour video EEG. Neurology to be re-called- Dr Dey    Infectious: Afebrile, no leukocytosis. Blood cultures have been negative. No rash/ lesions noted.   Previous UA and urine cultures have been negative  Called lab, they did not receive specimen sent down on 12/9. Rn instructed to straight cath for repeat UA and urine culture  No indication for antibiotics at this time     Metabolic: MISSY noted- start IV fluids. MOnitor BMP  Hypernatremia resolved  Ammonia levels wnl  ABG did not show CO2 narcosis  LFTS WNL    Endocrine:   Last TSH of 2.4 WNL. T4 is WNL. T3 is on the lower side at 42, Likely sick euthyroid syndrome. NO treatment is indicated at this time  Cortisol level is pending  Carnitine level sent on 12/6 is still pending     2. Hypertensive urgency : BP Improved  -Po hydralazine via NG tube    3. Afib with SSS/ tachy-truong syndrome: Started on PO lopressor  Poor candidate for PPM insertion  Transfer to monitored bed  Poor candidate for anti-coagulation  Continue aspirin therapy    4. Diabetes mellitus type 2  -HbA1c 6.0  - Add lantus for hyperglycemia with tube feeds. Increase HSS  - AG is 11  - Monitor bsl    5. MISSY: Add free water via NG tube 250ml Q6 hours  Gentle IV hydration at 75ml/hr with NS.   MOnitor bmp     6. Chronic UTI  -Ua negative. Urine culture negative  - Straight cath for UA and urine culture repeat ordered  -Previously on daily suppressive PO keflex     7. Acute right frontal CVA on admission: Aspirin PO  Family refusing statin therapy as it causes myalgia and muscle weakness. They were counselled on the importance of statin therapy in secondary stroke prevention. They have refused simvastatin and atorvastatin.       DVT prophylaxis - Lovenox SC    45 minutes spent discussing the above with the patient's daughter Yari Rodney and son Marco Antonio ( HCP)     Spoke to logistics- will need monitor and 4brackett bed for 24 hour video EEG     Poor overall prognosis

## 2018-12-11 NOTE — PROGRESS NOTE ADULT - SUBJECTIVE AND OBJECTIVE BOX
Chief Complaint: recent course and chart reviewed.    HPI:    PAST MEDICAL & SURGICAL HISTORY:  Diabetes mellitus  Fracture: lumbar fracture.  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  Heart Murmur  Calcium Nephrolithiasis  Hyperlipidemia LDL Goal < 100  Glaucoma, Low Tension  History of Pulmonary Hypertension  CAD (Coronary Artery Disease)  Obstructive Sleep Apnea  Controlled Type 2 Diabetes with Neuropathy  H/O cataract extraction  History of appendectomy  Fracture of Humerus: repair with harware right  Glaucoma: relieve pressure left eye along with cataract extraction  Bilateral Cataracts: excision   Benign Breast Disease: removal of cyst- 1963  Cataract  Kidney Stones: lithotripsy ,   S/P Appendectomy: 1937      PREVIOUS DIAGNOSTIC TESTING:      ECHO  FINDINGS:    STRESS  FINDINGS:    CATHETERIZATION  FINDINGS:    MEDICATIONS  (STANDING):  aspirin  chewable 81 milliGRAM(s) Oral daily  brimonidine 0.2% Ophthalmic Solution 1 Drop(s) Both EYES daily  dextrose 5%. 1000 milliLiter(s) (50 mL/Hr) IV Continuous <Continuous>  dextrose 50% Injectable 12.5 Gram(s) IV Push once  dextrose 50% Injectable 25 Gram(s) IV Push once  dextrose 50% Injectable 25 Gram(s) IV Push once  enoxaparin Injectable 40 milliGRAM(s) SubCutaneous daily  hydrALAZINE 25 milliGRAM(s) Oral every 8 hours  insulin glargine Injectable (LANTUS) 5 Unit(s) SubCutaneous at bedtime  insulin lispro (HumaLOG) corrective regimen sliding scale   SubCutaneous three times a day before meals  latanoprost 0.005% Ophthalmic Solution 1 Drop(s) Both EYES at bedtime  timolol 0.25% Solution 1 Drop(s) Both EYES daily    MEDICATIONS  (PRN):  dextrose 40% Gel 15 Gram(s) Oral once PRN Blood Glucose LESS THAN 70 milliGRAM(s)/deciliter  glucagon  Injectable 1 milliGRAM(s) IntraMuscular once PRN Glucose LESS THAN 70 milligrams/deciliter  hydrALAZINE Injectable 10 milliGRAM(s) IV Push every 6 hours PRN sbp >150  or dbp >100      FAMILY HISTORY:  No pertinent family history  Family history of cerebrovascular accident (CVA) (Sibling)      ROS: Negative other than as mentioned in HPI.    Vital Signs Last 24 Hrs  T(C): 37.3 (11 Dec 2018 07:26), Max: 37.3 (11 Dec 2018 07:26)  T(F): 99.2 (11 Dec 2018 07:26), Max: 99.2 (11 Dec 2018 07:26)  HR: 105 irreg(11 Dec 2018 07:) (85 - 126)  BP: 118/70 (11 Dec 2018 07:) (118/68 - 158/96)  BP(mean): --  RR: 16 ora (11 Dec 2018 07:) (18 - 24)  SpO2: 99% (11 Dec 2018 09:22) (96% - 99%)    PHYSICAL EXAM:  General: minimally responsive elderly F with feeding tube in place thru nose. Dtrs at bedside.  HEENT: Head; normocephalic, atraumatic.    Neck; Supple, no nodes adenopathy, no NVD or carotid bruit or thyromegaly.  CARDIOVASCULAR; irreg   LUNGS; good breath sounds.  ABDOMEN ; Soft, nontender without mass or organomegaly. bowel sounds normoactive.  EXTREMITIES; No clubbing, cyanosis or edema. Distal pulses wnl. ROM normal.  SKIN; warm and dry with normal turgor.  NEURO; minimally responsive without response to "noxious" stimuli   PSYCH; not relevant            INTERPRETATION OF TELEMETRY:    EC/9 shows afib and RBBB (was in SR on adm).    I&O's Detail    10 Dec 2018 07:  -  11 Dec 2018 07:00  --------------------------------------------------------  IN:    Glucerna 1.5: 250 mL  Total IN: 250 mL    OUT:  Total OUT: 0 mL    Total NET: 250 mL          LABS:                        10.5   5.7   )-----------( 224      ( 11 Dec 2018 09:01 )             32.0     12-11    144  |  109<H>  |  41.0<H>  ----------------------------<  281<H>  4.3   |  24.0  |  1.49<H>    Ca    10.0      11 Dec 2018 09:01              I&O's Summary    10 Dec 2018 07:01  -  11 Dec 2018 07:00  --------------------------------------------------------  IN: 250 mL / OUT: 0 mL / NET: 250 mL        RADIOLOGY & ADDITIONAL STUDIES:

## 2018-12-11 NOTE — PROGRESS NOTE ADULT - SUBJECTIVE AND OBJECTIVE BOX
[facility]                                   [address] [place] [address]                                                      PHONE: [phone]                                                         FAX: [phone]  [email or web address]  ---------------------------------------------------------------------------------------------------------------------------------    Overnight events/patient complaints:  pt tachy overnight     PAST MEDICAL & SURGICAL HISTORY:  Diabetes mellitus  Fracture: lumbar fracture.  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  Heart Murmur  Calcium Nephrolithiasis  Hyperlipidemia LDL Goal < 100  Glaucoma, Low Tension  History of Pulmonary Hypertension  CAD (Coronary Artery Disease)  Obstructive Sleep Apnea  Controlled Type 2 Diabetes with Neuropathy  H/O cataract extraction  History of appendectomy  Fracture of Humerus: repair with harware right  Glaucoma: relieve pressure left eye along with cataract extraction  Bilateral Cataracts: excision 2006  Benign Breast Disease: removal of cyst- 1963  Cataract  Kidney Stones: lithotripsy 2005, 2006  S/P Appendectomy: 1937      contrast media (iodine-based) (Urticaria)  erythromycin (Unknown)  IV-Dye (Unknown)  sulfADIAZINE (Unknown)    MEDICATIONS  (STANDING):  aspirin  chewable 81 milliGRAM(s) Oral daily  brimonidine 0.2% Ophthalmic Solution 1 Drop(s) Both EYES daily  dextrose 5%. 1000 milliLiter(s) (50 mL/Hr) IV Continuous <Continuous>  dextrose 50% Injectable 12.5 Gram(s) IV Push once  dextrose 50% Injectable 25 Gram(s) IV Push once  dextrose 50% Injectable 25 Gram(s) IV Push once  enoxaparin Injectable 40 milliGRAM(s) SubCutaneous daily  hydrALAZINE 25 milliGRAM(s) Oral every 8 hours  insulin glargine Injectable (LANTUS) 5 Unit(s) SubCutaneous at bedtime  insulin lispro (HumaLOG) corrective regimen sliding scale   SubCutaneous three times a day before meals  latanoprost 0.005% Ophthalmic Solution 1 Drop(s) Both EYES at bedtime  timolol 0.25% Solution 1 Drop(s) Both EYES daily    MEDICATIONS  (PRN):  dextrose 40% Gel 15 Gram(s) Oral once PRN Blood Glucose LESS THAN 70 milliGRAM(s)/deciliter  glucagon  Injectable 1 milliGRAM(s) IntraMuscular once PRN Glucose LESS THAN 70 milligrams/deciliter  hydrALAZINE Injectable 10 milliGRAM(s) IV Push every 6 hours PRN sbp >150  or dbp >100      Vital Signs Last 24 Hrs  T(C): 37.3 (11 Dec 2018 07:26), Max: 37.3 (11 Dec 2018 07:26)  T(F): 99.2 (11 Dec 2018 07:26), Max: 99.2 (11 Dec 2018 07:26)  HR: 107 (11 Dec 2018 07:26) (85 - 126)  BP: 118/70 (11 Dec 2018 07:26) (118/68 - 158/96)  BP(mean): --  RR: 20 (11 Dec 2018 07:26) (18 - 24)  SpO2: 99% (11 Dec 2018 05:05) (96% - 99%)  ICU Vital Signs Last 24 Hrs  LINDA CARTER  I&O's Detail    10 Dec 2018 07:01  -  11 Dec 2018 07:00  --------------------------------------------------------  IN:    Glucerna 1.5: 250 mL  Total IN: 250 mL    OUT:  Total OUT: 0 mL    Total NET: 250 mL        I&O's Summary    10 Dec 2018 07:01  -  11 Dec 2018 07:00  --------------------------------------------------------  IN: 250 mL / OUT: 0 mL / NET: 250 mL      Drug Dosing Weight  LINDA CARTER      PHYSICAL EXAM:  General: Appears well developed, well nourished alert and cooperative.  HEENT: Head; normocephalic, atraumatic.  Eyes: Pupils reactive, cornea wnl.  Neck: Supple, no nodes adenopathy, no NVD or carotid bruit or thyromegaly.  CARDIOVASCULAR: Normal S1 and S2, No murmur, rub, gallop or lift.   LUNGS: No rales, rhonchi or wheeze. Normal breath sounds bilaterally.  ABDOMEN: Soft, nontender without mass or organomegaly. bowel sounds normoactive.  EXTREMITIES: No clubbing, cyanosis or edema. Distal pulses wnl.   SKIN: warm and dry with normal turgor.  NEURO: Alert/oriented x 3/normal motor exam. No pathologic reflexes.    PSYCH: normal affect.        LABS:                        11.1   5.8   )-----------( 224      ( 10 Dec 2018 08:36 )             33.6     12-10    145  |  109<H>  |  28.0<H>  ----------------------------<  234<H>  4.0   |  23.0  |  1.40<H>    Ca    10.1      10 Dec 2018 08:36      LINDA CARTER            RADIOLOGY & ADDITIONAL STUDIES:    INTERPRETATION OF TELEMETRY (personally reviewed):  1. 89 yo female presented with AMS. Imaging showed a small frontal cva (given her parox afib an embolic cva is certainly possible)-pt is not a candidate for systemic AC.  2. Tachybrady/SSS-ordinarily a pacemaker would be considered-but given her current condition and poor prognosis it is not indicated.   would start Amio 400 mg daily for rate/rhythm control at this time         Thank you for allowing [facility] to participate in the care of this patient.  Please feel free to call with any questions or concerns.

## 2018-12-12 NOTE — PROGRESS NOTE ADULT - SUBJECTIVE AND OBJECTIVE BOX
[facility]                                     Neurology at [place]                                 [facility]                                  [address]                                        [place], [place], [address]                                             [phone]    CC: seizure  HPI: The patient is a 89y Female who presented with AMS on 11/26/18. There was a question of seizure activity prior to admission.  She has h/o of abnormal EEGs with epileptiform discharges as both outpatient and inpatient during this admission.  She recently tapered keppra per a different outside neurologist.  She was restarted on keppra and then changed to low dose vimpat 50 mg bid.  She is still lethargic but has metabolic and infectious issues.  Her daughter is refusing vimpat at this time because of lethargy.  Neurology is asked to provide second opinion.    Interval history:  asked to re-eval for video EEG,     ROS neurology: Unable to assess due to condition    MEDICATIONS  (STANDING):  aspirin  chewable 81 milliGRAM(s) Oral daily  brimonidine 0.2% Ophthalmic Solution 1 Drop(s) Both EYES daily  cefTRIAXone   IVPB      cefTRIAXone   IVPB 1 Gram(s) IV Intermittent every 24 hours  dextrose 5%. 1000 milliLiter(s) (50 mL/Hr) IV Continuous <Continuous>  dextrose 50% Injectable 12.5 Gram(s) IV Push once  dextrose 50% Injectable 25 Gram(s) IV Push once  dextrose 50% Injectable 25 Gram(s) IV Push once  enoxaparin Injectable 40 milliGRAM(s) SubCutaneous daily  hydrALAZINE 25 milliGRAM(s) Oral every 8 hours  insulin glargine Injectable (LANTUS) 10 Unit(s) SubCutaneous at bedtime  insulin lispro (HumaLOG) corrective regimen sliding scale   SubCutaneous three times a day before meals  lactobacillus acidophilus 1 Tablet(s) Oral daily  latanoprost 0.005% Ophthalmic Solution 1 Drop(s) Both EYES at bedtime  levETIRAcetam  IVPB 250 milliGRAM(s) IV Intermittent every 12 hours  metoprolol tartrate 25 milliGRAM(s) Oral two times a day  timolol 0.25% Solution 1 Drop(s) Both EYES daily    MEDICATIONS  (PRN):  acetaminophen    Suspension .. 765 milliGRAM(s) Oral every 6 hours PRN Moderate Pain (4 - 6)  dextrose 40% Gel 15 Gram(s) Oral once PRN Blood Glucose LESS THAN 70 milliGRAM(s)/deciliter  glucagon  Injectable 1 milliGRAM(s) IntraMuscular once PRN Glucose LESS THAN 70 milligrams/deciliter      Vital Signs Last 24 Hrs  T(C): 36.7 (12 Dec 2018 13:33), Max: 37.6 (12 Dec 2018 08:46)  T(F): 98 (12 Dec 2018 13:33), Max: 99.7 (12 Dec 2018 08:46)  HR: 126 (12 Dec 2018 11:44) (85 - 126)  BP: 133/100 (12 Dec 2018 11:44) (130/76 - 153/75)  BP(mean): 110 (12 Dec 2018 11:44) (92 - 111)  RR: 31 (12 Dec 2018 11:44) (16 - 31)  SpO2: 100% (12 Dec 2018 11:44) (95% - 100%)      General: lethargic/obtunded    Detailed Neurologic Exam:    Mental status: The patient is lethargic, not arousable.  The patient is not speaking. The patient is not oriented to current events. The patient is not able to follow simple commands.    Cranial nerves: Pupils equal and react symmetrically to light. There is no response to visual threat Extraocular motion is full by VOR. There is no ptosis. Facial sensation is intact. Facial musculature is symmetric. Unable to assess Palate elevation shoulder shrug or or tongue extension.    Motor: There is normal bulk and tone.  There is no tremor.  Unable to assess power to confrontation    Sensation: grimace to pinch in 4 extremities    Reflexes: muted throughout and plantar responses are flexor.    Cerebellar: Unable to test dysmetria on finger to nose testing.    Gait : deferred    LABS:                                    9.8    5.6   )-----------( 210      ( 12 Dec 2018 06:31 )             29.9     12-12    144  |  109<H>  |  44.0<H>  ----------------------------<  247<H>  4.4   |  24.0  |  1.20    Ca    9.0      12 Dec 2018 06:31      video EEG 12/11-12/18  EEG SUMMARY/CLASSIFICATION    Abnormal EEG in an altered patient.  - Frequent sharp transients and spikes in the left temporal region with shifting maximum.  - Intermittent theta/delta slowing max in the left temporal region.  - Moderate generalized slowing.    _____________________________________________________________  EEG IMPRESSION/CLINICAL CORRELATE    Abnormal EEG study.  1. Cortical hyperexcitability and functional abnormality int he left temporal region.  2. Moderate nonspecific diffuse or multifocal cerebral dysfunction.   3. No seizure seen.    RADIOLOGY & ADDITIONAL STUDIES (independently reviewed unless otherwise noted):  MRI brain punctate area of restricted diffusion right frontal lobe c/w acute CVA., no blood or mass    < from: EEG Awake and Asleep (11.28.18 @ 08:33) >    IMPRESSION: This is an abnormal EEG recording. Firstly it is generally   slow consistent with excessive drowsiness. Additionally there is   occasional left temporal sharp transient epileptiform activity is   recorded. There are no definite seizure discharges or sustained seizure   activity otherwise noted.

## 2018-12-12 NOTE — CONSULT NOTE ADULT - ASSESSMENT
89 y/o F with complicated hospital course. Follows Urology as outpatient for h/o nephrolithiasis, urinary incontinence and recurrent UTI.     Recurrent UTI  h/o nephrolithiasis  urinary incontinence   ? Seizures     - UA minimally positive for UTI  - Urine culture with 50-99k Proteus  - Sensitive to Ceftriaxone  - Continue Ceftriaxone  - Follow up repeat Urine culture done via straight cath which is GNR at the moment  - Renal U/S ordered  - No fevers  - No leukocytosis    Will Follow

## 2018-12-12 NOTE — GOALS OF CARE CONVERSATION - PERSONAL ADVANCE DIRECTIVE - CONVERSATION DETAILS
Family reports had  discussion with Dr. Jain earlier.   They had questions regarding plan of care if it was decided not to place  PEG tube.  Family was informed about   hospice services and comfort measures while in hospital.   Qi Tenorio RN discussed in detail hospice plan of care, criterias for qualification to the [facility] and home services.   Currently patient not [facility] patient appropriate.  It was explained to family, once patient on comfort measures in hospital, a plan would have to be  arranged for home hospice or transfer to [facility] if appropriate at that time.

## 2018-12-12 NOTE — PROGRESS NOTE ADULT - ASSESSMENT
The patient is a 89y Female who is followed by neurology because of encephalopathy    Encephalopathy/delirium  likely multi factorial  metabolic   possible underlying dementia    Seizure disorder  She had a video EEG which again showed epileptiform activity in left temporal lobe.  I had long discussion with her family.  After explaining risk of seizures, and reiterating that she remains lethargic off the keppra therefore it was not likely cause of lethargy, the family agrees to start keppra 250 mg bid  I will repeat EEG next week to assess response to keppra.    Thank you for allowing me to participate in the care of your patient    Rj Luther MD, PhD   [id number]

## 2018-12-12 NOTE — PROGRESS NOTE ADULT - ASSESSMENT
Patient is a 89 year old female with a history of CVA, seizure disorder, hypertension and diabetes mellitus who was brought to the ER for complaints of altered mental status. Patient was diagnosed with seizure disorder last year and started on keppra po. Over the past few months the family has noted she has been more lethargic and confused. She saw Dr Mcallister, neurologist at [place]. According to the daughter she had a number of tests which indicated she did not have seizure disorder and she was advised to taper off the keppra. Her daughter tapered it off over the past 2 weeks. The morning of admission she noted her mother became unresponsive, fell to her side, noted facial droop, her eyes were "fluttering" and her tongue was rolled back. After the episode she remained lethargic for about 1 hours. In the ER, NIH was 0. Patient was more alert and responsive. CT head was negative. Initially started on IV Keppra EEG positive for seizure activity. Spoke with neurology regarding family's concerns about Keppra causing lethargy; Changed to lacosamide. MRI positive for acute right frontal cva. Discussed the benefits of statin therapy with the patient's family in regards to secondary prevention; in agreement with statin therapy- LDL of 253. HbAc of 6. Blood cultures 1of 2 positive for CNS; UA negative. Started on iv Vancomycin repeat blood cultures ordered on 11/28. Passed swallow evaluation; started on PO soft diet with nectar fluid.  Noted to have episodes of bradycardia with sinus pause. Spoke with cardiology, iv labetalol discontinued with improvement. Recommend no AV jordan blocking agents. Hydralazine increased to 25mg TID. Aspirin to continue. Family refusing lipitor or zocor for history of myalgia. Recommend pravastatin on discharge. Repeat blood cultures x 2 negative. Hospital course complicated by worsening lethargy. Metabolic parameters acceptable; hypernatremia and uremia resolved. Neurology re-consulted; recommending to continue Vimpat. Palliative care consult appreciated; daughter refusing hospice services. Family  conference call arranged by MD, family wanted to continue to hold VImpat to assess improvement in lethargy understanding the risks of seizures of AEDs.  Second neurology opinion was obtained; vimpat or keppra not contributing to lethargy- recommend continuing AEDs which was endorsed to the family.  After a detailed discussion with the patient's family, NG tube was placed and tube feeds started. Noted to have intermittent tachycardia, EKG consistent with afib. 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     Assessment/Plan;    1. Acute toxic-metabolic encephalopathy -       Neuro-   Repeat CT head on 12/11 was negative for acute CVA    24 hour video EEG showed was abnormal showed cortical hyperexcitability and abnormality in the left temporal region consistent with 3 previous EEG's + for diffuse cerebral dysfunction.   Spoke with neurology given this focus she is at risk for recurrent seizures off of AEDs. Neurology to follow up to speak to family about resuming AEDs    Infectious: Proteus Mirabalis UTi sensitive to rocephin.   History of Proteus UTi and renal stones in the past.- renal ultrasound ordered  Previous UA x 2 negative on 11/27 and 12/1 as well as urine culture on 12/1  culture positive on 12/9, culture on 12/11 +GNR- final pending  ID consulted. Spoke with family, agree with Iv rocephin.       Metabolic: MISSY resolved.   Hypernatremia resolved  Ammonia levels wnl  ABG did not show CO2 narcosis  LFTS WNL    Endocrine:   Last TSH of 2.4 WNL. T4 is WNL. T3 is on the lower side at 42, Likely sick euthyroid syndrome. NO treatment is indicated at this time  Cortisol level WNL  Carnitine level is WNL    Family concerned that hyperglycemia is contributing to lethargy/encephalopathy. I discussed with them in detail that tight control of BGL in a hospitalized patient may be detrimental and risks of hypoglycemia.   Insulin sliding scale was increased yesterday and Lantus increased to 10 units at bedtime today.   Endocrinology consulted at the family's request. Spoke with Dr Lombardi who will evaluate the patient for further recommendations.     2. Afib with SSS/ tachy-truong syndrome: Started on PO lopressor  The patient's daughter refused lopressor this morning, concerned that Lopressor caused her brother in law to become tired and therefore do not want it.     I educated them on the benefits of BB therapy to control patient's rapid heart rate which outweigh the side effect.  Tyrrone, the patient's HCP is in agreement with continuing lopressor.     Per cardiology the patient is a poor candidate for PPM insertion at this time or anti-coagulation    Continue aspirin therapy      3. Hypertensive urgency : BP Improved  -Po hydralazine via NG tube    4. Diabetes mellitus type 2  -HbA1c 6.0  - Increase lantus to 10 units QHS  - AG is 11  - Monitor bsl  _ endocrinology consulted at the request of the patient's family     5. MISSY: resolved  continue free water via NG tube  Monitor bmp    7. Acute right frontal CVA on admission: Aspirin PO  Family refusing statin therapy as it causes myalgia and muscle weakness. They were counselled on the importance of statin therapy in secondary stroke prevention. They have refused simvastatin and atorvastatin.       DVT prophylaxis - Lovenox SC    60 minutes spent discussing all of the patient's vitals, lab results and medications with the patient's secondary HCP Yari and then her primary HCP Marco Antonio.     I spoke with Marco Antonio about the eventual decision regarding PEG tube placement, patient has shown little or no improvement in her cognition. They will ultimately need to make this decision.        Poor overall prognosis Patient is a 89 year old female with a history of CVA, seizure disorder, hypertension and diabetes mellitus who was brought to the ER for complaints of altered mental status. Patient was diagnosed with seizure disorder last year and started on keppra po. Over the past few months the family has noted she has been more lethargic and confused. She saw Dr Mcallister, neurologist at [place]. According to the daughter she had a number of tests which indicated she did not have seizure disorder and she was advised to taper off the keppra. Her daughter tapered it off over the past 2 weeks. The morning of admission she noted her mother became unresponsive, fell to her side, noted facial droop, her eyes were "fluttering" and her tongue was rolled back. After the episode she remained lethargic for about 1 hours. In the ER, NIH was 0. Patient was more alert and responsive. CT head was negative. Initially started on IV Keppra EEG positive for seizure activity. Spoke with neurology regarding family's concerns about Keppra causing lethargy; Changed to lacosamide. MRI positive for acute right frontal cva. Discussed the benefits of statin therapy with the patient's family in regards to secondary prevention; in agreement with statin therapy- LDL of 253. HbAc of 6. Blood cultures 1of 2 positive for CNS; UA negative. Started on iv Vancomycin repeat blood cultures ordered on 11/28. Passed swallow evaluation; started on PO soft diet with nectar fluid.  Noted to have episodes of bradycardia with sinus pause. Spoke with cardiology, iv labetalol discontinued with improvement. Recommend no AV jordan blocking agents. Hydralazine increased to 25mg TID. Aspirin to continue. Family refusing lipitor or zocor for history of myalgia. Recommend pravastatin on discharge. Repeat blood cultures x 2 negative. Hospital course complicated by worsening lethargy. Metabolic parameters acceptable; hypernatremia and uremia resolved. Neurology re-consulted; recommending to continue Vimpat. Palliative care consult appreciated; daughter refusing hospice services. Family  conference call arranged by MD, family wanted to continue to hold VImpat to assess improvement in lethargy understanding the risks of seizures of AEDs.  Second neurology opinion was obtained; vimpat or keppra not contributing to lethargy- recommend continuing AEDs which was endorsed to the family.  After a detailed discussion with the patient's family, NG tube was placed and tube feeds started. Noted to have intermittent tachycardia, EKG consistent with afib. 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     Assessment/Plan;    1. Acute toxic-metabolic encephalopathy -       Neuro-   Repeat CT head on 12/11 was negative for acute CVA    24 hour video EEG showed was abnormal showed cortical hyperexcitability and abnormality in the left temporal region consistent with 3 previous EEG's + for diffuse cerebral dysfunction.   Spoke with neurology given this focus she is at risk for recurrent seizures off of AEDs. Neurology to follow up to speak to family about resuming AEDs    Infectious: Proteus Mirabalis UTi sensitive to rocephin.   History of Proteus UTi and renal stones in the past.- renal ultrasound ordered  Previous UA x 2 negative on 11/27 and 12/1 as well as urine culture on 12/1  culture positive on 12/9, culture on 12/11 +GNR- final pending  ID consulted. Spoke with family, agree with Iv rocephin.       Metabolic: MISSY resolved.   Hypernatremia resolved  Ammonia levels wnl  ABG did not show CO2 narcosis  LFTS WNL    Endocrine:   Last TSH of 2.4 WNL. T4 is WNL. T3 is on the lower side at 42, Likely sick euthyroid syndrome. NO treatment is indicated at this time  Cortisol level WNL  Carnitine level is WNL    Family concerned that hyperglycemia is contributing to lethargy/encephalopathy. I discussed with them in detail that tight control of BGL in a hospitalized patient may be detrimental and risks of hypoglycemia.   Insulin sliding scale was increased yesterday and Lantus increased to 10 units at bedtime today.   Endocrinology consulted at the family's request. Spoke with Dr Lombardi who will evaluate the patient for further recommendations.     2. Afib with SSS/ tachy-truong syndrome: Started on PO lopressor  The patient's daughter refused lopressor this morning, concerned that Lopressor caused her brother in law to become tired and therefore do not want it.     I educated them on the benefits of BB therapy to control patient's rapid heart rate which outweigh the side effect.  Tyrrone, the patient's HCP is in agreement with continuing lopressor.     Per cardiology the patient is a poor candidate for PPM insertion at this time or anti-coagulation    Continue aspirin therapy      3. Hypertensive urgency : BP Improved  -Po hydralazine via NG tube    4. Diabetes mellitus type 2  -HbA1c 6.0  - Increase lantus to 10 units QHS  - AG is 11  - Monitor bsl  _ endocrinology consulted at the request of the patient's family     5. MISSY: resolved  continue free water via NG tube  Monitor bmp    7. Acute right frontal CVA on admission: Aspirin PO  Family refusing statin therapy as it causes myalgia and muscle weakness. They were counselled on the importance of statin therapy in secondary stroke prevention. They have refused simvastatin and atorvastatin.       DVT prophylaxis - Lovenox SC    60 minutes spent discussing all of the patient's vitals, lab results and medications with the patient's secondary HCP Yari and then her primary HCP Marco Antonio.     I spoke with Marco Antonio about the eventual decision regarding PEG tube placement, patient has shown little or no improvement in her cognition. They will ultimately need to make this decision.    Case was discussed in detail with ID, endocrinology, neurology and palliative care as well.     Poor overall prognosis     Spoke with echo MARIANO to transfer patient to any monitored bed once 24 hour EEG complete

## 2018-12-12 NOTE — PROGRESS NOTE ADULT - SUBJECTIVE AND OBJECTIVE BOX
CC: encephalopathy    INTERVAL HPI/OVERNIGHT EVENTS: Patient seen and examined, no change in mentation. Remains lethargic. HR in the 80-120s. daughter refused metoprolol this morning. undergoing 24 hour video EEG. afebrile.       Vital Signs Last 24 Hrs  T(C): 36.7 (12 Dec 2018 13:33), Max: 37.6 (12 Dec 2018 08:46)  T(F): 98 (12 Dec 2018 13:33), Max: 99.7 (12 Dec 2018 08:46)  HR: 126 (12 Dec 2018 11:44) (85 - 126)  BP: 133/100 (12 Dec 2018 11:44) (120/70 - 153/75)  BP(mean): 110 (12 Dec 2018 11:44) (92 - 111)  RR: 31 (12 Dec 2018 11:44) (16 - 31)  SpO2: 100% (12 Dec 2018 11:44) (95% - 100%)    PHYSICAL EXAM:    GENERAL: NAD,lethargic, will not open eyes in response to verbal or painful stimuli   ENMT: Dry mucous membranes + NG tube   NECK: Supple, No JVD  NERVOUS SYSTEM:  lethargic, attempts to  hand in the RUE- unchanged   CHEST/LUNG: Clear to auscultation bilaterally; No rales, rhonchi, wheezing, or rubs  HEART: IRR tachycardia ; No murmurs, rubs, or gallops  ABDOMEN: Soft, Nontender, Nondistended; Bowel sounds present  EXTREMITIES:  2+ Peripheral Pulses, No clubbing, cyanosis, or edema        MEDICATIONS  (STANDING):  aspirin  chewable 81 milliGRAM(s) Oral daily  brimonidine 0.2% Ophthalmic Solution 1 Drop(s) Both EYES daily  cefTRIAXone   IVPB      cefTRIAXone   IVPB 1 Gram(s) IV Intermittent every 24 hours  dextrose 5%. 1000 milliLiter(s) (50 mL/Hr) IV Continuous <Continuous>  dextrose 50% Injectable 12.5 Gram(s) IV Push once  dextrose 50% Injectable 25 Gram(s) IV Push once  dextrose 50% Injectable 25 Gram(s) IV Push once  enoxaparin Injectable 40 milliGRAM(s) SubCutaneous daily  hydrALAZINE 25 milliGRAM(s) Oral every 8 hours  insulin glargine Injectable (LANTUS) 10 Unit(s) SubCutaneous at bedtime  insulin lispro (HumaLOG) corrective regimen sliding scale   SubCutaneous three times a day before meals  lactobacillus acidophilus 1 Tablet(s) Oral daily  latanoprost 0.005% Ophthalmic Solution 1 Drop(s) Both EYES at bedtime  metoprolol tartrate 25 milliGRAM(s) Oral two times a day  timolol 0.25% Solution 1 Drop(s) Both EYES daily    MEDICATIONS  (PRN):  acetaminophen    Suspension .. 765 milliGRAM(s) Oral every 6 hours PRN Moderate Pain (4 - 6)  dextrose 40% Gel 15 Gram(s) Oral once PRN Blood Glucose LESS THAN 70 milliGRAM(s)/deciliter  glucagon  Injectable 1 milliGRAM(s) IntraMuscular once PRN Glucose LESS THAN 70 milligrams/deciliter      Allergies    contrast media (iodine-based) (Urticaria)  erythromycin (Unknown)  IV-Dye (Unknown)  sulfADIAZINE (Unknown)    Intolerances          LABS:                          9.8    5.6   )-----------( 210      ( 12 Dec 2018 06:31 )             29.9     -    144  |  109<H>  |  44.0<H>  ----------------------------<  247<H>  4.4   |  24.0  |  1.20    Ca    9.0      12 Dec 2018 06:31        Urinalysis Basic - ( 11 Dec 2018 12:28 )    Color: Yellow / Appearance: Cloudy / S.015 / pH: x  Gluc: x / Ketone: Negative  / Bili: Negative / Urobili: Negative mg/dL   Blood: x / Protein: 30 mg/dL / Nitrite: Positive   Leuk Esterase: Moderate / RBC: 0-2 /HPF / WBC 26-50   Sq Epi: x / Non Sq Epi: Occasional / Bacteria: TNTC        RADIOLOGY & ADDITIONAL TESTS:

## 2018-12-12 NOTE — PROGRESS NOTE ADULT - SUBJECTIVE AND OBJECTIVE BOX
CC:  follow GOC  INTERVAL HPI/OVERNIGHT EVENTS:  24hr EEG ordered  PRESENT SYMPTOMS: SOURCE:  Patient/Family/Team    PAIN SCALE:  0 = none  1 = mild   2 = moderate  3 = severe    Pain:  no sign    Dyspnea:  [ ] YES [ x] NO  Anxiety:  [ ] YES [ ] NO  na  Fatigue: [ ] YES [ ] NO  na  Nausea: [ ] YES [ ] NO  na  Loss of Appetite: [ ] YES [ ] NO  na on TF  Other symptoms: __________    MEDICATIONS  (STANDING):  aspirin  chewable 81 milliGRAM(s) Oral daily  brimonidine 0.2% Ophthalmic Solution 1 Drop(s) Both EYES daily  cefTRIAXone   IVPB      cefTRIAXone   IVPB 1 Gram(s) IV Intermittent every 24 hours  dextrose 5%. 1000 milliLiter(s) (50 mL/Hr) IV Continuous <Continuous>  dextrose 50% Injectable 12.5 Gram(s) IV Push once  dextrose 50% Injectable 25 Gram(s) IV Push once  dextrose 50% Injectable 25 Gram(s) IV Push once  enoxaparin Injectable 40 milliGRAM(s) SubCutaneous daily  hydrALAZINE 25 milliGRAM(s) Oral every 8 hours  insulin glargine Injectable (LANTUS) 10 Unit(s) SubCutaneous at bedtime  insulin lispro (HumaLOG) corrective regimen sliding scale   SubCutaneous three times a day before meals  lactobacillus acidophilus 1 Tablet(s) Oral daily  latanoprost 0.005% Ophthalmic Solution 1 Drop(s) Both EYES at bedtime  levETIRAcetam  IVPB 250 milliGRAM(s) IV Intermittent every 12 hours  metoprolol tartrate 25 milliGRAM(s) Oral two times a day  timolol 0.25% Solution 1 Drop(s) Both EYES daily    MEDICATIONS  (PRN):  acetaminophen    Suspension .. 765 milliGRAM(s) Oral every 6 hours PRN Moderate Pain (4 - 6)  dextrose 40% Gel 15 Gram(s) Oral once PRN Blood Glucose LESS THAN 70 milliGRAM(s)/deciliter  glucagon  Injectable 1 milliGRAM(s) IntraMuscular once PRN Glucose LESS THAN 70 milligrams/deciliter      Allergies    contrast media (iodine-based) (Urticaria)  erythromycin (Unknown)  IV-Dye (Unknown)  sulfADIAZINE (Unknown)    Intolerances    Karnofsky Performance Score/Palliative Performance Status Version 2:    10  %    Vital Signs Last 24 Hrs  T(C): 37.2 (12 Dec 2018 16:03), Max: 37.6 (12 Dec 2018 08:46)  T(F): 98.9 (12 Dec 2018 16:03), Max: 99.7 (12 Dec 2018 08:46)  HR: 126 (12 Dec 2018 11:44) (85 - 126)  BP: 133/100 (12 Dec 2018 11:44) (130/76 - 153/75)  BP(mean): 110 (12 Dec 2018 11:44) (92 - 111)  RR: 31 (12 Dec 2018 11:44) (16 - 31)  SpO2: 100% (12 Dec 2018 11:44) (95% - 100%)    PHYSICAL EXAM:    General: Lethargic - not responsive to tactile or verbal stimuli    HEENT: [ x] normal  [ ] dry mouth  [ ] ET tube/trach    Lungs: [ x] comfortable [ ] tachypnea/labored breathing  [ ] excessive secretions    CV: [x ] normal  [ ] tachycardia    GI: soft NTND    : [x ] normal  [ ] incontinent  [ ] oliguria/anuria  [ ] walter    MSK: [ ] normal  [x ] weakness  [ ] edema             [ ] ambulatory  [ x] bedbound/wheelchair bound    Skin: [ ] normal  [ ] pressure ulcers- Stage_____  [x ] no rash    LABS:                        9.8    5.6   )-----------( 210      ( 12 Dec 2018 06:31 )             29.9     -    144  |  109<H>  |  44.0<H>  ----------------------------<  247<H>  4.4   |  24.0  |  1.20    Ca    9.0      12 Dec 2018 06:31        Urinalysis Basic - ( 11 Dec 2018 12:28 )    Color: Yellow / Appearance: Cloudy / S.015 / pH: x  Gluc: x / Ketone: Negative  / Bili: Negative / Urobili: Negative mg/dL   Blood: x / Protein: 30 mg/dL / Nitrite: Positive   Leuk Esterase: Moderate / RBC: 0-2 /HPF / WBC 26-50   Sq Epi: x / Non Sq Epi: Occasional / Bacteria: TNTC      I&O's Summary    11 Dec 2018 07:01  -  12 Dec 2018 07:00  --------------------------------------------------------  IN: 2165 mL / OUT: 0 mL / NET: 2165 mL    12 Dec 2018 07:01  -  12 Dec 2018 16:24  --------------------------------------------------------  IN: 885 mL / OUT: 0 mL / NET: 885 mL      Thank you for the opportunity to assist with the care of this patient.   [facility] [phone].

## 2018-12-12 NOTE — GOALS OF CARE CONVERSATION - PERSONAL ADVANCE DIRECTIVE - CONVERSATION DETAILS
Extended meeting with pts family collaboratively with pallaitive care team  dr cheryl camilo  and nik atkinson , Rex Salinas . Family is still deciding on goals of care plan of care they are deciding whether or not  to pursue peg tube or not . Provided information regarding hospice program and services reinforced as previously discussed and explained additionally revisited inpt criteria for  hospice  explained short term nature for symptom management needs that cannot be provided in a home setting  willingness to be agreeable to a discharge plan iof symptoms should be managed discussed .  Again clarified hospice goals and plan of care  focus on comfort and quality of life  not on invasive aggressive procedures  to prolong life Extended meeting with pts family collaboratively with palliative care team  dr cheryl camilo  and nik atkinson , Rex Rodney & Dara. Family is still deciding on goals of care plan of care they are deciding whether or not  to pursue peg tube or not . Provided information regarding hospice program and services reinforced as previously discussed and explained additionally revisited inpt criteria for  hospice  explained short term nature for symptom management needs that cannot be provided in a home setting  willingness to be agreeable to a discharge plan iof symptoms should be managed discussed .  Again clarified hospice goals and plan of care  focus on comfort and quality of life  not on invasive aggressive procedures  to prolong life

## 2018-12-12 NOTE — CONSULT NOTE ADULT - SUBJECTIVE AND OBJECTIVE BOX
[facility]  INFECTIOUS DISEASES AND INTERNAL MEDICINE at [place]  =======================================================  Spike Burks MD  Diplomates American Board of Internal Medicine and Infectious Diseases  =======================================================      MRN-[id number]  LINDA CARTER     History obtained from the chart and Son. Patient is not able to provide history.     CC: Patient is a 89y old  Female who presents with a chief complaint of Altered mental status (11 Dec 2018 11:11)    89y/o  Female with h/o CVA, seizure disorder, hypertension and diabetes mellitus who was brought to the ER on 18 for complaints of altered mental status. Patient was diagnosed with seizure disorder last year and started on Keppra Over the past few months the family has noted she has been more lethargic and confused. She saw Dr Mcallister, neurologist at [facility] According to the daughter she had a number of tests which indicated she did not have seizure disorder and she was advised to taper off the Keppra. Her daughter tapered it off over the past 2 weeks prior to admission. The morning of admission she noted her mother became unresponsive, fell to her side, noted facial droop, her eyes were "fluttering" and her tongue was rolled back. After the episode she remained lethargic for about 1 hours. In the ER, patient was evaluated for CVA seen by neurology. Patient was more alert and responsive. CT head was negative. Initially started on IV Keppra EEG positive for seizure activity. MRI positive for acute right frontal cva.  Blood cultures 1of 2 positive for CoNS; UA negative. Started on iv Vancomycin repeat blood cultures ordered on  and are negative. Family later refusing lipitor or zocor for history of myalgia. Hospital course further complicated by worsening lethargy.  Neurology recommending to continue Vimpat, family wanted to continue to hold VImpat to assess improvement in lethargy understanding the risks of seizures of AEDs.  Second neurology opinion was obtained; vimpat or keppra not contributing to lethargy recommend continuing AEDs which was endorsed to the family.  Family concerned about possible UTI so UA and culture obtained. ID input requested.       Past Medical & Surgical Hx:  Diabetes mellitus  Fracture: lumbar fracture.  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  Heart Murmur  Calcium Nephrolithiasis  Hyperlipidemia LDL Goal < 100  Glaucoma, Low Tension  History of Pulmonary Hypertension  CAD (Coronary Artery Disease)  Obstructive Sleep Apnea  Controlled Type 2 Diabetes with Neuropathy  H/O cataract extraction  History of appendectomy  Fracture of Humerus: repair with harware right  Glaucoma: relieve pressure left eye along with cataract extraction  Bilateral Cataracts: excision   Benign Breast Disease: removal of cyst- 1963  Cataract  Kidney Stones: lithotripsy ,   S/P Appendectomy: 1937      Social Hx:  Unable to obtain. Patient is not responsive      FAMILY HISTORY:  Family history of cerebrovascular accident (CVA) (Sibling)      Allergies  contrast media (iodine-based) (Urticaria)  erythromycin (Unknown)  IV-Dye (Unknown)  sulfADIAZINE (Unknown)      Antibiotics:  cefTRIAXone   IVPB 1 Gram(s) IV Intermittent every 24 hours       REVIEW OF SYSTEMS:  Unable to obtain. Patient is not responsive      Physical Exam:  Vital Signs Last 24 Hrs  T(C): 37.6 (12 Dec 2018 08:46), Max: 37.6 (12 Dec 2018 08:46)  T(F): 99.7 (12 Dec 2018 08:46), Max: 99.7 (12 Dec 2018 08:46)  HR: 97 (12 Dec 2018 08:00) (85 - 113)  BP: 151/66 (12 Dec 2018 08:00) (120/70 - 153/75)  BP(mean): 92 (12 Dec 2018 08:00) (92 - 111)  RR: 21 (12 Dec 2018 08:00) (16 - 23)  SpO2: 100% (12 Dec 2018 08:00) (95% - 100%)      GEN: Not responsive  HEENT: normocephalic and atraumatic. Anicteric  NECK: Supple.   LUNGS: Clear to auscultation.  HEART: Regular rate and rhythm  ABDOMEN: Soft, nontender, and nondistended.  Positive bowel sounds.    : No CVA tenderness  EXTREMITIES: Without any edema.  MSK: No joint swelling  NEUROLOGIC: Not responsive, No meaningful communication   PSYCHIATRIC: Not responsive, No meaningful communication  SKIN: No rash      Labs:      144  |  109<H>  |  44.0<H>  ----------------------------<  247<H>  4.4   |  24.0  |  1.20    Ca    9.0      12 Dec 2018 06:31               9.8    5.6   )-----------( 210      ( 12 Dec 2018 06:31 )             29.9     Urinalysis Basic - ( 11 Dec 2018 12:28 )    Color: Yellow / Appearance: Cloudy / S.015 / pH: x  Gluc: x / Ketone: Negative  / Bili: Negative / Urobili: Negative mg/dL   Blood: x / Protein: 30 mg/dL / Nitrite: Positive   Leuk Esterase: Moderate / RBC: 0-2 /HPF / WBC 26-50   Sq Epi: x / Non Sq Epi: Occasional / Bacteria: TNTC      RECENT CULTURES:   @ 12:28 .Urine     >100,000 CFU/ml Gram Negative Rods Identification and susceptibility to  follow. Culture in progress       @ 18:10 .Urine Proteus mirabilis    50,000 - 99,000 CFU/mL Proteus mirabilis       @ 14:11 .Blood Blood-Peripheral     No growth at 5 days.       @ 11:05 .Urine Catheterized     No growth

## 2018-12-13 NOTE — PROGRESS NOTE ADULT - ASSESSMENT
89 y/o F with complicated hospital course. Follows Urology as outpatient for h/o nephrolithiasis, urinary incontinence and recurrent UTI.     Recurrent UTI  h/o nephrolithiasis  urinary incontinence   ? Seizures   Encephalopathy  Concern by Family for HIV     - UA minimally positive for UTI  - Urine culture with 50-99k Proteus  - Sensitive to Ceftriaxone  - Continue Ceftriaxone  - Follow up repeat Urine culture done via straight cath which is GNR at the moment  - Renal U/S With no evidence of urolithiasis or hydronephrosis  - Blood cultures from admission no growth  - No fevers  - No leukocytosis  - will check HIV and viral hepatitis studies     Will Follow

## 2018-12-13 NOTE — PROGRESS NOTE ADULT - SUBJECTIVE AND OBJECTIVE BOX
[facility]  INFECTIOUS DISEASES AND INTERNAL MEDICINE at [place]  =======================================================  Spike Burks MD  Diplomates American Board of Internal Medicine and Infectious Diseases  =======================================================    LINDA CARTER [id number]    Follow up: Recurrent UTI    No fevers  Remains unresponsive     Allergies:  contrast media (iodine-based) (Urticaria)  erythromycin (Unknown)  IV-Dye (Unknown)  sulfADIAZINE (Unknown)      Antibiotics:  cefTRIAXone   IVPB 1 Gram(s) IV Intermittent every 24 hours       REVIEW OF SYSTEMS:  Unable to obtain. Patient is not responsive      Physical Exam:  Vital Signs Last 24 Hrs  T(C): 36.8 (13 Dec 2018 07:56), Max: 37.2 (12 Dec 2018 16:03)  T(F): 98.2 (13 Dec 2018 07:56), Max: 98.9 (12 Dec 2018 16:03)  HR: 98 (13 Dec 2018 07:56) (74 - 118)  BP: 142/70 (13 Dec 2018 07:56) (136/78 - 168/97)  BP(mean): 90 (12 Dec 2018 20:15) (90 - 118)  RR: 18 (13 Dec 2018 07:56) (18 - 39)  SpO2: 97% (13 Dec 2018 07:56) (97% - 100%)      GEN: Not responsive  HEENT: normocephalic and atraumatic. Anicteric, + NGT  NECK: Supple.   LUNGS: Clear to auscultation.  HEART: Regular rate and rhythm  ABDOMEN: Soft, nontender, and nondistended.  Positive bowel sounds.    : No CVA tenderness  EXTREMITIES: Without any edema.  MSK: No joint swelling  NEUROLOGIC: Not responsive, No meaningful communication   PSYCHIATRIC: Not responsive, No meaningful communication  SKIN: No rash      Labs:  12-13    147<H>  |  110<H>  |  52.0<H>  ----------------------------<  248<H>  5.3   |  26.0  |  1.15    Ca    9.6      13 Dec 2018 06:43               10.2   6.0   )-----------( 224      ( 13 Dec 2018 06:43 )             31.3     ABG - ( 12 Dec 2018 16:16 )  pH, Arterial: 7.41  pH, Blood: x     /  pCO2: 46    /  pO2: 78    / HCO3: 28    / Base Excess: 3.6   /  SaO2: 96          RECENT CULTURES:  12-11 @ 12:28 .Urine Proteus mirabilis    >100,000 CFU/ml Proteus mirabilis  >100,000 CFU/ml Gram Negative Rods Identification and susceptibility to  follow.      12-09 @ 18:10 .Urine Proteus mirabilis    50,000 - 99,000 CFU/mL Proteus mirabilis        EXAM:  US KIDNEY(S)                        PROCEDURE DATE:  12/12/2018    INTERPRETATION:  CLINICAL INFORMATION: UTI and nephrolithiasis  COMPARISON: None available.  TECHNIQUE: Sonography of the kidneys and bladder. Study is significantly   technically limited due to body habitus and bowel gas  FINDINGS:  Right kidney:  7.8 cm. No renal mass, hydronephrosis or calculi. A small   lower pole cyst measures 1.7 x 1.9 x 2.0 cm.  Left kidney:  9.5 cm. No renal mass, hydronephrosisor calculi.  Urinary bladder: Not visualized.  IMPRESSION:   Technically limited study demonstrates no evidence of urolithiasis or   hydronephrosis.

## 2018-12-13 NOTE — CONSULT NOTE ADULT - SUBJECTIVE AND OBJECTIVE BOX
HPI:  Patient is a 89 year old female with hx of type 2 DM, CAD, CVA and seizure disorder who was admitted to [facility] on 11/26/2018 with mental status change was and was found to have acute right frontal CVA on MRI.  She remains very somnolent and extensive work up has been done including EEG and metabolic work up.  Her antiepileptics have been changed, but no change in somnolence.    Due to her somnolence tube feeds have been started via NG tube resulting in mild hyperglycemia.  The family is now requesting consultation by us for glycemic control.  Family members are concerned that mild hyperglycemia is contributing to her fatigue.     She was diagnosed with DM many years ago and is followed by Dr. Braden.  Her DM is mild and control has improved with the use of insulin.  At home she was using Lantus 8- 10 units, but this was recently D/C'ed due to improved control.  She takes 2- 4 units of humalog after eating meals.  Unable to assess for associated symptoms due to decreased mental status.     PAST MEDICAL & SURGICAL HISTORY:  Diabetes mellitus  Fracture: lumbar fracture.  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  Heart Murmur  Calcium Nephrolithiasis  Hyperlipidemia LDL Goal < 100  Glaucoma, Low Tension  History of Pulmonary Hypertension  CAD (Coronary Artery Disease)  Obstructive Sleep Apnea  Controlled Type 2 Diabetes with Neuropathy  H/O cataract extraction  History of appendectomy  Fracture of Humerus: repair with harware right  Glaucoma: relieve pressure left eye along with cataract extraction  Bilateral Cataracts: excision 2006  Benign Breast Disease: removal of cyst- 1963  Cataract  Kidney Stones: lithotripsy 2005, 2006  S/P Appendectomy: 1937    Allergies  contrast media (iodine-based) (Urticaria)  erythromycin (Unknown)  IV-Dye (Unknown)  sulfADIAZINE (Unknown)    MEDICATIONS  (STANDING):  aspirin  chewable 81 milliGRAM(s) Oral daily  brimonidine 0.2% Ophthalmic Solution 1 Drop(s) Both EYES daily  cefTRIAXone   IVPB      cefTRIAXone   IVPB 1 Gram(s) IV Intermittent every 24 hours  dextrose 5%. 1000 milliLiter(s) (50 mL/Hr) IV Continuous <Continuous>  dextrose 50% Injectable 12.5 Gram(s) IV Push once  dextrose 50% Injectable 25 Gram(s) IV Push once  dextrose 50% Injectable 25 Gram(s) IV Push once  enoxaparin Injectable 40 milliGRAM(s) SubCutaneous daily  hydrALAZINE 25 milliGRAM(s) Oral every 8 hours  insulin glargine Injectable (LANTUS) 10 Unit(s) SubCutaneous at bedtime  insulin lispro (HumaLOG) corrective regimen sliding scale   SubCutaneous three times a day before meals  lactobacillus acidophilus 1 Tablet(s) Oral daily  latanoprost 0.005% Ophthalmic Solution 1 Drop(s) Both EYES at bedtime  levETIRAcetam  IVPB 250 milliGRAM(s) IV Intermittent every 12 hours  metoprolol tartrate 25 milliGRAM(s) Oral two times a day  timolol 0.25% Solution 1 Drop(s) Both EYES daily    SH:  lives with daughter    FH:  + DM in many children,  daughter has CAH,  daughter has carnitine deficiency.    ROS:  unable to obtain due to mental status    Vital Signs Last 24 Hrs  T(C): 36.8 (13 Dec 2018 07:56), Max: 37.2 (12 Dec 2018 16:03)  T(F): 98.2 (13 Dec 2018 07:56), Max: 98.9 (12 Dec 2018 16:03)  HR: 98 (13 Dec 2018 07:56) (74 - 118)  BP: 142/70 (13 Dec 2018 07:56) (136/78 - 168/97)  BP(mean): 90 (12 Dec 2018 20:15) (90 - 118)  RR: 18 (13 Dec 2018 07:56) (18 - 39)  SpO2: 97% (13 Dec 2018 07:56) (97% - 100%)    PE:  Gen:  patient is somnolent, does not open eyes to verbal or tactile stimuli.  NAD  HEENT:  sclera anicteric, MMM  Neck:  no thyromegaly, no LAD  CV:  nl S1 + S2, RRR, no m/r/g  Resp:  nl respiratory effort, CTA b/l  GI/ Abd: soft, NT/ ND, BS +  Neuro:  No tremor, biceps DTRs 2 + b/l  MS:  edema in b/l UEs,  decreased muscle tone diffusely.    Skin:  no acanthosis, no rashes    LABS  Hemoglobin A1C, Whole Blood: 6.3 % (11.27.18 @ 07:34)    Thyroid Stimulating Hormone, Serum: 2.50 uIU/mL (11.27.18 @ 05:12)  T4, Serum: 5.1 ug/dL (12.10.18 @ 21:10)  Triiodothyronine, Total (T3 Total): 42 ng/dL (12.10.18 @ 21:10)    Cortisol AM, Serum: 14.3: Note reference range change for CORTAM and CORTPM. ug/dL (12.11.18 @ 19:01)    12-13    147<H>  |  110<H>  |  52.0<H>  ----------------------------<  248<H>  5.3   |  26.0  |  1.15    Ca    9.6      13 Dec 2018 06:43                          10.2   6.0   )-----------( 224      ( 13 Dec 2018 06:43 )             31.3

## 2018-12-13 NOTE — EEG REPORT - NS EEG TEXT BOX
[facility]   COMPREHENSIVE EPILEPSY CENTER   REPORT OF ROUTINE VIDEO EEG     [facility]: [address], [place] [address], Ph#: [phone]  [facility]: [address], [place] [address], Ph#: [phone]  Office: [address] [place] [address], [facility] [address], [place] [address] Ph#: [phone]    Patient Name: LINDA CARTER  Age and : 89y ([birth year])  MRN #: [id number]  Location: [place] [address]  Referring Physician: Katherine Dowling    Study Date: 18    _____________________________________________________________  TECHNICAL INFORMATION    Placement and Labeling of Electrodes:  The EEG was performed utilizing 20 channels referential EEG connections (coronal over temporal over parasagittal montage) using all standard 10-20 electrode placements with EKG.  Recording was at a sampling rate of 256 samples per second per channel.  Time synchronized digital video recording was done simultaneously with EEG recording.  A low light infrared camera was used for low light recording.  Nathen and seizure detection algorithms were utilized.    _____________________________________________________________  HISTORY    Patient is a 89y old  Female who presents with a chief complaint of Altered mental status (11 Dec 2018 11:11)      PERTINENT MEDICATION:  none    _____________________________________________________________  STUDY INTERPRETATION    Findings: The background was continuous, spontaneously variable and reactive. Background predominantly consisted of theta, delta and faster activities. No posterior dominant rhythm seen.    Focal Slowing:   Intermittent theta/delta slowing max in the left temporal region.    Sleep Background:  Stage II sleep transients were not recorded.    Other Findings:  Frequent sharp transients and spikes in the left temporal region with shifting maximum.    Events:  Clinical events: None recorded.  Seizures: None recorded.    Activation Procedures:   Hyperventilation was not performed.    Photic stimulation was performed and did not elicit any abnormality.     Artifacts:  Intermittent myogenic and movement artifacts were noted.    ECG:  The heart rate on single channel ECG was predominantly between  BPM.    _____________________________________________________________  EEG SUMMARY/CLASSIFICATION    Abnormal EEG in an altered patient.  - Frequent sharp transients and spikes in the left temporal region with shifting maximum.  - Intermittent theta/delta slowing max in the left temporal region.  - Moderate generalized slowing.    _____________________________________________________________  EEG IMPRESSION/CLINICAL CORRELATE    Abnormal EEG study.  1. Cortical hyperexcitability and functional abnormality int he left temporal region.  2. Moderate nonspecific diffuse or multifocal cerebral dysfunction.   3. No seizure seen.      Maureen Perez MD  Attending Physician, [facility]ile[facility] [place]
[facility]   COMPREHENSIVE EPILEPSY CENTER   REPORT OF CONTINUOUS VIDEO EEG     [facility]: [address], [place] [address], Ph#: [phone]  [facility]: [address], [place] [address], Ph#: [phone]  Office: [address] [place] [address], [address], [place] [address] Ph#: [phone]    Patient Name: LINDA CARTER  Age and : 89y (25)  MRN #: [id number]  Location: [place] [address]  Referring Physician: Katherine Dowling    Study Date: 18    _____________________________________________________________  HISTORY    Patient is a 89y old  Female who presents with a chief complaint of Altered mental status (11 Dec 2018 11:11)      PERTINENT MEDICATION:  none    _____________________________________________________________  STUDY INTERPRETATION    Findings: The background was continuous, spontaneously variable and reactive. Background predominantly consisted of theta, delta and faster activities. No posterior dominant rhythm seen.    Focal Slowing:   Intermittent theta/delta slowing max in the left temporal region.    Sleep Background:  Drowsiness was characterized by fragmentation, attenuation, and slowing of the background activity.    Sleep was characterized by the presence of symmetric and rudimentary K-complexes.    Other Findings:  Frequent sharp transients and spikes in the left temporal region with shifting maximum.    Events:  Clinical events: None recorded.  Seizures: None recorded.    Activation Procedures:   Hyperventilation was not performed.    Photic stimulation was performed and did not elicit any abnormality.     Artifacts:  Intermittent myogenic and movement artifacts were noted.    ECG:  The heart rate on single channel ECG was predominantly between  BPM.    _____________________________________________________________  EEG SUMMARY/CLASSIFICATION    Abnormal EEG in an altered patient.  - Frequent sharp transients and spikes in the left temporal region with shifting maximum.  - Intermittent theta/delta slowing max in the left temporal region.  - Moderate generalized slowing.    _____________________________________________________________  EEG IMPRESSION/CLINICAL CORRELATE    This is an approximately 8hr study.    Abnormal EEG study.  1. Cortical hyperexcitability and functional abnormality int he left temporal region.  2. Moderate nonspecific diffuse or multifocal cerebral dysfunction.   3. No seizure seen.      Maureen Perez MD  Attending Physician, [facility]ilepsy [place]
[facility]   COMPREHENSIVE EPILEPSY CENTER   REPORT OF CONTINUOUS VIDEO EEG     [facility]: [address], [place] [address], Ph#: [phone]  [facility]: [address], [place] [address], Ph#: [phone]  Office: [address] [facility], [address], [place] [address] Ph#: [phone]    Patient Name: LINDA CARTER  Age and : 89y (25)  MRN #: [id number]  Location: [place] [address]  Referring Physician: Katherine Dowling    Study Date: 18    _____________________________________________________________  HISTORY    Patient is a 89y old  Female who presents with a chief complaint of Altered mental status (11 Dec 2018 11:11)      PERTINENT MEDICATION:  none    _____________________________________________________________  STUDY INTERPRETATION    Findings: The background was continuous, spontaneously variable and reactive. Background predominantly consisted of theta, delta and faster activities. No posterior dominant rhythm seen.    Focal Slowing:   Intermittent theta/delta slowing max in the left temporal region.    Sleep Background:  Drowsiness was characterized by fragmentation, attenuation, and slowing of the background activity.    Sleep was characterized by the presence of symmetric and rudimentary K-complexes.    Other Findings:  Frequent sharp transients and spikes in the left temporal region with shifting maximum.    Events:  Clinical events: None recorded.  Seizures: None recorded.    Activation Procedures:   Hyperventilation was not performed.    Photic stimulation was performed and did not elicit any abnormality.     Artifacts:  Intermittent myogenic and movement artifacts were noted.    ECG:  The heart rate on single channel ECG was predominantly between  BPM.    _____________________________________________________________  EEG SUMMARY/CLASSIFICATION    Abnormal EEG in an altered patient.  - Frequent sharp transients and spikes in the left temporal region with shifting maximum.  - Intermittent theta/delta slowing max in the left temporal region.  - Moderate generalized slowing.    _____________________________________________________________  EEG IMPRESSION/CLINICAL CORRELATE    Abnormal EEG study.  1. Cortical hyperexcitability and functional abnormality int he left temporal region.  2. Moderate nonspecific diffuse or multifocal cerebral dysfunction.   3. No seizure seen.      Maureen Perez MD  Attending Physician, [facility] Epilepsy [place]

## 2018-12-13 NOTE — PROGRESS NOTE ADULT - SUBJECTIVE AND OBJECTIVE BOX
CC: AMS    INTERVAL HPI/ OVERNIGHT EVENTS: Patient seen and examined lethargic no change in mentation overnight.       Vital Signs Last 24 Hrs  T(C): 36.8 (13 Dec 2018 07:56), Max: 37.2 (12 Dec 2018 16:03)  T(F): 98.2 (13 Dec 2018 07:56), Max: 98.9 (12 Dec 2018 16:03)  HR: 98 (13 Dec 2018 07:56) (74 - 118)  BP: 142/70 (13 Dec 2018 07:56) (136/78 - 168/97)  BP(mean): 90 (12 Dec 2018 20:15) (90 - 118)  RR: 18 (13 Dec 2018 07:56) (18 - 39)  SpO2: 97% (13 Dec 2018 07:56) (97% - 100%)    PHYSICAL EXAM:    GENERAL: NAD [place]   HEAD:  Atraumatic, Normocephalic  EYES: EOMI, PERRLA, conjunctiva and sclera clear  ENMT:Oral cavity with thick secretions   + NG tube   NECK: Supple, No JVD  CHEST/LUNG: Clear to auscultation bilaterally; No rales, rhonchi, wheezing, or rubs  HEART: Regular rate and rhythm; No murmurs, rubs, or gallops  ABDOMEN: Soft, Nontender, Nondistended; Bowel sounds present  EXTREMITIES:  2+ Peripheral Pulses, No clubbing, cyanosis, or edema        MEDICATIONS  (STANDING):  aspirin  chewable 81 milliGRAM(s) Oral daily  brimonidine 0.2% Ophthalmic Solution 1 Drop(s) Both EYES daily  cefTRIAXone   IVPB      cefTRIAXone   IVPB 1 Gram(s) IV Intermittent every 24 hours  dextrose 5%. 1000 milliLiter(s) (50 mL/Hr) IV Continuous <Continuous>  dextrose 50% Injectable 12.5 Gram(s) IV Push once  dextrose 50% Injectable 25 Gram(s) IV Push once  dextrose 50% Injectable 25 Gram(s) IV Push once  enoxaparin Injectable 40 milliGRAM(s) SubCutaneous daily  hydrALAZINE 25 milliGRAM(s) Oral every 8 hours  insulin glargine Injectable (LANTUS) 10 Unit(s) SubCutaneous at bedtime  insulin lispro (HumaLOG) corrective regimen sliding scale   SubCutaneous three times a day before meals  lactobacillus acidophilus 1 Tablet(s) Oral daily  latanoprost 0.005% Ophthalmic Solution 1 Drop(s) Both EYES at bedtime  levETIRAcetam  IVPB 250 milliGRAM(s) IV Intermittent every 12 hours  metoprolol tartrate 25 milliGRAM(s) Oral two times a day  timolol 0.25% Solution 1 Drop(s) Both EYES daily    MEDICATIONS  (PRN):  acetaminophen    Suspension .. 765 milliGRAM(s) Oral every 6 hours PRN Moderate Pain (4 - 6)  dextrose 40% Gel 15 Gram(s) Oral once PRN Blood Glucose LESS THAN 70 milliGRAM(s)/deciliter  glucagon  Injectable 1 milliGRAM(s) IntraMuscular once PRN Glucose LESS THAN 70 milligrams/deciliter      Allergies    contrast media (iodine-based) (Urticaria)  erythromycin (Unknown)  IV-Dye (Unknown)  sulfADIAZINE (Unknown)    Intolerances          LABS:                          10.2   6.0   )-----------( 224      ( 13 Dec 2018 06:43 )             31.3     12-13    147<H>  |  110<H>  |  52.0<H>  ----------------------------<  248<H>  5.3   |  26.0  |  1.15    Ca    9.6      13 Dec 2018 06:43            RADIOLOGY & ADDITIONAL TESTS:

## 2018-12-13 NOTE — PROGRESS NOTE ADULT - ASSESSMENT
Patient is a 89 year old female with a history of CVA, seizure disorder, hypertension and diabetes mellitus who was brought to the ER for complaints of altered mental status. Patient was diagnosed with seizure disorder last year and started on keppra po. Over the past few months the family has noted she has been more lethargic and confused. She saw Dr Mcallister, neurologist at [facility]. According to the daughter she had a number of tests which indicated she did not have seizure disorder and she was advised to taper off the keppra. Her daughter tapered it off over the past 2 weeks. The morning of admission she noted her mother became unresponsive, fell to her side, noted facial droop, her eyes were "fluttering" and her tongue was rolled back. After the episode she remained lethargic for about 1 hours. In the ER, NIH was 0. Patient was more alert and responsive. CT head was negative. Initially started on IV Keppra EEG positive for seizure activity. Spoke with neurology regarding family's concerns about Keppra causing lethargy; Changed to lacosamide. MRI positive for acute right frontal cva. Discussed the benefits of statin therapy with the patient's family in regards to secondary prevention; in agreement with statin therapy- LDL of 253. HbAc of 6. Blood cultures 1of 2 positive for CNS; UA negative. Started on iv Vancomycin repeat blood cultures ordered on 11/28. Passed swallow evaluation; started on PO soft diet with nectar fluid.  Noted to have episodes of bradycardia with sinus pause. Spoke with cardiology, iv labetalol discontinued with improvement. Recommend no AV jordan blocking agents. Hydralazine increased to 25mg TID. Aspirin to continue. Family refusing lipitor or zocor for history of myalgia. Recommend pravastatin on discharge. Repeat blood cultures x 2 negative. Hospital course complicated by worsening lethargy. Metabolic parameters acceptable; hypernatremia and uremia resolved. Neurology re-consulted; recommending to continue Vimpat. Palliative care consult appreciated; daughter refusing hospice services. Family  conference call arranged by MD, family wanted to continue to hold VImpat to assess improvement in lethargy understanding the risks of seizures of AEDs.  Second neurology opinion was obtained; vimpat or keppra not contributing to lethargy- recommend continuing AEDs which was endorsed to the family.  After a detailed discussion with the patient's family, NG tube was placed and tube feeds started. Noted to have intermittent tachycardia, EKG consistent with afib. 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     Assessment/Plan;    1. Acute toxic-metabolic encephalopathy -       Neuro-   Repeat CT head on 12/11 was negative for acute CVA    24 hour video EEG showed was abnormal showed cortical hyperexcitability and abnormality in the left temporal region consistent with 3 previous EEG's + for diffuse cerebral dysfunction.   Spoke with neurology given this focus she is at risk for recurrent seizures off of AEDs. Neurology to follow up to speak to family about resuming AEDs    Infectious: Proteus Mirabalis UTi sensitive to rocephin.   History of Proteus UTi and renal stones in the past.- renal ultrasound ordered  Previous UA x 2 negative on 11/27 and 12/1 as well as urine culture on 12/1  culture positive on 12/9, culture on 12/11 +GNR- final pending  ID consulted. Spoke with family, agree with Iv rocephin.       Metabolic: MISSY resolved.   Hypernatremia resolved  Ammonia levels wnl  ABG did not show CO2 narcosis  LFTS WNL    Endocrine:   Last TSH of 2.4 WNL. T4 is WNL. T3 is on the lower side at 42, Likely sick euthyroid syndrome. NO treatment is indicated at this time  Cortisol level WNL  Carnitine level is WNL    Family concerned that hyperglycemia is contributing to lethargy/encephalopathy. I discussed with them in detail that tight control of BGL in a hospitalized patient may be detrimental and risks of hypoglycemia.   Insulin sliding scale was increased yesterday and Lantus increased to 10 units at bedtime today.   Endocrinology consulted at the family's request. Spoke with Dr Lombardi who will evaluate the patient for further recommendations.     2. Afib with SSS/ tachy-truong syndrome: Started on PO lopressor  The patient's daughter refused lopressor this morning, concerned that Lopressor caused her brother in law to become tired and therefore do not want it.     I educated them on the benefits of BB therapy to control patient's rapid heart rate which outweigh the side effect.  Tyrrone, the patient's HCP is in agreement with continuing lopressor.     Per cardiology the patient is a poor candidate for PPM insertion at this time or anti-coagulation    Continue aspirin therapy      3. Hypertensive urgency : BP Improved  -Po hydralazine via NG tube    4. Diabetes mellitus type 2  -HbA1c 6.0  - Increase lantus to 10 units QHS  - AG is 11  - Monitor bsl  _ endocrinology consulted at the request of the patient's family     5. MISSY: resolved  continue free water via NG tube  Monitor bmp    7. Acute right frontal CVA on admission: Aspirin PO  Family refusing statin therapy as it causes myalgia and muscle weakness. They were counselled on the importance of statin therapy in secondary stroke prevention. They have refused simvastatin and atorvastatin.       DVT prophylaxis - Lovenox SC    60 minutes spent discussing all of the patient's vitals, lab results and medications with the patient's secondary HCP Yari and then her primary HCP Marco Antonio.     I spoke with Marco Antonio about the eventual decision regarding PEG tube placement, patient has shown little or no improvement in her cognition. They will ultimately need to make this decision.    Case was discussed in detail with ID, endocrinology, neurology and palliative care as well.     Poor overall prognosis     Spoke with echo MARIANO to transfer patient to any monitored bed once 24 hour EEG complete Patient is a 89 year old female with a history of CVA, seizure disorder, hypertension and diabetes mellitus who was brought to the ER for complaints of altered mental status. Patient was diagnosed with seizure disorder last year and started on keppra po. Over the past few months the family has noted she has been more lethargic and confused. She saw Dr Mcallister, neurologist at [facility]. According to the daughter she had a number of tests which indicated she did not have seizure disorder and she was advised to taper off the keppra. Her daughter tapered it off over the past 2 weeks. The morning of admission she noted her mother became unresponsive, fell to her side, noted facial droop, her eyes were "fluttering" and her tongue was rolled back. After the episode she remained lethargic for about 1 hours. In the ER, NIH was 0. Patient was more alert and responsive. CT head was negative. Initially started on IV Keppra EEG positive for seizure activity. Spoke with neurology regarding family's concerns about Keppra causing lethargy; Changed to lacosamide. MRI positive for acute right frontal cva. Discussed the benefits of statin therapy with the patient's family in regards to secondary prevention; in agreement with statin therapy- LDL of 253. HbAc of 6. Blood cultures 1of 2 positive for CNS; UA negative. Started on iv Vancomycin repeat blood cultures ordered on 11/28. Passed swallow evaluation; started on PO soft diet with nectar fluid.  Noted to have episodes of bradycardia with sinus pause. Spoke with cardiology, iv labetalol discontinued with improvement. Recommend no AV jordan blocking agents. Hydralazine increased to 25mg TID. Aspirin to continue. Family refusing lipitor or zocor for history of myalgia. Recommend pravastatin on discharge. Repeat blood cultures x 2 negative. Hospital course complicated by worsening lethargy. Metabolic parameters acceptable; hypernatremia and uremia resolved. Neurology re-consulted; recommending to continue Vimpat. Palliative care consult appreciated; daughter refusing hospice services. Family  conference call arranged by MD, family wanted to continue to hold VImpat to assess improvement in lethargy understanding the risks of seizures of AEDs.  Second neurology opinion was obtained; vimpat or keppra not contributing to lethargy- recommend continuing AEDs which was endorsed to the family.  After a detailed discussion with the patient's family, NG tube was placed and tube feeds started. Noted to have intermittent tachycardia, EKG consistent with afib. 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 Started on lopressor therapy. Neurology recalled, 24 hour video eeg positive for left temporal seizure focus. Neurologist Dr Luther had a detailed discussion with the patient's family  in regards to the importance of AEd to prevent further seizures given risk. Agreed to Keppra IV. UA and urine culture were positive for Proteus mirabalis. Started on IV rocephin, ID consulted. Renal ultrasound with normal kidneys and no evidence of nephrolithiasis.     Assessment/Plan;    1. Acute toxic-metabolic encephalopathy -       Neuro-   Repeat CT head on 12/11 was negative for acute CVA    24 hour video EEG showed was abnormal showed cortical hyperexcitability and abnormality in the left temporal region consistent with 3 previous EEG's + for diffuse cerebral dysfunction.  Started on IV keppra Q12 hours    Infectious: Proteus Mirabalis UTi sensitive to rocephin day 2  History of Proteus UTi and renal stones in the past.- renal ultrasound ordered  Previous UA x 2 negative on 11/27 and 12/1 as well as urine culture on 12/1  culture positive on 12/9, culture on 12/11 + Proeteus     Family now requesting patient be checked for HIV and hepatitis.     Metabolic: MISSY resolved.   Hypernatremia-- increased free water to 250ml Q8 hours.   Ammonia levels wnl  ABG did not show CO2 narcosis. repeat ABG showed no evidence of acodisis pH wnl- Pco2 of 42 not contributing to lethargy  LFTS WNL    Endocrine:   Last TSH of 2.4 WNL. T4 is WNL. T3 is on the lower side at 42, Likely sick euthyroid syndrome. NO treatment is indicated at this time  Cortisol level WNL  Carnitine level is WNL    Family concerned that hyperglycemia is contributing to lethargy/encephalopathy. I discussed with them in detail that tight control of BGL in a hospitalized patient may be detrimental and risks of hypoglycemia.   Insulin sliding scale  and Lantus at 10 units QHS    Family requested to speak to Dr Ferrer, patient's outpatient endocrinologist. I spoke to him and he is agreement with the current treatment plan and agrees a BSL in the 200s with no acidosis would NOT be contributing to the patient's lethargy.     2. Afib with SSS/ tachy-truong syndrome: Started on PO lopressor  Per cardiology the patient is a poor candidate for PPM insertion at this time or anti-coagulation  Continue aspirin therapy      3. Hypertensive urgency : BP Improved  -Po hydralazine via NG tube    4. Diabetes mellitus type 2  -HbA1c 6.0  -  lantus to 10 units QHS  - AG is 11  - Monitor bsl    5. MISSY: resolved  continue free water via NG tube  Monitor bmp    7. Acute right frontal CVA on admission: Aspirin PO  Family refusing statin therapy as it causes myalgia and muscle weakness. They were counselled on the importance of statin therapy in secondary stroke prevention. They have refused simvastatin and atorvastatin.       DVT prophylaxis - Lovenox SC    75 minutes spent speaking to the family about   all of the patient's vitals, lab results and medications with the patient's secondary HCP Yari, primary HCP Coco.     Patient has a poor prognosis. At this point they need to decide regarding PEG tube placement vs home with hospice. They have had extensive meetings with palliative care and the hospice team to discuss their options. The patient's son, Marco Antonio wants to speak to her endocrinologist Dr Ferrer himself and confirm that hyperglycemia is not contributing to her lethargy.

## 2018-12-13 NOTE — GOALS OF CARE CONVERSATION - PERSONAL ADVANCE DIRECTIVE - CONVERSATION DETAILS
spoke with daughter dara  at bedside reinforced hospice program and plan of care. explained that a peg tube being pursued on a pts with mom's dx would not be part of a hospice plan of care . Dara appears to have complete understanding of pts condition and overall poor prognosis as per md assessments . She feels her sister who is pt's pcg Ronel does not and requires support and continued reinforcement  , education and possibly counselling she request SW  endorse to palliative care team  who pt and daughter are well known to . They have yet to make a decision regarding  peg placement   Daughter Dara expressed that she does not feel this is in her mom's best interest but will defer to the wishes of her family . support given. hospice will remain available

## 2018-12-13 NOTE — CONSULT NOTE ADULT - ASSESSMENT
89 year old female with hx of type 2 DM, CVA, seizure disorder admitted with acute right frontal CVA now with continued encephalopathy, likely multifactorial.  I do not think that her mild degree of hyperglycemia is contributing to her current encephalopathy and had an extensive conversation with multiple family members to this effect.  She has a mildly reduced T3 level, which is likely the result of sick euthyroid syndrome, not underlying thyroid dysfunction.    1.  T2DM-  BG is slightly above goal, likely from tube feeds.  Discussed glycemic goal with family and stressed need to avoid hyperglycemia.  Nonetheless, will strive for BG in the 140- 200 mg/dl range.  Over past 24 hours, she has required 20 units of insulin with mild hyperglycemia.  Will change basal insulin to Levemir or NPH which we can dose twice daily along with tube feeds for more flexibility in dosing.  Will give 10 units BID and continue humalog sliding scale q 6 hours.      2.  Abnormal TFTs-  as above, low T3 likely due to sick euthyroid syndrome.  Repeat TFTs as an outpatient in 2-4 weeks.   3.  Encephalopathy-  likely multifactorial in setting of recent CVA and UTI. As per medicine.

## 2018-12-14 NOTE — CONSULT NOTE ADULT - ASSESSMENT
HyperNatremia/ MISSY suspect 2/2 poor nutrition dehydration  Will cont hypotonic IVF  will check renal sonogram r/o obstructive etiology  Hyperkalemia given Kayexalate  AM labs   Plans discussed w family  Will follow HyperNatremia/ MISSY suspect 2/2 poor nutrition dehydration  Will cont hypotonic IVF  Renal sonogram noted no mhydronephrosis  Hyperkalemia given Kayexalate  AM labs   Plans discussed w family  Will follow HyperNatremia/ MISSY suspect 2/2 poor nutrition dehydration  Will cont hypotonic IVF  Renal sonogram noted no mhydronephrosis  Hyperkalemia given Kayexalate  CVA/ neuro eval noted  AM labs   Plans discussed w family  Will follow

## 2018-12-14 NOTE — PROGRESS NOTE ADULT - ASSESSMENT
Patient is a 89 year old female with a history of CVA, seizure disorder, hypertension and diabetes mellitus who was brought to the ER for complaints of altered mental status. Patient was diagnosed with seizure disorder last year and started on keppra po. Over the past few months the family has noted she has been more lethargic and confused. She saw Dr Mcallister, neurologist at [facility]. According to the daughter she had a number of tests which indicated she did not have seizure disorder and she was advised to taper off the keppra. Her daughter tapered it off over the past 2 weeks. The morning of admission she noted her mother became unresponsive, fell to her side, noted facial droop, her eyes were "fluttering" and her tongue was rolled back. After the episode she remained lethargic for about 1 hours. In the ER, NIH was 0. Patient was more alert and responsive. CT head was negative. Initially started on IV Keppra EEG positive for seizure activity. Spoke with neurology regarding family's concerns about Keppra causing lethargy; Changed to lacosamide. MRI positive for acute right frontal cva. Discussed the benefits of statin therapy with the patient's family in regards to secondary prevention; in agreement with statin therapy- LDL of 253. HbAc of 6. Blood cultures 1of 2 positive for CNS; UA negative. Started on iv Vancomycin repeat blood cultures ordered on 11/28. Passed swallow evaluation; started on PO soft diet with nectar fluid.  Noted to have episodes of bradycardia with sinus pause. Spoke with cardiology, iv labetalol discontinued with improvement. Recommend no AV jordan blocking agents. Hydralazine increased to 25mg TID. Aspirin to continue. Family refusing lipitor or zocor for history of myalgia. Recommend pravastatin on discharge. Repeat blood cultures x 2 negative. Hospital course complicated by worsening lethargy. Metabolic parameters acceptable; hypernatremia and uremia resolved. Neurology re-consulted; recommending to continue Vimpat. Palliative care consult appreciated; daughter refusing hospice services. Family  conference call arranged by MD, family wanted to continue to hold VImpat to assess improvement in lethargy understanding the risks of seizures of AEDs.  Second neurology opinion was obtained; vimpat or keppra not contributing to lethargy- recommend continuing AEDs which was endorsed to the family.  After a detailed discussion with the patient's family, NG tube was placed and tube feeds started. Noted to have intermittent tachycardia, EKG consistent with afib. 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 Started on lopressor therapy. Neurology recalled, 24 hour video eeg positive for left temporal seizure focus. Neurologist Dr Luther had a detailed discussion with the patient's family  in regards to the importance of AEd to prevent further seizures given risk. Agreed to Keppra IV. UA and urine culture were positive for Proteus mirabalis. Started on IV rocephin, ID consulted. Renal ultrasound with normal kidneys and no evidence of nephrolithiasis. Spoke with ID, treat for 7 days for proteus mirabilis UTI.      Assessment/Plan;    1. Acute toxic-metabolic encephalopathy - secondary to a combination of acute CVA on admission, seizure disorder worsened by , MISSY and hypernatremia and UTI during hospitalization     Neuro-   Repeat CT head on 12/11 was negative for acute CVA    24 hour video EEG showed was abnormal showed cortical hyperexcitability and abnormality in the left temporal region consistent with 3 previous EEG's + for diffuse cerebral dysfunction.  Started on IV keppra Q12 hours. Spoke with neurology; there were no plans to repeat EEG from my understanding. However the patient's son Sree is under the impression neurology would do a follow up EEG which I stated would not be clinically indicated at this time. He is requesting neurology follow up and will speak to Dr Luther    Infectious: Proteus Mirabalis UTi sensitive to rocephin day 3/7  History of Proteus UTi and renal stones in the past.- renal ultrasound with no nephrolithiasis   Previous UA x 2 negative on 11/27 and 12/1 as well as urine culture on 12/1  culture positive on 12/9, culture on 12/11 + Proteus   HIV was negative  Family requested hepatitis panel which is pending    Metabolic: MISSY with Hypernatremia-- increased free water to 250ml Q8 hours- REnal consultation for recommendations  Hyperkalemia: PO Kayexalate x 1  Ammonia levels wnl  ABG did not show CO2 narcosis. repeat ABG showed no evidence of acidosis pH wnl- Pco2 of 42 not contributing to lethargy  LFTS WNL    Endocrine:   Last TSH of 2.4 WNL. T4 is WNL. T3 is on the lower side at 42, Likely sick euthyroid syndrome. NO treatment is indicated at this time  Cortisol level WNL  Carnitine level is WNL    Family concerned that hyperglycemia is contributing to lethargy/encephalopathy. I discussed with them in detail that tight control of BGL in a hospitalized patient may be detrimental and risks of hypoglycemia.  Family requested to speak to Dr Ferrer, patient's outpatient endocrinologist as well. I spoke to him and he is agreement with the current treatment plan and agrees a BSL in the 200s with no acidosis would NOT be contributing to the patient's lethargy.  I spoke with Dr Lombardi who agrees as well     Evaluated by Endocrinology Dr Lombardi- Increased lantus to 10 unit BID with HSS.     2. Afib with SSS/ tachy-truong syndrome: Continue PO lopressor   Per cardiology the patient is a poor candidate for PPM insertion at this time or anti-coagulation  Continue aspirin therapy      3. Hypertensive urgency : BP Improved  -Po hydralazine via NG tube    4. Diabetes mellitus type 2  -HbA1c 6.0  -  lantus to 10 units QHS  - AG is 11  - Monitor bsl      5. Acute right frontal CVA on admission: Aspirin PO  Family refusing statin therapy as it causes myalgia and muscle weakness. They were counselled on the importance of statin therapy in secondary stroke prevention. They have refused simvastatin and atorvastatin.     6. History of REBECCA and COPD: No evidence of co2 retention or acidosis on ABG      DVT prophylaxis - Lovenox SC      Advance care planning:    I met with the patient's son and Primary HCP Rex along with Dr Samson. We had an hour long discussion regarding patients medical condition, vitals, lab and test results. His main concerns seem to be hyperglycemia, uti and seizure disorder worsening patient's mentation. I explained that 2 endocrinologists have weighed in and stated hyperglycemia is not contributing her her lethargy/encephalopathy. He feels that once she completes the treatment for the UTI she will become more responsive. He would like to speak to neurology about another EEG for follow up.  I explained the progression of her clinical condition and progressive decline given age and co- morbidities she has a poor overall prognosis. He understands but is not yet ready to make the decision to withdraw care in the hopes she may improve. He also has many family members who again are not ready and he does not ultimately want to be the one to make this decision alone. We spoke about his mother's living will and requested a copy in which she states she would not want artificial nutrition. He will speak to his sister about providing the living will.     At this time he would like to proceed with PEG tube evaluation. I have explained his mother would be high risk for the procedure. However he would like the specialists to weigh in as well. Cardio re called for clearance. Pulmonary to be consulted for clearance. GI to be consulted for peg tube evaluation.     Ultimately he inquired if his family would want a second opinion could she be transferred to another facility and I explained she would need an accepting facility for transfer.   She is FULL CODE at this time     75 minutes spent speaking to the family about   all of the patient's vitals, lab results and medications with the patient's secondary HCP Yari, primary HCP Coco.     Patient has a poor prognosis. At this point they need to decide regarding PEG tube placement vs home with hospice. They have had extensive meetings with palliative care and the hospice team to discuss their options. The patient's son, Marco Antonio wants to speak to her endocrinologist Dr Ferrer himself and confirm that hyperglycemia is not contributing to her lethargy.

## 2018-12-14 NOTE — CONSULT NOTE ADULT - ASSESSMENT
Assess    CVA  Dementia  Dysphagia with aspiration risk  Untreated REBECCA  Palliative PEG planned    No absolute pulmonary contraindication to PEG or surgical Gastrostomy under conscious sedation or under general anesthesia    Intubation or LMA available at procedure if done under conscious sedation advise    Bipap at 12/8 cm H20 prn as tolerated perioperatively    Home hospice appropriate if family decides not to intervent    Consider palliative consult for goals of care and advanced directives    Albuterol Neb PRN

## 2018-12-14 NOTE — PROGRESS NOTE ADULT - SUBJECTIVE AND OBJECTIVE BOX
Follow up for DM.    Interval HPI/ Events:  Still remains unresponsive.  No purposeful movement.  NG tube not functioning and tube feeds are held tonight.    MEDICATIONS  (STANDING):  aspirin  chewable 81 milliGRAM(s) Oral daily  brimonidine 0.2% Ophthalmic Solution 1 Drop(s) Both EYES daily  cefTRIAXone   IVPB      cefTRIAXone   IVPB 1 Gram(s) IV Intermittent every 24 hours  dextrose 5%. 1000 milliLiter(s) (50 mL/Hr) IV Continuous <Continuous>  dextrose 50% Injectable 12.5 Gram(s) IV Push once  dextrose 50% Injectable 25 Gram(s) IV Push once  dextrose 50% Injectable 25 Gram(s) IV Push once  enoxaparin Injectable 40 milliGRAM(s) SubCutaneous daily  hydrALAZINE 25 milliGRAM(s) Oral every 8 hours  insulin detemir injectable (LEVEMIR) 10 Unit(s) SubCutaneous every 12 hours  insulin lispro (HumaLOG) corrective regimen sliding scale   SubCutaneous every 6 hours  lactobacillus acidophilus 1 Tablet(s) Oral daily  latanoprost 0.005% Ophthalmic Solution 1 Drop(s) Both EYES at bedtime  levETIRAcetam  IVPB 250 milliGRAM(s) IV Intermittent every 12 hours  metoprolol tartrate 12.5 milliGRAM(s) Oral two times a day  metoprolol tartrate Injectable 5 milliGRAM(s) IV Push once  sodium chloride 0.45%. 1000 milliLiter(s) (60 mL/Hr) IV Continuous <Continuous>  sodium polystyrene sulfonate Suspension 30 Gram(s) Oral once  timolol 0.25% Solution 1 Drop(s) Both EYES daily    Vital Signs Last 24 Hrs  T(C): 37.6 (14 Dec 2018 07:41), Max: 37.6 (14 Dec 2018 07:41)  T(F): 99.7 (14 Dec 2018 07:41), Max: 99.7 (14 Dec 2018 07:41)  HR: 105 (14 Dec 2018 16:48) (72 - 117)  BP: 159/80 (14 Dec 2018 16:48) (120/82 - 159/80)  RR: 27 (14 Dec 2018 16:48) (18 - 27)  SpO2: 95% (14 Dec 2018 16:48) (95% - 95%)    PE:  Gen: elderly female, unresponsive to verbal or tactile stimuli.    HEENT:  dry mucous membranes  Neck:  no thyromegaly, no LAD  CV:  nl S1 + S2, RRR, no m/r/g  Resp:  nl respiratory effort, CTA b/l  GI/ Abd: soft, NT/ ND, BS +  Neuro:  No purposeful movement of extremities.  MS:  no c/c/e     LABS:  12-14    148<H>  |  109<H>  |  58.0<H>  ----------------------------<  209<H>  5.7<H>   |  29.0  |  1.32<H>    Ca    9.7      14 Dec 2018 11:25                          10.4   6.5   )-----------( 217      ( 14 Dec 2018 11:44 )             32.8     CAPILLARY BLOOD GLUCOSE  POCT Blood Glucose.: 138 mg/dL (14 Dec 2018 17:30)  POCT Blood Glucose.: 217 mg/dL (14 Dec 2018 12:10)  POCT Blood Glucose.: 201 mg/dL (14 Dec 2018 08:14)  POCT Blood Glucose.: 223 mg/dL (14 Dec 2018 06:24)  POCT Blood Glucose.: 186 mg/dL (13 Dec 2018 23:02)

## 2018-12-14 NOTE — PROGRESS NOTE ADULT - SUBJECTIVE AND OBJECTIVE BOX
CC: Encephalopathy    INTERVAL HPI/OVERNIGHT EVENTS: Patient seen and examined, no acute events overnight. HR fluctuating 80-120s. Afebrile. Unresponsive      Vital Signs Last 24 Hrs  T(C): 37.6 (14 Dec 2018 07:41), Max: 37.6 (14 Dec 2018 07:41)  T(F): 99.7 (14 Dec 2018 07:41), Max: 99.7 (14 Dec 2018 07:41)  HR: 107 (14 Dec 2018 13:25) (72 - 117)  BP: 120/82 (14 Dec 2018 13:25) (120/82 - 155/75)  BP(mean): --  RR: 18 (14 Dec 2018 07:41) (18 - 18)  SpO2: 100% (13 Dec 2018 15:35) (100% - 100%)    PHYSICAL EXAM:    GENERAL: NAD, Lethargic  HEAD:  Atraumatic, Normocephalic  EYES: EOMI, PERRLA, conjunctiva and sclera clear  ENMT: Moist mucous membranes  + NG tube   CHEST/LUNG: Clear to auscultation bilaterally; No rales, rhonchi, wheezing, or rubs  HEART: Regular rate and rhythm; No murmurs, rubs, or gallops  ABDOMEN: Soft, Nontender, Nondistended; Bowel sounds present  EXTREMITIES:  2+ Peripheral Pulses, No clubbing, cyanosis, or edema        MEDICATIONS  (STANDING):  aspirin  chewable 81 milliGRAM(s) Oral daily  brimonidine 0.2% Ophthalmic Solution 1 Drop(s) Both EYES daily  cefTRIAXone   IVPB      cefTRIAXone   IVPB 1 Gram(s) IV Intermittent every 24 hours  dextrose 5%. 1000 milliLiter(s) (50 mL/Hr) IV Continuous <Continuous>  dextrose 50% Injectable 12.5 Gram(s) IV Push once  dextrose 50% Injectable 25 Gram(s) IV Push once  dextrose 50% Injectable 25 Gram(s) IV Push once  enoxaparin Injectable 40 milliGRAM(s) SubCutaneous daily  hydrALAZINE 25 milliGRAM(s) Oral every 8 hours  insulin detemir injectable (LEVEMIR) 10 Unit(s) SubCutaneous every 12 hours  insulin lispro (HumaLOG) corrective regimen sliding scale   SubCutaneous every 6 hours  lactobacillus acidophilus 1 Tablet(s) Oral daily  latanoprost 0.005% Ophthalmic Solution 1 Drop(s) Both EYES at bedtime  levETIRAcetam  IVPB 250 milliGRAM(s) IV Intermittent every 12 hours  metoprolol tartrate 12.5 milliGRAM(s) Oral two times a day  sodium chloride 0.45%. 1000 milliLiter(s) (60 mL/Hr) IV Continuous <Continuous>  sodium polystyrene sulfonate Suspension 30 Gram(s) Oral once  timolol 0.25% Solution 1 Drop(s) Both EYES daily    MEDICATIONS  (PRN):  acetaminophen    Suspension .. 765 milliGRAM(s) Oral every 6 hours PRN Moderate Pain (4 - 6)  dextrose 40% Gel 15 Gram(s) Oral once PRN Blood Glucose LESS THAN 70 milliGRAM(s)/deciliter  glucagon  Injectable 1 milliGRAM(s) IntraMuscular once PRN Glucose LESS THAN 70 milligrams/deciliter      Allergies    contrast media (iodine-based) (Urticaria)  erythromycin (Unknown)  IV-Dye (Unknown)  sulfADIAZINE (Unknown)    Intolerances          LABS:                          10.4   6.5   )-----------( 217      ( 14 Dec 2018 11:44 )             32.8     12-14    148<H>  |  109<H>  |  58.0<H>  ----------------------------<  209<H>  5.7<H>   |  29.0  |  1.32<H>    Ca    9.7      14 Dec 2018 11:25            RADIOLOGY & ADDITIONAL TESTS:

## 2018-12-14 NOTE — PROGRESS NOTE ADULT - PROBLEM SELECTOR PLAN 5
Met with daughter Heide.  She and her brother Rex are not inclined to do PEG. She understands this is end of life. However, her sister Ronel is having difficulty accepting prognosis and thus, family cannot come to agreement about PEG.   She would like us to help Ronel understand mother's poor condition.     Discussed advance directives. Heide states mother's living will states NO CPR.   She  does not feel it would be an appropriate procedure given the current  circumstances.   Rex (HCP) will be in later this morning. - plan to discuss further.

## 2018-12-14 NOTE — PROGRESS NOTE ADULT - SUBJECTIVE AND OBJECTIVE BOX
[facility]                                   [address] [place] [address]                                                      PHONE: [phone]                                                         FAX: [phone]  [email or web address]  ---------------------------------------------------------------------------------------------------------------------------------    Overnight events/patient complaints:  NAD     contrast media (iodine-based) (Urticaria)  erythromycin (Unknown)  IV-Dye (Unknown)  sulfADIAZINE (Unknown)    MEDICATIONS  (STANDING):  aspirin  chewable 81 milliGRAM(s) Oral daily  brimonidine 0.2% Ophthalmic Solution 1 Drop(s) Both EYES daily  cefTRIAXone   IVPB      cefTRIAXone   IVPB 1 Gram(s) IV Intermittent every 24 hours  dextrose 5%. 1000 milliLiter(s) (50 mL/Hr) IV Continuous <Continuous>  dextrose 50% Injectable 12.5 Gram(s) IV Push once  dextrose 50% Injectable 25 Gram(s) IV Push once  dextrose 50% Injectable 25 Gram(s) IV Push once  enoxaparin Injectable 40 milliGRAM(s) SubCutaneous daily  hydrALAZINE 25 milliGRAM(s) Oral every 8 hours  insulin detemir injectable (LEVEMIR) 10 Unit(s) SubCutaneous every 12 hours  insulin lispro (HumaLOG) corrective regimen sliding scale   SubCutaneous every 6 hours  lactobacillus acidophilus 1 Tablet(s) Oral daily  latanoprost 0.005% Ophthalmic Solution 1 Drop(s) Both EYES at bedtime  levETIRAcetam  IVPB 250 milliGRAM(s) IV Intermittent every 12 hours  metoprolol tartrate 12.5 milliGRAM(s) Oral two times a day  sodium chloride 0.45%. 1000 milliLiter(s) (60 mL/Hr) IV Continuous <Continuous>  sodium polystyrene sulfonate Suspension 30 Gram(s) Oral once  timolol 0.25% Solution 1 Drop(s) Both EYES daily    MEDICATIONS  (PRN):  acetaminophen    Suspension .. 765 milliGRAM(s) Oral every 6 hours PRN Moderate Pain (4 - 6)  dextrose 40% Gel 15 Gram(s) Oral once PRN Blood Glucose LESS THAN 70 milliGRAM(s)/deciliter  glucagon  Injectable 1 milliGRAM(s) IntraMuscular once PRN Glucose LESS THAN 70 milligrams/deciliter      Vital Signs Last 24 Hrs  T(C): 37.6 (14 Dec 2018 07:41), Max: 37.6 (14 Dec 2018 07:41)  T(F): 99.7 (14 Dec 2018 07:41), Max: 99.7 (14 Dec 2018 07:41)  HR: 117 (14 Dec 2018 07:41) (72 - 117)  BP: 155/75 (14 Dec 2018 07:41) (135/72 - 155/75)  BP(mean): --  RR: 18 (14 Dec 2018 07:41) (18 - 18)  SpO2: 100% (13 Dec 2018 15:35) (100% - 100%)  ICU Vital Signs Last 24 Hrs  LINDA CARTER  I&O's Detail    13 Dec 2018 07:01  -  14 Dec 2018 07:00  --------------------------------------------------------  IN:    Free Water: 500 mL    Glucerna 1.5: 715 mL  Total IN: 1215 mL    OUT:  Total OUT: 0 mL    Total NET: 1215 mL        I&O's Summary    13 Dec 2018 07:01  -  14 Dec 2018 07:00  --------------------------------------------------------  IN: 1215 mL / OUT: 0 mL / NET: 1215 mL      Drug Dosing Weight  LINDA CARTER      PHYSICAL EXAM:  General: Appears well developed, well nourished alert and cooperative.  HEENT: Head; normocephalic, atraumatic.  Eyes: Pupils reactive, cornea wnl.  Neck: Supple, no nodes adenopathy, no NVD or carotid bruit or thyromegaly.  CARDIOVASCULAR: Normal S1 and S2, No murmur, rub, gallop or lift.   LUNGS: Decrease BS B/L   ABDOMEN: Soft, nontender without mass or organomegaly. bowel sounds normoactive.  EXTREMITIES: No clubbing, cyanosis + edema. Distal pulses wnl.             LABS:                        10.4   6.5   )-----------( 217      ( 14 Dec 2018 11:44 )             32.8     12-14    148<H>  |  109<H>  |  58.0<H>  ----------------------------<  209<H>  5.7<H>   |  29.0  |  1.32<H>    Ca    9.7      14 Dec 2018 11:25      LINDA CARTER            RADIOLOGY & ADDITIONAL STUDIES:    INTERPRETATION OF TELEMETRY (personally reviewed): A fib mild RVR         ECHO:  Summary:   1. Left ventricular ejection fraction, by visual estimation, is 65 to   70%.   2. Normal global left ventricular systolic function.   3. Increased LV wall thickness.   4. There is mild concentric left ventricular hypertrophy.   5. Mild mitral annular calcification.   6. Thickening and calcification of the anterior and posterior mitral   valve leaflets.   7. Mild tricuspid regurgitation.   8. Sclerotic aortic valve with normal opening.   9. Trace pulmonic valve regurgitation.  10. Endocardial visualization was enhanced with intravenous echo contrast.      ASSESSMENT AND PLAN:  In summary, LINDA CARTER is an 89y Female with past medical history significant for HTN dementia DM normal EF SSS tachybrady syndrome A fib 90's on low dose B-Blockers undergoing a low risk GI procedure in which patient high risk from CV standpoint     no a good candidate for PPM due to very poor prognosis     D/W with SON and PMD Dr Dowling

## 2018-12-14 NOTE — CONSULT NOTE ADULT - SUBJECTIVE AND OBJECTIVE BOX
89 year old female with a history of CVA, seizure disorder, SSS, Afib, hypertension and diabetes mellitus who was brought to the ER for complaints of altered mental status.  She was found to have a CVA. She has had a thorough workup but her mental status has not improved. She was started on tube feeding and has had several discussions with the palliative care service and the family is considering PEG placement. She has been seen by cardiology and pulmonary.     ROS above    REVIEW OF SYSTEMS:  Constitutional:  No unintentional weight loss, fevers, chills or night sweats	  Eyes: No eye pain, redness, discharge, or proptosis  ENMT: No sore throat, ear pain, mouth sores, or swollen glands in the neck  Respiratory: No dyspnea, cough or wheezing  Cardiovascular: No chest pain, dyspnea on exertion, or orthopnea  Gastrointestinal:	Please see HPI  Genitourinary: No dysuria or hematuria  Neurological:	 No changes in sleep/wake cycle, convulsions, confusion, dizziness or lightheadedness  Psychiatric: No changes in personality or emotional problems   Hematology: No easy bruising   Endocrine: No hot or cold flashes or deepening of voice	  All other review of systems were completed and were otherwise negative save what is reported in the HPI.    PAST MEDICAL/SURGICAL HISTORY:  Diabetes mellitus  Fracture: lumbar fracture.  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  Heart Murmur  Calcium Nephrolithiasis  Hyperlipidemia LDL Goal < 100  Glaucoma, Low Tension  History of Pulmonary Hypertension  CAD (Coronary Artery Disease)  Obstructive Sleep Apnea  Controlled Type 2 Diabetes with Neuropathy  H/O cataract extraction  History of appendectomy  Fracture of Humerus: repair with harware right  Glaucoma: relieve pressure left eye along with cataract extraction  Bilateral Cataracts: excision 2006  Benign Breast Disease: removal of cyst- 1963  Cataract  Kidney Stones: lithotripsy 2005, 2006  S/P Appendectomy: 1937    SOCIAL HISTORY:  - TOBACCO: none  - ALCOHOL: none  - ILLICIT DRUG USE: Denies    FAMILY HISTORY:  No known history of gastrointestinal or liver disease;  No pertinent family history  Family history of cerebrovascular accident (CVA) (Sibling)      HOME MEDICATIONS:  brimonidine 0.2% ophthalmic solution: 1 drop(s) to each affected eye once a day (in the morning) (26 Nov 2018 16:54)  cephalexin 250 mg oral capsule: 1 cap(s) orally once a day (26 Nov 2018 16:54)  HumaLOG 100 units/mL subcutaneous solution: Inject 3 to 5 units subcutaneously after meals (26 Nov 2018 16:54)  hydrALAZINE 25 mg oral tablet: 1 tab(s) orally 2 times a day (26 Nov 2018 16:08)  latanoprost 0.005% ophthalmic solution: 1 drop(s) to each affected eye once a day (at bedtime) (26 Nov 2018 16:08)  methenamine hippurate 1 g oral tablet: 1 tab(s) orally 2 times a day (26 Nov 2018 17:06)  timolol hemihydrate 0.25% ophthalmic solution: 1 drop(s) to each affected eye once a day (in the morning) (26 Nov 2018 16:54)    INPATIENT MEDICATIONS:  MEDICATIONS  (STANDING):  aspirin  chewable 81 milliGRAM(s) Oral daily  brimonidine 0.2% Ophthalmic Solution 1 Drop(s) Both EYES daily  cefTRIAXone   IVPB      cefTRIAXone   IVPB 1 Gram(s) IV Intermittent every 24 hours  dextrose 5%. 1000 milliLiter(s) (50 mL/Hr) IV Continuous <Continuous>  dextrose 50% Injectable 12.5 Gram(s) IV Push once  dextrose 50% Injectable 25 Gram(s) IV Push once  dextrose 50% Injectable 25 Gram(s) IV Push once  enoxaparin Injectable 40 milliGRAM(s) SubCutaneous daily  hydrALAZINE 25 milliGRAM(s) Oral every 8 hours  insulin detemir injectable (LEVEMIR) 10 Unit(s) SubCutaneous every 12 hours  insulin lispro (HumaLOG) corrective regimen sliding scale   SubCutaneous every 6 hours  lactobacillus acidophilus 1 Tablet(s) Oral daily  latanoprost 0.005% Ophthalmic Solution 1 Drop(s) Both EYES at bedtime  levETIRAcetam  IVPB 250 milliGRAM(s) IV Intermittent every 12 hours  metoprolol tartrate 12.5 milliGRAM(s) Oral two times a day  sodium chloride 0.45%. 1000 milliLiter(s) (60 mL/Hr) IV Continuous <Continuous>  sodium polystyrene sulfonate Suspension 30 Gram(s) Oral once  timolol 0.25% Solution 1 Drop(s) Both EYES daily    MEDICATIONS  (PRN):  acetaminophen    Suspension .. 765 milliGRAM(s) Oral every 6 hours PRN Moderate Pain (4 - 6)  dextrose 40% Gel 15 Gram(s) Oral once PRN Blood Glucose LESS THAN 70 milliGRAM(s)/deciliter  glucagon  Injectable 1 milliGRAM(s) IntraMuscular once PRN Glucose LESS THAN 70 milligrams/deciliter    ALLERGIES:  contrast media (iodine-based) (Urticaria)  erythromycin (Unknown)  IV-Dye (Unknown)  sulfADIAZINE (Unknown)    VITAL SIGNS LAST 24 HOURS:  T(C): 37.6 (14 Dec 2018 07:41), Max: 37.6 (14 Dec 2018 07:41)  T(F): 99.7 (14 Dec 2018 07:41), Max: 99.7 (14 Dec 2018 07:41)  HR: 107 (14 Dec 2018 13:25) (72 - 117)  BP: 120/82 (14 Dec 2018 13:25) (120/82 - 155/75)  BP(mean): --  RR: 18 (14 Dec 2018 07:41) (18 - 18)  SpO2: --      12-13-18 @ 07:01  -  12-14-18 @ 07:00  --------------------------------------------------------  IN: 1215 mL / OUT: 0 mL / NET: 1215 mL        12-13-18 @ 07:01  -  12-14-18 @ 07:00  --------------------------------------------------------  IN: 1215 mL / OUT: 0 mL / NET: 1215 mL          PHYSICAL EXAM:  Constitutional: Well-developed, well-nourished, in no apparent distress; NGT in place  Eyes: Sclerae anicteric, conjunctivae normal  ENMT: Mucus membranes moist, no oropharyngeal thrush noted  Neck: No thyroid nodules appreciated, no significant cervical or supraclavicular lymphadenopathy  Respiratory: Breathing nonlabored; clear to auscultation  Cardiovascular: Regular rate and rhythm  Gastrointestinal: Soft, nontender, nondistended, normoactive bowel sounds; no hepatosplenomegaly appreciated; no rebound tenderness or involuntary guarding  Extremities: No clubbing, cyanosis or edema  Neurological: not Alert or oriented       LABS:                        10.4   6.5   )-----------( 217      ( 14 Dec 2018 11:44 )             32.8       12-14    148<H>  |  109<H>  |  58.0<H>  ----------------------------<  209<H>  5.7<H>   |  29.0  |  1.32<H>    Ca    9.7      14 Dec 2018 11:25

## 2018-12-14 NOTE — CONSULT NOTE ADULT - SUBJECTIVE AND OBJECTIVE BOX
PULMONARY CONSULT NOTE      LINDA CARTEREJ-[id number]    Patient is a 89y old  Female who presents with a chief complaint of Altered mental status (14 Dec 2018 14:37)      HISTORY OF PRESENT ILLNESS:    CVA  Sz disorder  HTN  DM  Dementia  Dysphagia with past Aspiration  Severe REBECCA - CPAP failure    Seen prior to PEG    MEDICATIONS  (STANDING):  aspirin  chewable 81 milliGRAM(s) Oral daily  brimonidine 0.2% Ophthalmic Solution 1 Drop(s) Both EYES daily  cefTRIAXone   IVPB      cefTRIAXone   IVPB 1 Gram(s) IV Intermittent every 24 hours  dextrose 5%. 1000 milliLiter(s) (50 mL/Hr) IV Continuous <Continuous>  dextrose 50% Injectable 12.5 Gram(s) IV Push once  dextrose 50% Injectable 25 Gram(s) IV Push once  dextrose 50% Injectable 25 Gram(s) IV Push once  enoxaparin Injectable 40 milliGRAM(s) SubCutaneous daily  hydrALAZINE 25 milliGRAM(s) Oral every 8 hours  insulin detemir injectable (LEVEMIR) 10 Unit(s) SubCutaneous every 12 hours  insulin lispro (HumaLOG) corrective regimen sliding scale   SubCutaneous every 6 hours  lactobacillus acidophilus 1 Tablet(s) Oral daily  latanoprost 0.005% Ophthalmic Solution 1 Drop(s) Both EYES at bedtime  levETIRAcetam  IVPB 250 milliGRAM(s) IV Intermittent every 12 hours  metoprolol tartrate 12.5 milliGRAM(s) Oral two times a day  sodium chloride 0.45%. 1000 milliLiter(s) (60 mL/Hr) IV Continuous <Continuous>  sodium polystyrene sulfonate Suspension 30 Gram(s) Oral once  timolol 0.25% Solution 1 Drop(s) Both EYES daily      MEDICATIONS  (PRN):  acetaminophen    Suspension .. 765 milliGRAM(s) Oral every 6 hours PRN Moderate Pain (4 - 6)  dextrose 40% Gel 15 Gram(s) Oral once PRN Blood Glucose LESS THAN 70 milliGRAM(s)/deciliter  glucagon  Injectable 1 milliGRAM(s) IntraMuscular once PRN Glucose LESS THAN 70 milligrams/deciliter      Allergies    contrast media (iodine-based) (Urticaria)  erythromycin (Unknown)  IV-Dye (Unknown)  sulfADIAZINE (Unknown)    Intolerances        PAST MEDICAL & SURGICAL HISTORY:  Diabetes mellitus  Fracture: lumbar fracture.  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  Heart Murmur  Calcium Nephrolithiasis  Hyperlipidemia LDL Goal < 100  Glaucoma, Low Tension  History of Pulmonary Hypertension  CAD (Coronary Artery Disease)  Obstructive Sleep Apnea  Controlled Type 2 Diabetes with Neuropathy  H/O cataract extraction  History of appendectomy  Fracture of Humerus: repair with harware right  Glaucoma: relieve pressure left eye along with cataract extraction  Bilateral Cataracts: excision 2006  Benign Breast Disease: removal of cyst- 1963  Cataract  Kidney Stones: lithotripsy 2005, 2006  S/P Appendectomy: 1937      FAMILY HISTORY:  No pertinent family history  Family history of cerebrovascular accident (CVA) (Sibling)      SOCIAL HISTORY  Smoking History:     REVIEW OF SYSTEMS:    Non-verbal when seen  Son present    Vital Signs Last 24 Hrs  T(C): 37.6 (14 Dec 2018 07:41), Max: 37.6 (14 Dec 2018 07:41)  T(F): 99.7 (14 Dec 2018 07:41), Max: 99.7 (14 Dec 2018 07:41)  HR: 107 (14 Dec 2018 13:25) (72 - 117)  BP: 120/82 (14 Dec 2018 13:25) (120/82 - 155/75)  BP(mean): --  RR: 18 (14 Dec 2018 07:41) (18 - 18)  SpO2: 100% (13 Dec 2018 15:35) (100% - 100%)    PHYSICAL EXAMINATION:    GENERAL: Obese.  Snoring when seen.     HEENT: Head is normocephalic and atraumatic. Extraocular muscles are intact. Mucous membranes are moist.     NECK: Supple.     LUNGS: Clear to auscultation without wheezing, rales, or rhonchi. Respirations unlabored    HEART: Regular rate and rhythm without murmur.    ABDOMEN: Soft, nontender, and nondistended.  No hepatosplenomegaly is noted.    EXTREMITIES: Without any cyanosis, clubbing, rash, lesions or edema.    NEUROLOGIC: Grossly intact.      LABS:                        10.4   6.5   )-----------( 217      ( 14 Dec 2018 11:44 )             32.8     12-14    148<H>  |  109<H>  |  58.0<H>  ----------------------------<  209<H>  5.7<H>   |  29.0  |  1.32<H>    Ca    9.7      14 Dec 2018 11:25          ABG - ( 12 Dec 2018 16:16 )  pH, Arterial: 7.41  pH, Blood: x     /  pCO2: 46    /  pO2: 78    / HCO3: 28    / Base Excess: 3.6   /  SaO2: 96            Procalcitonin, Serum: 0.11 ng/mL (12-14-18 @ 07:05)      MICROBIOLOGY: Unrevealing    RADIOLOGY & ADDITIONAL STUDIES:    CXR on 12/10/18  NGT in trachea - removed

## 2018-12-14 NOTE — CONSULT NOTE ADULT - SUBJECTIVE AND OBJECTIVE BOX
Patient is a 89y old  Female who presents with a chief complaint of Altered mental status (14 Dec 2018 15:14)   Acute  renal failure.    HPI:  The patient is a 89 year old female with a history of CVA< seizure disorder, hypertension and diabetes mellitus who was brought to the ER for complaints of altered mental status. Patient was diagnosed with seizure disorder last year and started on keppra po. Over the past few months the family has noted she has been more lethargic and confused. She saw Dr Mcallister, neurologist at [place]. According to the daughter she had a number of tests which indicated she did not have seizure disorder and she was advised to taper off the keppra. Her daughter tapered it off over the past 2 weeks. This morning she noted her mother became unresponsive, fell to her side, noted facial droop, her eyes were "fluttering" and her tongue was rolled back. After the episode she remained lethargic for about 3 hours. In the ER, NIH was 0. Patient was more alert and responsive. CT head was negative.   found to have elevated sodium, hyperkalemia, MISSY  ROS above    PAST MEDICAL & SURGICAL HISTORY:  Diabetes mellitus  Fracture: lumbar fracture.  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  Heart Murmur  Calcium Nephrolithiasis  Hyperlipidemia LDL Goal < 100  Glaucoma, Low Tension  History of Pulmonary Hypertension  CAD (Coronary Artery Disease)  Obstructive Sleep Apnea  Controlled Type 2 Diabetes with Neuropathy  H/O cataract extraction  History of appendectomy  Fracture of Humerus: repair with harware right  Glaucoma: relieve pressure left eye along with cataract extraction  Bilateral Cataracts: excision 2006  Benign Breast Disease: removal of cyst- 1963  Cataract  Kidney Stones: lithotripsy 2005, 2006  S/P Appendectomy: 1937      FAMILY HISTORY:  No pertinent family history  Family history of cerebrovascular accident (CVA) (Sibling)  [place]    Social History:Non smoker    MEDICATIONS  (STANDING):  aspirin  chewable 81 milliGRAM(s) Oral daily  brimonidine 0.2% Ophthalmic Solution 1 Drop(s) Both EYES daily  cefTRIAXone   IVPB      cefTRIAXone   IVPB 1 Gram(s) IV Intermittent every 24 hours  dextrose 5%. 1000 milliLiter(s) (50 mL/Hr) IV Continuous <Continuous>  dextrose 50% Injectable 12.5 Gram(s) IV Push once  dextrose 50% Injectable 25 Gram(s) IV Push once  dextrose 50% Injectable 25 Gram(s) IV Push once  enoxaparin Injectable 40 milliGRAM(s) SubCutaneous daily  hydrALAZINE 25 milliGRAM(s) Oral every 8 hours  insulin detemir injectable (LEVEMIR) 10 Unit(s) SubCutaneous every 12 hours  insulin lispro (HumaLOG) corrective regimen sliding scale   SubCutaneous every 6 hours  lactobacillus acidophilus 1 Tablet(s) Oral daily  latanoprost 0.005% Ophthalmic Solution 1 Drop(s) Both EYES at bedtime  levETIRAcetam  IVPB 250 milliGRAM(s) IV Intermittent every 12 hours  metoprolol tartrate 12.5 milliGRAM(s) Oral two times a day  sodium chloride 0.45%. 1000 milliLiter(s) (60 mL/Hr) IV Continuous <Continuous>  sodium polystyrene sulfonate Suspension 30 Gram(s) Oral once  timolol 0.25% Solution 1 Drop(s) Both EYES daily    MEDICATIONS  (PRN):  acetaminophen    Suspension .. 765 milliGRAM(s) Oral every 6 hours PRN Moderate Pain (4 - 6)  dextrose 40% Gel 15 Gram(s) Oral once PRN Blood Glucose LESS THAN 70 milliGRAM(s)/deciliter  glucagon  Injectable 1 milliGRAM(s) IntraMuscular once PRN Glucose LESS THAN 70 milligrams/deciliter   Meds reviewed    Allergies    contrast media (iodine-based) (Urticaria)  erythromycin (Unknown)  IV-Dye (Unknown)  sulfADIAZINE (Unknown)    Intolerances    Vital Signs Last 24 Hrs  T(C): 37.6 (14 Dec 2018 07:41), Max: 37.6 (14 Dec 2018 07:41)  T(F): 99.7 (14 Dec 2018 07:41), Max: 99.7 (14 Dec 2018 07:41)  HR: 107 (14 Dec 2018 13:25) (72 - 117)  BP: 120/82 (14 Dec 2018 13:25) (120/82 - 155/75)  BP(mean): --  RR: 18 (14 Dec 2018 07:41) (18 - 18)  SpO2: 100% (13 Dec 2018 15:35) (100% - 100%)  Daily     Daily     PHYSICAL EXAM:    GENERAL: appears chronically ill  HEAD:  Atraumatic, Normocephalic  EYES: EOMI  NECK: Supple, neck  veins full  NERVOUS SYSTEM:  unresponsive  CHEST/LUNG: dec bs B/L  HEART: Regular rate and rhythm; No murmur  ABDOMEN: Soft, Nontender, Nondistended; Bowel sounds present  EXTREMITIES:  no edema      LABS:                        10.4   6.5   )-----------( 217      ( 14 Dec 2018 11:44 )             32.8     12-14    148<H>  |  109<H>  |  58.0<H>  ----------------------------<  209<H>  5.7<H>   |  29.0  |  1.32<H>    Ca    9.7      14 Dec 2018 11:25            ABG - ( 12 Dec 2018 16:16 )  pH, Arterial: 7.41  pH, Blood: x     /  pCO2: 46    /  pO2: 78    / HCO3: 28    / Base Excess: 3.6   /  SaO2: 96                    RADIOLOGY & ADDITIONAL TESTS:

## 2018-12-14 NOTE — CONSULT NOTE ADULT - ASSESSMENT
89 year old female with a history of CVA, seizure disorder, SSS, Afib,  hypertension and diabetes mellitus who was brought to the ER for complaints of altered mental status.  She was found to have a CVA. She has had a thorough workup but her mental status has not improved. She was started on tube feeding and has had several discussions with the palliative care service and the family is considering PEG placement. She has been seen by cardiology and pulmonary.     The patient is high risk for the procedure. I discussed the PEG with the family including the risks benefits and alternatives in detail. I also discussed the risk of infection and aspiration after the PEG is placed. They want to think about the peg and discuss it among themselves. Please call us when and if they decide to proceed or if there are any further questions.     Thanks

## 2018-12-14 NOTE — PROGRESS NOTE ADULT - ASSESSMENT
89 yr old woman, history of CVA, seizure disorder, hypertension and diabetes mellitus  presented with hx of AMS, lethargy. MRI + acute right infarct

## 2018-12-14 NOTE — PROGRESS NOTE ADULT - ASSESSMENT
89 year old female with hx of type 2 DM, CVA, seizure disorder admitted with acute right frontal CVA now with continued encephalopathy, likely multifactorial.   She has a mildly reduced T3 level, which is likely the result of sick euthyroid syndrome, not underlying thyroid dysfunction.  She now has MISSY and hypernatremia likely due to dehydration.    1.  T2DM-  Would hold evening dose of Levemir until tube feeds are resumed.  Once resumed, continue Levemir 10 units BID and humalog scale.   2.  Abnormal TFTs-  as above, low T3 likely due to sick euthyroid syndrome.  Repeat TFTs as an outpatient in 2-4 weeks.   3.  Encephalopathy-  likely multifactorial in setting of recent CVA and UTI. As per medicine.    4.  MISSY/ Hypernatremia-  fluids as per renal.

## 2018-12-14 NOTE — PROGRESS NOTE ADULT - SUBJECTIVE AND OBJECTIVE BOX
CC:  follow up GOC  INTERVAL HPI/OVERNIGHT EVENTS:    PRESENT SYMPTOMS: SOURCE:  Patient/Family/Team    PAIN SCALE:  0 = none  1 = mild   2 = moderate  3 = severe    Pain: some  grimacing    Dyspnea:  [ ] YES [ x] NO  Anxiety:  [ ] YES [ ] NO  na  Fatigue: [x ] YES [ ] NO  Nausea: [ ] YES [ ] NO  na    Loss of Appetite: [ ] YES [ ] NO na on TF  Other symptoms: __________    MEDICATIONS  (STANDING):  aspirin  chewable 81 milliGRAM(s) Oral daily  brimonidine 0.2% Ophthalmic Solution 1 Drop(s) Both EYES daily  cefTRIAXone   IVPB      cefTRIAXone   IVPB 1 Gram(s) IV Intermittent every 24 hours  dextrose 5%. 1000 milliLiter(s) (50 mL/Hr) IV Continuous <Continuous>  dextrose 50% Injectable 12.5 Gram(s) IV Push once  dextrose 50% Injectable 25 Gram(s) IV Push once  dextrose 50% Injectable 25 Gram(s) IV Push once  enoxaparin Injectable 40 milliGRAM(s) SubCutaneous daily  hydrALAZINE 25 milliGRAM(s) Oral every 8 hours  insulin detemir injectable (LEVEMIR) 10 Unit(s) SubCutaneous every 12 hours  insulin lispro (HumaLOG) corrective regimen sliding scale   SubCutaneous every 6 hours  lactobacillus acidophilus 1 Tablet(s) Oral daily  latanoprost 0.005% Ophthalmic Solution 1 Drop(s) Both EYES at bedtime  levETIRAcetam  IVPB 250 milliGRAM(s) IV Intermittent every 12 hours  metoprolol tartrate 12.5 milliGRAM(s) Oral two times a day  timolol 0.25% Solution 1 Drop(s) Both EYES daily    MEDICATIONS  (PRN):  acetaminophen    Suspension .. 765 milliGRAM(s) Oral every 6 hours PRN Moderate Pain (4 - 6)  dextrose 40% Gel 15 Gram(s) Oral once PRN Blood Glucose LESS THAN 70 milliGRAM(s)/deciliter  glucagon  Injectable 1 milliGRAM(s) IntraMuscular once PRN Glucose LESS THAN 70 milligrams/deciliter      Allergies    contrast media (iodine-based) (Urticaria)  erythromycin (Unknown)  IV-Dye (Unknown)  sulfADIAZINE (Unknown)    Intolerances      Karnofsky Performance Score/Palliative Performance Status Version 2:   10      %    Vital Signs Last 24 Hrs  T(C): 37.6 (14 Dec 2018 07:41), Max: 37.6 (14 Dec 2018 07:41)  T(F): 99.7 (14 Dec 2018 07:41), Max: 99.7 (14 Dec 2018 07:41)  HR: 117 (14 Dec 2018 07:41) (72 - 117)  BP: 155/75 (14 Dec 2018 07:41) (135/72 - 155/75)  BP(mean): --  RR: 18 (14 Dec 2018 07:41) (18 - 18)  SpO2: 100% (13 Dec 2018 15:35) (100% - 100%)    PHYSICAL EXAM:    General: Obutunded   HEENT: [ x] normal   +NG tube  Lungs: [ x] comfortable [ ] tachypnea/labored breathing  [ ] excessive secretions    CV: [ x] normal  [ ] tachycardia    GI: [x ] normal + NG tube    : [ ] normal  [ x] incontinent  [ ] oliguria/anuria  [ ] walter    MSK: [ ] normal  [ ] weakness  [ ] edema             [ ] ambulatory  [x ] bedbound/wheelchair bound    Skin: [ ] normal  [ ] pressure ulcers- Stage_____  [x ] no rash    LABS:                        10.2   6.0   )-----------( 224      ( 13 Dec 2018 06:43 )             31.3     12-13    147<H>  |  110<H>  |  52.0<H>  ----------------------------<  248<H>  5.3   |  26.0  |  1.15    Ca    9.6      13 Dec 2018 06:43          I&O's Summary    13 Dec 2018 07:01  -  14 Dec 2018 07:00  --------------------------------------------------------  IN: 1215 mL / OUT: 0 mL / NET: 1215 mL    Thank you for the opportunity to assist with the care of this patient.   [facility] Consult [facility] [phone].

## 2018-12-15 NOTE — PROGRESS NOTE ADULT - SUBJECTIVE AND OBJECTIVE BOX
NEPHROLOGY INTERVAL HPI/OVERNIGHT EVENTS:    Examined earlier    MEDICATIONS  (STANDING):  aspirin  chewable 81 milliGRAM(s) Oral daily  brimonidine 0.2% Ophthalmic Solution 1 Drop(s) Both EYES daily  cefTRIAXone   IVPB      cefTRIAXone   IVPB 1 Gram(s) IV Intermittent every 24 hours  dextrose 5%. 1000 milliLiter(s) (50 mL/Hr) IV Continuous <Continuous>  dextrose 50% Injectable 12.5 Gram(s) IV Push once  dextrose 50% Injectable 25 Gram(s) IV Push once  dextrose 50% Injectable 25 Gram(s) IV Push once  enoxaparin Injectable 40 milliGRAM(s) SubCutaneous daily  hydrALAZINE 25 milliGRAM(s) Oral every 8 hours  insulin detemir injectable (LEVEMIR) 10 Unit(s) SubCutaneous every 12 hours  insulin lispro (HumaLOG) corrective regimen sliding scale   SubCutaneous every 6 hours  lactobacillus acidophilus 1 Tablet(s) Oral daily  latanoprost 0.005% Ophthalmic Solution 1 Drop(s) Both EYES at bedtime  levETIRAcetam  IVPB 250 milliGRAM(s) IV Intermittent every 12 hours  metoprolol tartrate 12.5 milliGRAM(s) Oral two times a day  sodium chloride 0.45%. 1000 milliLiter(s) (60 mL/Hr) IV Continuous <Continuous>  timolol 0.25% Solution 1 Drop(s) Both EYES daily    MEDICATIONS  (PRN):  acetaminophen    Suspension .. 765 milliGRAM(s) Oral every 6 hours PRN Moderate Pain (4 - 6)  dextrose 40% Gel 15 Gram(s) Oral once PRN Blood Glucose LESS THAN 70 milliGRAM(s)/deciliter  glucagon  Injectable 1 milliGRAM(s) IntraMuscular once PRN Glucose LESS THAN 70 milligrams/deciliter      Allergies    contrast media (iodine-based) (Urticaria)  erythromycin (Unknown)  IV-Dye (Unknown)  sulfADIAZINE (Unknown)    Intolerances        Vital Signs Last 24 Hrs  T(C): 36.8 (15 Dec 2018 08:14), Max: 37.3 (14 Dec 2018 23:44)  T(F): 98.2 (15 Dec 2018 08:14), Max: 99.1 (14 Dec 2018 23:44)  HR: 84 (15 Dec 2018 08:14) (76 - 107)  BP: 136/73 (15 Dec 2018 08:14) (120/82 - 160/85)  BP(mean): --  RR: 18 (15 Dec 2018 08:14) (18 - 27)  SpO2: 92% (15 Dec 2018 08:14) (92% - 96%)  Daily     Daily     PHYSICAL EXAM:  GENERAL: appears chronically ill  HEAD:  Atraumatic, Normocephalic  EYES: EOMI  NECK: Supple, neck  veins full  NERVOUS SYSTEM:  unresponsive  CHEST/LUNG: dec bs B/L  HEART: Regular rate and rhythm; No murmur  ABDOMEN: Soft, Nontender, Nondistended; Bowel sounds present  EXTREMITIES:  no edema    LABS:                        11.6   7.9   )-----------( 200      ( 15 Dec 2018 07:08 )             35.9     12-15    146<H>  |  106  |  61.0<H>  ----------------------------<  242<H>  5.0   |  29.0  |  1.27    Ca    9.1      15 Dec 2018 11:54                  RADIOLOGY & ADDITIONAL TESTS:

## 2018-12-15 NOTE — PROGRESS NOTE ADULT - SUBJECTIVE AND OBJECTIVE BOX
CC: Follow up type 2 DM.    Interval HPI/ Overnight Events:  Patient still remains unresponsive to verbal or tactile stimuli, however family thinks she may be more alert today.   Tube feeds have been restarted via NG tube.      MEDICATIONS  (STANDING):  aspirin  chewable 81 milliGRAM(s) Oral daily  brimonidine 0.2% Ophthalmic Solution 1 Drop(s) Both EYES daily  cefepime   IVPB 1000 milliGRAM(s) IV Intermittent every 12 hours  dextrose 5%. 1000 milliLiter(s) (50 mL/Hr) IV Continuous <Continuous>  dextrose 50% Injectable 12.5 Gram(s) IV Push once  dextrose 50% Injectable 25 Gram(s) IV Push once  dextrose 50% Injectable 25 Gram(s) IV Push once  enoxaparin Injectable 40 milliGRAM(s) SubCutaneous daily  hydrALAZINE 25 milliGRAM(s) Oral every 8 hours  insulin detemir injectable (LEVEMIR) 10 Unit(s) SubCutaneous every 12 hours  insulin lispro (HumaLOG) corrective regimen sliding scale   SubCutaneous every 6 hours  lactobacillus acidophilus 1 Tablet(s) Oral daily  latanoprost 0.005% Ophthalmic Solution 1 Drop(s) Both EYES at bedtime  levETIRAcetam  IVPB 250 milliGRAM(s) IV Intermittent every 12 hours  metoprolol tartrate 12.5 milliGRAM(s) Oral two times a day  sodium chloride 0.45%. 1000 milliLiter(s) (60 mL/Hr) IV Continuous <Continuous>  timolol 0.25% Solution 1 Drop(s) Both EYES daily    Vital Signs Last 24 Hrs  T(C): 37.3 (15 Dec 2018 16:21), Max: 37.3 (14 Dec 2018 23:44)  T(F): 99.1 (15 Dec 2018 16:21), Max: 99.1 (14 Dec 2018 23:44)  HR: 65 (15 Dec 2018 16:21) (65 - 106)  BP: 130/65 (15 Dec 2018 16:21) (130/65 - 160/85)  RR: 18 (15 Dec 2018 16:21) (18 - 19)  SpO2: 99% (15 Dec 2018 16:21) (92% - 99%)    PE:  Gen: elderly female, unresponsive   HEENT:  MMM  Neck:  no thyromegaly, no LAD  CV:  nl S1 + S2, RRR, no m/r/g  Resp:  nl respiratory effort, CTA b/l  GI/ Abd: soft, NT/ ND, BS +  MS:  no edema in LEs,  no purposeful movements of extremities.     LABS:  12-15    146<H>  |  106  |  61.0<H>  ----------------------------<  242<H>  5.0   |  29.0  |  1.27    Ca    9.1      15 Dec 2018 11:54                          11.6   7.9   )-----------( 200      ( 15 Dec 2018 07:08 )             35.9     CAPILLARY BLOOD GLUCOSE  POCT Blood Glucose.: 270 mg/dL (15 Dec 2018 17:14)  POCT Blood Glucose.: 246 mg/dL (15 Dec 2018 12:15)  POCT Blood Glucose.: 270 mg/dL (15 Dec 2018 06:40)  POCT Blood Glucose.: 165 mg/dL (14 Dec 2018 23:49)  POCT Blood Glucose.: 142 mg/dL (14 Dec 2018 22:30)

## 2018-12-15 NOTE — PROGRESS NOTE ADULT - ASSESSMENT
89 year old female with hx of type 2 DM, CVA, seizure disorder admitted with acute right frontal CVA now with continued encephalopathy, likely multifactorial.   She has a mildly reduced T3 level, which is likely the result of sick euthyroid syndrome, not underlying thyroid dysfunction.  She is mildly hyperglycemic on tube feeds.      1.  T2DM-  since she received additional 16 units of sliding scale insulin in past 24 hours, will increase Levemir to 16 units BID.  Continue humalog scale.    2.  Abnormal TFTs-  as above, low T3 likely due to sick euthyroid syndrome.  Repeat TFTs as an outpatient in 2-4 weeks.   3.  Encephalopathy-  likely multifactorial in setting of recent CVA and UTI. As per medicine.

## 2018-12-15 NOTE — PROGRESS NOTE ADULT - ASSESSMENT
Assess    CVA  Dementia  Dysphagia with aspiration risk  Untreated REBECCA  Palliative PEG planned    No absolute pulmonary contraindication to PEG or surgical Gastrostomy under conscious sedation or under general anesthesia    Intubation or LMA available at procedure if done under conscious sedation advise    Bipap at 12/8 cm H20 prn as tolerated perioperatively    Home hospice appropriate if family decides not to intervent    Consider palliative consult for goals of care and advanced directives    Albuterol Neb PRN Assess    CVA  Dementia  Dysphagia with aspiration risk  Untreated REBECCA  Palliative PEG planned    No absolute pulmonary contraindication to PEG or surgical Gastrostomy under conscious sedation or under general anesthesia - though palliation seems more reasonable    Intubation or LMA available at procedure if done under conscious sedation advise    Bipap at 12/8 cm H20 prn as tolerated perioperatively    Home hospice appropriate if family decides not to intervene    Consider palliative consult for goals of care and advanced directives    Albuterol Neb PRN

## 2018-12-15 NOTE — PROGRESS NOTE ADULT - SUBJECTIVE AND OBJECTIVE BOX
[facility]                                   [address] [place] [address]                                                      PHONE: [phone]                                                         FAX: [phone]  [email or web address]  ---------------------------------------------------------------------------------------------------------------------------------    Overnight events/patient complaints: comfortable, daughter at bedside      contrast media (iodine-based) (Urticaria)  erythromycin (Unknown)  IV-Dye (Unknown)  sulfADIAZINE (Unknown)    MEDICATIONS  (STANDING):  aspirin  chewable 81 milliGRAM(s) Oral daily  brimonidine 0.2% Ophthalmic Solution 1 Drop(s) Both EYES daily  cefTRIAXone   IVPB      cefTRIAXone   IVPB 1 Gram(s) IV Intermittent every 24 hours  dextrose 5%. 1000 milliLiter(s) (50 mL/Hr) IV Continuous <Continuous>  dextrose 50% Injectable 12.5 Gram(s) IV Push once  dextrose 50% Injectable 25 Gram(s) IV Push once  dextrose 50% Injectable 25 Gram(s) IV Push once  enoxaparin Injectable 40 milliGRAM(s) SubCutaneous daily  hydrALAZINE 25 milliGRAM(s) Oral every 8 hours  insulin detemir injectable (LEVEMIR) 10 Unit(s) SubCutaneous every 12 hours  insulin lispro (HumaLOG) corrective regimen sliding scale   SubCutaneous every 6 hours  lactobacillus acidophilus 1 Tablet(s) Oral daily  latanoprost 0.005% Ophthalmic Solution 1 Drop(s) Both EYES at bedtime  levETIRAcetam  IVPB 250 milliGRAM(s) IV Intermittent every 12 hours  metoprolol tartrate 12.5 milliGRAM(s) Oral two times a day  sodium chloride 0.45%. 1000 milliLiter(s) (60 mL/Hr) IV Continuous <Continuous>  timolol 0.25% Solution 1 Drop(s) Both EYES daily    MEDICATIONS  (PRN):  acetaminophen    Suspension .. 765 milliGRAM(s) Oral every 6 hours PRN Moderate Pain (4 - 6)  dextrose 40% Gel 15 Gram(s) Oral once PRN Blood Glucose LESS THAN 70 milliGRAM(s)/deciliter  glucagon  Injectable 1 milliGRAM(s) IntraMuscular once PRN Glucose LESS THAN 70 milligrams/deciliter      Vital Signs Last 24 Hrs  T(C): 37.3 (14 Dec 2018 23:44), Max: 37.3 (14 Dec 2018 23:44)  T(F): 99.1 (14 Dec 2018 23:44), Max: 99.1 (14 Dec 2018 23:44)  HR: 92 (15 Dec 2018 05:26) (76 - 107)  BP: 150/80 (15 Dec 2018 05:26) (120/82 - 160/85)  BP(mean): --  RR: 18 (15 Dec 2018 05:26) (18 - 27)  SpO2: 95% (15 Dec 2018 05:26) (95% - 96%)  ICU Vital Signs Last 24 Hrs  LINDA CARTER  I&O's Detail    14 Dec 2018 07:01  -  15 Dec 2018 07:00  --------------------------------------------------------  IN:    Free Water: 500 mL    Glucerna 1.5: 120 mL  Total IN: 620 mL    OUT:  Total OUT: 0 mL    Total NET: 620 mL        I&O's Summary    14 Dec 2018 07:01  -  15 Dec 2018 07:00  --------------------------------------------------------  IN: 620 mL / OUT: 0 mL / NET: 620 mL      Drug Dosing Weight  LINDA CARTER      PHYSICAL EXAM:  General: NAD.  HEENT: Head; normocephalic.  Eyes: Pupils reactive, cornea wnl.  Neck: Supple.  CARDIOVASCULAR: Irregular.  LUNGS: Unlabored respirations.  ABDOMEN: Soft.  EXTREMITIES: No clubbing, cyanosis.   SKIN: warm.  NEURO: Lethargic.    PSYCH: normal affect.        LABS:                        11.6   7.9   )-----------( 200      ( 15 Dec 2018 07:08 )             35.9     12-14    148<H>  |  109<H>  |  58.0<H>  ----------------------------<  209<H>  5.7<H>   |  29.0  |  1.32<H>    Ca    9.7      14 Dec 2018 11:25      LINDA CARTER            RADIOLOGY & ADDITIONAL STUDIES:    INTERPRETATION OF TELEMETRY (personally reviewed): AF rate controlled 70-80's    ECHO:  Summary:   1. Left ventricular ejection fraction, by visual estimation, is 65 to   70%.   2. Normal global left ventricular systolic function.   3. Increased LV wall thickness.   4. There is mild concentric left ventricular hypertrophy.   5. Mild mitral annular calcification.   6. Thickening and calcification of the anterior and posterior mitral   valve leaflets.   7. Mild tricuspid regurgitation.   8. Sclerotic aortic valve with normal opening.   9. Trace pulmonic valve regurgitation.  10. Endocardial visualization was enhanced with intravenous echo contrast.    ASSESSMENT AND PLAN:  In summary, LINDA CARTER is an 89y Female with past medical history significant for HTN dementia DM normal EF SSS tachybrady syndrome permanent AF on low dose B-Blockers pre-op for PEG with metabolic encephalopathy and UTI.    1. AF rate controlled. No indication for PPM at this time. Pt poor candidate for invasive cardiac procedures.  2. Poor candidate for full AC due to bleeding risk.  3. Continue medical management. Abx.  4. Discussed with daughter.

## 2018-12-15 NOTE — PROGRESS NOTE ADULT - ASSESSMENT
HyperNatremia/ MISSY suspect 2/2 poor nutrition dehydration  Na better will cont hypotonic IVF   Renal sonogram noted no hydronephrosis  Hyperkalemia resolved  CVA/ neuro eval noted  AM labs   Plans discussed w family  Will follow

## 2018-12-15 NOTE — PROGRESS NOTE ADULT - ASSESSMENT
1. Acute toxic-metabolic encephalopathy - secondary to a combination of acute CVA on admission, seizure disorder worsened by , MISSY and hypernatremia and UTI during hospitalization     Neuro-   Repeat CT head on 12/11 was negative for acute CVA    24 hour video EEG showed was abnormal showed cortical hyperexcitability and abnormality in the left temporal region consistent with 3 previous EEG's + for diffuse cerebral dysfunction.  Started on IV keppra Q12 hours. Spoke with neurology; there were no plans to repeat EEG from my understanding. However the patient's son Sree is under the impression neurology would do a follow up EEG which I stated would not be clinically indicated at this time. He is requesting neurology follow up and will speak to Dr Leroy    Infectious: Proteus Mirabalis UTi sensitive to rocephin day 3/7  History of Proteus UTi and renal stones in the past.- renal ultrasound with no nephrolithiasis   Previous UA x 2 negative on 11/27 and 12/1 as well as urine culture on 12/1  culture positive on 12/9, culture on 12/11 + Proteus   HIV was negative  Family requested hepatitis panel which is pending    Metabolic: MISSY with Hypernatremia-- increased free water to 250ml Q8 hours- REnal consultation for recommendations  Hyperkalemia: PO Kayexalate x 1  Ammonia levels wnl  ABG did not show CO2 narcosis. repeat ABG showed no evidence of acidosis pH wnl- Pco2 of 42 not contributing to lethargy  LFTS WNL    Endocrine:   Last TSH of 2.4 WNL. T4 is WNL. T3 is on the lower side at 42, Likely sick euthyroid syndrome. NO treatment is indicated at this time  Cortisol level WNL  Carnitine level is WNL    Family concerned that hyperglycemia is contributing to lethargy/encephalopathy. I discussed with them in detail that tight control of BGL in a hospitalized patient may be detrimental and risks of hypoglycemia.  Family requested to speak to Dr Ferrer, patient's outpatient endocrinologist as well. I spoke to him and he is agreement with the current treatment plan and agrees a BSL in the 200s with no acidosis would NOT be contributing to the patient's lethargy.  I spoke with Dr Lombardi who agrees as well     Evaluated by Endocrinology Dr Lombardi- Increased lantus to 10 unit BID with HSS.     2. Afib with SSS/ tachy-truong syndrome: Continue PO lopressor   Per cardiology the patient is a poor candidate for PPM insertion at this time or anti-coagulation  Continue aspirin therapy      3. Hypertensive urgency : BP Improved  -Po hydralazine via NG tube    4. Diabetes mellitus type 2  -HbA1c 6.0  -  lantus to 10 units QHS    5. Acute right frontal CVA on admission: Aspirin PO  Family refusing statin therapy as it causes myalgia and muscle weakness. They were counselled on the importance of statin therapy in secondary stroke prevention. They have refused simvastatin and atorvastatin.     6. History of REBECCA and COPD: No evidence of co2 retention or acidosis on ABG    Advance care planning:  Hospitalist discussed with s son and Primary HCP Rex along with Dr Samson. We had an hour long discussion regarding patients medical condition, vitals, lab and test results. His main concerns seem to be hyperglycemia, uti and seizure disorder worsening patient's mentation. I explained that 2 endocrinologists have weighed in and stated hyperglycemia is not contributing her her lethargy/encephalopathy. He feels that once she completes the treatment for the UTI she will become more responsive. He would like to speak to neurology about another EEG for follow up.  I explained the progression of her clinical condition and progressive decline given age and co- morbidities she has a poor overall prognosis. He understands but is not yet ready to make the decision to withdraw care in the hopes she may improve. He also has many family members who again are not ready and he does not ultimately want to be the one to make this decision alone. We spoke about his mother's living will and requested a copy in which she states she would not want artificial nutrition. He will speak to his sister about providing the living will.     At this time he would like to proceed with PEG tube evaluation. I have explained his mother would be high risk for the procedure. However he would like the specialists to weigh in as well. Cardio re called for clearance. Pulmonary to be consulted for clearance. GI to be consulted for peg tube evaluation.     Ultimately he inquired if his family would want a second opinion could she be transferred to another facility and I explained she would need an accepting facility for transfer.   She is FULL CODE at this time     Patient has a poor prognosis. At this point they need to decide regarding PEG tube placement vs home with hospice. They have had extensive meetings with palliative care and the hospice team to discuss their options. The patient's son, Marco Antonio wants to speak to her endocrinologist Dr Ferrer himself and confirm that hyperglycemia is not contributing to her lethargy.

## 2018-12-15 NOTE — PROGRESS NOTE ADULT - SUBJECTIVE AND OBJECTIVE BOX
CC: metabolic encephalopaty , UTI.  CVA.   HPI:  The patient is a 89 year old female with a history of CVA seizure disorder, hypertension and diabetes mellitus who was brought to the ER for complaints of altered mental status. Patient was diagnosed with seizure disorder last year and started on keppra po. Over the past few months the family has noted she has been more lethargic and confused. She saw Dr Mcallister, neurologist at [facility]. According to the daughter she had a number of tests which indicated she did not have seizure disorder and she was advised to taper off the keppra. Her daughter tapered it off over the past 2 weeks. This morning she noted her mother became unresponsive, fell to her side, noted facial droop, her eyes were "fluttering" and her tongue was rolled back. After the episode she remained lethargic for about 3 hours. In the ER, NIH was 0. Patient was more alert and responsive. CT head was negative. (26 Nov 2018 17:08)    REVIEW OF SYSTEMS:    Patient denied fever, chills, abdominal pain, nausea, vomiting, cough, shortness of breath, chest pain or palpitations    Vital Signs Last 24 Hrs  T(C): 37.3 (15 Dec 2018 16:21), Max: 37.3 (14 Dec 2018 23:44)  T(F): 99.1 (15 Dec 2018 16:21), Max: 99.1 (14 Dec 2018 23:44)  HR: 65 (15 Dec 2018 16:21) (65 - 106)  BP: 130/65 (15 Dec 2018 16:21) (130/65 - 160/85)  BP(mean): --  RR: 18 (15 Dec 2018 16:21) (18 - 27)  SpO2: 99% (15 Dec 2018 16:21) (92% - 99%)I&O's Summary    14 Dec 2018 07:01  -  15 Dec 2018 07:00  --------------------------------------------------------  IN: 620 mL / OUT: 0 mL / NET: 620 mL      PHYSICAL EXAM:  GENERAL: NAD,  HEENT: PERRL, +EOMI, anicteric, no [place]  NECK: Supple, No JVD   CHEST/LUNG: CTA bilaterally; Normal effort  HEART: S1S2 Normal intensity, no murmurs, gallops or rubs noted  ABDOMEN: Soft, BS Normoactive, NT, ND, no HSM noted  EXTREMITIES:  2+ radial and DP pulses noted, no clubbing, cyanosis, or edema noted,   SKIN: No rashes or lesions noted  NEURO: Non verbal. Minimal responsed, no focal deficits noted, CN II-XII intact  PSYCH: Depressed mood and affect; insight/judgement inappropriate  LABS:                        11.6   7.9   )-----------( 200      ( 15 Dec 2018 07:08 )             35.9     12-15    146<H>  |  106  |  61.0<H>  ----------------------------<  242<H>  5.0   |  29.0  |  1.27    Ca    9.1      15 Dec 2018 11:54          RADIOLOGY & ADDITIONAL TESTS:    MEDICATIONS:  MEDICATIONS  (STANDING):  aspirin  chewable 81 milliGRAM(s) Oral daily  brimonidine 0.2% Ophthalmic Solution 1 Drop(s) Both EYES daily  cefTRIAXone   IVPB      cefTRIAXone   IVPB 1 Gram(s) IV Intermittent every 24 hours  dextrose 5%. 1000 milliLiter(s) (50 mL/Hr) IV Continuous <Continuous>  dextrose 50% Injectable 12.5 Gram(s) IV Push once  dextrose 50% Injectable 25 Gram(s) IV Push once  dextrose 50% Injectable 25 Gram(s) IV Push once  enoxaparin Injectable 40 milliGRAM(s) SubCutaneous daily  hydrALAZINE 25 milliGRAM(s) Oral every 8 hours  insulin detemir injectable (LEVEMIR) 10 Unit(s) SubCutaneous every 12 hours  insulin lispro (HumaLOG) corrective regimen sliding scale   SubCutaneous every 6 hours  lactobacillus acidophilus 1 Tablet(s) Oral daily  latanoprost 0.005% Ophthalmic Solution 1 Drop(s) Both EYES at bedtime  levETIRAcetam  IVPB 250 milliGRAM(s) IV Intermittent every 12 hours  metoprolol tartrate 12.5 milliGRAM(s) Oral two times a day  sodium chloride 0.45%. 1000 milliLiter(s) (60 mL/Hr) IV Continuous <Continuous>  timolol 0.25% Solution 1 Drop(s) Both EYES daily    MEDICATIONS  (PRN):  acetaminophen    Suspension .. 765 milliGRAM(s) Oral every 6 hours PRN Moderate Pain (4 - 6)  dextrose 40% Gel 15 Gram(s) Oral once PRN Blood Glucose LESS THAN 70 milliGRAM(s)/deciliter  glucagon  Injectable 1 milliGRAM(s) IntraMuscular once PRN Glucose LESS THAN 70 milligrams/deciliter

## 2018-12-15 NOTE — PROGRESS NOTE ADULT - SUBJECTIVE AND OBJECTIVE BOX
PULMONARY Progress NOTE      LINDA CARTERSANTINO-[id number]    Patient is a 89y old  Female who presents with a chief complaint of Altered mental status (14 Dec 2018 14:37)      HISTORY OF PRESENT ILLNESS:    CVA  Sz disorder  HTN  DM  Dementia  Dysphagia with past Aspiration  Severe REBECCA - CPAP failure    Seen prior to PEG    Interval history:    Reasonably comfortable    MEDICATIONS  (STANDING):  aspirin  chewable 81 milliGRAM(s) Oral daily  brimonidine 0.2% Ophthalmic Solution 1 Drop(s) Both EYES daily  cefTRIAXone   IVPB      cefTRIAXone   IVPB 1 Gram(s) IV Intermittent every 24 hours  dextrose 5%. 1000 milliLiter(s) (50 mL/Hr) IV Continuous <Continuous>  dextrose 50% Injectable 12.5 Gram(s) IV Push once  dextrose 50% Injectable 25 Gram(s) IV Push once  dextrose 50% Injectable 25 Gram(s) IV Push once  enoxaparin Injectable 40 milliGRAM(s) SubCutaneous daily  hydrALAZINE 25 milliGRAM(s) Oral every 8 hours  insulin detemir injectable (LEVEMIR) 10 Unit(s) SubCutaneous every 12 hours  insulin lispro (HumaLOG) corrective regimen sliding scale   SubCutaneous every 6 hours  lactobacillus acidophilus 1 Tablet(s) Oral daily  latanoprost 0.005% Ophthalmic Solution 1 Drop(s) Both EYES at bedtime  levETIRAcetam  IVPB 250 milliGRAM(s) IV Intermittent every 12 hours  metoprolol tartrate 12.5 milliGRAM(s) Oral two times a day  sodium chloride 0.45%. 1000 milliLiter(s) (60 mL/Hr) IV Continuous <Continuous>  sodium polystyrene sulfonate Suspension 30 Gram(s) Oral once  timolol 0.25% Solution 1 Drop(s) Both EYES daily      MEDICATIONS  (PRN):  acetaminophen    Suspension .. 765 milliGRAM(s) Oral every 6 hours PRN Moderate Pain (4 - 6)  dextrose 40% Gel 15 Gram(s) Oral once PRN Blood Glucose LESS THAN 70 milliGRAM(s)/deciliter  glucagon  Injectable 1 milliGRAM(s) IntraMuscular once PRN Glucose LESS THAN 70 milligrams/deciliter      Allergies    contrast media (iodine-based) (Urticaria)  erythromycin (Unknown)  IV-Dye (Unknown)  sulfADIAZINE (Unknown)    Intolerances        PAST MEDICAL & SURGICAL HISTORY:  Diabetes mellitus  Fracture: lumbar fracture.  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  Heart Murmur  Calcium Nephrolithiasis  Hyperlipidemia LDL Goal < 100  Glaucoma, Low Tension  History of Pulmonary Hypertension  CAD (Coronary Artery Disease)  Obstructive Sleep Apnea  Controlled Type 2 Diabetes with Neuropathy  H/O cataract extraction  History of appendectomy  Fracture of Humerus: repair with harware right  Glaucoma: relieve pressure left eye along with cataract extraction  Bilateral Cataracts: excision 2006  Benign Breast Disease: removal of cyst- 1963  Cataract  Kidney Stones: lithotripsy 2005, 2006  S/P Appendectomy: 1937      FAMILY HISTORY:  No pertinent family history  Family history of cerebrovascular accident (CVA) (Sibling)      SOCIAL HISTORY  Smoking History:     REVIEW OF SYSTEMS:    Non-verbal when seen  Son present    Vital Signs Last 24 Hrs  T(C): 37.6 (14 Dec 2018 07:41), Max: 37.6 (14 Dec 2018 07:41)  T(F): 99.7 (14 Dec 2018 07:41), Max: 99.7 (14 Dec 2018 07:41)  HR: 107 (14 Dec 2018 13:25) (72 - 117)  BP: 120/82 (14 Dec 2018 13:25) (120/82 - 155/75)  BP(mean): --  RR: 18 (14 Dec 2018 07:41) (18 - 18)  SpO2: 100% (13 Dec 2018 15:35) (100% - 100%)    PHYSICAL EXAMINATION:    GENERAL: Obese.  Snoring when seen.     HEENT: Head is normocephalic and atraumatic. Extraocular muscles are intact. Mucous membranes are moist.     NECK: Supple.     LUNGS: Clear to auscultation without wheezing, rales, or rhonchi. Respirations unlabored    HEART: Regular rate and rhythm without murmur.    ABDOMEN: Soft, nontender, and nondistended.  No hepatosplenomegaly is noted.    EXTREMITIES: Without any cyanosis, clubbing, rash, lesions or edema.    NEUROLOGIC: Grossly intact.      LABS:                        10.4   6.5   )-----------( 217      ( 14 Dec 2018 11:44 )             32.8     12-14    148<H>  |  109<H>  |  58.0<H>  ----------------------------<  209<H>  5.7<H>   |  29.0  |  1.32<H>    Ca    9.7      14 Dec 2018 11:25          ABG - ( 12 Dec 2018 16:16 )  pH, Arterial: 7.41  pH, Blood: x     /  pCO2: 46    /  pO2: 78    / HCO3: 28    / Base Excess: 3.6   /  SaO2: 96            Procalcitonin, Serum: 0.11 ng/mL (12-14-18 @ 07:05)      MICROBIOLOGY: Unrevealing    RADIOLOGY & ADDITIONAL STUDIES:    CXR on 12/10/18  NGT in trachea - removed

## 2018-12-16 NOTE — PROGRESS NOTE ADULT - SUBJECTIVE AND OBJECTIVE BOX
PULMONARY Progress NOTE      LINDA CARTERSANTINO-[id number]    Patient is a 89y old  Female who presents with a chief complaint of Altered mental status (14 Dec 2018 14:37)      HISTORY OF PRESENT ILLNESS:    CVA  Sz disorder  HTN  DM  Dementia  Dysphagia with past Aspiration  Severe REBECCA - CPAP failure    Seen prior to PEG    Interval history:    Reasonably comfortable  Non-communicative    MEDICATIONS  (STANDING):  aspirin  chewable 81 milliGRAM(s) Oral daily  brimonidine 0.2% Ophthalmic Solution 1 Drop(s) Both EYES daily  cefTRIAXone   IVPB      cefTRIAXone   IVPB 1 Gram(s) IV Intermittent every 24 hours  dextrose 5%. 1000 milliLiter(s) (50 mL/Hr) IV Continuous <Continuous>  dextrose 50% Injectable 12.5 Gram(s) IV Push once  dextrose 50% Injectable 25 Gram(s) IV Push once  dextrose 50% Injectable 25 Gram(s) IV Push once  enoxaparin Injectable 40 milliGRAM(s) SubCutaneous daily  hydrALAZINE 25 milliGRAM(s) Oral every 8 hours  insulin detemir injectable (LEVEMIR) 10 Unit(s) SubCutaneous every 12 hours  insulin lispro (HumaLOG) corrective regimen sliding scale   SubCutaneous every 6 hours  lactobacillus acidophilus 1 Tablet(s) Oral daily  latanoprost 0.005% Ophthalmic Solution 1 Drop(s) Both EYES at bedtime  levETIRAcetam  IVPB 250 milliGRAM(s) IV Intermittent every 12 hours  metoprolol tartrate 12.5 milliGRAM(s) Oral two times a day  sodium chloride 0.45%. 1000 milliLiter(s) (60 mL/Hr) IV Continuous <Continuous>  sodium polystyrene sulfonate Suspension 30 Gram(s) Oral once  timolol 0.25% Solution 1 Drop(s) Both EYES daily      MEDICATIONS  (PRN):  acetaminophen    Suspension .. 765 milliGRAM(s) Oral every 6 hours PRN Moderate Pain (4 - 6)  dextrose 40% Gel 15 Gram(s) Oral once PRN Blood Glucose LESS THAN 70 milliGRAM(s)/deciliter  glucagon  Injectable 1 milliGRAM(s) IntraMuscular once PRN Glucose LESS THAN 70 milligrams/deciliter      Allergies    contrast media (iodine-based) (Urticaria)  erythromycin (Unknown)  IV-Dye (Unknown)  sulfADIAZINE (Unknown)    Intolerances        PAST MEDICAL & SURGICAL HISTORY:  Diabetes mellitus  Fracture: lumbar fracture.  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  Heart Murmur  Calcium Nephrolithiasis  Hyperlipidemia LDL Goal < 100  Glaucoma, Low Tension  History of Pulmonary Hypertension  CAD (Coronary Artery Disease)  Obstructive Sleep Apnea  Controlled Type 2 Diabetes with Neuropathy  H/O cataract extraction  History of appendectomy  Fracture of Humerus: repair with harware right  Glaucoma: relieve pressure left eye along with cataract extraction  Bilateral Cataracts: excision 2006  Benign Breast Disease: removal of cyst- 1963  Cataract  Kidney Stones: lithotripsy 2005, 2006  S/P Appendectomy: 1937      FAMILY HISTORY:  No pertinent family history  Family history of cerebrovascular accident (CVA) (Sibling)      SOCIAL HISTORY  Smoking History:     REVIEW OF SYSTEMS:    Non-verbal when seen  Son present    Vital Signs Last 24 Hrs  T(C): 37.6 (14 Dec 2018 07:41), Max: 37.6 (14 Dec 2018 07:41)  T(F): 99.7 (14 Dec 2018 07:41), Max: 99.7 (14 Dec 2018 07:41)  HR: 107 (14 Dec 2018 13:25) (72 - 117)  BP: 120/82 (14 Dec 2018 13:25) (120/82 - 155/75)  BP(mean): --  RR: 18 (14 Dec 2018 07:41) (18 - 18)  SpO2: 100% (13 Dec 2018 15:35) (100% - 100%)    PHYSICAL EXAMINATION:    GENERAL: Obese.  Snoring when seen.     HEENT: Head is normocephalic and atraumatic. Extraocular muscles are intact. Mucous membranes are moist.     NECK: Supple.     LUNGS: Clear to auscultation without wheezing, rales, or rhonchi. Respirations unlabored    HEART: Regular rate and rhythm without murmur.    ABDOMEN: Soft, nontender, and nondistended.  No hepatosplenomegaly is noted.    EXTREMITIES: Without any cyanosis, clubbing, rash, lesions or edema.    NEUROLOGIC: Grossly intact.      LABS:                        10.4   6.5   )-----------( 217      ( 14 Dec 2018 11:44 )             32.8     12-14    148<H>  |  109<H>  |  58.0<H>  ----------------------------<  209<H>  5.7<H>   |  29.0  |  1.32<H>    Ca    9.7      14 Dec 2018 11:25          ABG - ( 12 Dec 2018 16:16 )  pH, Arterial: 7.41  pH, Blood: x     /  pCO2: 46    /  pO2: 78    / HCO3: 28    / Base Excess: 3.6   /  SaO2: 96            Procalcitonin, Serum: 0.11 ng/mL (12-14-18 @ 07:05)      MICROBIOLOGY: Unrevealing    RADIOLOGY & ADDITIONAL STUDIES:    CXR on 12/10/18  NGT in trachea - removed

## 2018-12-16 NOTE — PROGRESS NOTE ADULT - SUBJECTIVE AND OBJECTIVE BOX
CC: Obtundations.  Recent CVA  HPI:  The patient is a 89 year old female with a history of CVA< seizure disorder, hypertension and diabetes mellitus who was brought to the ER for complaints of altered mental status. Patient was diagnosed with seizure disorder last year and started on keppra po. Over the past few months the family has noted she has been more lethargic and confused. She saw Dr Mcallister, neurologist at [facility]. According to the daughter she had a number of tests which indicated she did not have seizure disorder and she was advised to taper off the keppra. Her daughter tapered it off over the past 2 weeks. This morning she noted her mother became unresponsive, fell to her side, noted facial droop, her eyes were "fluttering" and her tongue was rolled back. After the episode she remained lethargic for about 3 hours. In the ER, NIH was 0. Patient was more alert and responsive. CT head was negative. (26 Nov 2018 17:08)    REVIEW OF SYSTEMS:    Patient denied fever, chills, abdominal pain, nausea, vomiting, cough, shortness of breath, chest pain or palpitations    Vital Signs Last 24 Hrs  T(C): 36.7 (16 Dec 2018 09:29), Max: 37.3 (15 Dec 2018 16:21)  T(F): 98.1 (16 Dec 2018 09:29), Max: 99.1 (15 Dec 2018 16:21)  HR: 80 (16 Dec 2018 09:29) (65 - 80)  BP: 169/71 (16 Dec 2018 09:29) (130/65 - 169/71)  BP(mean): --  RR: 18 (16 Dec 2018 09:29) (18 - 18)  SpO2: 94% (16 Dec 2018 09:29) (94% - 99%)I&O's Summary    15 Dec 2018 07:01  -  16 Dec 2018 07:00  --------------------------------------------------------  IN: 630 mL / OUT: 0 mL / NET: 630 mL      PHYSICAL EXAM:  GENERAL: Obese  HEENT: PERRL, +EOMI, anicteric, no Hopi  NECK: Supple, No JVD   CHEST/LUNG: Bilateral rales, transmitted breath sounds.   HEART: S1S2 Normal intensity, no murmurs, gallops or rubs noted  ABDOMEN: Soft, BS Normoactive,   EXTREMITIES:  2+ radial and DP pulses noted, no clubbing, cyanosis, or edema noted, Bed bound   SKIN: No rashes or lesions noted  NEURO: Unresponsive, obtunded  PSYCH: Unresponsive  LABS:                        11.0   7.9   )-----------( 239      ( 16 Dec 2018 08:15 )             34.6     12-16    148<H>  |  106  |  59.0<H>  ----------------------------<  132<H>  4.8   |  30.0<H>  |  1.10    Ca    9.4      16 Dec 2018 08:15          RADIOLOGY & ADDITIONAL TESTS:    MEDICATIONS:  MEDICATIONS  (STANDING):  aspirin  chewable 81 milliGRAM(s) Oral daily  brimonidine 0.2% Ophthalmic Solution 1 Drop(s) Both EYES daily  cefepime   IVPB 1000 milliGRAM(s) IV Intermittent every 12 hours  dextrose 5%. 1000 milliLiter(s) (50 mL/Hr) IV Continuous <Continuous>  dextrose 50% Injectable 12.5 Gram(s) IV Push once  dextrose 50% Injectable 25 Gram(s) IV Push once  dextrose 50% Injectable 25 Gram(s) IV Push once  enoxaparin Injectable 40 milliGRAM(s) SubCutaneous daily  hydrALAZINE 25 milliGRAM(s) Oral every 8 hours  insulin detemir injectable (LEVEMIR) 16 Unit(s) SubCutaneous every 12 hours  insulin lispro (HumaLOG) corrective regimen sliding scale   SubCutaneous every 6 hours  lactobacillus acidophilus 1 Tablet(s) Oral daily  latanoprost 0.005% Ophthalmic Solution 1 Drop(s) Both EYES at bedtime  levETIRAcetam  IVPB 250 milliGRAM(s) IV Intermittent every 12 hours  metoprolol tartrate 12.5 milliGRAM(s) Oral two times a day  timolol 0.25% Solution 1 Drop(s) Both EYES daily    MEDICATIONS  (PRN):  acetaminophen    Suspension .. 765 milliGRAM(s) Oral every 6 hours PRN Moderate Pain (4 - 6)  dextrose 40% Gel 15 Gram(s) Oral once PRN Blood Glucose LESS THAN 70 milliGRAM(s)/deciliter  glucagon  Injectable 1 milliGRAM(s) IntraMuscular once PRN Glucose LESS THAN 70 milligrams/deciliter

## 2018-12-16 NOTE — PROGRESS NOTE ADULT - ASSESSMENT
Assess    CVA  Dementia  Dysphagia with aspiration risk  Untreated REBECCA  Palliative PEG planned  Discussed outlook with daughter    No absolute pulmonary contraindication to PEG or surgical Gastrostomy under conscious sedation or under general anesthesia - though palliation seems more reasonable    Intubation or LMA available at procedure if done under conscious sedation advise    Bipap at 19/13 cm H20 trial       Home hospice appropriate if family decides not to intervene    Consider palliative consult for goals of care and advanced directives  Family meeting indicated    Albuterol Neb PRN

## 2018-12-16 NOTE — PROGRESS NOTE ADULT - ASSESSMENT
1. Acute toxic-metabolic encephalopathy - secondary to a combination of acute CVA on admission, seizure disorder worsened by , MISSY and hypernatremia and UTI during hospitalization   Repeat CT head on 12/11 was negative for acute CVA    24 hour video EEG showed was abnormal showed cortical hyperexcitability and abnormality in the left temporal region consistent with 3 previous EEG's + for diffuse cerebral dysfunction.  Started on IV keppra Q12 hours. Spoke with neurology; there were no plans to repeat EEG from my understanding. However the patient's son Sree is under the impression neurology would do a follow up EEG which I stated would not be clinically indicated at this time. He is requesting neurology follow up and will speak to Dr Leroy.  He wants to know if patient is brain dead.     UTI: Proteus Mirabalis UTi   History of Proteus UTi and renal stones in the past.- renal ultrasound with no nephrolithiasis   Previous UA x 2 negative on 11/27 and 12/1 as well as urine culture on 12/1  culture positive on 12/9, culture on 12/11 + Proteus   HIV was negative  Cefepime 1g iv q12    Metabolic: MISSY with Hypernatremia-- Cautious admin of free water and hyptonic saline for risk of fluid overload.   Hyperkalemia:  Mild . Will give lasix 40mg iV X1   Ammonia levels wnl.  LFTS WNL  ABG did not show CO2 narcosis. repeat ABG showed no evidence of acidosis pH wnl- Pco2 of 42 not contributing to lethargy    DM  4. Diabetes mellitus type 2  -HbA1c 6.0  Levemir, Sliding scale insulin  Family concerned that hyperglycemia is contributing to lethargy/encephalopathy. I discussed with them in detail that tight control of BGL in a hospitalized patient may be detrimental and risks of hypoglycemia.  Family requested to speak to Dr Ferrer, patient's outpatient endocrinologist as well. I spoke to him and he is agreement with the current treatment plan and agrees a BSL in the 200s with no acidosis would NOT be contributing to the patient's lethargy.  I spoke with Dr Lombardi who agrees as well     Last TSH of 2.4 WNL. T4 is WNL. T3 is on the lower side at 42, Likely sick euthyroid syndrome. NO treatment is indicated at this time  Cortisol level WNL  Carnitine level is WN    Evaluated by Endocrinology Dr Lombardi- Increased lantus to 10 unit BID with HSS.     2. Afib with SSS/ tachy-truong syndrome: Continue PO lopressor   Per cardiology the patient is a poor candidate for PPM insertion at this time or anti-coagulation  Continue aspirin therapy    3. Hypertensive urgency : BP Improved  -Po hydralazine via NG tube    5. Acute right frontal CVA on admission: Aspirin PO  Family refusing statin therapy as it causes myalgia and muscle weakness. They were counselled on the importance of statin therapy in secondary stroke prevention. They have refused simvastatin and atorvastatin.     6. History of REBECCA and COPD: No evidence of co2 retention or acidosis on ABG    Advance care planning:  Hospitalist discussed with son and Primary HCP Rex along with Dr Samson regarding patients medical condition, vitals, lab and test results. His main concerns seem to be hyperglycemia, uti and seizure disorder worsening patient's mentation. I explained that 2 endocrinologists have weighed in and stated hyperglycemia is not contributing her her lethargy/encephalopathy. He feels that once she completes the treatment for the UTI she will become more responsive. He would like to speak to neurology about another EEG for follow up.  I explained the progression of her clinical condition and progressive decline given age and co- morbidities she has a poor overall prognosis. He understands but is not yet ready to make the decision to withdraw care in the hopes she may improve. He also has many family members who again are not ready and he does not ultimately want to be the one to make this decision alone. We spoke about his mother's living will and requested a copy in which she states she would not want artificial nutrition. He will speak to his sister about providing the living will.     At this time he would like to proceed with PEG tube evaluation. I have explained his mother would be high risk for the procedure. However he would like the specialists to weigh in as well. Cardio re called for clearance. Pulmonary to be consulted for clearance. GI to be consulted for peg tube evaluation.     Ultimately he inquired if his family would want a second opinion could she be transferred to another facility and I explained she would need an accepting facility for transfer.   She is FULL CODE at this time     Patient has a poor prognosis. At this point they need to decide regarding PEG tube placement vs home with hospice. They have had extensive meetings with palliative care and the hospice team to discuss their options. The patient's son, Marco Antonio wants to speak to her endocrinologist Dr Ferrer himself and confirm that hyperglycemia is not contributing to her lethargy.

## 2018-12-16 NOTE — CHART NOTE - NSCHARTNOTEFT_GEN_A_CORE
Called by RN for concerns of fluid overload. Pt is sleeping in bed comfortably in NAD, family at bedside. As per RN, pt has rhonchus breath sounds but hemodynamically stable, in NAD and breathing comfortably.     VITAL SIGNS:  T(C): 37.3 (12-15-18 @ 22:09), Max: 37.3 (12-15-18 @ 16:21)  HR: 67 (12-15-18 @ 22:09) (65 - 92)  BP: 153/76 (12-15-18 @ 22:09) (130/65 - 155/64)  RR: 18 (12-15-18 @ 22:09) (18 - 18)  SpO2: 94% (12-15-18 @ 22:09) (92% - 99%)  Wt(kg): --Vital Signs Last 24 Hrs  T(C): 37.3 (15 Dec 2018 22:09), Max: 37.3 (15 Dec 2018 16:21)  T(F): 99.1 (15 Dec 2018 22:09), Max: 99.1 (15 Dec 2018 16:21)  HR: 67 (15 Dec 2018 22:09) (65 - 92)  BP: 153/76 (15 Dec 2018 22:09) (130/65 - 155/64)  BP(mean): --  RR: 18 (15 Dec 2018 22:09) (18 - 18)  SpO2: 94% (15 Dec 2018 22:09) (92% - 99%)    PHYSICAL EXAM:  GENERAL: NAD, sleeping comfortably in bed  HEAD:  Atraumatic, Normocephalic  ENT: Moist mucous membranes  NECK: Supple, No JVD  CHEST/LUNG: course expiratory breath sounds. Expiratory rhonchi. No rales, wheezing, or rubs. breathing non labored, no accessory muscle usage  HEART: Regular rate and rhythm; No murmurs, rubs, or gallops  ABDOMEN: Bowel sounds present; Soft, Nontender, Nondistended  EXTREMITIES:  Edema to B/L upper extremities. 2+ Peripheral Pulses, brisk capillary refill. No clubbing, cyanosis  NERVOUS SYSTEM: lethargic, at baseline as per RN and family   SKIN: No rashes or lesions    ASSESSMENT/ PLAN: Pt is a 89y/o F admitted for Acute toxic-metabolic encephalopathy secondary to a combination of acute CVA on admission, seizure disorder worsened by, MISSY and hypernatremia and UTI during hospitalization. Pt on hypotonic IVF at 60ml/hr, receiving NG tube feeds at 65ml/hr and 250ml free water flush q6h.   -continue IVF for hypernatremia. trending down. F/u BMP  -free water flushes and tube feeds held for tonight and may restart in the AM  -upper extremity elevation  -Case d/w nocturnist Dr. Damian and will endorse to AM hospitalist Called by RN for concerns of fluid overload around 21:00 last night. Pt is sleeping in bed comfortably in NAD, family at bedside. As per RN, pt has rhonchus breath sounds but hemodynamically stable, in NAD and breathing comfortably.     VITAL SIGNS:  T(C): 37.3 (12-15-18 @ 22:09), Max: 37.3 (12-15-18 @ 16:21)  HR: 67 (12-15-18 @ 22:09) (65 - 92)  BP: 153/76 (12-15-18 @ 22:09) (130/65 - 155/64)  RR: 18 (12-15-18 @ 22:09) (18 - 18)  SpO2: 94% (12-15-18 @ 22:09) (92% - 99%)  Wt(kg): --Vital Signs Last 24 Hrs  T(C): 37.3 (15 Dec 2018 22:09), Max: 37.3 (15 Dec 2018 16:21)  T(F): 99.1 (15 Dec 2018 22:09), Max: 99.1 (15 Dec 2018 16:21)  HR: 67 (15 Dec 2018 22:09) (65 - 92)  BP: 153/76 (15 Dec 2018 22:09) (130/65 - 155/64)  BP(mean): --  RR: 18 (15 Dec 2018 22:09) (18 - 18)  SpO2: 94% (15 Dec 2018 22:09) (92% - 99%)    PHYSICAL EXAM:  GENERAL: NAD, sleeping comfortably in bed  HEAD:  Atraumatic, Normocephalic  ENT: Moist mucous membranes  NECK: Supple, No JVD  CHEST/LUNG: course expiratory breath sounds. Expiratory rhonchi. No rales, wheezing, or rubs. breathing non labored, no accessory muscle usage  HEART: Regular rate and rhythm; No murmurs, rubs, or gallops  ABDOMEN: Bowel sounds present; Soft, Nontender, Nondistended  EXTREMITIES:  Edema to B/L upper extremities. 2+ Peripheral Pulses, brisk capillary refill. No clubbing, cyanosis  NERVOUS SYSTEM: lethargic, at baseline as per RN and family   SKIN: No rashes or lesions    ASSESSMENT/ PLAN: Pt is a 89y/o F admitted for Acute toxic-metabolic encephalopathy secondary to a combination of acute CVA on admission, seizure disorder worsened by, MISSY and hypernatremia and UTI during hospitalization. Pt on hypotonic IVF at 60ml/hr, receiving NG tube feeds at 65ml/hr and 250ml free water flush q6h.   -continue IVF for hypernatremia. trending down. F/u BMP  -free water flushes and tube feeds held for tonight and may restart in the AM  -upper extremity elevation  -Case d/w nocturnist Dr. Damian and will endorse to AM hospitalist

## 2018-12-17 NOTE — PROGRESS NOTE ADULT - SUBJECTIVE AND OBJECTIVE BOX
[facility]                                   [address] [place] [address]                                                      PHONE: [phone]                                                         FAX: [phone]  [email or web address]  ---------------------------------------------------------------------------------------------------------------------------------    Overnight events/patient complaints: unable to obtain history; daughter is at bedside. CPAP in place     INTERPRETATION OF TELEMETRY (personally reviewed): atrial fibrillation with controlled rate; no pauses noted     ECG: < from: 12 Lead ECG (12.09.18 @ 13:13) >  Atrial fibrillation  Right bundle branch block    ECHO: < from: TTE Echo Complete w/Doppler (11.26.18 @ 18:28) >   1. Left ventricular ejection fraction, by visual estimation, is 65 to 70%.   2. Normal global left ventricular systolic function.   3. Increased LV wall thickness.   4. There is mild concentric left ventricular hypertrophy.   5. Mild mitral annular calcification.   6. Thickening and calcification of the anterior and posterior mitral valve leaflets.   7. Mild tricuspid regurgitation.   8. Sclerotic aortic valve with normal opening.   9. Trace pulmonic valve regurgitation.  10. Endocardial visualization was enhanced with intravenous echo contrast.      I&O's Summary    16 Dec 2018 07:01  -  17 Dec 2018 07:00  --------------------------------------------------------  IN: 950 mL / OUT: 0 mL / NET: 950 mL      MEDICATIONS  (STANDING):  aspirin  chewable 81 milliGRAM(s) Oral daily  brimonidine 0.2% Ophthalmic Solution 1 Drop(s) Both EYES daily  cefepime   IVPB 1000 milliGRAM(s) IV Intermittent every 12 hours  dextrose 5%. 1000 milliLiter(s) (50 mL/Hr) IV Continuous <Continuous>  dextrose 50% Injectable 12.5 Gram(s) IV Push once  dextrose 50% Injectable 25 Gram(s) IV Push once  dextrose 50% Injectable 25 Gram(s) IV Push once  enoxaparin Injectable 40 milliGRAM(s) SubCutaneous daily  hydrALAZINE 25 milliGRAM(s) Oral every 8 hours  insulin detemir injectable (LEVEMIR) 16 Unit(s) SubCutaneous every 12 hours  insulin lispro (HumaLOG) corrective regimen sliding scale   SubCutaneous every 6 hours  lactobacillus acidophilus 1 Tablet(s) Oral daily  latanoprost 0.005% Ophthalmic Solution 1 Drop(s) Both EYES at bedtime  levETIRAcetam  IVPB 250 milliGRAM(s) IV Intermittent every 12 hours  timolol 0.25% Solution 1 Drop(s) Both EYES daily    Vital Signs Last 24 Hrs  T(C): 36.8 (17 Dec 2018 09:15), Max: 36.8 (17 Dec 2018 01:38)  T(F): 98.3 (17 Dec 2018 09:15), Max: 98.3 (17 Dec 2018 09:15)  HR: 58 (17 Dec 2018 09:15) (58 - 80)  BP: 162/72 (17 Dec 2018 09:15) (147/82 - 169/71)  BP(mean): --  RR: 19 (17 Dec 2018 09:15) (18 - 19)  SpO2: 100% (17 Dec 2018 09:15) (94% - 100%)  ICU Vital Signs Last 24 Hrs    PHYSICAL EXAM:  General: obese, sedate, elderly   HEENT: Head; normocephalic, atraumatic.Pupils reactive, cornea wnl. Neck supple, no nodes adenopathy, no JVD  CARDIOVASCULAR: Normal S1 and S2, 1/6 TWAN, no rub, gallop or lift.   LUNGS: poor effort, no rales, rhonchi or wheeze. Normal breath sounds bilaterally.  ABDOMEN: Soft, nontender without mass or organomegaly. bowel sounds normoactive.  EXTREMITIES: No clubbing, cyanosis; + upper extremity edema.   SKIN: warm and dry with normal turgor.  NEURO: Alert/oriented x0        LABS:                        11.0   7.9   )-----------( 239      ( 16 Dec 2018 08:15 )             34.6     12-16    148<H>  |  106  |  59.0<H>  ----------------------------<  132<H>  4.8   |  30.0<H>  |  1.10    Ca    9.4      16 Dec 2018 08:15      ASSESSMENT AND PLAN:  In summary, LINDA CARTER is a 89y woman with past medical history significant for CVA, seizure disorder, hypertension, chronic Afib with SSS/ tachy-truong syndrome, and REBECCA who presented with altered mental status found to have acute CVA, MISSY, hypernatremia and UTI. She remains altered.     chronic atrial fibrillation  - AF rate controlled. No indication for PPM at this time. Pt poor candidate for invasive cardiac procedures, and is at elevated risk for complications related to any procedure, however, there are no acute contraindications to PEG placement under conscious sedation  - BB not on board currently; continue to hold    - Poor candidate for full AC due to bleeding risk.  - Continue medical management. Abx.  - Discussed extensively with daughter.  - No further cardiac recommendations therefore will sign off. Agree with palliative care/hospice evaluation.     Thank you for allowing [facility] to participate in the care of this patient.  Please feel free to call with any questions or concerns.

## 2018-12-17 NOTE — PROGRESS NOTE ADULT - SUBJECTIVE AND OBJECTIVE BOX
[facility]  INFECTIOUS DISEASES AND INTERNAL MEDICINE at [place]  =======================================================  Spike Burks MD  Diplomates American Board of Internal Medicine and Infectious Diseases  =======================================================    LINDA CARTER [id number]    Follow up: Recurrent UTI    No fevers  Remains unresponsive     Allergies:  contrast media (iodine-based) (Urticaria)  erythromycin (Unknown)  IV-Dye (Unknown)  sulfADIAZINE (Unknown)      Antibiotics:  cefTRIAXone   IVPB 1 Gram(s) IV Intermittent every 24 hours       REVIEW OF SYSTEMS:  Unable to obtain. Patient is not responsive      Physical Exam:  Vital Signs Last 24 Hrs  T(C): 36.8 (17 Dec 2018 09:15), Max: 36.8 (17 Dec 2018 01:38)  T(F): 98.3 (17 Dec 2018 09:15), Max: 98.3 (17 Dec 2018 09:15)  HR: 58 (17 Dec 2018 09:15) (58 - 65)  BP: 162/72 (17 Dec 2018 09:15) (154/78 - 162/83)  RR: 19 (17 Dec 2018 09:15) (18 - 19)  SpO2: 100% (17 Dec 2018 09:15) (99% - 100%)      GEN: Not responsive  HEENT: normocephalic and atraumatic. Anicteric, + NGT  NECK: Supple.   LUNGS: Clear to auscultation.  HEART: Regular rate and rhythm  ABDOMEN: Soft, nontender, and nondistended.  Positive bowel sounds.    : No CVA tenderness  EXTREMITIES: Without any edema.  MSK: No joint swelling  NEUROLOGIC: Not responsive, No meaningful communication   PSYCHIATRIC: Not responsive, No meaningful communication  SKIN: No rash      Labs:  12-17    147<H>  |  107  |  53.0<H>  ----------------------------<  196<H>  4.8   |  30.0<H>  |  1.02    Ca    9.8      17 Dec 2018 10:10                 10.6   6.7   )-----------( 184      ( 17 Dec 2018 10:10 )             32.7       RECENT CULTURES:  12-11 @ 12:28 .Urine Proteus mirabilis  Escherichia coli    >100,000 CFU/ml Proteus mirabilis  >100,000 CFU/ml Escherichia coli      12-09 @ 18:10 .Urine Proteus mirabilis    50,000 - 99,000 CFU/mL Proteus mirabilis        EXAM:  US KIDNEY(S)                        PROCEDURE DATE:  12/12/2018    INTERPRETATION:  CLINICAL INFORMATION: UTI and nephrolithiasis  COMPARISON: None available.  TECHNIQUE: Sonography of the kidneys and bladder. Study is significantly   technically limited due to body habitus and bowel gas  FINDINGS:  Right kidney:  7.8 cm. No renal mass, hydronephrosis or calculi. A small   lower pole cyst measures 1.7 x 1.9 x 2.0 cm.  Left kidney:  9.5 cm. No renal mass, hydronephrosisor calculi.  Urinary bladder: Not visualized.  IMPRESSION:   Technically limited study demonstrates no evidence of urolithiasis or   hydronephrosis.

## 2018-12-17 NOTE — PROGRESS NOTE ADULT - ASSESSMENT
1. Acute toxic-metabolic encephalopathy - secondary to a combination of acute CVA on admission, seizure disorder worsened by , MISSY and hypernatremia and UTI during hospitalization   Repeat CT head on 12/11 was negative for acute CVA    24 hour video EEG showed abnormal cortical hyperexcitability and abnormality in the left temporal region consistent with 3 previous EEG's + for diffuse cerebral dysfunction.  Started on IV keppra Q12 hours. Spoke with neurology; there were no plans to repeat EEG. However the patient's son and daughters said they would want a neurology determination of brain death or otherwise     UTI: Proteus Mirabalis UTi   History of Proteus UTi and renal stones in the past.- renal ultrasound with no nephrolithiasis   Previous UA x 2 negative on 11/27 and 12/1 as well as urine culture on 12/1  culture positive on 12/9, culture on 12/11 + Proteus   HIV was negative  Cefepime 1g iv q12    MISSY with Hypernatremia--  Prerenal azotemia is predominant picture. Cautious admin of free water and hypotonic saline for risk of fluid overload.     Hyperkalemia:  Mild . Resolved   Ammonia levels wnl.  LFTS WNL  ABG did not show CO2 narcosis. repeat ABG showed no evidence of acidosis pH wnl- Pco2 of 42 not contributing to lethargy    DM  4. Diabetes mellitus type 2  -HbA1c 6.0  Levemir, Sliding scale insulin  Family concerned that hyperglycemia is contributing to lethargy/encephalopathy. discussed with them in detail that tight control of BGL in a hospitalized patient may be detrimental and risks of hypoglycemia.  Family requested to speak to Dr Ferrer, patient's outpatient endocrinologist as well.  Spoken  to him and he is agreement with the current treatment plan and agrees a BSL in the 200s with no acidosis would NOT be contributing to the patient's lethargy.  Dr Lombardi inhouse endocrinologist also in agreement     TSH of 2.4 WNL. T4 is WNL. T3 is on the lower side at 42, Likely sick euthyroid syndrome. NO treatment is indicated at this time  Cortisol level WNL  Carnitine level is WN     2. Afib with SSS/ tachy-truong syndrome:   Rate is controlled  Per cardiology the patient is a poor candidate for PPM insertion at this time or anti-coagulation  Continue aspirin therapy  Discontinued metoprolol at behest of daughter who reported it was discontinued by out patient medical provider    3. Hypertensive urgency : BP Improving   -Po hydralazine via NG tube    5. Acute right frontal CVA on admission: Aspirin PO  Family refusing statin therapy as it causes myalgia and muscle weakness. They were counselled on the importance of statin therapy in secondary stroke prevention. They have refused simvastatin and atorvastatin.     6. History of REBECCA and COPD: No evidence of co2 retention or acidosis on ABG   Nocturnal and prn bipap    Advance care planning:  Hospitalist discussed with son and Primary HCP Rex along with Dr Samson regarding patients medical condition, vitals, lab and test results. His main concerns seem to be hyperglycemia, uti and seizure disorder worsening patient's mentation. Explained to family  2 endocrinologists have weighed in and stated hyperglycemia is not contributing her lethargy/encephalopathy. He feels that once she completes the treatment for the UTI she will become more responsive. He would like to speak to neurology about another EEG for follow up.  Eexplained the progression of her clinical condition and progressive decline given age and co- morbidities she has a poor overall prognosis. He understands but is not yet ready to make the decision to withdraw care in the hopes she may improve. He also has many family members who again are not ready and he does not ultimately want to be the one to make this decision alone. We spoke about his mother's living will and requested a copy in which she states she would not want artificial nutrition. He will speak to his sisters about providing the living Will.     Family still equivocal or ambivalent on their request for peg tube placement.  Have explained patient  would be high risk for the procedure.  Cardiology and pulmonology gave provisional clearance for peg tube placement.  Pending firm decision by family .     Ultimately he inquired if his family would want a second opinion could she be transferred to another facility and it was explained she would need an accepting facility for transfer.   She is FULL CODE at this time     Patient has a poor prognosis. At this point they need to decide regarding PEG tube placement vs home with hospice. They have had extensive meetings with palliative care and the hospice team to discuss their options.

## 2018-12-17 NOTE — CHART NOTE - NSCHARTNOTEFT_GEN_A_CORE
Source: EMR, Spoke with daughter --Patient nonverbal    Current Diet: NPO with TF    Enteral /Parenteral Nutrition: NGT Glucerna 1.5 @65ml/hr x 24hr -- however rate decreased to 30ml/hr today due to possible refeeding syndrome. Pt tolerating rate at 30ml currently.   NGT vs PEG vs home hospice- family wishes, still awaiting decision.     Current Weight:   (12/12) 80kg  (12/10) 73kg  (12/2) 76kg    % Weight Change     Pertinent Medications: MEDICATIONS  (STANDING):  aspirin  chewable 81 milliGRAM(s) Oral daily  brimonidine 0.2% Ophthalmic Solution 1 Drop(s) Both EYES daily  cefepime   IVPB 1000 milliGRAM(s) IV Intermittent every 12 hours  dextrose 5%. 1000 milliLiter(s) (50 mL/Hr) IV Continuous <Continuous>  dextrose 50% Injectable 12.5 Gram(s) IV Push once  dextrose 50% Injectable 25 Gram(s) IV Push once  dextrose 50% Injectable 25 Gram(s) IV Push once  enoxaparin Injectable 40 milliGRAM(s) SubCutaneous daily  hydrALAZINE 25 milliGRAM(s) Oral every 8 hours  insulin detemir injectable (LEVEMIR) 16 Unit(s) SubCutaneous every 12 hours  insulin lispro (HumaLOG) corrective regimen sliding scale   SubCutaneous every 6 hours  lactobacillus acidophilus 1 Tablet(s) Oral daily  latanoprost 0.005% Ophthalmic Solution 1 Drop(s) Both EYES at bedtime  levETIRAcetam  IVPB 250 milliGRAM(s) IV Intermittent every 12 hours  timolol 0.25% Solution 1 Drop(s) Both EYES daily    MEDICATIONS  (PRN):  acetaminophen    Suspension .. 765 milliGRAM(s) Oral every 6 hours PRN Moderate Pain (4 - 6)  dextrose 40% Gel 15 Gram(s) Oral once PRN Blood Glucose LESS THAN 70 milliGRAM(s)/deciliter  glucagon  Injectable 1 milliGRAM(s) IntraMuscular once PRN Glucose LESS THAN 70 milligrams/deciliter    Pertinent Labs: CBC Full  -  ( 17 Dec 2018 10:10 )  WBC Count : 6.7 K/uL  Hemoglobin : 10.6 g/dL  Hematocrit : 32.7 %  Platelet Count - Automated : 184 K/uL  Mean Cell Volume : 96.2 fl  Mean Cell Hemoglobin : 31.2 pg  Mean Cell Hemoglobin Concentration : 32.4 g/dL  Na 148, BUN 59, Glu 132, A1C 6.3%      Nutrition focused physical exam previously conducted - found signs of malnutrition [ ]absent [X]present    Subcutaneous fat loss: [X] Orbital fat pads region, [X]Buccal fat region, [ ]Triceps region,  [ ]Ribs region    Muscle wasting: [ ]Temples region, [X]Clavicle region, [X]Shoulder region, [ ]Scapula region, [ ]Interosseous region,  [ ]thigh region, [ ]Calf region      Estimated Needs:   [X] no change since previous assessment  [ ] recalculated:     Current Nutrition Diagnosis: Pt remains at high nutrition risk secondary to severe acute malnutrition related to inadequate energy intake in setting of advancing age, lethargy as evidenced by unintentional significant 17lb (9.5%) weight loss x 9 months, moderate muscle wasting/fat loss (temples, clavicle, ribs, buccal, orbital), Pt consuming <25% of estimated needs >7 days.    Recommendations:   1) Monitor tolerance of TF-- advance rate to 64ml/hr as medically feasible -- consider PEG (family wishes) vs home hospice?  2) Monitor daily weights  3) Consider MVI daily      Monitoring and Evaluation:   [ ] PO intake [X] Tolerance to diet prescription [X] Weights  [X] Follow up per protocol [X] Labs:      Monitoring and Evaluation:   [ ] PO intake [X] Tolerance to diet prescription [X] Weights  [X] Follow up per protocol [X] Labs:

## 2018-12-17 NOTE — CHART NOTE - NSCHARTNOTEFT_GEN_A_CORE
pt found on home cpap with nsal pprongs , pt ordered for nocturnal bipap by Dr. Chacon, but patient has nasal tube feed for meds and RN notified, family at the bedside, family wants to hold off on hospital bipap machine due to no order to stop nasal feed and wants to talk to MD on 12/18, RN aware, pt comfortable on home BIPAP unit, sleeping, Hospital BIPAP machine at the bedside

## 2018-12-17 NOTE — PROGRESS NOTE ADULT - ASSESSMENT
89 y/o F with complicated hospital course. Follows Urology as outpatient for h/o nephrolithiasis, urinary incontinence and recurrent UTI.     Recurrent UTI  h/o nephrolithiasis  urinary incontinence   ? Seizures   Encephalopathy  Concern by Family for HIV     - UA minimally positive for UTI  - Urine culture with 50-99k Proteus  - Sensitive to Ceftriaxone and cefepime  - Continue Cefepime, today is last day of antibiotics.   - Renal U/S With no evidence of urolithiasis or hydronephrosis  - Blood cultures from admission no growth  - No fevers  - No leukocytosis  - No utility in repeat UA and culture  - HIV and viral hepatitis studies negative    Will Sign Off. Please call PRN.

## 2018-12-17 NOTE — CHART NOTE - NSCHARTNOTEFT_GEN_A_CORE
Patient daughter informed respiratory therapist that while  her mother was on nocturnal bipap  she observed that there was a lost of ac power alarm . As a result the bipap  was checked and outlet was changed.  Bipap machine is functioning properly . As per patient daughter request , patient was placed on home cpap machine. Patient remained  stable no signs of respiratory distress noted.

## 2018-12-17 NOTE — GOALS OF CARE CONVERSATION - PERSONAL ADVANCE DIRECTIVE - CONVERSATION DETAILS
Met with Rex Rodney & Dara.  this is the 3rd or 4th visit regarding hospice program and services  hospice appropriate goals of care Family is still deciding on goals of care plan of care they are deciding whether or not  to pursue peg tube or not . Provided information regarding hospice program and services reinforced as previously discussed and explained additionally revisited inpt criteria for  hospice  explained short term nature for symptom management needs that cannot be provided in a home setting  willingness to be agreeable to a discharge plan of symptoms should be managed discussed, hospice inn also does require advanced directives which are not in place  presently .  Again clarified hospice goals and plan of care  focus on comfort and quality of life  not on invasive aggressive procedures and recurrent trips to the emergency room  They asked many hypothetical questions regarding pts care and specific scenarios  I explained for medical management it would be best  if the spoke to the  MD for appropriate medical management . They asked if they decided to forego the peg and stop the ng feeds  would pt be appropriate for hospice and if so for what setting  I explained that their case would need to be evaluated on the day that they had made firm decisions regarding her care and would be based strictly off patients  condition on that given day . I inquire if they were ready to make any such decision they said no . I reviewed case briefly with hospice md dr Elena  who states if they did not pursue a peg and ng tube was removed and pt was unable  to take po then pt would likely be a hospice candidate but  this would still require family to be on a hospice page accepting hospice goals and plan of care and setting would be determined by symptom management needs  on the day they come to a decision regarding goals of care. Endorsed above  to Dr Daniel Rainey  , SANTO Kovacs and   Leila valenzuela . Hospice has been to family on multiple occasions  participated in many long extended meetings regarding  the above , all of the above have been explained in detail .

## 2018-12-17 NOTE — PROGRESS NOTE ADULT - ASSESSMENT
HyperNatremia/ MISSY suspect 2/2 poor nutrition dehydration  Na better will cont hypotonic IVF   Renal sonogram noted no hydronephrosis  Hyperkalemia resolved  CVA/ neuro eval noted  AM labs     Will follow

## 2018-12-17 NOTE — PROGRESS NOTE ADULT - SUBJECTIVE AND OBJECTIVE BOX
NEPHROLOGY INTERVAL HPI/OVERNIGHT EVENTS:    Examined earlier    MEDICATIONS  (STANDING):  aspirin  chewable 81 milliGRAM(s) Oral daily  brimonidine 0.2% Ophthalmic Solution 1 Drop(s) Both EYES daily  cefepime   IVPB 1000 milliGRAM(s) IV Intermittent every 12 hours  dextrose 5%. 1000 milliLiter(s) (50 mL/Hr) IV Continuous <Continuous>  dextrose 50% Injectable 12.5 Gram(s) IV Push once  dextrose 50% Injectable 25 Gram(s) IV Push once  dextrose 50% Injectable 25 Gram(s) IV Push once  enoxaparin Injectable 40 milliGRAM(s) SubCutaneous daily  hydrALAZINE 25 milliGRAM(s) Oral every 8 hours  insulin detemir injectable (LEVEMIR) 16 Unit(s) SubCutaneous every 12 hours  insulin lispro (HumaLOG) corrective regimen sliding scale   SubCutaneous every 6 hours  lactobacillus acidophilus 1 Tablet(s) Oral daily  latanoprost 0.005% Ophthalmic Solution 1 Drop(s) Both EYES at bedtime  levETIRAcetam  IVPB 250 milliGRAM(s) IV Intermittent every 12 hours  sodium chloride 0.45%. 1000 milliLiter(s) (75 mL/Hr) IV Continuous <Continuous>  timolol 0.25% Solution 1 Drop(s) Both EYES daily    MEDICATIONS  (PRN):  acetaminophen    Suspension .. 765 milliGRAM(s) Oral every 6 hours PRN Moderate Pain (4 - 6)  dextrose 40% Gel 15 Gram(s) Oral once PRN Blood Glucose LESS THAN 70 milliGRAM(s)/deciliter  glucagon  Injectable 1 milliGRAM(s) IntraMuscular once PRN Glucose LESS THAN 70 milligrams/deciliter      Allergies    contrast media (iodine-based) (Urticaria)  erythromycin (Unknown)  IV-Dye (Unknown)  sulfADIAZINE (Unknown)    Intolerances        Vital Signs Last 24 Hrs  T(C): 36.8 (17 Dec 2018 09:15), Max: 36.8 (17 Dec 2018 01:38)  T(F): 98.3 (17 Dec 2018 09:15), Max: 98.3 (17 Dec 2018 09:15)  HR: 58 (17 Dec 2018 09:15) (58 - 65)  BP: 162/72 (17 Dec 2018 09:15) (147/82 - 162/83)  BP(mean): --  RR: 19 (17 Dec 2018 09:15) (18 - 19)  SpO2: 100% (17 Dec 2018 09:15) (99% - 100%)  Daily     Daily     PHYSICAL EXAM:  GENERAL: appears chronically ill  HEAD:  Atraumatic, Normocephalic  EYES: EOMI  NECK: Supple, neck  veins full  NERVOUS SYSTEM:  unresponsive  CHEST/LUNG: dec bs B/L  HEART: Regular rate and rhythm; No murmur  ABDOMEN: Soft, Nontender, Nondistended; Bowel sounds present  EXTREMITIES:  no edema    LABS:                        10.6   6.7   )-----------( 184      ( 17 Dec 2018 10:10 )             32.7     12-17    147<H>  |  107  |  53.0<H>  ----------------------------<  196<H>  4.8   |  30.0<H>  |  1.02    Ca    9.8      17 Dec 2018 10:10                  RADIOLOGY & ADDITIONAL TESTS:

## 2018-12-17 NOTE — PROGRESS NOTE ADULT - SUBJECTIVE AND OBJECTIVE BOX
CC:  follow up GOC  INTERVAL HPI/OVERNIGHT EVENTS:    PRESENT SYMPTOMS: SOURCE:  Patient/Family/Team    PAIN SCALE:  0 = none  1 = mild   2 = moderate  3 = severe    Pain: no grimacing    Dyspnea:  [ ] YES [x ] NO  Anxiety:  [ ] YES [ ] NO  na  Fatigue: [ ] YES [ ] NO  na  Nausea: [ ] YES [ ] NO  na  Loss of Appetite: [ ] YES [ ] NO  na  Other symptoms: __________    MEDICATIONS  (STANDING):  aspirin  chewable 81 milliGRAM(s) Oral daily  brimonidine 0.2% Ophthalmic Solution 1 Drop(s) Both EYES daily  cefepime   IVPB 1000 milliGRAM(s) IV Intermittent every 12 hours  dextrose 5%. 1000 milliLiter(s) (50 mL/Hr) IV Continuous <Continuous>  dextrose 50% Injectable 12.5 Gram(s) IV Push once  dextrose 50% Injectable 25 Gram(s) IV Push once  dextrose 50% Injectable 25 Gram(s) IV Push once  enoxaparin Injectable 40 milliGRAM(s) SubCutaneous daily  hydrALAZINE 25 milliGRAM(s) Oral every 8 hours  insulin detemir injectable (LEVEMIR) 16 Unit(s) SubCutaneous every 12 hours  insulin lispro (HumaLOG) corrective regimen sliding scale   SubCutaneous every 6 hours  lactobacillus acidophilus 1 Tablet(s) Oral daily  latanoprost 0.005% Ophthalmic Solution 1 Drop(s) Both EYES at bedtime  levETIRAcetam  IVPB 250 milliGRAM(s) IV Intermittent every 12 hours  sodium chloride 0.45%. 1000 milliLiter(s) (75 mL/Hr) IV Continuous <Continuous>  timolol 0.25% Solution 1 Drop(s) Both EYES daily    MEDICATIONS  (PRN):  acetaminophen    Suspension .. 765 milliGRAM(s) Oral every 6 hours PRN Moderate Pain (4 - 6)  dextrose 40% Gel 15 Gram(s) Oral once PRN Blood Glucose LESS THAN 70 milliGRAM(s)/deciliter  glucagon  Injectable 1 milliGRAM(s) IntraMuscular once PRN Glucose LESS THAN 70 milligrams/deciliter      Allergies    contrast media (iodine-based) (Urticaria)  erythromycin (Unknown)  IV-Dye (Unknown)  sulfADIAZINE (Unknown)    Intolerances    Karnofsky Performance Score/Palliative Performance Status Version 2:  10  %    Vital Signs Last 24 Hrs  T(C): 36.8 (17 Dec 2018 09:15), Max: 36.8 (17 Dec 2018 01:38)  T(F): 98.3 (17 Dec 2018 09:15), Max: 98.3 (17 Dec 2018 09:15)  HR: 58 (17 Dec 2018 09:15) (58 - 65)  BP: 162/72 (17 Dec 2018 09:15) (147/82 - 162/83)  BP(mean): --  RR: 19 (17 Dec 2018 09:15) (18 - 19)  SpO2: 100% (17 Dec 2018 09:15) (99% - 100%)    PHYSICAL EXAM:    General:  Elderly lethargic    HEENT: [ x] normal  [ ] dry mouth  [ ] ET tube/trach    Lungs: [x ] comfortable [ ] tachypnea/labored breathing  [ ] excessive secretions    CV: [ x] normal  [ ] tachycardia    GI: [ x] normal  [ ] distended  [ ] tender  [ ] no BS               [ x] NG    : [ ] normal  [ x] incontinent  [ ] oliguria/anuria  [ ] walter    MSK: [ ] normal  [x ] weakness  [ ] edema             [ ] ambulatory  [x ] bedbound/wheelchair bound    Skin: [ ] normal  [ ] pressure ulcers- Stage_____  [ x] no rash    LABS:                        10.6   6.7   )-----------( 184      ( 17 Dec 2018 10:10 )             32.7     12-17    147<H>  |  107  |  53.0<H>  ----------------------------<  196<H>  4.8   |  30.0<H>  |  1.02    Ca    9.8      17 Dec 2018 10:10          I&O's Summary    16 Dec 2018 07:01  -  17 Dec 2018 07:00  --------------------------------------------------------    IN: 950 mL / OUT: 0 mL / NET: 950 mL  Thank you for the opportunity to assist with the care of this patient.   [facility] [phone].

## 2018-12-17 NOTE — PROGRESS NOTE ADULT - SUBJECTIVE AND OBJECTIVE BOX
CC: Metabolic encephalopathy .  UTI. CVA .   HPI:  The patient is a 89 year old female with a history of CVA< seizure disorder, hypertension and diabetes mellitus who was brought to the ER for complaints of altered mental status. Patient was diagnosed with seizure disorder last year and started on keppra po. Over the past few months the family has noted she has been more lethargic and confused. She saw Dr Mcallister, neurologist at [facility]. According to the daughter she had a number of tests which indicated she did not have seizure disorder and she was advised to taper off the keppra. Her daughter tapered it off over the past 2 weeks. This morning she noted her mother became unresponsive, fell to her side, noted facial droop, her eyes were "fluttering" and her tongue was rolled back. After the episode she remained lethargic for about 3 hours. In the ER, NIH was 0. Patient was more alert and responsive. CT head was negative. (26 Nov 2018 17:08)    REVIEW OF SYSTEMS:    Patient denied fever, chills, abdominal pain, nausea, vomiting, cough, shortness of breath, chest pain or palpitations    Vital Signs Last 24 Hrs  T(C): 36.8 (17 Dec 2018 09:15), Max: 36.8 (17 Dec 2018 01:38)  T(F): 98.3 (17 Dec 2018 09:15), Max: 98.3 (17 Dec 2018 09:15)  HR: 58 (17 Dec 2018 09:15) (58 - 65)  BP: 162/72 (17 Dec 2018 09:15) (154/78 - 162/83)  BP(mean): --  RR: 19 (17 Dec 2018 09:15) (18 - 19)  SpO2: 100% (17 Dec 2018 09:15) (99% - 100%)I&O's Summary    16 Dec 2018 07:01  -  17 Dec 2018 07:00  --------------------------------------------------------  IN: 950 mL / OUT: 0 mL / NET: 950 mL      PHYSICAL EXAM:  GENERAL: NGT in place.  Minimally responsive  HEENT: PERRL, +EOMI, anicteric, no [place]  NECK: Supple, No JVD   CHEST/LUNG: bilateral transmitted breath sound   HEART: S1S2 Normal intensity, no murmurs, gallops or rubs noted  ABDOMEN: Soft, BS Normoactive, NT, ND, no HSM noted  EXTREMITIES:   no clubbing, cyanosis, or edema noted, Bed bound   SKIN: No rash  NEURO:  Non verbal   PSYCH:  Unresponsive    LABS:                        10.6   6.7   )-----------( 184      ( 17 Dec 2018 10:10 )             32.7     12-17    147<H>  |  107  |  53.0<H>  ----------------------------<  196<H>  4.8   |  30.0<H>  |  1.02    Ca    9.8      17 Dec 2018 10:10          RADIOLOGY & ADDITIONAL TESTS:    MEDICATIONS:  MEDICATIONS  (STANDING):  aspirin  chewable 81 milliGRAM(s) Oral daily  brimonidine 0.2% Ophthalmic Solution 1 Drop(s) Both EYES daily  cefepime   IVPB 1000 milliGRAM(s) IV Intermittent every 12 hours  dextrose 5%. 1000 milliLiter(s) (50 mL/Hr) IV Continuous <Continuous>  dextrose 50% Injectable 12.5 Gram(s) IV Push once  dextrose 50% Injectable 25 Gram(s) IV Push once  dextrose 50% Injectable 25 Gram(s) IV Push once  enoxaparin Injectable 40 milliGRAM(s) SubCutaneous daily  hydrALAZINE 25 milliGRAM(s) Oral every 8 hours  insulin detemir injectable (LEVEMIR) 16 Unit(s) SubCutaneous every 12 hours  insulin lispro (HumaLOG) corrective regimen sliding scale   SubCutaneous every 6 hours  lactobacillus acidophilus 1 Tablet(s) Oral daily  latanoprost 0.005% Ophthalmic Solution 1 Drop(s) Both EYES at bedtime  levETIRAcetam  IVPB 250 milliGRAM(s) IV Intermittent every 12 hours  sodium chloride 0.45%. 1000 milliLiter(s) (75 mL/Hr) IV Continuous <Continuous>  timolol 0.25% Solution 1 Drop(s) Both EYES daily    MEDICATIONS  (PRN):  acetaminophen    Suspension .. 765 milliGRAM(s) Oral every 6 hours PRN Moderate Pain (4 - 6)  dextrose 40% Gel 15 Gram(s) Oral once PRN Blood Glucose LESS THAN 70 milliGRAM(s)/deciliter  glucagon  Injectable 1 milliGRAM(s) IntraMuscular once PRN Glucose LESS THAN 70 milligrams/deciliter

## 2018-12-17 NOTE — PROGRESS NOTE ADULT - PROBLEM SELECTOR PLAN 5
Family has requested PEG tube evaluation.  Multiple discussions with family regarding patient's medical condition and prognosis.   No definite decision made

## 2018-12-18 NOTE — PROGRESS NOTE ADULT - ASSESSMENT
1. Acute toxic-metabolic encephalopathy - secondary to a combination of acute CVA on admission, seizure disorder worsened by , MISSY and hypernatremia and UTI during hospitalization   Repeat CT head on 12/11 was negative for acute CVA    24 hour video EEG showed abnormal cortical hyperexcitability and abnormality in the left temporal region consistent with 3 previous EEG's + for diffuse cerebral dysfunction.  Started on IV keppra Q12 hours. Spoke with neurology; there were no plans to repeat EEG. However the patient's son and daughters said they would want a neurology determination of brain death or otherwise     UTI: Proteus Mirabalis UTi   History of Proteus UTi and renal stones in the past.- renal ultrasound with no nephrolithiasis   Previous UA x 2 negative on 11/27 and 12/1 as well as urine culture on 12/1  culture positive on 12/9, culture on 12/11 + Proteus   HIV was negative  Cefepime 1g iv q12    MISSY with Hypernatremia--  Prerenal azotemia is predominant picture. Cautious admin of free water and hypotonic saline for risk of fluid overload.     Hyperkalemia:  Mild . Resolved   Ammonia levels wnl.  LFTS WNL  ABG did not show CO2 narcosis. repeat ABG showed no evidence of acidosis pH wnl- Pco2 of 42 not contributing to lethargy    DM  4. Diabetes mellitus type 2  -HbA1c 6.0  Levemir, Sliding scale insulin  Family concerned that hyperglycemia is contributing to lethargy/encephalopathy. discussed with them in detail that tight control of BGL in a hospitalized patient may be detrimental and risks of hypoglycemia.  Family requested to speak to Dr Ferrer, patient's outpatient endocrinologist as well.  Spoken  to him and he is agreement with the current treatment plan and agrees a BSL in the 200s with no acidosis would NOT be contributing to the patient's lethargy.  Dr Lombardi inhouse endocrinologist also in agreement     TSH of 2.4 WNL. T4 is WNL. T3 is on the lower side at 42, Likely sick euthyroid syndrome. NO treatment is indicated at this time  Cortisol level WNL  Carnitine level is WN     2. Afib with SSS/ tachy-truong syndrome:   Rate is controlled  Per cardiology the patient is a poor candidate for PPM insertion at this time or anti-coagulation  Continue aspirin therapy  Discontinued metoprolol at behest of daughter who reported it was discontinued by outpatient medical provider    3. Hypertensive urgency : BP Improving   -Po hydralazine via NG tube    5. Acute right frontal CVA on admission: Aspirin PO  Family refusing statin therapy as it causes myalgia and muscle weakness. They were counselled on the importance of statin therapy in secondary stroke prevention. They have refused simvastatin and atorvastatin.     6. History of REBECCA and COPD: No evidence of co2 retention or acidosis on ABG   Nocturnal and prn bipap    Advance care planning:  Hospitalist discussed with son and Primary HCP Rex along with Dr Samson regarding patients medical condition, vitals, lab and test results. His main concerns seem to be hyperglycemia, uti and seizure disorder worsening patient's mentation. Explained to family  2 endocrinologists have weighed in and stated hyperglycemia is not contributing her lethargy/encephalopathy. He feels that once she completes the treatment for the UTI she will become more responsive. He would like to speak to neurology about another EEG for follow up.  Eexplained the progression of her clinical condition and progressive decline given age and co- morbidities she has a poor overall prognosis. He understands but is not yet ready to make the decision to withdraw care in the hopes she may improve. He also has many family members who again are not ready and he does not ultimately want to be the one to make this decision alone. We spoke about his mother's living will and requested a copy in which she states she would not want artificial nutrition. He will speak to his sisters about providing the living Will.     Family still equivocal or ambivalent on their request for peg tube placement.  Have explained patient  would be high risk for the procedure.  Cardiology and pulmonology gave provisional clearance for peg tube placement.  Pending firm decision by family .     Ultimately he inquired if his family would want a second opinion could she be transferred to another facility and it was explained she would need an accepting facility for transfer.   She is FULL CODE at this time     Patient has a poor prognosis. At this point they need to decide regarding PEG tube placement vs home with hospice. They have had extensive meetings with palliative care and the hospice team to discuss their options. 1. Acute toxic-metabolic encephalopathy - secondary to a combination of acute CVA on admission, seizure disorder worsened by , MISSY and hypernatremia and UTI during hospitalization   Repeat CT head on 12/11 was negative for acute CVA    UTI: Proteus Mirabalis UTi   History of Proteus UTi and renal stones in the past.- renal ultrasound with no nephrolithiasis   Previous UA x 2 negative on 11/27 and 12/1 as well as urine culture on 12/1  culture positive on 12/9, culture on 12/11 + Proteus   HIV was negative  Cefepime 1g iv q12 completed abx    Siezure disorder   24 hour video EEG showed abnormal cortical hyperexcitability and abnormality in the left temporal region consistent with 3 previous EEG's + for diffuse cerebral dysfunction.  Started on IV keppra Q12 hours. Spoke with neurology; there were no plans to repeat EEG. However the patient's son and daughters said they would want a neurology determination of brain death or otherwise     MISSY with Hypernatremia--  Prerenal azotemia is predominant picture. Cautious admin of free water and hypotonic saline for risk of fluid overload.     Hyperkalemia:  Mild . Resolved   Ammonia levels wnl.  LFTS WNL  ABG did not show CO2 narcosis. repeat ABG showed no evidence of acidosis pH wnl- Pco2 of 42 not contributing to lethargy    DM  4. Diabetes mellitus type 2  -HbA1c 6.0  Levemir, Sliding scale insulin  Family concerned that hyperglycemia is contributing to lethargy/encephalopathy. discussed with them in detail that tight control of BGL in a hospitalized patient may be detrimental and risks of hypoglycemia.  Family requested to speak to Dr Ferrer, patient's outpatient endocrinologist as well.  Spoken  to him and he is agreement with the current treatment plan and agrees a BSL in the 200s with no acidosis would NOT be contributing to the patient's lethargy.  Dr Lombardi inhouse endocrinologist also in agreement     TSH of 2.4 WNL. T4 is WNL. T3 is on the lower side at 42, Likely sick euthyroid syndrome. NO treatment is indicated at this time  Cortisol level WNL  Carnitine level is WN     2. Afib with SSS/ tachy-truong syndrome:   Rate is controlled  Per cardiology the patient is a poor candidate for PPM insertion at this time or anti-coagulation  Continue aspirin therapy  Discontinued metoprolol at behest of daughter who reported it was discontinued by outpatient medical provider    3. Hypertensive urgency : BP Improving   -Po hydralazine via NG tube    5. Acute right frontal CVA on admission: Aspirin PO  Family refusing statin therapy as it causes myalgia and muscle weakness. They were counselled on the importance of statin therapy in secondary stroke prevention. They have refused simvastatin and atorvastatin.     6. History of REBECCA and COPD: No evidence of co2 retention or acidosis on ABG   Nocturnal and prn bipap    Advance care planning:  Ongoing multidisciplinary discussion with family regarding advance care planning .  Patient on NGT feeding which is not a recommended  long term mode of nutrition.  Family still equivocating  or ambivalent on their request for peg tube placement.  Have explained patient  would be high risk for the procedure.  Cardiology and pulmonology gave provisional clearance for peg tube placement.  Pending firm decision by family .     Dr Hung spoke to family at length and provided a second neurology opinion as per family request.      Supportive care.

## 2018-12-18 NOTE — GOALS OF CARE CONVERSATION - PERSONAL ADVANCE DIRECTIVE - CONVERSATION DETAILS
Revisited this case with my Hospice  Dr LISE Flood as family appears to be struggling with the decision regarding whether  or nor to pursue a peg tube,  unsure if this  has cultural or Orthodoxy significance to family  but hospice  Md states  that they would consider  taking the pt with a peg  if  otherwise family was agreeable  to a hospice plan of care which would be focusing on pt overall comfort and quality of life and not pursuing aggressive medical work ups , diagnostic and procedures  that did not palliate  any symptom . Spoke with Dara  who states she will discuss with her siblings . She also states that pt's PMD  Dr Jarred Austin has encouraged the family to make a decision  and explained  he risks benefits of ng tube/peg tubes  and their possible complications  as per dara this was consistent  with the explanation provided by  NP Bethanie Dee .  Endorsed above to Bethanie Dee NP . Hospice will remain available

## 2018-12-18 NOTE — PROGRESS NOTE ADULT - SUBJECTIVE AND OBJECTIVE BOX
NEPHROLOGY INTERVAL HPI/OVERNIGHT EVENTS:    Examined earlier    MEDICATIONS  (STANDING):  aspirin  chewable 81 milliGRAM(s) Oral daily  brimonidine 0.2% Ophthalmic Solution 1 Drop(s) Both EYES daily  dextrose 5%. 1000 milliLiter(s) (50 mL/Hr) IV Continuous <Continuous>  dextrose 50% Injectable 12.5 Gram(s) IV Push once  dextrose 50% Injectable 25 Gram(s) IV Push once  dextrose 50% Injectable 25 Gram(s) IV Push once  enoxaparin Injectable 40 milliGRAM(s) SubCutaneous daily  hydrALAZINE 25 milliGRAM(s) Oral every 8 hours  insulin detemir injectable (LEVEMIR) 16 Unit(s) SubCutaneous every 12 hours  insulin lispro (HumaLOG) corrective regimen sliding scale   SubCutaneous every 6 hours  lactobacillus acidophilus 1 Tablet(s) Oral daily  latanoprost 0.005% Ophthalmic Solution 1 Drop(s) Both EYES at bedtime  levETIRAcetam  IVPB 250 milliGRAM(s) IV Intermittent every 12 hours  timolol 0.25% Solution 1 Drop(s) Both EYES daily    MEDICATIONS  (PRN):  acetaminophen    Suspension .. 765 milliGRAM(s) Oral every 6 hours PRN Moderate Pain (4 - 6)  dextrose 40% Gel 15 Gram(s) Oral once PRN Blood Glucose LESS THAN 70 milliGRAM(s)/deciliter  glucagon  Injectable 1 milliGRAM(s) IntraMuscular once PRN Glucose LESS THAN 70 milligrams/deciliter      Allergies    contrast media (iodine-based) (Urticaria)  erythromycin (Unknown)  IV-Dye (Unknown)  sulfADIAZINE (Unknown)    Intolerances        Vital Signs Last 24 Hrs  T(C): 36.5 (18 Dec 2018 16:56), Max: 37.1 (17 Dec 2018 20:10)  T(F): 97.7 (18 Dec 2018 16:56), Max: 98.7 (17 Dec 2018 20:10)  HR: 69 (18 Dec 2018 16:56) (62 - 80)  BP: 144/79 (18 Dec 2018 16:56) (132/55 - 190/73)  BP(mean): --  RR: 26 (18 Dec 2018 16:56) (18 - 26)  SpO2: 99% (18 Dec 2018 16:56) (94% - 100%)  Daily     Daily     PHYSICAL EXAM:  GENERAL: appears chronically ill  HEAD:  Atraumatic, Normocephalic  EYES: EOMI  NECK: Supple, neck  veins full  NERVOUS SYSTEM:  unresponsive  CHEST/LUNG: dec bs B/L  HEART: Regular rate and rhythm; No murmur  ABDOMEN: Soft, Nontender, Nondistended; Bowel sounds present  EXTREMITIES:  no edema    LABS:                        10.6   6.6   )-----------( 202      ( 18 Dec 2018 07:45 )             33.8     12-18    148<H>  |  109<H>  |  45.0<H>  ----------------------------<  161<H>  4.8   |  27.0  |  0.82    Ca    9.4      18 Dec 2018 07:45    TPro  5.8<L>  /  Alb  3.3  /  TBili  0.3<L>  /  DBili  x   /  AST  22  /  ALT  13  /  AlkPhos  45  12-18                RADIOLOGY & ADDITIONAL TESTS:

## 2018-12-18 NOTE — CHART NOTE - NSCHARTNOTEFT_GEN_A_CORE
Called by RN to replace Dobhoff for feeding.  VSS.  Pt on bipap.  Replaced dobhoff in right nostril without difficulty.  Placement checked with +auscultation.  Pt tolerated procedure well.  Will order portable CXR to confirm placement.  RN to hold feedings until confirmation and pt off bipap.  Observe and reassess prn.

## 2018-12-18 NOTE — PROGRESS NOTE ADULT - ASSESSMENT
89y Female who is followed by neurology because of encephalopathy    Encephalopathy/delirium  Likely multi factorial  Toxic-metabolic origin.   Possible underlying dementia although family denies.     Seizure disorder  She had a video EEG which again showed epileptiform activity in left temporal lobe.  She is currently on low dose Keppra.     Discharge planning.  I had an extensive discussion with the patient's son by phone last night, her daughter at bedside this morning, and her other daughter by phone this morning.   I explained my examination findings and described her condition.     They are in the process of trying to decide upon how to proceed from this point. (PEG vs palliative care).     All questions were answered to the best of my ability.     I discussed with Dr Rainey as well and explained that any further neurologic issues should be referred back to her primary treating neurologists at [facility].

## 2018-12-18 NOTE — PROVIDER CONTACT NOTE (OTHER) - SITUATION
MD gave provider to RT note to use Pt's own CPAP machine which is not a real order, need an order as patient may use own cpap at night

## 2018-12-18 NOTE — CHART NOTE - NSCHARTNOTEFT_GEN_A_CORE
Tube Feeding Rec increase Jevity 1.5 to 75 ml /hr x 18 hrs ( 2,0,25 kcal 92 g pro) to allow for Bipap at night Tube Feeding Rec increase Jevity 1.5 to 75 ml /hr x 18 hrs ( 2,0,25 kcal 92 g pro) to allow for Cpap at night

## 2018-12-18 NOTE — PROGRESS NOTE ADULT - ASSESSMENT
HyperNatremia/ MISSY suspect 2/2 poor nutrition dehydration  Na slowly improving cont hypotonic IVF   Renal sonogram noted no hydronephrosis  Hyperkalemia resolved  CVA/ neuro eval noted  AM labs     Will follow

## 2018-12-18 NOTE — PROGRESS NOTE ADULT - SUBJECTIVE AND OBJECTIVE BOX
CC: Encephalopathy , unresponsive.  CVA, UTI.    HPI:  The patient is a 89 year old female with a history of CVA< seizure disorder, hypertension and diabetes mellitus who was brought to the ER for complaints of altered mental status. Patient was diagnosed with seizure disorder last year and started on keppra po. Over the past few months the family has noted she has been more lethargic and confused. She saw Dr Mcallister, neurologist at [facility]. According to the daughter she had a number of tests which indicated she did not have seizure disorder and she was advised to taper off the keppra. Her daughter tapered it off over the past 2 weeks. This morning she noted her mother became unresponsive, fell to her side, noted facial droop, her eyes were "fluttering" and her tongue was rolled back. After the episode she remained lethargic for about 3 hours. In the ER, NIH was 0. Patient was more alert and responsive. CT head was negative. (26 Nov 2018 17:08)    REVIEW OF SYSTEMS:    Patient denied fever, chills, abdominal pain, nausea, vomiting, cough, shortness of breath, chest pain or palpitations    Vital Signs Last 24 Hrs  T(C): 36.5 (18 Dec 2018 16:56), Max: 37.1 (17 Dec 2018 20:10)  T(F): 97.7 (18 Dec 2018 16:56), Max: 98.7 (17 Dec 2018 20:10)  HR: 69 (18 Dec 2018 16:56) (62 - 80)  BP: 144/79 (18 Dec 2018 16:56) (132/55 - 190/73)  BP(mean): --  RR: 26 (18 Dec 2018 16:56) (18 - 26)  SpO2: 99% (18 Dec 2018 16:56) (94% - 100%)I&O's Summary    17 Dec 2018 07:01  -  18 Dec 2018 07:00  --------------------------------------------------------  IN: 1375 mL / OUT: 0 mL / NET: 1375 mL      PHYSICAL EXAM:  GENERAL: NAD, Obese  HEENT: PERRL, +EOMI, anicteric, no [place]. NG tube in place   NECK: Supple, No JVD   CHEST/LUNG: Bilateral transmitted breath sounds  HEART: S1S2 Normal intensity, no murmurs, gallops or rubs noted  ABDOMEN: Soft, BS Normoactive, NT, ND, no HSM noted  EXTREMITIES: Bed bound  SKIN: No rashes or lesions noted  NEURO: Unresponsive  PSYCH: Non verbal , unresponsive   LABS:                        10.6   6.6   )-----------( 202      ( 18 Dec 2018 07:45 )             33.8     12-18    148<H>  |  109<H>  |  45.0<H>  ----------------------------<  161<H>  4.8   |  27.0  |  0.82    Ca    9.4      18 Dec 2018 07:45    TPro  5.8<L>  /  Alb  3.3  /  TBili  0.3<L>  /  DBili  x   /  AST  22  /  ALT  13  /  AlkPhos  45  12-18        RADIOLOGY & ADDITIONAL TESTS:    MEDICATIONS:  MEDICATIONS  (STANDING):  aspirin  chewable 81 milliGRAM(s) Oral daily  brimonidine 0.2% Ophthalmic Solution 1 Drop(s) Both EYES daily  dextrose 5%. 1000 milliLiter(s) (50 mL/Hr) IV Continuous <Continuous>  dextrose 50% Injectable 12.5 Gram(s) IV Push once  dextrose 50% Injectable 25 Gram(s) IV Push once  dextrose 50% Injectable 25 Gram(s) IV Push once  dextrose 50% Injectable 25 Gram(s) IV Push once  dextrose 50% Injectable 25 Gram(s) IV Push once  enoxaparin Injectable 40 milliGRAM(s) SubCutaneous daily  hydrALAZINE 25 milliGRAM(s) Oral every 8 hours  insulin detemir injectable (LEVEMIR) 16 Unit(s) SubCutaneous every 12 hours  insulin lispro (HumaLOG) corrective regimen sliding scale   SubCutaneous every 6 hours  lactobacillus acidophilus 1 Tablet(s) Oral daily  latanoprost 0.005% Ophthalmic Solution 1 Drop(s) Both EYES at bedtime  levETIRAcetam  IVPB 250 milliGRAM(s) IV Intermittent every 12 hours  timolol 0.25% Solution 1 Drop(s) Both EYES daily    MEDICATIONS  (PRN):  acetaminophen    Suspension .. 765 milliGRAM(s) Oral every 6 hours PRN Moderate Pain (4 - 6)  dextrose 40% Gel 15 Gram(s) Oral once PRN Blood Glucose LESS THAN 70 milliGRAM(s)/deciliter  glucagon  Injectable 1 milliGRAM(s) IntraMuscular once PRN Glucose LESS THAN 70 milligrams/deciliter

## 2018-12-18 NOTE — PROGRESS NOTE ADULT - SUBJECTIVE AND OBJECTIVE BOX
[facility]                                        [facility] [place]                                 Ashkan Rosenbaum, & Christofer                                  [address]                                        [place], [place], [address]                                             [phone]        CC: seizure and altered mental status     HPI:   The patient is a 89y Female who presented with AMS on 11/26/18. There was a question of seizure activity prior to admission.  She has a history of abnormal EEGs with epileptiform discharges as both outpatient and inpatient during this admission.  She recently tapered Keppra per a different outside neurologist.  She was restarted on Keppra and then changed to low dose Vimpat 50 mg bid.  She was still lethargic but had metabolic and infectious issues.    She had been followed by [facility] who had seen her in the office.   Dr Luther had seen her as a second opinion on 12/6/18, and again on 12/12/18.  She had V-EEG monitoring that confirmed epileptiform activity.     Interim history:  She has remained lethargic and has been on low dose Keppra.     ROS:   Unobtainable due to patient's condition.     MEDICATIONS  (STANDING):  aspirin  chewable 81 milliGRAM(s) Oral daily  brimonidine 0.2% Ophthalmic Solution 1 Drop(s) Both EYES daily  dextrose 5%. 1000 milliLiter(s) (50 mL/Hr) IV Continuous <Continuous>  enoxaparin Injectable 40 milliGRAM(s) SubCutaneous daily  hydrALAZINE 25 milliGRAM(s) Oral every 8 hours  insulin detemir injectable (LEVEMIR) 16 Unit(s) SubCutaneous every 12 hours  insulin lispro (HumaLOG) corrective regimen sliding scale   SubCutaneous every 6 hours  lactobacillus acidophilus 1 Tablet(s) Oral daily  latanoprost 0.005% Ophthalmic Solution 1 Drop(s) Both EYES at bedtime  levETIRAcetam  IVPB 250 milliGRAM(s) IV Intermittent every 12 hours  sodium chloride 0.45%. 1000 milliLiter(s) (75 mL/Hr) IV Continuous <Continuous>  timolol 0.25% Solution 1 Drop(s) Both EYES daily      Vital Signs Last 24 Hrs  T(C): 36.5 (18 Dec 2018 06:00), Max: 37.1 (17 Dec 2018 20:10)  T(F): 97.7 (18 Dec 2018 06:00), Max: 98.7 (17 Dec 2018 20:10)  HR: 66 (18 Dec 2018 06:00) (58 - 80)  BP: 162/70 (18 Dec 2018 06:00) (132/55 - 190/73)  RR: 18 (18 Dec 2018 06:00) (18 - 19)  SpO2: 100% (18 Dec 2018 06:00) (94% - 100%)    Detailed Neurologic Exam:    Mental status: Unresponsive to voice. Not following instructions. Non verbal.     Cranial nerves: Pupils equal and react symmetrically to light. There is no blink to threat from either side. She does not track with gaze although eyes move slightly to oculocephalic maneuvers. Corneal reflexes are present. Facial musculature is symmetric. Palate and tongue cannot be assessed.     Motor/Sensory: There is normal bulk and tone.  There is no tremor.  Symmetric limb movement to stimuli although minimal.     Reflexes: 1+ throughout and plantar responses are flexor.    Cerebellar: Cannot be tested.     Labs:     12-18    148<H>  |  109<H>  |  45.0<H>  ----------------------------<  161<H>  4.8   |  27.0  |  0.82    Ca    9.4      18 Dec 2018 07:45    TPro  5.8<L>  /  Alb  3.3  /  TBili  0.3<L>  /  DBili  x   /  AST  22  /  ALT  13  /  AlkPhos  45  12-18                            10.6   6.7   )-----------( 184      ( 17 Dec 2018 10:10 )             32.7       Rad:   V-EEG Monitoring showed frequent sharp activity in the left temporal region with generalized slowing of background and superimposed focal slowing in the left temporal region.

## 2018-12-19 NOTE — PROGRESS NOTE ADULT - ASSESSMENT
HyperNatremia/ MISSY suspect 2/2 poor nutrition dehydration  Na slowly improving cont hypotonic IVF   Renal sonogram noted no hydronephrosis  Hyperkalemia resolved  Labs in am   D/W daughter

## 2018-12-19 NOTE — PROGRESS NOTE ADULT - SUBJECTIVE AND OBJECTIVE BOX
Interval Events:  no overnight events  follow up on type 2 diabetes    patient seen and examined at bedside.  Patient still remains unresponsive to verbal or tactile stimuli  daughter at bedside  daughter reported family meeting this week      REVIEW OF SYSTEMS:  unable to obtain        contrast media (iodine-based) (Urticaria)  erythromycin (Unknown)  IV-Dye (Unknown)  sulfADIAZINE (Unknown)      MEDICATIONS  (STANDING):  aspirin  chewable 81 milliGRAM(s) Oral daily  brimonidine 0.2% Ophthalmic Solution 1 Drop(s) Both EYES daily  dextrose 5%. 1000 milliLiter(s) (50 mL/Hr) IV Continuous <Continuous>  dextrose 50% Injectable 12.5 Gram(s) IV Push once  dextrose 50% Injectable 25 Gram(s) IV Push once  dextrose 50% Injectable 25 Gram(s) IV Push once  enoxaparin Injectable 40 milliGRAM(s) SubCutaneous daily  hydrALAZINE 50 milliGRAM(s) Oral every 8 hours  insulin lispro (HumaLOG) corrective regimen sliding scale   SubCutaneous every 6 hours  lactobacillus acidophilus 1 Tablet(s) Oral daily  latanoprost 0.005% Ophthalmic Solution 1 Drop(s) Both EYES at bedtime  levETIRAcetam  IVPB 250 milliGRAM(s) IV Intermittent every 12 hours  timolol 0.25% Solution 1 Drop(s) Both EYES daily    MEDICATIONS  (PRN):  acetaminophen    Suspension .. 765 milliGRAM(s) Oral every 6 hours PRN Moderate Pain (4 - 6)  dextrose 40% Gel 15 Gram(s) Oral once PRN Blood Glucose LESS THAN 70 milliGRAM(s)/deciliter  glucagon  Injectable 1 milliGRAM(s) IntraMuscular once PRN Glucose LESS THAN 70 milligrams/deciliter  hydrALAZINE Injectable 10 milliGRAM(s) IV Push every 4 hours PRN Patient npo ngt tube clogged      Vital Signs Last 24 Hrs  T(C): 36.7 (19 Dec 2018 15:35), Max: 36.7 (19 Dec 2018 15:35)  T(F): 98.1 (19 Dec 2018 15:35), Max: 98.1 (19 Dec 2018 15:35)  HR: 67 (19 Dec 2018 15:35) (56 - 83)  BP: 167/70 (19 Dec 2018 15:35) (144/79 - 192/81)  BP(mean): --  RR: 20 (19 Dec 2018 08:31) (20 - 26)  SpO2: 100% (19 Dec 2018 15:35) (96% - 100%)    PHYSICAL EXAM:    Gen: elderly female, unresponsive   HEENT:  MMM  Neck:  no thyromegaly, no LAD  CV:  nl S1 + S2, RRR, no m/r/g  Resp:  nl respiratory effort, CTA b/l  GI/ Abd: soft, NT/ ND, BS +  MS:  no edema in LEs,  no purposeful movements of extremities.       LABS  12-18    148<H>  |  109<H>  |  45.0<H>  ----------------------------<  161<H>  4.8   |  27.0  |  0.82    Ca    9.4      18 Dec 2018 07:45    TPro  5.8<L>  /  Alb  3.3  /  TBili  0.3<L>  /  DBili  x   /  AST  22  /  ALT  13  /  AlkPhos  45  12-18                          10.6   6.6   )-----------( 202      ( 18 Dec 2018 07:45 )             33.8         Alanine Aminotransferase (ALT/SGPT): 13 U/L (12-18-18 @ 07:45)  Albumin, Serum: 3.3 g/dL (12-18-18 @ 07:45)  Aspartate Aminotransferase (AST/SGOT): 22 U/L (12-18-18 @ 07:45)  Alkaline Phosphatase, Serum: 45 U/L (12-18-18 @ 07:45)    CAPILLARY BLOOD GLUCOSE      POCT Blood Glucose.: 162 mg/dL (19 Dec 2018 12:49)  POCT Blood Glucose.: 108 mg/dL (19 Dec 2018 05:18)  POCT Blood Glucose.: 93 mg/dL (18 Dec 2018 23:39)  POCT Blood Glucose.: 155 mg/dL (18 Dec 2018 17:50)  POCT Blood Glucose.: 56 mg/dL (18 Dec 2018 17:23) Interval Events:  no overnight events  follow up on type 2 diabetes    patient seen and examined at bedside.  Patient still remains unresponsive to verbal or tactile stimuli  daughter at bedside  daughter reported family meeting this week      REVIEW OF SYSTEMS:  unable to obtain        contrast media (iodine-based) (Urticaria)  erythromycin (Unknown)  IV-Dye (Unknown)  sulfADIAZINE (Unknown)      MEDICATIONS  (STANDING):  aspirin  chewable 81 milliGRAM(s) Oral daily  brimonidine 0.2% Ophthalmic Solution 1 Drop(s) Both EYES daily  dextrose 5%. 1000 milliLiter(s) (50 mL/Hr) IV Continuous <Continuous>  dextrose 50% Injectable 12.5 Gram(s) IV Push once  dextrose 50% Injectable 25 Gram(s) IV Push once  dextrose 50% Injectable 25 Gram(s) IV Push once  enoxaparin Injectable 40 milliGRAM(s) SubCutaneous daily  hydrALAZINE 50 milliGRAM(s) Oral every 8 hours  insulin lispro (HumaLOG) corrective regimen sliding scale   SubCutaneous every 6 hours  lactobacillus acidophilus 1 Tablet(s) Oral daily  latanoprost 0.005% Ophthalmic Solution 1 Drop(s) Both EYES at bedtime  levETIRAcetam  IVPB 250 milliGRAM(s) IV Intermittent every 12 hours  timolol 0.25% Solution 1 Drop(s) Both EYES daily    MEDICATIONS  (PRN):  acetaminophen    Suspension .. 765 milliGRAM(s) Oral every 6 hours PRN Moderate Pain (4 - 6)  dextrose 40% Gel 15 Gram(s) Oral once PRN Blood Glucose LESS THAN 70 milliGRAM(s)/deciliter  glucagon  Injectable 1 milliGRAM(s) IntraMuscular once PRN Glucose LESS THAN 70 milligrams/deciliter  hydrALAZINE Injectable 10 milliGRAM(s) IV Push every 4 hours PRN Patient npo ngt tube clogged      Vital Signs Last 24 Hrs  T(C): 36.7 (19 Dec 2018 15:35), Max: 36.7 (19 Dec 2018 15:35)  T(F): 98.1 (19 Dec 2018 15:35), Max: 98.1 (19 Dec 2018 15:35)  HR: 67 (19 Dec 2018 15:35) (56 - 83)  BP: 167/70 (19 Dec 2018 15:35) (144/79 - 192/81)  BP(mean): --  RR: 20 (19 Dec 2018 08:31) (20 - 26)  SpO2: 100% (19 Dec 2018 15:35) (96% - 100%)    PHYSICAL EXAM:    Gen: elderly female, unresponsive   HEENT:  MMM  Neck:  no thyromegaly, no LAD  CV:  nl S1 + S2, RRR, no m/r/g  Resp:  nl respiratory effort, CTA b/l  GI/ Abd: soft, NT/ ND, BS +  MS:  no edema in LEs, no purposeful movements of extremities.       LABS  12-18    148<H>  |  109<H>  |  45.0<H>  ----------------------------<  161<H>  4.8   |  27.0  |  0.82    Ca    9.4      18 Dec 2018 07:45    TPro  5.8<L>  /  Alb  3.3  /  TBili  0.3<L>  /  DBili  x   /  AST  22  /  ALT  13  /  AlkPhos  45  12-18                          10.6   6.6   )-----------( 202      ( 18 Dec 2018 07:45 )             33.8         Alanine Aminotransferase (ALT/SGPT): 13 U/L (12-18-18 @ 07:45)  Albumin, Serum: 3.3 g/dL (12-18-18 @ 07:45)  Aspartate Aminotransferase (AST/SGOT): 22 U/L (12-18-18 @ 07:45)  Alkaline Phosphatase, Serum: 45 U/L (12-18-18 @ 07:45)    CAPILLARY BLOOD GLUCOSE      POCT Blood Glucose.: 162 mg/dL (19 Dec 2018 12:49)  POCT Blood Glucose.: 108 mg/dL (19 Dec 2018 05:18)  POCT Blood Glucose.: 93 mg/dL (18 Dec 2018 23:39)  POCT Blood Glucose.: 155 mg/dL (18 Dec 2018 17:50)  POCT Blood Glucose.: 56 mg/dL (18 Dec 2018 17:23)

## 2018-12-19 NOTE — PROGRESS NOTE ADULT - SUBJECTIVE AND OBJECTIVE BOX
NEPHROLOGY INTERVAL HPI/OVERNIGHT EVENTS:    No effectual change   IVF and feeds noted   Second Neuro opinion noted     MEDICATIONS  (STANDING):  aspirin  chewable 81 milliGRAM(s) Oral daily  brimonidine 0.2% Ophthalmic Solution 1 Drop(s) Both EYES daily  dextrose 5%. 1000 milliLiter(s) (50 mL/Hr) IV Continuous <Continuous>  dextrose 50% Injectable 12.5 Gram(s) IV Push once  dextrose 50% Injectable 25 Gram(s) IV Push once  dextrose 50% Injectable 25 Gram(s) IV Push once  enoxaparin Injectable 40 milliGRAM(s) SubCutaneous daily  hydrALAZINE 50 milliGRAM(s) Oral every 8 hours  insulin lispro (HumaLOG) corrective regimen sliding scale   SubCutaneous every 6 hours  lactobacillus acidophilus 1 Tablet(s) Oral daily  latanoprost 0.005% Ophthalmic Solution 1 Drop(s) Both EYES at bedtime  levETIRAcetam  IVPB 250 milliGRAM(s) IV Intermittent every 12 hours  timolol 0.25% Solution 1 Drop(s) Both EYES daily    MEDICATIONS  (PRN):  acetaminophen    Suspension .. 765 milliGRAM(s) Oral every 6 hours PRN Moderate Pain (4 - 6)  dextrose 40% Gel 15 Gram(s) Oral once PRN Blood Glucose LESS THAN 70 milliGRAM(s)/deciliter  glucagon  Injectable 1 milliGRAM(s) IntraMuscular once PRN Glucose LESS THAN 70 milligrams/deciliter  hydrALAZINE Injectable 10 milliGRAM(s) IV Push every 4 hours PRN Patient npo ngt tube clogged      Allergies    contrast media (iodine-based) (Urticaria)  erythromycin (Unknown)  IV-Dye (Unknown)  sulfADIAZINE (Unknown)    Intolerances        Vital Signs Last 24 Hrs  T(C): 36.4 (19 Dec 2018 08:31), Max: 36.5 (18 Dec 2018 16:56)  T(F): 97.5 (19 Dec 2018 08:31), Max: 97.7 (18 Dec 2018 16:56)  HR: 73 (19 Dec 2018 08:31) (56 - 73)  BP: 145/71 (19 Dec 2018 08:31) (144/79 - 192/81)  BP(mean): --  RR: 20 (19 Dec 2018 08:31) (20 - 26)  SpO2: 96% (19 Dec 2018 08:31) (96% - 100%)  Daily     Daily   I&O's Detail    I&O's Summary      PHYSICAL EXAM:  GENERAL: appears chronically ill  HEAD:  Atraumatic, Normocephalic  EYES: EOMI  NECK: Supple, neck  veins full  NERVOUS SYSTEM:  unresponsive  CHEST/LUNG: dec bs B/L  HEART: Regular rate and rhythm; No murmur  ABDOMEN: Soft, Nontender, Nondistended; Bowel sounds present  EXTREMITIES:  no edema    LABS:                        10.6   6.6   )-----------( 202      ( 18 Dec 2018 07:45 )             33.8     12-18    148<H>  |  109<H>  |  45.0<H>  ----------------------------<  161<H>  4.8   |  27.0  |  0.82    Ca    9.4      18 Dec 2018 07:45    TPro  5.8<L>  /  Alb  3.3  /  TBili  0.3<L>  /  DBili  x   /  AST  22  /  ALT  13  /  AlkPhos  45  12-18                RADIOLOGY & ADDITIONAL TESTS:

## 2018-12-19 NOTE — CHART NOTE - NSCHARTNOTEFT_GEN_A_CORE
Palliative care social work note.    SW and Hospice RN Qi met with patients son and 2 dgts at bedside earlier today. Hospice philosophy reviewed with family who appear to be on aggressive , diagnostic and treatment focus. Family has not made decisions on feeding tube and question why dophoff can not remain. Son states his hope is to give patient chance to improve and get nutrition. Family was t meet with hospitalist and GI physician to discuss medical issues further.  Son also verbalized that in patients Living Will she has DNR order but family states that patient is to remain full code in hospital.

## 2018-12-19 NOTE — PROGRESS NOTE ADULT - SUBJECTIVE AND OBJECTIVE BOX
PULMONARY Progress NOTE      LINDA CARTERSANTINO-[id number]    Patient is a 89y old  Female who presents with a chief complaint of Altered mental status (14 Dec 2018 14:37)      HISTORY OF PRESENT ILLNESS:    CVA  Sz disorder  HTN  DM  Dementia  Dysphagia with past Aspiration  Severe REBECCA - CPAP failure    Seen prior to PEG    Interval history:    Reasonably comfortable  Non-communicative  No real improvement with feeding or Bipap (snoring resolved on Bipap/ some leak due to movement and nasal feeding tube)    MEDICATIONS  (STANDING):  aspirin  chewable 81 milliGRAM(s) Oral daily  brimonidine 0.2% Ophthalmic Solution 1 Drop(s) Both EYES daily  cefTRIAXone   IVPB      cefTRIAXone   IVPB 1 Gram(s) IV Intermittent every 24 hours  dextrose 5%. 1000 milliLiter(s) (50 mL/Hr) IV Continuous <Continuous>  dextrose 50% Injectable 12.5 Gram(s) IV Push once  dextrose 50% Injectable 25 Gram(s) IV Push once  dextrose 50% Injectable 25 Gram(s) IV Push once  enoxaparin Injectable 40 milliGRAM(s) SubCutaneous daily  hydrALAZINE 25 milliGRAM(s) Oral every 8 hours  insulin detemir injectable (LEVEMIR) 10 Unit(s) SubCutaneous every 12 hours  insulin lispro (HumaLOG) corrective regimen sliding scale   SubCutaneous every 6 hours  lactobacillus acidophilus 1 Tablet(s) Oral daily  latanoprost 0.005% Ophthalmic Solution 1 Drop(s) Both EYES at bedtime  levETIRAcetam  IVPB 250 milliGRAM(s) IV Intermittent every 12 hours  metoprolol tartrate 12.5 milliGRAM(s) Oral two times a day  sodium chloride 0.45%. 1000 milliLiter(s) (60 mL/Hr) IV Continuous <Continuous>  sodium polystyrene sulfonate Suspension 30 Gram(s) Oral once  timolol 0.25% Solution 1 Drop(s) Both EYES daily      MEDICATIONS  (PRN):  acetaminophen    Suspension .. 765 milliGRAM(s) Oral every 6 hours PRN Moderate Pain (4 - 6)  dextrose 40% Gel 15 Gram(s) Oral once PRN Blood Glucose LESS THAN 70 milliGRAM(s)/deciliter  glucagon  Injectable 1 milliGRAM(s) IntraMuscular once PRN Glucose LESS THAN 70 milligrams/deciliter      Allergies    contrast media (iodine-based) (Urticaria)  erythromycin (Unknown)  IV-Dye (Unknown)  sulfADIAZINE (Unknown)    Intolerances        PAST MEDICAL & SURGICAL HISTORY:  Diabetes mellitus  Fracture: lumbar fracture.  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  Heart Murmur  Calcium Nephrolithiasis  Hyperlipidemia LDL Goal < 100  Glaucoma, Low Tension  History of Pulmonary Hypertension  CAD (Coronary Artery Disease)  Obstructive Sleep Apnea  Controlled Type 2 Diabetes with Neuropathy  H/O cataract extraction  History of appendectomy  Fracture of Humerus: repair with harware right  Glaucoma: relieve pressure left eye along with cataract extraction  Bilateral Cataracts: excision 2006  Benign Breast Disease: removal of cyst- 1963  Cataract  Kidney Stones: lithotripsy 2005, 2006  S/P Appendectomy: 1937      FAMILY HISTORY:  No pertinent family history  Family history of cerebrovascular accident (CVA) (Sibling)      SOCIAL HISTORY  Smoking History:     REVIEW OF SYSTEMS:    Non-verbal when seen  Son present    Vital Signs Last 24 Hrs  T(C): 37.6 (14 Dec 2018 07:41), Max: 37.6 (14 Dec 2018 07:41)  T(F): 99.7 (14 Dec 2018 07:41), Max: 99.7 (14 Dec 2018 07:41)  HR: 107 (14 Dec 2018 13:25) (72 - 117)  BP: 120/82 (14 Dec 2018 13:25) (120/82 - 155/75)  BP(mean): --  RR: 18 (14 Dec 2018 07:41) (18 - 18)  SpO2: 100% (13 Dec 2018 15:35) (100% - 100%)    PHYSICAL EXAMINATION:    GENERAL: Obese.  Snoring when seen.     HEENT: Head is normocephalic and atraumatic. Extraocular muscles are intact. Mucous membranes are moist.     NECK: Supple.     LUNGS: Clear to auscultation without wheezing, rales, or rhonchi. Respirations unlabored    HEART: Regular rate and rhythm without murmur.    ABDOMEN: Soft, nontender, and nondistended.  No hepatosplenomegaly is noted.    EXTREMITIES: Without any cyanosis, clubbing, rash, lesions or edema.    NEUROLOGIC: Grossly intact.      LABS:                        10.4   6.5   )-----------( 217      ( 14 Dec 2018 11:44 )             32.8     12-14    148<H>  |  109<H>  |  58.0<H>  ----------------------------<  209<H>  5.7<H>   |  29.0  |  1.32<H>    Ca    9.7      14 Dec 2018 11:25          ABG - ( 12 Dec 2018 16:16 )  pH, Arterial: 7.41  pH, Blood: x     /  pCO2: 46    /  pO2: 78    / HCO3: 28    / Base Excess: 3.6   /  SaO2: 96            Procalcitonin, Serum: 0.11 ng/mL (12-14-18 @ 07:05)      MICROBIOLOGY: Unrevealing    RADIOLOGY & ADDITIONAL STUDIES:    CXR on 12/10/18  NGT in trachea - removed

## 2018-12-19 NOTE — CHART NOTE - NSCHARTNOTEFT_GEN_A_CORE
Contacted by Noemi, patient's daughter tonight as she was concerned for patient's hypoglycemia.  Patient was placed on BiPap and NGT feeds held and patient became hypoglycemic to 50s.  Since patient's clinical course will change and tight glycemic control in the elderly is more detrimental than hyperglycemia, would opt for NPH.  will discuss with family tomorrow regarding the switch but will dc levemir 16units BID in the interim

## 2018-12-19 NOTE — GOALS OF CARE CONVERSATION - PERSONAL ADVANCE DIRECTIVE - CONVERSATION DETAILS
Family meeting  at 10 am  with this Rn and Ana KEARNEY . Discussed with Dara Goodman & Ronel  explained that hospice would consider taking pt with a peg tube if they were on a Hospice plan of care . I explained that on a hospice plan of care in contrast  to the intensive work up they have had on this hospitalization any work up or diagnostics  that would be pursued under a hospice plan of care would be driven by symptom management needs and not to correct diagnostic values. Additionally any plan of pursuit of diagnostics and or procedure would need to be reviewed by a hospice doctor and approved . They verbalized understanding and wanted to speak with GI md who was present  and Daniel Young . I did touch base with daughter Ronel  who state she feels that they family needs time to absorb today's medical information from the doctors before making any decisions , she feels hospice may be premature .  Hospice will remain available

## 2018-12-19 NOTE — PROGRESS NOTE ADULT - ASSESSMENT
1. Acute toxic-metabolic encephalopathy - secondary to a combination of acute CVA on admission, seizure disorder worsened by , MISSY and hypernatremia and UTI during hospitalization   Repeat CT head on 12/11 was negative for acute CVA    UTI: Proteus Mirabalis UTi   History of Proteus UTi and renal stones in the past.- renal ultrasound with no nephrolithiasis   Previous UA x 2 negative on 11/27 and 12/1 as well as urine culture on 12/1  culture positive on 12/9, culture on 12/11 + Proteus   HIV was negative  Cefepime 1g iv q12 completed abx    Siezure disorder   24 hour video EEG showed abnormal cortical hyperexcitability and abnormality in the left temporal region consistent with 3 previous EEG's + for diffuse cerebral dysfunction.  Started on IV keppra Q12 hours. Spoke with neurology; there were no plans to repeat EEG. However the patient's son and daughters said they would want a neurology determination of brain death or otherwise     MISSY with Hypernatremia--  Prerenal azotemia is predominant picture. Cautious admin of free water and hypotonic saline for risk of fluid overload.     Hyperkalemia:  Mild . Resolved   Ammonia levels wnl.  LFTS WNL  ABG did not show CO2 narcosis. repeat ABG showed no evidence of acidosis pH wnl- Pco2 of 42 not contributing to lethargy    DM  4. Diabetes mellitus type 2  -HbA1c 6.0  Levemir, Sliding scale insulin  Family concerned that hyperglycemia is contributing to lethargy/encephalopathy. discussed with them in detail that tight control of BGL in a hospitalized patient may be detrimental and risks of hypoglycemia.  Family requested to speak to Dr Ferrer, patient's outpatient endocrinologist as well.  Spoken  to him and he is agreement with the current treatment plan and agrees a BSL in the 200s with no acidosis would NOT be contributing to the patient's lethargy.  Dr Lombardi inhouse endocrinologist also in agreement     TSH of 2.4 WNL. T4 is WNL. T3 is on the lower side at 42, Likely sick euthyroid syndrome. NO treatment is indicated at this time  Cortisol level WNL  Carnitine level is WN     2. Afib with SSS/ tachy-truong syndrome:   Rate is controlled  Per cardiology the patient is a poor candidate for PPM insertion at this time or anti-coagulation  Continue aspirin therapy  Discontinued metoprolol at behest of daughter who reported it was discontinued by outpatient medical provider    3. Hypertensive urgency : BP Improving   -Po hydralazine via NG tube    5. Acute right frontal CVA on admission: Aspirin PO  Family refusing statin therapy as it causes myalgia and muscle weakness. They were counselled on the importance of statin therapy in secondary stroke prevention. They have refused simvastatin and atorvastatin.     6. History of REBECCA and COPD: No evidence of co2 retention or acidosis on ABG   Nocturnal and prn bipap    Advance care planning:  Ongoing multidisciplinary discussion with family regarding advance care planning .  GI dr Cruz explained that an NG tube dubhoof catheter can be temporizing measure for feeding nutrition.  Family said they would prefer to stay out the period of NG tube feeding in the hospital.   and discharge planning may determine if Home health services can undertake feeding via NGtube at home or if family can be educated to do the feeding at home.      Dr Hung spoke to family at length and provided a second neurology opinion as per family request.      Supportive care.

## 2018-12-19 NOTE — PROGRESS NOTE ADULT - SUBJECTIVE AND OBJECTIVE BOX
CC: Encephalopathy secondary to UTI, CVA , dementia.  Minimally responsive.    HPI:  The patient is a 89 year old female with a history of CVA< seizure disorder, hypertension and diabetes mellitus who was brought to the ER for complaints of altered mental status. Patient was diagnosed with seizure disorder last year and started on keppra po. Over the past few months the family has noted she has been more lethargic and confused. She saw Dr Mcallister, neurologist at [facility]. According to the daughter she had a number of tests which indicated she did not have seizure disorder and she was advised to taper off the keppra. Her daughter tapered it off over the past 2 weeks. This morning she noted her mother became unresponsive, fell to her side, noted facial droop, her eyes were "fluttering" and her tongue was rolled back. After the episode she remained lethargic for about 3 hours. In the ER, NIH was 0. Patient was more alert and responsive. CT head was negative. (26 Nov 2018 17:08)    REVIEW OF SYSTEMS:    Patient denied fever, chills, abdominal pain, nausea, vomiting, cough, shortness of breath, chest pain or palpitations    Vital Signs Last 24 Hrs  T(C): 36.4 (19 Dec 2018 08:31), Max: 36.7 (18 Dec 2018 13:38)  T(F): 97.5 (19 Dec 2018 08:31), Max: 98 (18 Dec 2018 13:38)  HR: 73 (19 Dec 2018 08:31) (56 - 73)  BP: 145/71 (19 Dec 2018 08:31) (144/79 - 192/81)  BP(mean): --  RR: 20 (19 Dec 2018 08:31) (18 - 26)  SpO2: 96% (19 Dec 2018 08:31) (94% - 100%)I&O's Summary    PHYSICAL EXAM:  GENERAL: NAD,   HEENT: PERRL, +EOMI, anicteric, no [place]  NECK: Supple, No JVD   CHEST/LUNG:  bilateral transmitted breath sounds.   HEART: S1S2 Normal intensity, no murmurs, gallops or rubs noted  ABDOMEN: Soft, BS Normoactive, NT, ND, no HSM noted  EXTREMITIES:  2+ radial and DP pulses noted, no clubbing, cyanosis, or edema noted, Bed bound   SKIN: No rashes or lesions noted  NEURO: Minimally responsive   PSYCH: Minimally responsive   LABS:                        10.6   6.6   )-----------( 202      ( 18 Dec 2018 07:45 )             33.8     12-18    148<H>  |  109<H>  |  45.0<H>  ----------------------------<  161<H>  4.8   |  27.0  |  0.82    Ca    9.4      18 Dec 2018 07:45    TPro  5.8<L>  /  Alb  3.3  /  TBili  0.3<L>  /  DBili  x   /  AST  22  /  ALT  13  /  AlkPhos  45  12-18        RADIOLOGY & ADDITIONAL TESTS:    MEDICATIONS:  MEDICATIONS  (STANDING):  aspirin  chewable 81 milliGRAM(s) Oral daily  brimonidine 0.2% Ophthalmic Solution 1 Drop(s) Both EYES daily  dextrose 5% + sodium chloride 0.45%. 1000 milliLiter(s) (70 mL/Hr) IV Continuous <Continuous>  dextrose 5%. 1000 milliLiter(s) (50 mL/Hr) IV Continuous <Continuous>  dextrose 50% Injectable 12.5 Gram(s) IV Push once  dextrose 50% Injectable 25 Gram(s) IV Push once  dextrose 50% Injectable 25 Gram(s) IV Push once  enoxaparin Injectable 40 milliGRAM(s) SubCutaneous daily  hydrALAZINE 50 milliGRAM(s) Oral every 8 hours  insulin lispro (HumaLOG) corrective regimen sliding scale   SubCutaneous every 6 hours  lactobacillus acidophilus 1 Tablet(s) Oral daily  latanoprost 0.005% Ophthalmic Solution 1 Drop(s) Both EYES at bedtime  levETIRAcetam  IVPB 250 milliGRAM(s) IV Intermittent every 12 hours  timolol 0.25% Solution 1 Drop(s) Both EYES daily    MEDICATIONS  (PRN):  acetaminophen    Suspension .. 765 milliGRAM(s) Oral every 6 hours PRN Moderate Pain (4 - 6)  dextrose 40% Gel 15 Gram(s) Oral once PRN Blood Glucose LESS THAN 70 milliGRAM(s)/deciliter  glucagon  Injectable 1 milliGRAM(s) IntraMuscular once PRN Glucose LESS THAN 70 milligrams/deciliter  hydrALAZINE Injectable 10 milliGRAM(s) IV Push every 4 hours PRN Patient npo ngt tube clogged

## 2018-12-19 NOTE — PROGRESS NOTE ADULT - SUBJECTIVE AND OBJECTIVE BOX
Pt seen and examined f/u inability to eat due to semicomatose state    Patient seen by Dr. Rudolph to consider PEG placement but patient's disposition was being discussed with hospital doctors and family. We asked to be called back if the final decision was to proceed with PEG. Meanwhile the son thinks she may wake up as the decline 24 days ago was sudden and he thinks she may be waking up. Also has respiratory disease with sleep apnea.    REVIEW OF SYSTEMS:    CONSTITUTIONAL: No fever, weight loss, or fatigue  EYES: No eye pain, visual disturbances, or discharge  ENMT:  No difficulty hearing, tinnitus, vertigo; No sinus or throat pain  RESPIRATORY: No cough, wheezing, chills or hemoptysis; No shortness of breath  CARDIOVASCULAR: No chest pain, palpitations, dizziness, or leg swelling  GASTROINTESTINAL: No abdominal or epigastric pain. No nausea, vomiting, or hematemesis; No diarrhea or constipation. No melena or hematochezia.    MEDICATIONS:  MEDICATIONS  (STANDING):  aspirin  chewable 81 milliGRAM(s) Oral daily  brimonidine 0.2% Ophthalmic Solution 1 Drop(s) Both EYES daily  dextrose 5% + sodium chloride 0.45%. 1000 milliLiter(s) (70 mL/Hr) IV Continuous <Continuous>  dextrose 5%. 1000 milliLiter(s) (50 mL/Hr) IV Continuous <Continuous>  dextrose 50% Injectable 12.5 Gram(s) IV Push once  dextrose 50% Injectable 25 Gram(s) IV Push once  dextrose 50% Injectable 25 Gram(s) IV Push once  enoxaparin Injectable 40 milliGRAM(s) SubCutaneous daily  hydrALAZINE 50 milliGRAM(s) Oral every 8 hours  insulin lispro (HumaLOG) corrective regimen sliding scale   SubCutaneous every 6 hours  lactobacillus acidophilus 1 Tablet(s) Oral daily  latanoprost 0.005% Ophthalmic Solution 1 Drop(s) Both EYES at bedtime  levETIRAcetam  IVPB 250 milliGRAM(s) IV Intermittent every 12 hours  timolol 0.25% Solution 1 Drop(s) Both EYES daily    MEDICATIONS  (PRN):  acetaminophen    Suspension .. 765 milliGRAM(s) Oral every 6 hours PRN Moderate Pain (4 - 6)  dextrose 40% Gel 15 Gram(s) Oral once PRN Blood Glucose LESS THAN 70 milliGRAM(s)/deciliter  glucagon  Injectable 1 milliGRAM(s) IntraMuscular once PRN Glucose LESS THAN 70 milligrams/deciliter  hydrALAZINE Injectable 10 milliGRAM(s) IV Push every 4 hours PRN Patient npo ngt tube clogged      Allergies    contrast media (iodine-based) (Urticaria)  erythromycin (Unknown)  IV-Dye (Unknown)  sulfADIAZINE (Unknown)    Intolerances        Vital Signs Last 24 Hrs  T(C): 36.4 (19 Dec 2018 08:31), Max: 36.7 (18 Dec 2018 13:38)  T(F): 97.5 (19 Dec 2018 08:31), Max: 98 (18 Dec 2018 13:38)  HR: 73 (19 Dec 2018 08:31) (56 - 73)  BP: 145/71 (19 Dec 2018 08:31) (144/79 - 192/81)  BP(mean): --  RR: 20 (19 Dec 2018 08:31) (18 - 26)  SpO2: 96% (19 Dec 2018 08:31) (94% - 100%)      PHYSICAL EXAM:    General:  female tolerating dubhoff tube feeds, essentially with no mental status and does not even respond to deep pain.  HEENT: MMM, conjunctiva and sclera clear, dubhoff tube in place  Gastrointestinal:Abdomen: Soft non-tender non-distended; Normal bowel sounds; No hepatosplenomegaly  Extremities: no cyanosis, clubbing or edema.  Skin: Warm and dry. No obvious rash    LABS:      CBC Full  -  ( 18 Dec 2018 07:45 )  WBC Count : 6.6 K/uL  Hemoglobin : 10.6 g/dL  Hematocrit : 33.8 %  Platelet Count - Automated : 202 K/uL  Mean Cell Volume : 97.7 fl  Mean Cell Hemoglobin : 30.6 pg  Mean Cell Hemoglobin Concentration : 31.4 g/dL  Auto Neutrophil # : x  Auto Lymphocyte # : x  Auto Monocyte # : x  Auto Eosinophil # : x  Auto Basophil # : x  Auto Neutrophil % : x  Auto Lymphocyte % : x  Auto Monocyte % : x  Auto Eosinophil % : x  Auto Basophil % : x    12-18    148<H>  |  109<H>  |  45.0<H>  ----------------------------<  161<H>  4.8   |  27.0  |  0.82    Ca    9.4      18 Dec 2018 07:45    TPro  5.8<L>  /  Alb  3.3  /  TBili  0.3<L>  /  DBili  x   /  AST  22  /  ALT  13  /  AlkPhos  45  12-18                      RADIOLOGY & ADDITIONAL STUDIES (The following images were personally reviewed):

## 2018-12-19 NOTE — PROGRESS NOTE ADULT - ASSESSMENT
89 year old female with hx of type 2 DM, CVA, seizure disorder admitted with acute right frontal CVA now with continued encephalopathy, likely multifactorial.   She has a mildly reduced T3 level, which is likely the result of sick euthyroid syndrome, not underlying thyroid dysfunction.  She is mildly hyperglycemic on tube feeds.      1.  T2DM  -dced insulin as patient is off of tube feeds at this time  -would wait until patient is hyperglycemic consistently before adding insulin back (and would add nph as it is shorter acting than levemir)  -keep insulin sliding scale at Q4hrs  2.  Abnormal TFTs-  as above, low T3 likely due to sick euthyroid syndrome.  Repeat TFTs as an outpatient in 2-4 weeks.   3.  Encephalopathy-  likely multifactorial in setting of recent CVA and UTI. As per medicine. 89 year old female with hx of type 2 DM, CVA, seizure disorder admitted with acute right frontal CVA now with continued encephalopathy, likely multifactorial.   She has a mildly reduced T3 level, which is likely the result of sick euthyroid syndrome, not underlying thyroid dysfunction.  She is mildly hyperglycemic on tube feeds.      1.  T2DM  -dced insulin as patient is off of tube feeds at this time  -would wait until patient is hyperglycemic consistently before adding insulin back (and would add nph as it is shorter acting than levemir)  -keep insulin sliding scale at Q4hrs    2.  Abnormal TFTs-  as above, low T3 likely due to sick euthyroid syndrome.  Repeat TFTs as an outpatient in 2-4 weeks.     3.  Encephalopathy-  likely multifactorial in setting of recent CVA and UTI. As per medicine.      4.  Hypernatremia- mild, probably due to dehydration. can monitor for now

## 2018-12-19 NOTE — PROGRESS NOTE ADULT - ASSESSMENT
Assess    CVA  Dementia  Dysphagia with aspiration risk  Poorly treated REBECCA  Palliative PEG desired by one daughter  Discussed outlook with other daughter    No absolute pulmonary contraindication to PEG or surgical Gastrostomy under conscious sedation or under general anesthesia - though palliation seems much more reasonable    Intubation or LMA available at procedure if done under conscious sedation advise    Bipap at 19/13 cm H20 trial       Home hospice appropriate if family decides not to intervene    Palliative input appreciated    Albuterol Neb PRN

## 2018-12-20 NOTE — PROGRESS NOTE ADULT - ASSESSMENT
Improved HyperNatremia and  MISSY suspect 2/2 poor nutrition dehydration  Renal sonogram noted no hydronephrosis  Hyperkalemia resolved  GI follow up noted   IVF noted

## 2018-12-20 NOTE — CHART NOTE - NSCHARTNOTEFT_GEN_A_CORE
Pt. is on her Home CPAP machine. Family is not comfortable with patient wearing the hospital provided Bipap machine. SpO2:100% HR:80BPM RR:18bpm. No respiratory distress noted at this time.

## 2018-12-20 NOTE — PROGRESS NOTE ADULT - ASSESSMENT
1. Metabolic encephalopathy - secondary to a combination of acute CVA on admission, seizure disorder, MISSY and hypernatremia and UTI.    Repeat CT head on 12/11 was negative for acute CVA    UTI: Proteus Mirabalis UTi   History of Proteus UTi and renal stones in the past.- renal ultrasound with no nephrolithiasis   Previous UA x 2 negative on 11/27 and 12/1 as well as urine culture on 12/1  culture positive on 12/9, culture on 12/11 + Proteus   HIV was negative  Cefepime 1g iv q12. Abx  completed and discontinued.     Siezure disorder   24 hour video EEG showed abnormal cortical hyperexcitability and abnormality in the left temporal region consistent with 3 previous EEG's + for diffuse cerebral dysfunction.  Started on IV keppra Q12 hours. Neurology in consult  indicate no plans to repeat EEG. However the patient's son and daughters said they would want a neurology determination of brain death or otherwise     MISSY --    Prerenal azotemia is predominant picture.  Secondary to volume contraction from dehydration. Azotemia is resolving    Free water, and hypotonic saline infusion.     Hyperkalemia:  Mild . Resolved     Ammonia levels wnl.  LFTS WNL    ABG did not show CO2 narcosis. repeat ABG showed no evidence of acidosis pH wnl- Pco2 of 42 not contributing to lethargy    DM  4. Diabetes mellitus type 2  -HbA1c 6.0  Sliding scale insulin  Family concerned that hyperglycemia is contributing to lethargy/encephalopathy. Explained in detail that tight control of BGL in a hospitalized patient may be detrimental and risks of hypoglycemia.  Family requested to speak to Dr Ferrer, patient's outpatient endocrinologist as well.  Spoken  to him and he is agreement with the current treatment plan and agrees a BSL in the 200s with no acidosis would NOT be contributing to the patient's lethargy.  [facility] also in agreement and following.     TSH of 2.4 WNL. T4 is WNL. T3 is on the lower side at 42, Likely sick euthyroid syndrome. NO treatment is indicated at this time  Cortisol level WNL  Carnitine level is WN     2. Afib with SSS/ tachy-truong syndrome:   Rate is controlled  Per cardiology the patient is a poor candidate for PPM insertion at this time or anti-coagulation  Continue aspirin therapy  Discontinued metoprolol at behest of daughter who reported it was discontinued by outpatient medical provider    3. Hypertensive urgency : BP Improving   -Po hydralazine via NG tube  Started lidocaine patch for long standing shoulder pain that may be throwing up BP    5. Acute right frontal CVA on admission:   Aspirin PO  Family refusing statin therapy stating that  causes myalgia and muscle weakness. They were counselled on the importance of statin therapy in secondary stroke prevention. They have refused simvastatin and atorvastatin.     6. History of REBECCA and COPD: No evidence of Co2 retention or acidosis on ABG   Nocturnal and prn bipap  May use patient home bipap machine     Advance care planning:  Ongoing multidisciplinary discussion with family regarding advance care planning .  GI dr Cruz explained that an NG tube dubhoof catheter can be temporizing measure for feeding nutrition.   Had multidisciplinary meeting with family 12/20/18 .Family said they would prefer to stay out the period of NG tube feeding in the hospital which they put at another 5 weeks and hope for resolution or improvement of encephalopathy or deterioration, in which case they can decide one way or another. They said they cannot take care of patient at home in present state.    Aim is to optimized medical management and meet again to determine further thrust for ongoing rehabilitative or supportive care.  No acute medical management is going on currently     Dr Hung spoke to family at length and provided a second neurology opinion as per family request.      Supportive care.

## 2018-12-20 NOTE — PROGRESS NOTE ADULT - SUBJECTIVE AND OBJECTIVE BOX
CC: Encephalopathy.  Minimally responsive.  NGT for nutrition.   HPI:  The patient is a 89 year old female with a history of CVA< seizure disorder, hypertension and diabetes mellitus who was brought to the ER for complaints of altered mental status. Patient was diagnosed with seizure disorder last year and started on keppra po. Over the past few months the family has noted she has been more lethargic and confused. She saw Dr Mcallister, neurologist at [facility]. According to the daughter she had a number of tests which indicated she did not have seizure disorder and she was advised to taper off the keppra. Her daughter tapered it off over the past 2 weeks. This morning she noted her mother became unresponsive, fell to her side, noted facial droop, her eyes were "fluttering" and her tongue was rolled back. After the episode she remained lethargic for about 3 hours. In the ER, NIH was 0. Patient was more alert and responsive. CT head was negative. (26 Nov 2018 17:08)    REVIEW OF SYSTEMS:    Patient denied fever, chills, abdominal pain, nausea, vomiting, cough, shortness of breath, chest pain or palpitations    Vital Signs Last 24 Hrs  T(C): 37.6 (20 Dec 2018 15:15), Max: 37.6 (20 Dec 2018 15:15)  T(F): 99.7 (20 Dec 2018 15:15), Max: 99.7 (20 Dec 2018 15:15)  HR: 76 (20 Dec 2018 15:15) (72 - 76)  BP: 162/71 (20 Dec 2018 15:15) (127/86 - 172/70)  BP(mean): --  RR: 20 (20 Dec 2018 07:15) (20 - 20)  SpO2: 97% (20 Dec 2018 15:15) (97% - 98%)I&O's Summary    PHYSICAL EXAM:  GENERAL: Unresponsive  HEENT: PERRL, +EOMI, anicteric, no [place]  NECK: Supple, No JVD   CHEST/LUNG: Bilateral transmitted breath sound.   HEART: S1S2 Normal intensity, no murmurs, gallops or rubs noted  ABDOMEN: Soft, BS Normoactive, NT, ND, no HSM noted  EXTREMITIES:  No cyanosis, No edema noted, bed bound  SKIN: No rashes or lesions noted  NEURO: Unresponsive,   PSYCH: Unresponsive   LABS:    12-20    145  |  108<H>  |  32.0<H>  ----------------------------<  201<H>  4.0   |  27.0  |  0.97    Ca    8.5<L>      20 Dec 2018 08:10          RADIOLOGY & ADDITIONAL TESTS:    MEDICATIONS:  MEDICATIONS  (STANDING):  aspirin  chewable 81 milliGRAM(s) Oral daily  brimonidine 0.2% Ophthalmic Solution 1 Drop(s) Both EYES daily  dextrose 5% + sodium chloride 0.45%. 1000 milliLiter(s) (70 mL/Hr) IV Continuous <Continuous>  dextrose 5%. 1000 milliLiter(s) (50 mL/Hr) IV Continuous <Continuous>  dextrose 50% Injectable 12.5 Gram(s) IV Push once  dextrose 50% Injectable 25 Gram(s) IV Push once  dextrose 50% Injectable 25 Gram(s) IV Push once  enoxaparin Injectable 40 milliGRAM(s) SubCutaneous daily  hydrALAZINE 50 milliGRAM(s) Oral every 8 hours  insulin lispro (HumaLOG) corrective regimen sliding scale   SubCutaneous every 6 hours  lactobacillus acidophilus 1 Tablet(s) Oral daily  latanoprost 0.005% Ophthalmic Solution 1 Drop(s) Both EYES at bedtime  levETIRAcetam  IVPB 250 milliGRAM(s) IV Intermittent every 12 hours  lidocaine   Patch 1 Patch Transdermal daily  timolol 0.25% Solution 1 Drop(s) Both EYES daily    MEDICATIONS  (PRN):  acetaminophen    Suspension .. 765 milliGRAM(s) Oral every 6 hours PRN Moderate Pain (4 - 6)  dextrose 40% Gel 15 Gram(s) Oral once PRN Blood Glucose LESS THAN 70 milliGRAM(s)/deciliter  glucagon  Injectable 1 milliGRAM(s) IntraMuscular once PRN Glucose LESS THAN 70 milligrams/deciliter  hydrALAZINE Injectable 10 milliGRAM(s) IV Push every 4 hours PRN Patient npo ngt tube clogged

## 2018-12-20 NOTE — GOALS OF CARE CONVERSATION - PERSONAL ADVANCE DIRECTIVE - WHAT MATTERS MOST
For patient not to suffer at the end of life
For the patient to be well cared for and be able to return home.
Family would like a little more time with NG tube feedings to see if she can improve.

## 2018-12-20 NOTE — GOALS OF CARE CONVERSATION - PERSONAL ADVANCE DIRECTIVE - CONVERSATION DETAILS
Family meeting to discuss further plan of care. Family raised issues of  medical plan (blood sugars, cardio,)   NG tube, discharge plan,   Family was initially informed by Dr. Pako ARBOLEDA can keep NG tube for 4-6weeks.  They would like to give a little more time with NG tube to see if she still can improve in her mental status. They feel that they have seen  positive signs.   They understand it is temporary procedure. They expressed their discontent as they felt they were being rushed out of the hospital. Informed that [facility] will not take patient with NG tube.  Family  prefers to stay in hospital during the time NG tube is in place. They are not ready for hospice

## 2018-12-20 NOTE — PROGRESS NOTE ADULT - SUBJECTIVE AND OBJECTIVE BOX
Chief Complaint: This is a 89y old Female patient being seen for follow-up consultation for PEG tube placement.    HPI:  Met with the patient's daughter at the bedside who was eager to show me some videos showing some spontaneous movement of right arm and left foot.  Family meeting is planned for today.  Patient's daughter still feels hospice is premature.    REVIEW OF SYSTEMS: Patient is noncommunicative     PAST MEDICAL/SURGICAL HISTORY:  Diabetes mellitus  Fracture: lumbar fracture.  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  Heart Murmur  Calcium Nephrolithiasis  Hyperlipidemia LDL Goal < 100  Glaucoma, Low Tension  History of Pulmonary Hypertension  CAD (Coronary Artery Disease)  Obstructive Sleep Apnea  Controlled Type 2 Diabetes with Neuropathy  H/O cataract extraction  History of appendectomy  Fracture of Humerus: repair with harware right  Glaucoma: relieve pressure left eye along with cataract extraction  Bilateral Cataracts: excision 2006  Benign Breast Disease: removal of cyst- 1963  Cataract  Kidney Stones: lithotripsy 2005, 2006  S/P Appendectomy: 1937    MEDICATIONS  (STANDING):  aspirin  chewable 81 milliGRAM(s) Oral daily  brimonidine 0.2% Ophthalmic Solution 1 Drop(s) Both EYES daily  dextrose 5% + sodium chloride 0.45%. 1000 milliLiter(s) (70 mL/Hr) IV Continuous <Continuous>  dextrose 5%. 1000 milliLiter(s) (50 mL/Hr) IV Continuous <Continuous>  dextrose 50% Injectable 12.5 Gram(s) IV Push once  dextrose 50% Injectable 25 Gram(s) IV Push once  dextrose 50% Injectable 25 Gram(s) IV Push once  enoxaparin Injectable 40 milliGRAM(s) SubCutaneous daily  hydrALAZINE 50 milliGRAM(s) Oral every 8 hours  insulin lispro (HumaLOG) corrective regimen sliding scale   SubCutaneous every 6 hours  lactobacillus acidophilus 1 Tablet(s) Oral daily  latanoprost 0.005% Ophthalmic Solution 1 Drop(s) Both EYES at bedtime  levETIRAcetam  IVPB 250 milliGRAM(s) IV Intermittent every 12 hours  timolol 0.25% Solution 1 Drop(s) Both EYES daily    MEDICATIONS  (PRN):  acetaminophen    Suspension .. 765 milliGRAM(s) Oral every 6 hours PRN Moderate Pain (4 - 6)  dextrose 40% Gel 15 Gram(s) Oral once PRN Blood Glucose LESS THAN 70 milliGRAM(s)/deciliter  glucagon  Injectable 1 milliGRAM(s) IntraMuscular once PRN Glucose LESS THAN 70 milligrams/deciliter  hydrALAZINE Injectable 10 milliGRAM(s) IV Push every 4 hours PRN Patient npo ngt tube clogged    VITAL SIGNS LAST 24 HOURS:  T(C): 36.8 (20 Dec 2018 07:15), Max: 36.8 (20 Dec 2018 07:15)  T(F): 98.2 (20 Dec 2018 07:15), Max: 98.2 (20 Dec 2018 07:15)  HR: 72 (20 Dec 2018 07:15) (67 - 83)  BP: 130/55 (20 Dec 2018 07:15) (127/86 - 172/70)  RR: 20 (20 Dec 2018 07:15) (20 - 20)  SpO2: 98% (20 Dec 2018 07:15) (98% - 100%)    PHYSICAL EXAM:  Constitutional: Elderly  woman with no evident mental status  Eyes: Sclerae anicteric, conjunctivae normal  ENMT: Dobbhoff tube in place; nasal BiPAP  Neck: No thyroid nodules appreciated, no significant cervical or supraclavicular lymphadenopathy  Respiratory: Breathing nonlabored; clear to auscultation  Cardiovascular: Regular rate and rhythm  Gastrointestinal: Soft, nontender, nondistended, normoactive bowel sounds; no hepatosplenomegaly appreciated  Extremities: No clubbing, cyanosis or edema  Neurological: Absent  Skin: No jaundice  Lymph Nodes: No significant lymphadenopathy  Musculoskeletal: No significant peripheral atrophy    12-20    145  |  108<H>  |  32.0<H>  ----------------------------<  201<H>  4.0   |  27.0  |  0.97    Ca    8.5<L>      20 Dec 2018 08:10    Culture - Blood (collected 17 Dec 2018 10:10)  Source: .Blood  Preliminary Report (19 Dec 2018 11:02):    No growth at 48 hours

## 2018-12-20 NOTE — PROGRESS NOTE ADULT - SUBJECTIVE AND OBJECTIVE BOX
NEPHROLOGY INTERVAL HPI/OVERNIGHT EVENTS:      No new events   GI follow up noted   Meds reviewed       MEDICATIONS  (STANDING):  aspirin  chewable 81 milliGRAM(s) Oral daily  brimonidine 0.2% Ophthalmic Solution 1 Drop(s) Both EYES daily  dextrose 5% + sodium chloride 0.45%. 1000 milliLiter(s) (70 mL/Hr) IV Continuous <Continuous>  dextrose 5%. 1000 milliLiter(s) (50 mL/Hr) IV Continuous <Continuous>  dextrose 50% Injectable 12.5 Gram(s) IV Push once  dextrose 50% Injectable 25 Gram(s) IV Push once  dextrose 50% Injectable 25 Gram(s) IV Push once  enoxaparin Injectable 40 milliGRAM(s) SubCutaneous daily  hydrALAZINE 50 milliGRAM(s) Oral every 8 hours  insulin lispro (HumaLOG) corrective regimen sliding scale   SubCutaneous every 6 hours  lactobacillus acidophilus 1 Tablet(s) Oral daily  latanoprost 0.005% Ophthalmic Solution 1 Drop(s) Both EYES at bedtime  levETIRAcetam  IVPB 250 milliGRAM(s) IV Intermittent every 12 hours  timolol 0.25% Solution 1 Drop(s) Both EYES daily    MEDICATIONS  (PRN):  acetaminophen    Suspension .. 765 milliGRAM(s) Oral every 6 hours PRN Moderate Pain (4 - 6)  dextrose 40% Gel 15 Gram(s) Oral once PRN Blood Glucose LESS THAN 70 milliGRAM(s)/deciliter  glucagon  Injectable 1 milliGRAM(s) IntraMuscular once PRN Glucose LESS THAN 70 milligrams/deciliter  hydrALAZINE Injectable 10 milliGRAM(s) IV Push every 4 hours PRN Patient npo ngt tube clogged      Allergies    contrast media (iodine-based) (Urticaria)  erythromycin (Unknown)  IV-Dye (Unknown)  sulfADIAZINE (Unknown)    Intolerances            Vital Signs Last 24 Hrs  T(C): 36.8 (20 Dec 2018 07:15), Max: 36.8 (20 Dec 2018 07:15)  T(F): 98.2 (20 Dec 2018 07:15), Max: 98.2 (20 Dec 2018 07:15)  HR: 72 (20 Dec 2018 07:15) (67 - 83)  BP: 130/55 (20 Dec 2018 07:15) (127/86 - 172/70)  BP(mean): --  RR: 20 (20 Dec 2018 07:15) (20 - 20)  SpO2: 98% (20 Dec 2018 07:15) (98% - 100%)  Daily     Daily   I&O's Detail    I&O's Summary      PHYSICAL EXAM:  GENERAL: appears chronically ill  HEAD:  Atraumatic, Normocephalic  EYES: EOMI  NECK: Supple, neck  veins full  NERVOUS SYSTEM:  unresponsive  CHEST/LUNG: dec bs B/L  HEART: Regular rate and rhythm; No murmur  ABDOMEN: Soft, Nontender, Nondistended; Bowel sounds present  EXTREMITIES:  no edema      LABS:    12-20    145  |  108<H>  |  32.0<H>  ----------------------------<  201<H>  4.0   |  27.0  |  0.97    Ca    8.5<L>      20 Dec 2018 08:10                  RADIOLOGY & ADDITIONAL TESTS:

## 2018-12-20 NOTE — GOALS OF CARE CONVERSATION - PERSONAL ADVANCE DIRECTIVE - CONVERSATION/DISCUSSION
Chaplaincy Referral/Hospice Referral
Hospice Referral
Prognosis/Diagnosis
Hospice Referral
Diagnosis
Treatment Options

## 2018-12-20 NOTE — PROGRESS NOTE ADULT - ASSESSMENT
Assess    CVA  Dementia  Dysphagia with aspiration risk  Poorly treated REBECCA  Palliative PEG desired by one daughter  Discussed outlook with other daughter    No absolute pulmonary contraindication to PEG or surgical Gastrostomy under conscious sedation or under general anesthesia - though palliation seems much more reasonable  Intubation or LMA available at procedure if done under conscious sedation advise    Bipap at 19/13 cm H20 trial not yielding any clinical benefit     Home hospice appropriate if family decides not to intervene    Palliative input appreciated    Rec    Albuterol Neb PRN  Family meeting    **NOTE: If the family wishes to be aggressive consider: OPERATIVE TRACH (solves rebecca)  , PEG (solves feeding) by ACS or CTS service and then PLACEMENT !!!!!!!    NOTHING TO ADD  WILL SIGN OFF

## 2018-12-20 NOTE — PROGRESS NOTE ADULT - SUBJECTIVE AND OBJECTIVE BOX
PULMONARY Progress NOTE      LINDA CARTERSANTINO-[id number]    Patient is a 89y old  Female who presents with a chief complaint of Altered mental status (14 Dec 2018 14:37)      HISTORY OF PRESENT ILLNESS:    CVA  Sz disorder  HTN  DM  Dementia  Dysphagia with past Aspiration  Severe REBECCA - CPAP failure    Seen prior to PEG    Interval history:    Reasonably comfortable  Non-communicative  No real improvement with feeding or Bipap (snoring resolved on Bipap/ some leak due to movement and nasal feeding tube)    MEDICATIONS  (STANDING):  aspirin  chewable 81 milliGRAM(s) Oral daily  brimonidine 0.2% Ophthalmic Solution 1 Drop(s) Both EYES daily  cefTRIAXone   IVPB      cefTRIAXone   IVPB 1 Gram(s) IV Intermittent every 24 hours  dextrose 5%. 1000 milliLiter(s) (50 mL/Hr) IV Continuous <Continuous>  dextrose 50% Injectable 12.5 Gram(s) IV Push once  dextrose 50% Injectable 25 Gram(s) IV Push once  dextrose 50% Injectable 25 Gram(s) IV Push once  enoxaparin Injectable 40 milliGRAM(s) SubCutaneous daily  hydrALAZINE 25 milliGRAM(s) Oral every 8 hours  insulin detemir injectable (LEVEMIR) 10 Unit(s) SubCutaneous every 12 hours  insulin lispro (HumaLOG) corrective regimen sliding scale   SubCutaneous every 6 hours  lactobacillus acidophilus 1 Tablet(s) Oral daily  latanoprost 0.005% Ophthalmic Solution 1 Drop(s) Both EYES at bedtime  levETIRAcetam  IVPB 250 milliGRAM(s) IV Intermittent every 12 hours  metoprolol tartrate 12.5 milliGRAM(s) Oral two times a day  sodium chloride 0.45%. 1000 milliLiter(s) (60 mL/Hr) IV Continuous <Continuous>  sodium polystyrene sulfonate Suspension 30 Gram(s) Oral once  timolol 0.25% Solution 1 Drop(s) Both EYES daily      MEDICATIONS  (PRN):  acetaminophen    Suspension .. 765 milliGRAM(s) Oral every 6 hours PRN Moderate Pain (4 - 6)  dextrose 40% Gel 15 Gram(s) Oral once PRN Blood Glucose LESS THAN 70 milliGRAM(s)/deciliter  glucagon  Injectable 1 milliGRAM(s) IntraMuscular once PRN Glucose LESS THAN 70 milligrams/deciliter      Allergies    contrast media (iodine-based) (Urticaria)  erythromycin (Unknown)  IV-Dye (Unknown)  sulfADIAZINE (Unknown)    Intolerances        PAST MEDICAL & SURGICAL HISTORY:  Diabetes mellitus  Fracture: lumbar fracture.  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  Heart Murmur  Calcium Nephrolithiasis  Hyperlipidemia LDL Goal < 100  Glaucoma, Low Tension  History of Pulmonary Hypertension  CAD (Coronary Artery Disease)  Obstructive Sleep Apnea  Controlled Type 2 Diabetes with Neuropathy  H/O cataract extraction  History of appendectomy  Fracture of Humerus: repair with harware right  Glaucoma: relieve pressure left eye along with cataract extraction  Bilateral Cataracts: excision 2006  Benign Breast Disease: removal of cyst- 1963  Cataract  Kidney Stones: lithotripsy 2005, 2006  S/P Appendectomy: 1937      FAMILY HISTORY:  No pertinent family history  Family history of cerebrovascular accident (CVA) (Sibling)      SOCIAL HISTORY  Smoking History:     REVIEW OF SYSTEMS:    Non-verbal when seen  Son present    Vital Signs Last 24 Hrs  T(C): 37.6 (14 Dec 2018 07:41), Max: 37.6 (14 Dec 2018 07:41)  T(F): 99.7 (14 Dec 2018 07:41), Max: 99.7 (14 Dec 2018 07:41)  HR: 107 (14 Dec 2018 13:25) (72 - 117)  BP: 120/82 (14 Dec 2018 13:25) (120/82 - 155/75)  BP(mean): --  RR: 18 (14 Dec 2018 07:41) (18 - 18)  SpO2: 100% (13 Dec 2018 15:35) (100% - 100%)    PHYSICAL EXAMINATION:    GENERAL: Obese.  Snoring when seen.     HEENT: Head is normocephalic and atraumatic. Extraocular muscles are intact. Mucous membranes are moist.     NECK: Supple.     LUNGS: Clear to auscultation without wheezing, rales, or rhonchi. Respirations unlabored    HEART: Regular rate and rhythm without murmur.    ABDOMEN: Soft, nontender, and nondistended.  No hepatosplenomegaly is noted.    EXTREMITIES: Without any cyanosis, clubbing, rash, lesions or edema.    NEUROLOGIC: Grossly intact.      LABS:                        10.4   6.5   )-----------( 217      ( 14 Dec 2018 11:44 )             32.8     12-14    148<H>  |  109<H>  |  58.0<H>  ----------------------------<  209<H>  5.7<H>   |  29.0  |  1.32<H>    Ca    9.7      14 Dec 2018 11:25          ABG - ( 12 Dec 2018 16:16 )  pH, Arterial: 7.41  pH, Blood: x     /  pCO2: 46    /  pO2: 78    / HCO3: 28    / Base Excess: 3.6   /  SaO2: 96            Procalcitonin, Serum: 0.11 ng/mL (12-14-18 @ 07:05)      MICROBIOLOGY: Unrevealing    RADIOLOGY & ADDITIONAL STUDIES:    CXR on 12/10/18  NGT in trachea - removed

## 2018-12-20 NOTE — PROGRESS NOTE ADULT - ASSESSMENT
My partner, Dr. Min had a long discussion with the family yesterday, discussing the pros and cons of PEG tube placement and the risks of complications, especially with IV sedation in the setting of poor respiratory status.  As previously recommended, the Dobbhoff tube can remain in place for another two weeks, giving the patient opportunity to awaken and the family more time to absorb the patient's clinical state and prognosis.  Per my chart review, the patient was last evaluated by neurology on December 12.  Consider recalling Dr. Luther to reevaluate the patient.    Will sign off for now.  Please reconsult is PEG tube placement is deemed appropriate.    Thank you for the consult.  Please do not hesitate to call with any questions or concerns.    EB Aviles MD  [facility]  Division of Gastroenterology  Tel [phone]  Fax [phone]  12-20-18 @ 09:57

## 2018-12-20 NOTE — GOALS OF CARE CONVERSATION - PERSONAL ADVANCE DIRECTIVE - NS PRO AD PATIENT TYPE
Health Care Proxy (HCP)

## 2018-12-20 NOTE — PROGRESS NOTE ADULT - SUBJECTIVE AND OBJECTIVE BOX
CC:  follow up GOC  INTERVAL HPI/OVERNIGHT EVENTS:    PRESENT SYMPTOMS: SOURCE:  Patient/Family/Team    PAIN SCALE:  0 = none  1 = mild   2 = moderate  3 = severe    Pain: soem grimacing    Dyspnea:  [ ] YES [x ] NO  Anxiety:  [ ] YES [ ] NO    Fatigue: [ x] YES [ ] NO  Nausea: [ ] YES [ ] NO  na  Loss of Appetite: [ ] YES [ ] NO  na  Other symptoms: __________    MEDICATIONS  (STANDING):  aspirin  chewable 81 milliGRAM(s) Oral daily  brimonidine 0.2% Ophthalmic Solution 1 Drop(s) Both EYES daily  dextrose 5% + sodium chloride 0.45%. 1000 milliLiter(s) (70 mL/Hr) IV Continuous <Continuous>  dextrose 5%. 1000 milliLiter(s) (50 mL/Hr) IV Continuous <Continuous>  dextrose 50% Injectable 12.5 Gram(s) IV Push once  dextrose 50% Injectable 25 Gram(s) IV Push once  dextrose 50% Injectable 25 Gram(s) IV Push once  enoxaparin Injectable 40 milliGRAM(s) SubCutaneous daily  hydrALAZINE 50 milliGRAM(s) Oral every 8 hours  insulin lispro (HumaLOG) corrective regimen sliding scale   SubCutaneous every 6 hours  lactobacillus acidophilus 1 Tablet(s) Oral daily  latanoprost 0.005% Ophthalmic Solution 1 Drop(s) Both EYES at bedtime  levETIRAcetam  IVPB 250 milliGRAM(s) IV Intermittent every 12 hours  timolol 0.25% Solution 1 Drop(s) Both EYES daily    MEDICATIONS  (PRN):  acetaminophen    Suspension .. 765 milliGRAM(s) Oral every 6 hours PRN Moderate Pain (4 - 6)  dextrose 40% Gel 15 Gram(s) Oral once PRN Blood Glucose LESS THAN 70 milliGRAM(s)/deciliter  glucagon  Injectable 1 milliGRAM(s) IntraMuscular once PRN Glucose LESS THAN 70 milligrams/deciliter  hydrALAZINE Injectable 10 milliGRAM(s) IV Push every 4 hours PRN Patient npo ngt tube clogged      Allergies    contrast media (iodine-based) (Urticaria)  erythromycin (Unknown)  IV-Dye (Unknown)  sulfADIAZINE (Unknown)    Intolerances      Karnofsky Performance Score/Palliative Performance Status Version 2: 10   %    Vital Signs Last 24 Hrs  T(C): 37.6 (20 Dec 2018 15:15), Max: 37.6 (20 Dec 2018 15:15)  T(F): 99.7 (20 Dec 2018 15:15), Max: 99.7 (20 Dec 2018 15:15)  HR: 76 (20 Dec 2018 15:15) (72 - 76)  BP: 162/71 (20 Dec 2018 15:15) (127/86 - 172/70)  BP(mean): --  RR: 20 (20 Dec 2018 07:15) (20 - 20)  SpO2: 97% (20 Dec 2018 15:15) (97% - 98%)    PHYSICAL EXAM:    General: Lethargic     HEENT: NCAT +     Lungs: [ ] comfortable     CV: [x ] normal     GI: soft + NG tube    : [ ] normal  [ ] incontinent  [ ] oliguria/anuria  [ ] walter    MSK: [ ] normal  [ ] weakness  [ ] edema             [ ] ambulatory  [ ] bedbound/wheelchair bound    Skin: [ ] normal  [ ] pressure ulcers- Stage_____  [ ] no rash    LABS:    12-20    145  |  108<H>  |  32.0<H>  ----------------------------<  201<H>  4.0   |  27.0  |  0.97    Ca    8.5<L>      20 Dec 2018 08:10          I&O's Summary      RADIOLOGY & ADDITIONAL STUDIES:      ADVANCE DIRECTIVES:  [ ] YES [ ] NO    DNR: [ ] YES [ ] NO      Date Completed:                    MOLST [ ] YES [ ] NO   Date Completed:     COUNSELING:  Advanced Care Planning Discussion - Time Spent ______Minutes.   More than 50% time spent in counseling and coordinating care. ______ Minutes.     Thank you for the opportunity to assist with the care of this patient.   [place] [facility] Consult Service [phone]. CC:  follow up GOC  INTERVAL HPI/OVERNIGHT EVENTS:    PRESENT SYMPTOMS: SOURCE:  Patient/Family/Team    PAIN SCALE:  0 = none  1 = mild   2 = moderate  3 = severe    Pain: soem grimacing    Dyspnea:  [ ] YES [x ] NO  Anxiety:  [ ] YES [ ] NO    Fatigue: [ x] YES [ ] NO  Nausea: [ ] YES [ ] NO  na  Loss of Appetite: [ ] YES [ ] NO  na  Other symptoms: __________    MEDICATIONS  (STANDING):  aspirin  chewable 81 milliGRAM(s) Oral daily  brimonidine 0.2% Ophthalmic Solution 1 Drop(s) Both EYES daily  dextrose 5% + sodium chloride 0.45%. 1000 milliLiter(s) (70 mL/Hr) IV Continuous <Continuous>  dextrose 5%. 1000 milliLiter(s) (50 mL/Hr) IV Continuous <Continuous>  dextrose 50% Injectable 12.5 Gram(s) IV Push once  dextrose 50% Injectable 25 Gram(s) IV Push once  dextrose 50% Injectable 25 Gram(s) IV Push once  enoxaparin Injectable 40 milliGRAM(s) SubCutaneous daily  hydrALAZINE 50 milliGRAM(s) Oral every 8 hours  insulin lispro (HumaLOG) corrective regimen sliding scale   SubCutaneous every 6 hours  lactobacillus acidophilus 1 Tablet(s) Oral daily  latanoprost 0.005% Ophthalmic Solution 1 Drop(s) Both EYES at bedtime  levETIRAcetam  IVPB 250 milliGRAM(s) IV Intermittent every 12 hours  timolol 0.25% Solution 1 Drop(s) Both EYES daily    MEDICATIONS  (PRN):  acetaminophen    Suspension .. 765 milliGRAM(s) Oral every 6 hours PRN Moderate Pain (4 - 6)  dextrose 40% Gel 15 Gram(s) Oral once PRN Blood Glucose LESS THAN 70 milliGRAM(s)/deciliter  glucagon  Injectable 1 milliGRAM(s) IntraMuscular once PRN Glucose LESS THAN 70 milligrams/deciliter  hydrALAZINE Injectable 10 milliGRAM(s) IV Push every 4 hours PRN Patient npo ngt tube clogged      Allergies    contrast media (iodine-based) (Urticaria)  erythromycin (Unknown)  IV-Dye (Unknown)  sulfADIAZINE (Unknown)    Intolerances      Karnofsky Performance Score/Palliative Performance Status Version 2: 10   %    Vital Signs Last 24 Hrs  T(C): 37.6 (20 Dec 2018 15:15), Max: 37.6 (20 Dec 2018 15:15)  T(F): 99.7 (20 Dec 2018 15:15), Max: 99.7 (20 Dec 2018 15:15)  HR: 76 (20 Dec 2018 15:15) (72 - 76)  BP: 162/71 (20 Dec 2018 15:15) (127/86 - 172/70)  BP(mean): --  RR: 20 (20 Dec 2018 07:15) (20 - 20)  SpO2: 97% (20 Dec 2018 15:15) (97% - 98%)    PHYSICAL EXAM:    General: Lethargic     HEENT: NCAT +     Lungs: [ ] comfortable     CV: [x ] normal     GI: soft + NG tube    : [ ] normal  [x ] incontinent  [ ] oliguria/anuria  [ ] walter    MSK: [ ] normal  [ x] weakness  [ ] edema             [ ] ambulatory  [ x] bedbound/wheelchair bound    Skin: [ ] normal  [ ] pressure ulcers- Stage_____  [ x] no rash    LABS:    12-20    145  |  108<H>  |  32.0<H>  ----------------------------<  201<H>  4.0   |  27.0  |  0.97    Ca    8.5<L>      20 Dec 2018 08:10      Thank you for the opportunity to assist with the care of this patient.   [place] [facility] Consult Service [phone].

## 2018-12-20 NOTE — GOALS OF CARE CONVERSATION - PERSONAL ADVANCE DIRECTIVE - TREATMENT GUIDELINE COMMENT
Cont current treatments  Family deciding on  PEG tube -- son states they are not inclined to put PEG. Await final decision.    Time start- 2:15 Time End 3:00pm
Cont current treatments.   Plan to investigate half life of Vimpat. Family wishes to see if any improvements off medication.    Endocrinology to eval any metabolic issues contributing to mental status, particular Carnitine.   Will decide thereafter if NG tube to be placed if no improvement in mental status.   Time start 3:25pm  Time end 4: 30pm
Continue current treatments.  NG tube to remain for now.    Case Management and Social Work coordinating plan of care as LILLIAM does not take NG tube.   Time start 3:05pm  Time end 4:05pm

## 2018-12-21 NOTE — PROGRESS NOTE ADULT - ASSESSMENT
89yoF with PMH CVA, seizure disorder, HTN, DM2 on long term insulin presenting with AMS, noted to have acute CVA on arrival.    Presentation complicated by seizure disorder.     Metabolic encephalopathy - persistent. secondary to a combination of acute tiny rt frontal lobe CVA on admission, seizure disorder, MISSY, hypernatremia and UTI.  ammonia levels WNL on last check.   Repeat CT head on 12/11 was negative for recurrent acute CVA    Hypernatremia: mild. likely sec to inadequate intake.   fluids updated on 12/21 per renal, lasix x2d added as well  bmp, mg, phos in am    UTI: Proteus Mirabalis UTi   resolved  History of Proteus UTi and renal stones in the past.- renal ultrasound with no nephrolithiasis   Previous UA x 2 negative on 11/27 and 12/1 as well as urine culture on 12/1  culture positive on 12/9, culture on 12/11 + Proteus   HIV was negative  Cefepime 1g iv q12. Abx  completed and discontinued.     Siezure disorder: stable.  weaned off seizure meds as outpt by daughter, unclear why  24 hour video EEG showed abnormal cortical hyperexcitability and abnormality in the left temporal region consistent with 3 previous EEG's + for diffuse cerebral dysfunction.  Started on IV keppra Q12 hours. Neurology in consult  indicate no plans to repeat EEG. However the patient's son and daughters said they would want a neurology determination of brain death or otherwise     MISSY --    resolved  Prerenal azotemia is predominant picture.  Secondary to volume contraction from dehydration.      Hyperkalemia:  Mild . Resolved     DM 2 on long term insulin therapy complicated by hyperhlycemia.   controlled.   -HbA1c 6.0, goal < 8  Sliding scale insulin, fs q6  Family concerned that hyperglycemia is contributing to lethargy/encephalopathy. Explained in detail that tight control of BGL in a hospitalized patient may be detrimental and risks of hypoglycemia.  Family requested to speak to Dr Ferrer, patient's outpatient endocrinologist as well.  Spoken  to him and he is agreement with the current treatment plan and agrees a BSL in the 200s with no acidosis would NOT be contributing to the patient's lethargy.  [facility]    Sick Euthyroid Syndrome: TSH of 2.4 WNL. T4 is WNL. T3 is on the lower side at 42. NO treatment is indicated at this time  Cortisol level WNL  Carnitine level is WN     Afib with SSS/ tachy-truong syndrome:   Rate is controlled  Per cardiology the patient is a poor candidate for PPM insertion at this time or anti-coagulation  Continue aspirin therapy  Discontinued metoprolol at behest of daughter who reported it was discontinued by outpatient medical provider    Hypertensive urgency : resolved  Po hydralazine via NG tube  Started lidocaine patch for long standing shoulder pain that may have been throwing up BP    Acute right frontal CVA: stable. present on admission, noted on MR.   Aspirin PO  Family refusing statin therapy stating that  causes myalgia and muscle weakness. They were counselled on the importance of statin therapy in secondary stroke prevention. They have refused simvastatin and atorvastatin.     History of REBECCA and COPD: No evidence of Co2 retention or acidosis on ABG   Nocturnal and prn bipap  May use patient home bipap machine     Advance care planning:  Extensive family meeting documented from 12/20 including administration + medical providers on staff along with family. GI dr Cruz explained that an NG tube dubhoof catheter can be temporizing measure for feeding nutrition. Family said they would prefer to stay out the period of NG tube feeding in the hospital which they put at another 5 weeks and hope for resolution or improvement of encephalopathy or deterioration, in which case they can decide one way or another despite also understanding long term prognosis is extremely poor and likelihood of meaningful recovery is extremely limited. They said they cannot take care of patient at home in present state.   Aim is to optimized medical management and meet again on weekly basis to determine further thrust for ongoing rehabilitative or supportive care.  No acute medical management is going on currently Dr Hung spoke to family at length and provided a second neurology opinion as per family request.      dvt ppx: lovenox 89yoF with PMH CVA, seizure disorder, HTN, DM2 on long term insulin presenting with AMS, noted to have acute CVA on arrival.    Presentation complicated by seizure disorder.     Course complicated by volume overload + suspected malpositioning of ngt.    Pulmonary Edema - new onset, confirmed on cxr . likely iatrogenic  agree w/ short course lasix ordered by renal  strict i/o  serial cxr as needed to fu resolution  serial bmp while on diuresis    Dysphagia - sec to severe lethargy. NGT in place, chris wang. several lengthy GOC conversations  per family, NGT to remain 6wks prior to them making decision regarding further care such as PEG  suspected to have asp + malpositioning, cxr  shows tube below diaphragm despite absence of ngt being seen on abd xr  advance tube, resume feeds, stop if asp considered to be recurrent  repeat cxr + eval with labs in am for potential onset of asp pna    Metabolic encephalopathy - persistent. secondary to a combination of acute tiny rt frontal lobe CVA on admission, seizure disorder, MISSY, hypernatremia and UTI.  ammonia levels WNL on last check.   Repeat CT head on  was negative for recurrent acute CVA    Hypernatremia: mild. likely sec to inadequate intake.   fluids updated on  per renal, lasix x2d added as well  bmp, mg, phos in am    Siezure disorder: stable.  weaned off seizure meds as outpt by daughter, unclear why  24 hour video EEG showed abnormal cortical hyperexcitability and abnormality in the left temporal region consistent with 3 previous EEG's + for diffuse cerebral dysfunction.  Started on IV keppra Q12 hours. Neurology in consult  indicate no plans to repeat EEG. However the patient's son and daughters said they would want a neurology determination of brain death or otherwise     DM 2 on long term insulin therapy complicated by hyperhlycemia.   controlled.   -HbA1c 6.0, goal < 8  Sliding scale insulin, fs q6  Family concerned that hyperglycemia is contributing to lethargy/encephalopathy. Explained in detail that tight control of BGL in a hospitalized patient may be detrimental and risks of hypoglycemia.  Family requested to speak to Dr Ferrer, patient's outpatient endocrinologist as well.  Spoken  to him and he is agreement with the current treatment plan and agrees a BSL in the 200s with no acidosis would NOT be contributing to the patient's lethargy.  Dr Lombardi inhouse endocrinologist also in agreement    Sick Euthyroid Syndrome: TSH of 2.4 WNL. T4 is WNL. T3 is on the lower side at 42. NO treatment is indicated at this time  Cortisol level WNL  Carnitine level is WN     Acute right frontal CVA: stable. present on admission, noted on MR.   Aspirin via ngt  Family refusing statin therapy stating that  causes myalgia and muscle weakness. They were counselled on the importance of statin therapy in secondary stroke prevention. They have refused simvastatin and atorvastatin.     History of REBECCA and COPD: No evidence of Co2 retention or acidosis on ABG   Nocturnal and prn bipap  May use patient home bipap machine     UTI: Proteus Mirabalis UTi   resolved  History of Proteus UTi and renal stones in the past.- renal ultrasound with no nephrolithiasis   Previous UA x 2 negative on  and  as well as urine culture on   culture positive on , culture on  + Proteus   HIV was negative  Cefepime 1g iv q12. Abx  completed and discontinued.     MISSY --    resolved  Prerenal azotemia is predominant picture.  Secondary to volume contraction from dehydration.      Hyperkalemia:  Mild . Resolved     Afib with SSS/ tachy-truong syndrome:   Rate is controlled  Per cardiology the patient is a poor candidate for PPM insertion at this time or anti-coagulation  Continue aspirin therapy  Discontinued metoprolol at behest of daughter who reported it was discontinued by outpatient medical provider    Hypertensive urgency : resolved  Po hydralazine via NG tube  Started lidocaine patch for long standing shoulder pain that may have been throwing up BP      Advance care planning:  Extensive family meeting documented from  including administration + medical providers on staff along with family. GI dr Cruz explained that an NG tube dubhoof catheter can be temporizing measure for feeding nutrition. Family said they would prefer to stay out the period of NG tube feeding in the hospital which they put at another 5 weeks and hope for resolution or improvement of encephalopathy or deterioration, in which case they can decide one way or another despite also understanding long term prognosis is extremely poor and likelihood of meaningful recovery is extremely limited. They said they cannot take care of patient at home in present state.   Aim is to optimized medical management and meet again on weekly basis to determine further thrust for ongoing rehabilitative or supportive care.  No acute medical management is going on currently Dr Hung spoke to family at length and provided a second neurology opinion as per family request.      overall pt is very poor prognosis, fully unresponsive, not interactive at all. without artificial feedings and iv for meds pt would likely  iminently, family however would like all measures pursued and are hopeful that pt will make meaningful recovery    dvt ppx: lovenox

## 2018-12-21 NOTE — PROGRESS NOTE ADULT - SUBJECTIVE AND OBJECTIVE BOX
NEPHROLOGY INTERVAL HPI/OVERNIGHT EVENTS:    No new events; daughter at bedside.      MEDICATIONS  (STANDING):  aspirin  chewable 81 milliGRAM(s) Oral daily  brimonidine 0.2% Ophthalmic Solution 1 Drop(s) Both EYES daily  dextrose 5% + sodium chloride 0.45%. 1000 milliLiter(s) (70 mL/Hr) IV Continuous <Continuous>  dextrose 5%. 1000 milliLiter(s) (50 mL/Hr) IV Continuous <Continuous>  dextrose 50% Injectable 12.5 Gram(s) IV Push once  dextrose 50% Injectable 25 Gram(s) IV Push once  dextrose 50% Injectable 25 Gram(s) IV Push once  enoxaparin Injectable 40 milliGRAM(s) SubCutaneous daily  hydrALAZINE 50 milliGRAM(s) Oral every 8 hours  insulin lispro (HumaLOG) corrective regimen sliding scale   SubCutaneous every 6 hours  lactobacillus acidophilus 1 Tablet(s) Oral daily  latanoprost 0.005% Ophthalmic Solution 1 Drop(s) Both EYES at bedtime  levETIRAcetam  IVPB 250 milliGRAM(s) IV Intermittent every 12 hours  timolol 0.25% Solution 1 Drop(s) Both EYES daily    MEDICATIONS  (PRN):  acetaminophen    Suspension .. 765 milliGRAM(s) Oral every 6 hours PRN Moderate Pain (4 - 6)  dextrose 40% Gel 15 Gram(s) Oral once PRN Blood Glucose LESS THAN 70 milliGRAM(s)/deciliter  glucagon  Injectable 1 milliGRAM(s) IntraMuscular once PRN Glucose LESS THAN 70 milligrams/deciliter  hydrALAZINE Injectable 10 milliGRAM(s) IV Push every 4 hours PRN Patient npo ngt tube clogged      Allergies    contrast media (iodine-based) (Urticaria)  erythromycin (Unknown)  IV-Dye (Unknown)  sulfADIAZINE (Unknown)              Vital Signs Last 24 Hrs    T(C): 37 (21 Dec 2018 00:31), Max: 37.6 (20 Dec 2018 15:15)  T(F): 98.6 (21 Dec 2018 00:31), Max: 99.7 (20 Dec 2018 15:15)  HR: 62 (21 Dec 2018 05:32) (61 - 76)  BP: 167/72 (21 Dec 2018 05:32) (140/72 - 167/72)  BP(mean): --  RR: 12 (21 Dec 2018 00:31) (12 - 20)  SpO2: 100% (21 Dec 2018 00:31) (97% - 100%)  T(C): 36.8 (20 Dec 2018 07:15), Max: 36.8 (20 Dec 2018 07:15)  T(F): 98.2 (20 Dec 2018 07:15), Max: 98.2 (20 Dec 2018 07:15)  HR: 72 (20 Dec 2018 07:15) (67 - 83)  BP: 130/55 (20 Dec 2018 07:15) (127/86 - 172/70)  BP(mean): --  RR: 20 (20 Dec 2018 07:15) (20 - 20)  SpO2: 98% (20 Dec 2018 07:15) (98% - 100%)      PHYSICAL EXAM:  GENERAL: appears chronically ill  HEAD:  Atraumatic, Normocephalic  EYES:   NECK: Supple, neck  veins full  NERVOUS SYSTEM:  unresponsive, in bed  CHEST/LUNG:  Decreased bs bases  HEART: Regular rate and rhythm; No murmur  ABDOMEN: Tube feed on hold  EXTREMITIES:  +3 edema      LABS:    12-20    145  |  108<H>  |  32.0<H>  ----------------------------<  201<H>  4.0   |  27.0  |  0.97    Ca    8.5<L>      20 Dec 2018 08:10                  RADIOLOGY & ADDITIONAL TESTS:

## 2018-12-21 NOTE — PROGRESS NOTE ADULT - SUBJECTIVE AND OBJECTIVE BOX
CC:    Patient seen and examined at the bedside. No acute overnight events. - yesterday. Complaining of - this AM. Denies fever/chills, headache, lightheadedness, dizziness, chest pain, palpitations, shortness of breath, cough, abd pain, nausea/vomiting/diarrhea, muscle pain.      =========================================================================================  T(C): 36.4 (12-21-18 @ 08:07), Max: 37.6 (12-20-18 @ 15:15)  HR: 87 (12-21-18 @ 08:07) (61 - 87)  BP: 138/84 (12-21-18 @ 08:07) (138/84 - 167/72)  RR: 20 (12-21-18 @ 08:07) (12 - 20)  SpO2: 96% (12-21-18 @ 08:07) (96% - 100%)    PHYSICAL EXAM.    GEN - appears age appropriate. well nourished. pleasant. no distress.   HEENT - NCAT, EOMI, CHRISTOS  RESP - CTA BL, no wheeze/stridor/rhonchi/crackles. not on supplemental O2. able to speak in full sentences without distress.   CARDIO - NS1S2, RRR. No murmurs/rubs/gallops.  ABD - Soft/Non tender/Non distended. Normal BS x4 quadrants. no guarding/rebound tenderness.  Ext - No OSCAR.  MSK - BL 5/5 strength on upper and lower extremities.   Neuro - AAOx3. cn 2-12 grossly intact  Psych - normal affect  Skin - c/d/i. no rashes/lesions      I&O's Summary    Daily     Daily     =========================================================================================  LABS.    12-21    147<H>  |  110<H>  |  30.0<H>  ----------------------------<  127<H>  3.9   |  26.0  |  0.93    Ca    8.7      21 Dec 2018 07:51                            9.5    10.4  )-----------( 184      ( 21 Dec 2018 07:51 )             30.2                   =========================================================================================  IMAGING.     =========================================================================================    HOME MEDS.    Home Medications:  brimonidine 0.2% ophthalmic solution: 1 drop(s) to each affected eye once a day (in the morning) (26 Nov 2018 16:54)  cephalexin 250 mg oral capsule: 1 cap(s) orally once a day (26 Nov 2018 16:54)  HumaLOG 100 units/mL subcutaneous solution: Inject 3 to 5 units subcutaneously after meals (26 Nov 2018 16:54)  hydrALAZINE 25 mg oral tablet: 1 tab(s) orally 2 times a day (26 Nov 2018 16:08)  latanoprost 0.005% ophthalmic solution: 1 drop(s) to each affected eye once a day (at bedtime) (26 Nov 2018 16:08)  methenamine hippurate 1 g oral tablet: 1 tab(s) orally 2 times a day (26 Nov 2018 17:06)  timolol hemihydrate 0.25% ophthalmic solution: 1 drop(s) to each affected eye once a day (in the morning) (26 Nov 2018 16:54)      =========================================================================================    HOSPITAL MEDS.    MEDICATIONS  (STANDING):  aspirin  chewable 81 milliGRAM(s) Oral daily  brimonidine 0.2% Ophthalmic Solution 1 Drop(s) Both EYES daily  dextrose 5% + sodium chloride 0.45%. 1000 milliLiter(s) (40 mL/Hr) IV Continuous <Continuous>  dextrose 5%. 1000 milliLiter(s) (50 mL/Hr) IV Continuous <Continuous>  dextrose 50% Injectable 12.5 Gram(s) IV Push once  dextrose 50% Injectable 25 Gram(s) IV Push once  dextrose 50% Injectable 25 Gram(s) IV Push once  enoxaparin Injectable 40 milliGRAM(s) SubCutaneous daily  furosemide   Injectable 40 milliGRAM(s) IV Push daily  hydrALAZINE 50 milliGRAM(s) Oral every 8 hours  insulin lispro (HumaLOG) corrective regimen sliding scale   SubCutaneous every 6 hours  lactobacillus acidophilus 1 Tablet(s) Oral daily  latanoprost 0.005% Ophthalmic Solution 1 Drop(s) Both EYES at bedtime  levETIRAcetam  IVPB 250 milliGRAM(s) IV Intermittent every 12 hours  lidocaine   Patch 1 Patch Transdermal daily  timolol 0.25% Solution 1 Drop(s) Both EYES daily    MEDICATIONS  (PRN):  acetaminophen    Suspension .. 765 milliGRAM(s) Oral every 6 hours PRN Moderate Pain (4 - 6)  dextrose 40% Gel 15 Gram(s) Oral once PRN Blood Glucose LESS THAN 70 milliGRAM(s)/deciliter  glucagon  Injectable 1 milliGRAM(s) IntraMuscular once PRN Glucose LESS THAN 70 milligrams/deciliter  hydrALAZINE Injectable 10 milliGRAM(s) IV Push every 4 hours PRN Patient npo ngt tube clogged CC: pulm edema    Patient seen and examined at the bedside. Tube feeds stopped overnight given pt having feeds coming out of her mouth reportedly by nursing and concern for aspiration. No events yesterday. Complaining of nothing this AM but pt unresponsive, does not speak, does not move, cannot respond regarding presence or absence of fever/chills, headache, lightheadedness, dizziness, chest pain, palpitations, shortness of breath, cough, abd pain, nausea/vomiting/diarrhea, muscle pain.      =========================================================================================  T(C): 36.4 (12-21-18 @ 08:07), Max: 37.6 (12-20-18 @ 15:15)  HR: 87 (12-21-18 @ 08:07) (61 - 87)  BP: 138/84 (12-21-18 @ 08:07) (138/84 - 167/72)  RR: 20 (12-21-18 @ 08:07) (12 - 20)  SpO2: 96% (12-21-18 @ 08:07) (96% - 100%)    PHYSICAL EXAM.    GEN - appears younger than stated age. no distress.   HEENT - NCAT. NGT in place  RESP - crackles noted @ bases BL. no wheeze/stridor/rhonchi/crackles. not on supplemental O2. unable to speak   CARDIO -No murmurs/rubs/gallops.  ABD - Soft/Non tender/Non distended. Normal BS x4 quadrants. no guarding/rebound tenderness.  Ext - BL upper and lower ext edema noted diffusely  MSK - BL 0/5 strength on upper and lower extremities.   Neuro - AAOx0. unable to assess.cn 2-12 grossly intact  Psych - cannot assess, pt lethargic, does not wake or respond to verbal or physical stimuli  Skin - c/d/i. no rashes/lesions      I&O's Summary    Daily     Daily     =========================================================================================  LABS.    12-21    147<H>  |  110<H>  |  30.0<H>  ----------------------------<  127<H>  3.9   |  26.0  |  0.93    Ca    8.7      21 Dec 2018 07:51                            9.5    10.4  )-----------( 184      ( 21 Dec 2018 07:51 )             30.2                   =========================================================================================  IMAGING.     =========================================================================================    HOME MEDS.    Home Medications:  brimonidine 0.2% ophthalmic solution: 1 drop(s) to each affected eye once a day (in the morning) (26 Nov 2018 16:54)  cephalexin 250 mg oral capsule: 1 cap(s) orally once a day (26 Nov 2018 16:54)  HumaLOG 100 units/mL subcutaneous solution: Inject 3 to 5 units subcutaneously after meals (26 Nov 2018 16:54)  hydrALAZINE 25 mg oral tablet: 1 tab(s) orally 2 times a day (26 Nov 2018 16:08)  latanoprost 0.005% ophthalmic solution: 1 drop(s) to each affected eye once a day (at bedtime) (26 Nov 2018 16:08)  methenamine hippurate 1 g oral tablet: 1 tab(s) orally 2 times a day (26 Nov 2018 17:06)  timolol hemihydrate 0.25% ophthalmic solution: 1 drop(s) to each affected eye once a day (in the morning) (26 Nov 2018 16:54)      =========================================================================================    HOSPITAL MEDS.    MEDICATIONS  (STANDING):  aspirin  chewable 81 milliGRAM(s) Oral daily  brimonidine 0.2% Ophthalmic Solution 1 Drop(s) Both EYES daily  dextrose 5% + sodium chloride 0.45%. 1000 milliLiter(s) (40 mL/Hr) IV Continuous <Continuous>  dextrose 5%. 1000 milliLiter(s) (50 mL/Hr) IV Continuous <Continuous>  dextrose 50% Injectable 12.5 Gram(s) IV Push once  dextrose 50% Injectable 25 Gram(s) IV Push once  dextrose 50% Injectable 25 Gram(s) IV Push once  enoxaparin Injectable 40 milliGRAM(s) SubCutaneous daily  furosemide   Injectable 40 milliGRAM(s) IV Push daily  hydrALAZINE 50 milliGRAM(s) Oral every 8 hours  insulin lispro (HumaLOG) corrective regimen sliding scale   SubCutaneous every 6 hours  lactobacillus acidophilus 1 Tablet(s) Oral daily  latanoprost 0.005% Ophthalmic Solution 1 Drop(s) Both EYES at bedtime  levETIRAcetam  IVPB 250 milliGRAM(s) IV Intermittent every 12 hours  lidocaine   Patch 1 Patch Transdermal daily  timolol 0.25% Solution 1 Drop(s) Both EYES daily    MEDICATIONS  (PRN):  acetaminophen    Suspension .. 765 milliGRAM(s) Oral every 6 hours PRN Moderate Pain (4 - 6)  dextrose 40% Gel 15 Gram(s) Oral once PRN Blood Glucose LESS THAN 70 milliGRAM(s)/deciliter  glucagon  Injectable 1 milliGRAM(s) IntraMuscular once PRN Glucose LESS THAN 70 milligrams/deciliter  hydrALAZINE Injectable 10 milliGRAM(s) IV Push every 4 hours PRN Patient npo ngt tube clogged

## 2018-12-21 NOTE — CHART NOTE - NSCHARTNOTEFT_GEN_A_CORE
review of CXR shows dobhoff tube in stomach.  Guide wire removed without difficulties.  Order to restart tube feedings per hospitalist written.  Family at bedside.  Concerned about prior CXR and being told the tube was in place (the one prior to this one)

## 2018-12-22 NOTE — PROGRESS NOTE ADULT - ASSESSMENT
89 year old female with hx of type 2 DM, CVA, seizure disorder admitted with acute right frontal CVA now with continued encephalopathy, likely multifactorial.   She has a mildly reduced T3 level, which is likely the result of sick euthyroid syndrome, not underlying thyroid dysfunction.  She is mildly hyperglycemic on tube feeds.      1.  T2DM  -mild hyperglycemia  -start nph 3 units Q8 for slightly better control  -discussed at length with the family  -keep insulin sliding scale at Q4hrs    2.  Abnormal TFTs-  as above, low T3 likely due to sick euthyroid syndrome.  Repeat TFTs as an outpatient in 2-4 weeks.     3.  Encephalopathy- ikely multifactorial in setting of recent CVA and UTI. As per medicine.      4.  Hypernatremia- improving. mild, probably due to dehydration. can monitor for now

## 2018-12-22 NOTE — PROGRESS NOTE ADULT - SUBJECTIVE AND OBJECTIVE BOX
CC:  Encephalopathy from CVA , seizure, UTI in setting of dementia.    HPI:  The patient is a 89 year old female with a history of CVA< seizure disorder, hypertension and diabetes mellitus who was brought to the ER for complaints of altered mental status. Patient was diagnosed with seizure disorder last year and started on keppra po. Over the past few months the family has noted she has been more lethargic and confused. She saw Dr Mcallister, neurologist at [place]. According to the daughter she had a number of tests which indicated she did not have seizure disorder and she was advised to taper off the keppra. Her daughter tapered it off over the past 2 weeks. This morning she noted her mother became unresponsive, fell to her side, noted facial droop, her eyes were "fluttering" and her tongue was rolled back. After the episode she remained lethargic for about 3 hours. In the ER, NIH was 0. Patient was more alert and responsive. CT head was negative. (26 Nov 2018 17:08)    REVIEW OF SYSTEMS:    Patient denied fever, chills, abdominal pain, nausea, vomiting, cough, shortness of breath, chest pain or palpitations    Vital Signs Last 24 Hrs  T(C): 36.7 (22 Dec 2018 00:21), Max: 36.8 (21 Dec 2018 15:39)  T(F): 98.1 (22 Dec 2018 00:21), Max: 98.3 (21 Dec 2018 15:39)  HR: 71 (22 Dec 2018 04:21) (63 - 71)  BP: 139/67 (22 Dec 2018 04:21) (137/60 - 158/78)  BP(mean): --  RR: 18 (22 Dec 2018 00:21) (17 - 18)  SpO2: 98% (21 Dec 2018 15:39) (98% - 98%)I&O's Summary    21 Dec 2018 07:01  -  22 Dec 2018 07:00  --------------------------------------------------------  IN: 360 mL / OUT: 0 mL / NET: 360 mL      PHYSICAL EXAM:  GENERAL: Obtundation  HEENT: PERRL, +EOMI, anicteric, no [place]  NECK: Supple, No JVD   CHEST/LUNG: Bilateral rales   HEART: S1S2 Normal intensity, no murmurs, gallops or rubs noted  ABDOMEN: Soft, BS Normoactive, NT, ND, no HSM noted  EXTREMITIES: No clubbing, cyanosis, Slight pedal and upper ext edema noted, Bed bound   SKIN: No rashes or lesions noted  NEURO: Unresponsive.  Occasional rare limb movement.   PSYCH: Unresponsive   LABS:                        10.7   6.5   )-----------( 188      ( 22 Dec 2018 06:46 )             34.2     12-22    146<H>  |  109<H>  |  32.0<H>  ----------------------------<  196<H>  4.2   |  26.0  |  0.94    Ca    9.0      22 Dec 2018 06:46  Phos  2.8     12-22  Mg     2.3     12-22          RADIOLOGY & ADDITIONAL TESTS:    MEDICATIONS:  MEDICATIONS  (STANDING):  aspirin  chewable 81 milliGRAM(s) Oral daily  brimonidine 0.2% Ophthalmic Solution 1 Drop(s) Both EYES daily  dextrose 5% + sodium chloride 0.45%. 1000 milliLiter(s) (40 mL/Hr) IV Continuous <Continuous>  dextrose 5%. 1000 milliLiter(s) (50 mL/Hr) IV Continuous <Continuous>  dextrose 50% Injectable 12.5 Gram(s) IV Push once  dextrose 50% Injectable 25 Gram(s) IV Push once  dextrose 50% Injectable 25 Gram(s) IV Push once  enoxaparin Injectable 40 milliGRAM(s) SubCutaneous daily  furosemide   Injectable 40 milliGRAM(s) IV Push daily  hydrALAZINE 50 milliGRAM(s) Oral every 8 hours  insulin lispro (HumaLOG) corrective regimen sliding scale   SubCutaneous every 6 hours  lactobacillus acidophilus 1 Tablet(s) Oral daily  latanoprost 0.005% Ophthalmic Solution 1 Drop(s) Both EYES at bedtime  levETIRAcetam  IVPB 250 milliGRAM(s) IV Intermittent every 12 hours  lidocaine   Patch 1 Patch Transdermal daily  timolol 0.25% Solution 1 Drop(s) Both EYES daily    MEDICATIONS  (PRN):  acetaminophen    Suspension .. 765 milliGRAM(s) Oral every 6 hours PRN Moderate Pain (4 - 6)  dextrose 40% Gel 15 Gram(s) Oral once PRN Blood Glucose LESS THAN 70 milliGRAM(s)/deciliter  glucagon  Injectable 1 milliGRAM(s) IntraMuscular once PRN Glucose LESS THAN 70 milligrams/deciliter  hydrALAZINE Injectable 10 milliGRAM(s) IV Push every 4 hours PRN Patient npo ngt tube clogged

## 2018-12-22 NOTE — PROGRESS NOTE ADULT - ASSESSMENT
89yoF with PMH CVA, seizure disorder, HTN, DM2 on long term insulin presenting with AMS, noted to have acute CVA on arrival.    Pulmonary Edema - new onset, confirmed on cxr . likely iatrogenic  agree w/ short course lasix ordered by renal  strict i/o  serial cxr as needed to fu resolution  serial bmp while on diuresis    Dysphagia - sec to severe lethargy. NGT in place,  felipe.   per family, NGT to remain 6wks prior to them making decision regarding further care such as PEG  suspected to have asp + malpositioning, cxr  shows tube below diaphragm despite absence of ngt being seen on abd xr  advance tube, resume feeds, stop if asp considered to be recurrent    Metabolic encephalopathy - persistent. secondary to a combination of acute tiny rt frontal lobe CVA on admission, seizure disorder, MISSY, hypernatremia and UTI.  ammonia levels WNL on last check.   Repeat CT head on  was negative for recurrent acute CVA    Hypernatremia: mild. likely sec to inadequate intake.   fluids updated on  per renal, lasix x2d added as well  bmp, mg, phos in am    Siezure disorder: stable.  weaned off seizure meds as outpt by daughter, unclear why  24 hour video EEG showed abnormal cortical hyperexcitability and abnormality in the left temporal region consistent with 3 previous EEG's + for diffuse cerebral dysfunction.  Started on IV keppra Q12 hours. Neurology in consult  indicate no plans to repeat EEG.  Dr Hung in consult as second opinion, agree with initial neurology evaluation.        DM 2 on long term insulin therapy complicated by hyperhlycemia.   controlled.   -HbA1c 6.0, goal < 8  Sliding scale insulin, fs q6  Family concerned that hyperglycemia is contributing to lethargy/encephalopathy. Explained in detail that tight control of BGL in a hospitalized patient may be detrimental and risks of hypoglycemia.  Family requested to speak to Dr Ferrer, patient's outpatient endocrinologist as well.  Spoken  to him and he is agreement with the current treatment plan and agrees a BSL in the 200s with no acidosis would NOT be contributing to the patient's lethargy.  [facility]    Sick Euthyroid Syndrome: TSH of 2.4 WNL. T4 is WNL. T3 is on the lower side at 42. NO treatment is indicated at this time  Cortisol level WNL  Carnitine level is WN     Acute right frontal CVA: stable. present on admission, noted on MR.   Aspirin via ngt  Family refusing statin therapy stating that  causes myalgia and muscle weakness. They were counselled on the importance of statin therapy in secondary stroke prevention. They have refused simvastatin and atorvastatin.     History of REBECCA and COPD: No evidence of Co2 retention or acidosis on ABG   Nocturnal and prn bipap  May use patient home bipap machine     UTI: Proteus Mirabalis UTi   resolved  History of Proteus UTi and renal stones in the past.- renal ultrasound with no nephrolithiasis   Previous UA x 2 negative on  and  as well as urine culture on   culture positive on , culture on  + Proteus   HIV was negative  Cefepime 1g iv q12. Abx  completed and discontinued.     MISSY --    resolved  Prerenal azotemia is predominant picture.  Secondary to volume contraction from dehydration.      Hyperkalemia:  Mild . Resolved     Afib with SSS/ tachy-truong syndrome:   Rate is controlled  Per cardiology the patient is a poor candidate for PPM insertion at this time or anti-coagulation  Continue aspirin therapy  Discontinued metoprolol at behest of daughter who reported it was discontinued by outpatient medical provider    Hypertensive urgency : resolved  Hydralazine PO and IV   Started lidocaine patch for long standing shoulder pain that may have been throwing up BP      Advance care planning:  Extensive family meeting documented from  including administration + medical providers on staff along with family. GI dr Cruz explained that an NG tube dubhoof catheter can be temporizing measure for feeding nutrition. Family said they would prefer to stay out the period of NG tube feeding in the hospital which they put at another 5 weeks and hope for resolution or improvement of encephalopathy or deterioration, in which case they can decide one way or another despite also understanding long term prognosis is extremely poor and likelihood of meaningful recovery is extremely limited. They said they cannot take care of patient at home in present state.   Aim is to optimized medical management and meet again on weekly basis to determine further thrust for ongoing rehabilitative or supportive care.  No acute medical management is going on currently Dr Hung spoke to family at length and provided a second neurology opinion as per family request.      overall pt is very poor prognosis, fully unresponsive, not interactive at all.  family however would like all measures pursued and are hopeful that pt will make meaningful recovery    dvt ppx: lovenox

## 2018-12-22 NOTE — CHART NOTE - NSCHARTNOTEFT_GEN_A_CORE
Patient used their Home CPAP Unit for nocturnal use. Pt. stayed on their home equipment  through out the night. Patient tolerated the settings well with no sign of any respiratory distress noted.

## 2018-12-22 NOTE — PROGRESS NOTE ADULT - SUBJECTIVE AND OBJECTIVE BOX
Interval Events:  no overnight events  follow up on type 2 diabetes    patient seen and examined at bedside.  son marti and daughter amanda at bedside  patient not responsive to verbal or tactile stimuli    REVIEW OF SYSTEMS:  unable to obtain due to clinical status      contrast media (iodine-based) (Urticaria)  erythromycin (Unknown)  IV-Dye (Unknown)  sulfADIAZINE (Unknown)      MEDICATIONS  (STANDING):  aspirin  chewable 81 milliGRAM(s) Oral daily  brimonidine 0.2% Ophthalmic Solution 1 Drop(s) Both EYES daily  dextrose 5%. 1000 milliLiter(s) (50 mL/Hr) IV Continuous <Continuous>  dextrose 50% Injectable 12.5 Gram(s) IV Push once  dextrose 50% Injectable 25 Gram(s) IV Push once  dextrose 50% Injectable 25 Gram(s) IV Push once  enoxaparin Injectable 40 milliGRAM(s) SubCutaneous daily  furosemide   Injectable 40 milliGRAM(s) IV Push daily  insulin lispro (HumaLOG) corrective regimen sliding scale   SubCutaneous every 6 hours  insulin NPH human recombinant 3 Unit(s) SubCutaneous every 8 hours  lactobacillus acidophilus 1 Tablet(s) Oral daily  latanoprost 0.005% Ophthalmic Solution 1 Drop(s) Both EYES at bedtime  levETIRAcetam  IVPB 250 milliGRAM(s) IV Intermittent every 12 hours  lidocaine   Patch 1 Patch Transdermal daily  timolol 0.25% Solution 1 Drop(s) Both EYES daily    MEDICATIONS  (PRN):  acetaminophen    Suspension .. 765 milliGRAM(s) Oral every 6 hours PRN Moderate Pain (4 - 6)  dextrose 40% Gel 15 Gram(s) Oral once PRN Blood Glucose LESS THAN 70 milliGRAM(s)/deciliter  glucagon  Injectable 1 milliGRAM(s) IntraMuscular once PRN Glucose LESS THAN 70 milligrams/deciliter  hydrALAZINE Injectable 10 milliGRAM(s) IV Push every 4 hours PRN Patient npo ngt tube clogged      Vital Signs Last 24 Hrs  T(C): 36.7 (22 Dec 2018 15:48), Max: 36.7 (22 Dec 2018 00:21)  T(F): 98 (22 Dec 2018 15:48), Max: 98.1 (22 Dec 2018 00:21)  HR: 73 (22 Dec 2018 15:48) (63 - 73)  BP: 163/83 (22 Dec 2018 15:48) (137/60 - 163/83)  BP(mean): --  RR: 18 (22 Dec 2018 00:21) (17 - 18)  SpO2: --    PHYSICAL EXAM:    Gen: elderly female, unresponsive   HEENT:  MMM  Neck:  no thyromegaly, no LAD  CV:  nl S1 + S2, RRR, no m/r/g  Resp:  nl respiratory effort, CTA b/l  GI/ Abd: soft, NT/ ND, BS +  MS:  no edema in LEs, no purposeful movements of extremities.   Neuro: not alert, not spontaneously moving extremities      LABS  12-22    146<H>  |  109<H>  |  32.0<H>  ----------------------------<  196<H>  4.2   |  26.0  |  0.94    Ca    9.0      22 Dec 2018 06:46  Phos  2.8     12-22  Mg     2.3     12-22                            10.7   6.5   )-----------( 188      ( 22 Dec 2018 06:46 )             34.2           CAPILLARY BLOOD GLUCOSE      POCT Blood Glucose.: 213 mg/dL (22 Dec 2018 15:36)  POCT Blood Glucose.: 237 mg/dL (22 Dec 2018 12:11)  POCT Blood Glucose.: 228 mg/dL (22 Dec 2018 10:09)  POCT Blood Glucose.: 223 mg/dL (22 Dec 2018 05:58)  POCT Blood Glucose.: 189 mg/dL (22 Dec 2018 01:52)  POCT Blood Glucose.: 181 mg/dL (21 Dec 2018 22:03)  POCT Blood Glucose.: 184 mg/dL (21 Dec 2018 17:25)

## 2018-12-23 NOTE — PROGRESS NOTE ADULT - ASSESSMENT
89yoF with PMH CVA, seizure disorder, HTN, DM2 on long term insulin presenting with AMS, noted to have acute CVA on arrival.    Pulmonary Edema - new onset, Resolved on lasix.   serial bmp while on diuresis    Dysphagia - sec to severe lethargy. NGT in place, dobhoff.   per family, NGT to remain 6wks prior to them making decision regarding further care such as PEG  Tolerating NGT feeding.  May no optimize rate of feeding to decrease aspiration risk.  To hold feeding at night.    Metabolic encephalopathy - persistent. secondary to a combination of acute tiny rt frontal lobe CVA on admission, seizure disorder, MISSY, hypernatremia and UTI.  ammonia levels WNL on last check.   Repeat CT head on 12/11 was negative for recurrent acute CVA    Hypernatremia: mild. likely sec to inadequate intake.   Cautious fluids to avoid overload   Free water with NGtube feeds.     Siezure disorder: stable.  weaned off seizure meds as outpt by daughter, unclear why  24 hour video EEG showed abnormal cortical hyperexcitability and abnormality in the left temporal region consistent with 3 previous EEG's + for diffuse cerebral dysfunction.  Started on IV keppra Q12 hours. Neurology in consult  indicate no plans to repeat EEG.  Dr Hung in consult as second opinion, agree with initial neurology evaluation.        DM 2 on long term insulin therapy complicated by hyperhlycemia.   controlled.   -HbA1c 6.0, goal < 8  Sliding scale insulin, fs q6  Family concerned that hyperglycemia is contributing to lethargy/encephalopathy. Explained in detail that tight control of BGL in a hospitalized patient may be detrimental and risks of hypoglycemia.  Family requested to speak to Dr Ferrer, patient's outpatient endocrinologist as well.  Spoken  to him and he is agreement with the current treatment plan and agrees a BSL in the 200s with no acidosis would NOT be contributing to the patient's lethargy.  [facility]    Sick Euthyroid Syndrome: TSH of 2.4 WNL. T4 is WNL. T3 is on the lower side at 42. NO treatment is indicated at this time  Cortisol level WNL  Carnitine level is WN     Acute right frontal CVA: stable. present on admission, noted on MR.   Aspirin via ngt  Family refusing statin therapy stating that  causes myalgia and muscle weakness. They were counselled on the importance of statin therapy in secondary stroke prevention. They have refused simvastatin and atorvastatin.     History of REBECCA and COPD: No evidence of Co2 retention or acidosis on ABG   Nocturnal and prn bipap  May use patient home bipap machine     UTI: Proteus Mirabalis UTi   resolved  History of Proteus UTi and renal stones in the past.- renal ultrasound with no nephrolithiasis   Previous UA x 2 negative on 11/27 and 12/1 as well as urine culture on 12/1  culture positive on 12/9, culture on 12/11 + Proteus   HIV was negative  Cefepime 1g iv q12. Abx  completed and discontinued.     MISSY --    resolved  Prerenal azotemia is predominant picture.  Secondary to volume contraction from dehydration.      Hyperkalemia:  Mild . Resolved     Afib with SSS/ tachy-truong syndrome:   Rate is controlled  Per cardiology the patient is a poor candidate for PPM insertion at this time or anti-coagulation  Continue aspirin therapy  Discontinued metoprolol at behest of daughter who reported it was discontinued by outpatient medical provider for untoward effects.     Hypertensive urgency : resolved  Hydralazine PO and IV   Started lidocaine patch for long standing shoulder pain that may have been throwing up BP      Advance care planning:  Extensive family meeting documented from 12/20 including administration + medical providers on staff along with family. GI dr Cruz explained that an NG tube dubhoof catheter can be temporizing measure for feeding nutrition. Family said they would prefer to stay out the period of NG tube feeding in the hospital which they put at another 5 weeks and hope for resolution or improvement of encephalopathy or deterioration, in which case they can decide one way or another despite also understanding long term prognosis is extremely poor and likelihood of meaningful recovery is extremely limited. They said they cannot take care of patient at home in present state.   Aim is to optimized medical management and meet again on weekly basis to determine further thrust for ongoing rehabilitative or supportive care.  No acute medical management is going on currently Dr Hung spoke to family at length and provided a second neurology opinion as per family request.      overall pt is very poor prognosis, fully unresponsive, not interactive at all.  family however would like all measures pursued and are hopeful that pt will make meaningful recovery    dvt ppx: lovenox

## 2018-12-23 NOTE — PROGRESS NOTE ADULT - SUBJECTIVE AND OBJECTIVE BOX
CC: Encephalopathy secondary to seizure, CVA and UTI.   unresponsive. NGTube feeding.    HPI:  The patient is a 89 year old female with a history of CVA< seizure disorder, hypertension and diabetes mellitus who was brought to the ER for complaints of altered mental status. Patient was diagnosed with seizure disorder last year and started on keppra po. Over the past few months the family has noted she has been more lethargic and confused. She saw Dr Mcallister, neurologist at [place]. According to the daughter she had a number of tests which indicated she did not have seizure disorder and she was advised to taper off the keppra. Her daughter tapered it off over the past 2 weeks. This morning she noted her mother became unresponsive, fell to her side, noted facial droop, her eyes were "fluttering" and her tongue was rolled back. After the episode she remained lethargic for about 3 hours. In the ER, NIH was 0. Patient was more alert and responsive. CT head was negative. (26 Nov 2018 17:08)    REVIEW OF SYSTEMS:    Patient denied fever, chills, abdominal pain, nausea, vomiting, cough, shortness of breath, chest pain or palpitations    Vital Signs Last 24 Hrs  T(C): 36.9 (23 Dec 2018 07:55), Max: 36.9 (23 Dec 2018 07:55)  T(F): 98.4 (23 Dec 2018 07:55), Max: 98.4 (23 Dec 2018 07:55)  HR: 82 (23 Dec 2018 13:46) (61 - 82)  BP: 125/66 (23 Dec 2018 13:46) (125/66 - 192/79)  BP(mean): --  RR: 20 (23 Dec 2018 07:55) (20 - 20)  SpO2: 100% (23 Dec 2018 07:55) (100% - 100%)I&O's Summary    PHYSICAL EXAM:  GENERAL: Unresponsive  HEENT: PERRL, +EOMI, anicteric, no [place]  NECK: Supple, No JVD   CHEST/LUNG: CTA bilateral; Normal effort  HEART: S1S2 Normal intensity, no murmurs, gallops or rubs noted  ABDOMEN: Soft, BS Normoactive, NT, ND, no HSM noted  EXTREMITIES:  2+ radial and DP pulses noted, no clubbing, cyanosis, or edema noted, Bed bound  SKIN: No rashes or lesions noted  NEURO: Unresponsive   PSYCH: Unresponsive   LABS:                        10.7   6.5   )-----------( 188      ( 22 Dec 2018 06:46 )             34.2     12-23    148<H>  |  108<H>  |  32.0<H>  ----------------------------<  215<H>  4.8   |  29.0  |  0.89    Ca    9.0      23 Dec 2018 08:55  Phos  2.8     12-22  Mg     2.3     12-22          RADIOLOGY & ADDITIONAL TESTS:    MEDICATIONS:  MEDICATIONS  (STANDING):  aspirin  chewable 81 milliGRAM(s) Oral daily  brimonidine 0.2% Ophthalmic Solution 1 Drop(s) Both EYES daily  dextrose 5%. 1000 milliLiter(s) (50 mL/Hr) IV Continuous <Continuous>  dextrose 50% Injectable 12.5 Gram(s) IV Push once  dextrose 50% Injectable 25 Gram(s) IV Push once  dextrose 50% Injectable 25 Gram(s) IV Push once  enoxaparin Injectable 40 milliGRAM(s) SubCutaneous daily  insulin lispro (HumaLOG) corrective regimen sliding scale   SubCutaneous every 6 hours  insulin NPH human recombinant 3 Unit(s) SubCutaneous every 8 hours  lactobacillus acidophilus 1 Tablet(s) Oral daily  latanoprost 0.005% Ophthalmic Solution 1 Drop(s) Both EYES at bedtime  levETIRAcetam  IVPB 250 milliGRAM(s) IV Intermittent every 12 hours  lidocaine   Patch 1 Patch Transdermal daily  timolol 0.25% Solution 1 Drop(s) Both EYES daily    MEDICATIONS  (PRN):  acetaminophen    Suspension .. 765 milliGRAM(s) Oral every 6 hours PRN Moderate Pain (4 - 6)  dextrose 40% Gel 15 Gram(s) Oral once PRN Blood Glucose LESS THAN 70 milliGRAM(s)/deciliter  glucagon  Injectable 1 milliGRAM(s) IntraMuscular once PRN Glucose LESS THAN 70 milligrams/deciliter  hydrALAZINE Injectable 10 milliGRAM(s) IV Push every 4 hours PRN Patient npo ngt tube clogged

## 2018-12-24 NOTE — PROGRESS NOTE ADULT - ASSESSMENT
89yoF with PMH CVA, seizure disorder, HTN, DM2 on long term insulin presenting with AMS, noted to have acute CVA on arrival.

## 2018-12-24 NOTE — CHART NOTE - NSCHARTNOTEFT_GEN_A_CORE
Source: EMR    Current Diet: NPO with TF   (family doesn't want PEG)    Per MD note-- TF held overnight for risk of aspiration     Enteral /Parenteral Nutrition: NGT Glucerna 1.5 @65ml/hr x 24hr     Current Weight:   (12/12) 80kg  (11/28) 78KG    % Weight Change     Pertinent Medications: MEDICATIONS  (STANDING):  aspirin  chewable 81 milliGRAM(s) Oral daily  brimonidine 0.2% Ophthalmic Solution 1 Drop(s) Both EYES daily  dextrose 5%. 1000 milliLiter(s) (50 mL/Hr) IV Continuous <Continuous>  dextrose 50% Injectable 12.5 Gram(s) IV Push once  dextrose 50% Injectable 25 Gram(s) IV Push once  dextrose 50% Injectable 25 Gram(s) IV Push once  enoxaparin Injectable 40 milliGRAM(s) SubCutaneous daily  insulin lispro (HumaLOG) corrective regimen sliding scale   SubCutaneous every 6 hours  insulin NPH human recombinant 3 Unit(s) SubCutaneous every 8 hours  lactobacillus acidophilus 1 Tablet(s) Oral daily  latanoprost 0.005% Ophthalmic Solution 1 Drop(s) Both EYES at bedtime  levETIRAcetam  IVPB 250 milliGRAM(s) IV Intermittent every 12 hours  lidocaine   Patch 1 Patch Transdermal daily  timolol 0.25% Solution 1 Drop(s) Both EYES daily    MEDICATIONS  (PRN):  acetaminophen    Suspension .. 765 milliGRAM(s) Oral every 6 hours PRN Moderate Pain (4 - 6)  dextrose 40% Gel 15 Gram(s) Oral once PRN Blood Glucose LESS THAN 70 milliGRAM(s)/deciliter  glucagon  Injectable 1 milliGRAM(s) IntraMuscular once PRN Glucose LESS THAN 70 milligrams/deciliter  hydrALAZINE Injectable 10 milliGRAM(s) IV Push every 4 hours PRN Patient npo ngt tube clogged    Pertinent Labs: Na 150, BUN 41, Glu 262    Nutrition focused physical exam previously conducted - found signs of malnutrition [ ]absent [X]present    Subcutaneous fat loss: [X] Orbital fat pads region, [X]Buccal fat region, [ ]Triceps region,  [ ]Ribs region    Muscle wasting: [ ]Temples region, [X]Clavicle region, [X]Shoulder region, [ ]Scapula region, [ ]Interosseous region,  [ ]thigh region, [ ]Calf region    Estimated Needs:   [X] no change since previous assessment  [ ] recalculated:     Current Nutrition Diagnosis: Pt remains at high nutrition risk secondary to severe acute malnutrition related to inadequate energy intake in setting of advancing age, lethargy as evidenced by unintentional significant 17lb (9.5%) weight loss x 9 months, moderate muscle wasting/fat loss (temples, clavicle, ribs, buccal, orbital)      Recommendations:   1) increase Jevity 1.5 to 75 ml /hr x 18 hrs ( 2,025 kcal 92 g pro) to allow for Cpap at night.  2) Monitor daily weights  3) Consider MVI daily      Monitoring and Evaluation:   [ ] PO intake [X] Tolerance to diet prescription [X] Weights  [X] Follow up per protocol [X] Labs:

## 2018-12-24 NOTE — PROGRESS NOTE ADULT - SUBJECTIVE AND OBJECTIVE BOX
INTERVAL HPI/OVERNIGHT EVENTS:    CC:     Chart reviewed. NGT in place, no overnight events, daughter at bedside.    Vital Signs Last 24 Hrs  T(C): 35.8 (24 Dec 2018 07:43), Max: 37.1 (24 Dec 2018 00:03)  T(F): 96.4 (24 Dec 2018 07:43), Max: 98.8 (24 Dec 2018 00:03)  HR: 78 (24 Dec 2018 07:43) (71 - 83)  BP: 127/59 (24 Dec 2018 07:43) (127/59 - 180/90)  BP(mean): --  RR: 20 (24 Dec 2018 07:43) (20 - 20)  SpO2: 94% (24 Dec 2018 07:43) (94% - 94%)    PHYSICAL EXAM:    GENERAL: Not in distress, minimally responsive, NGT in place  CHEST/LUNG: b/l air entry  HEART: irregular  ABDOMEN: Soft, BS+  EXTREMITIES:  no edema, tenderness.    MEDICATIONS  (STANDING):  aspirin  chewable 81 milliGRAM(s) Oral daily  brimonidine 0.2% Ophthalmic Solution 1 Drop(s) Both EYES daily  dextrose 5%. 1000 milliLiter(s) (50 mL/Hr) IV Continuous <Continuous>  dextrose 50% Injectable 12.5 Gram(s) IV Push once  dextrose 50% Injectable 25 Gram(s) IV Push once  dextrose 50% Injectable 25 Gram(s) IV Push once  enoxaparin Injectable 40 milliGRAM(s) SubCutaneous daily  insulin lispro (HumaLOG) corrective regimen sliding scale   SubCutaneous every 6 hours  insulin NPH human recombinant 3 Unit(s) SubCutaneous every 8 hours  lactobacillus acidophilus 1 Tablet(s) Oral daily  latanoprost 0.005% Ophthalmic Solution 1 Drop(s) Both EYES at bedtime  levETIRAcetam  IVPB 250 milliGRAM(s) IV Intermittent every 12 hours  lidocaine   Patch 1 Patch Transdermal daily  multivitamin 1 Tablet(s) Oral daily  timolol 0.25% Solution 1 Drop(s) Both EYES daily    MEDICATIONS  (PRN):  acetaminophen    Suspension .. 765 milliGRAM(s) Oral every 6 hours PRN Moderate Pain (4 - 6)  dextrose 40% Gel 15 Gram(s) Oral once PRN Blood Glucose LESS THAN 70 milliGRAM(s)/deciliter  glucagon  Injectable 1 milliGRAM(s) IntraMuscular once PRN Glucose LESS THAN 70 milligrams/deciliter  hydrALAZINE Injectable 10 milliGRAM(s) IV Push every 4 hours PRN Patient npo ngt tube clogged      Allergies    contrast media (iodine-based) (Urticaria)  erythromycin (Unknown)  IV-Dye (Unknown)  sulfADIAZINE (Unknown)    Intolerances          LABS:      12-24    150<H>  |  107  |  41.0<H>  ----------------------------<  262<H>  4.7   |  31.0<H>  |  0.87    Ca    9.6      24 Dec 2018 07:08            RADIOLOGY & ADDITIONAL TESTS:

## 2018-12-24 NOTE — PROGRESS NOTE ADULT - ASSESSMENT
89 year old female with hx of type 2 DM, CVA, seizure disorder admitted with acute right frontal CVA now with continued encephalopathy, likely multifactorial.   She has a mildly reduced T3 level, which is likely the result of sick euthyroid syndrome, not underlying thyroid dysfunction.  She is mildly hyperglycemic on tube feeds and with improved MISSY.  1. T2DM - acceptable glycemic control given age and acute mental status issues, avoid tight control which can result in dangerous hypoglycemia  - cont current insulin regimen  2. Euthyroid sick - repeat TFTs 1 month

## 2018-12-24 NOTE — PROGRESS NOTE ADULT - SUBJECTIVE AND OBJECTIVE BOX
NEPHROLOGY INTERVAL HPI/OVERNIGHT EVENTS:    no effectual change   Daughter at bedside   They dont want PEG at this time   + nasal tube feeds     MEDICATIONS  (STANDING):  aspirin  chewable 81 milliGRAM(s) Oral daily  brimonidine 0.2% Ophthalmic Solution 1 Drop(s) Both EYES daily  dextrose 5%. 1000 milliLiter(s) (50 mL/Hr) IV Continuous <Continuous>  dextrose 50% Injectable 12.5 Gram(s) IV Push once  dextrose 50% Injectable 25 Gram(s) IV Push once  dextrose 50% Injectable 25 Gram(s) IV Push once  enoxaparin Injectable 40 milliGRAM(s) SubCutaneous daily  insulin lispro (HumaLOG) corrective regimen sliding scale   SubCutaneous every 6 hours  insulin NPH human recombinant 3 Unit(s) SubCutaneous every 8 hours  lactobacillus acidophilus 1 Tablet(s) Oral daily  latanoprost 0.005% Ophthalmic Solution 1 Drop(s) Both EYES at bedtime  levETIRAcetam  IVPB 250 milliGRAM(s) IV Intermittent every 12 hours  lidocaine   Patch 1 Patch Transdermal daily  timolol 0.25% Solution 1 Drop(s) Both EYES daily    MEDICATIONS  (PRN):  acetaminophen    Suspension .. 765 milliGRAM(s) Oral every 6 hours PRN Moderate Pain (4 - 6)  dextrose 40% Gel 15 Gram(s) Oral once PRN Blood Glucose LESS THAN 70 milliGRAM(s)/deciliter  glucagon  Injectable 1 milliGRAM(s) IntraMuscular once PRN Glucose LESS THAN 70 milligrams/deciliter  hydrALAZINE Injectable 10 milliGRAM(s) IV Push every 4 hours PRN Patient npo ngt tube clogged      Allergies    contrast media (iodine-based) (Urticaria)  erythromycin (Unknown)  IV-Dye (Unknown)  sulfADIAZINE (Unknown)    Intolerances          Vital Signs Last 24 Hrs  T(C): 35.8 (24 Dec 2018 07:43), Max: 37.1 (24 Dec 2018 00:03)  T(F): 96.4 (24 Dec 2018 07:43), Max: 98.8 (24 Dec 2018 00:03)  HR: 78 (24 Dec 2018 07:43) (71 - 83)  BP: 127/59 (24 Dec 2018 07:43) (125/66 - 180/90)  BP(mean): --  RR: 20 (24 Dec 2018 07:43) (20 - 20)  SpO2: 94% (24 Dec 2018 07:43) (94% - 94%)  Daily     Daily   I&O's Detail    I&O's Summary      PHYSICAL EXAM:  GENERAL: appears chronically ill  HEAD:  Atraumatic, Normocephalic  EYES: EOMI  NECK: Supple, neck  veins full  NERVOUS SYSTEM:  unresponsive  CHEST/LUNG: dec bs B/L  HEART: Regular rate and rhythm; No murmur  ABDOMEN: Soft, Nontender, Nondistended; Bowel sounds present  EXTREMITIES:  no edema      LABS:    12-24    150<H>  |  107  |  41.0<H>  ----------------------------<  262<H>  4.7   |  31.0<H>  |  0.87    Ca    9.6      24 Dec 2018 07:08                  RADIOLOGY & ADDITIONAL TESTS:

## 2018-12-24 NOTE — PROGRESS NOTE ADULT - SUBJECTIVE AND OBJECTIVE BOX
INTERVAL HPI/OVERNIGHT EVENTS: f/u diabetes. pt nonverbal, not awake. family at bedside    MEDICATIONS  (STANDING):  aspirin  chewable 81 milliGRAM(s) Oral daily  brimonidine 0.2% Ophthalmic Solution 1 Drop(s) Both EYES daily  dextrose 5%. 1000 milliLiter(s) (50 mL/Hr) IV Continuous <Continuous>  dextrose 50% Injectable 12.5 Gram(s) IV Push once  dextrose 50% Injectable 25 Gram(s) IV Push once  dextrose 50% Injectable 25 Gram(s) IV Push once  enoxaparin Injectable 40 milliGRAM(s) SubCutaneous daily  insulin lispro (HumaLOG) corrective regimen sliding scale   SubCutaneous every 6 hours  insulin NPH human recombinant 3 Unit(s) SubCutaneous every 8 hours  lactobacillus acidophilus 1 Tablet(s) Oral daily  latanoprost 0.005% Ophthalmic Solution 1 Drop(s) Both EYES at bedtime  levETIRAcetam  IVPB 250 milliGRAM(s) IV Intermittent every 12 hours  lidocaine   Patch 1 Patch Transdermal daily  timolol 0.25% Solution 1 Drop(s) Both EYES daily    MEDICATIONS  (PRN):  acetaminophen    Suspension .. 765 milliGRAM(s) Oral every 6 hours PRN Moderate Pain (4 - 6)  dextrose 40% Gel 15 Gram(s) Oral once PRN Blood Glucose LESS THAN 70 milliGRAM(s)/deciliter  glucagon  Injectable 1 milliGRAM(s) IntraMuscular once PRN Glucose LESS THAN 70 milligrams/deciliter  hydrALAZINE Injectable 10 milliGRAM(s) IV Push every 4 hours PRN Patient npo ngt tube clogged      Allergiescontrast media (iodine-based) (Urticaria)  erythromycin (Unknown)  IV-Dye (Unknown)  sulfADIAZINE (Unknown)            Review of systems: unable to assess due to mental status      Vital Signs Last 24 Hrs  T(C): 35.8 (24 Dec 2018 07:43), Max: 37.1 (24 Dec 2018 00:03)  T(F): 96.4 (24 Dec 2018 07:43), Max: 98.8 (24 Dec 2018 00:03)  HR: 78 (24 Dec 2018 07:43) (71 - 83)  BP: 127/59 (24 Dec 2018 07:43) (127/59 - 180/90)  BP(mean): --  RR: 20 (24 Dec 2018 07:43) (20 - 20)  SpO2: 94% (24 Dec 2018 07:43) (94% - 94%)    PHYSICAL EXAM: limited exam  Constitutional: NAD  Respiratory: CTAB  Cardiovascular: S1 and S2, RRR, no M/G/R  Gastrointestinal: BS+, soft,  Extremities: No peripheral edema  Neurological: sleeping, unresponsive          LABS:    12-24    150<H>  |  107  |  41.0<H>  ----------------------------<  262<H>  4.7   |  31.0<H>  |  0.87    Ca    9.6      24 Dec 2018 07:08    Triiodothyronine, Total (T3 Total): 42 ng/dL (12-10-18 @ 21:10)  T4, Serum: 5.1 ug/dL (12-10-18 @ 21:10)  Thyroid Stimulating Hormone, Serum: 2.50 uIU/mL (11-27-18 @ 05:12)      CAPILLARY BLOOD GLUCOSE  POCT Blood Glucose.: 248 mg/dL (24 Dec 2018 12:56)  POCT Blood Glucose.: 232 mg/dL (24 Dec 2018 06:39)  POCT Blood Glucose.: 191 mg/dL (23 Dec 2018 23:15)  POCT Blood Glucose.: 214 mg/dL (23 Dec 2018 17:36)

## 2018-12-24 NOTE — PROGRESS NOTE ADULT - ASSESSMENT
Improved MISSY  Renal sonogram noted no hydronephrosis    hypernatremia   Add free water via feeding tube   GI follow up noted

## 2018-12-25 NOTE — PROGRESS NOTE ADULT - ASSESSMENT
Patient is a 89 year old female with a history of CVA, seizure disorder, hypertension and diabetes mellitus who was brought to the ER for complaints of altered mental status. Patient was diagnosed with seizure disorder last year and started on keppra po. Over the past few months the family has noted she has been more lethargic and confused. She saw Dr Mcallister, neurologist at [facility]. According to the daughter she had a number of tests which indicated she did not have seizure disorder and she was advised to taper off the keppra. Her daughter tapered it off over the past 2 weeks. The morning of admission she noted her mother became unresponsive, fell to her side, noted facial droop, her eyes were "fluttering" and her tongue was rolled back. After the episode she remained lethargic for about 1 hours. In the ER, NIH was 0. Patient was more alert and responsive. CT head was negative. Initially started on IV Keppra EEG positive for seizure activity. Spoke with neurology regarding family's concerns about Keppra causing lethargy; Changed to lacosamide. MRI positive for acute right frontal cva. Discussed the benefits of statin therapy with the patient's family in regards to secondary prevention; in agreement with statin therapy- LDL of 253. HbAc of 6. Blood cultures 1of 2 positive for CNS; UA negative. Started on iv Vancomycin repeat blood cultures ordered on 11/28. Passed swallow evaluation; started on PO soft diet with nectar fluid.  Noted to have episodes of bradycardia with sinus pause. Spoke with cardiology, iv labetalol discontinued with improvement. Recommend no AV jordan blocking agents. Hydralazine increased to 25mg TID. Aspirin to continue. Family refusing lipitor or zocor for history of myalgia. Recommend pravastatin on discharge. Repeat blood cultures x 2 negative. Hospital course complicated by worsening lethargy. Metabolic parameters acceptable; hypernatremia and uremia resolved. Neurology re-consulted; recommending to continue Vimpat. Palliative care consult appreciated; daughter refusing hospice services. Family  conference call arranged by MD, family wanted to continue to hold VImpat to assess improvement in lethargy understanding the risks of seizures of AEDs.  Second neurology opinion was obtained; vimpat or keppra not contributing to lethargy- recommend continuing AEDs which was endorsed to the family.  After a detailed discussion with the patient's family, NG tube was placed and tube feeds started. Noted to have intermittent tachycardia, EKG consistent with afib. 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 Started on lopressor therapy. Neurology recalled, 24 hour video eeg positive for left temporal seizure focus. Neurologist Dr Luther had a detailed discussion with the patient's family  in regards to the importance of AEd to prevent further seizures given risk. Agreed to Keppra IV. UA and urine culture were positive for Proteus mirabalis. Started on IV rocephin, ID consulted. Renal ultrasound with normal kidneys and no evidence of nephrolithiasis. Spoke with ID, treat for 7 days for proteus mirabilis UTI.  GI recommended Dobhoff could be in place for 6 weeks. Family decided to wait for this period and hopeful for neurologic recovery. Tube feeds were continued. Noted to have hypernatremia, hence free water increased. Given worsening sodium, after discussion with renal, she was started on IVF.

## 2018-12-25 NOTE — PROGRESS NOTE ADULT - SUBJECTIVE AND OBJECTIVE BOX
INTERVAL HPI/OVERNIGHT EVENTS:    CC: encephalopathy, hypernatremia, seizures, CVA, atrial fibrillation, diabetes mellitus    Vital Signs Last 24 Hrs  T(C): 37.8 (25 Dec 2018 07:51), Max: 37.8 (25 Dec 2018 07:51)  T(F): 100 (25 Dec 2018 07:51), Max: 100 (25 Dec 2018 07:51)  HR: 92 (25 Dec 2018 07:51) (88 - 102)  BP: 132/63 (25 Dec 2018 07:51) (16/78 - 162/82)  BP(mean): --  RR: 20 (25 Dec 2018 07:51) (20 - 20)  SpO2: 93% (25 Dec 2018 07:51) (93% - 93%)    PHYSICAL EXAM:    GENERAL: Npt in distress, non verbal, minimally responsive  CHEST/LUNG: b/l air entry  HEART: irregular  ABDOMEN: Soft, BS+  EXTREMITIES:  No edema, tenderness.    MEDICATIONS  (STANDING):  aspirin  chewable 81 milliGRAM(s) Oral daily  brimonidine 0.2% Ophthalmic Solution 1 Drop(s) Both EYES daily  dextrose 5%. 1000 milliLiter(s) (50 mL/Hr) IV Continuous <Continuous>  dextrose 50% Injectable 12.5 Gram(s) IV Push once  dextrose 50% Injectable 25 Gram(s) IV Push once  dextrose 50% Injectable 25 Gram(s) IV Push once  enoxaparin Injectable 40 milliGRAM(s) SubCutaneous daily  insulin lispro (HumaLOG) corrective regimen sliding scale   SubCutaneous every 6 hours  insulin NPH human recombinant 3 Unit(s) SubCutaneous every 8 hours  lactobacillus acidophilus 1 Tablet(s) Oral daily  latanoprost 0.005% Ophthalmic Solution 1 Drop(s) Both EYES at bedtime  levETIRAcetam  IVPB 250 milliGRAM(s) IV Intermittent every 12 hours  lidocaine   Patch 1 Patch Transdermal daily  multivitamin 1 Tablet(s) Oral daily  sodium chloride 0.225%. 1000 milliLiter(s) (70 mL/Hr) IV Continuous <Continuous>  timolol 0.25% Solution 1 Drop(s) Both EYES daily    MEDICATIONS  (PRN):  acetaminophen    Suspension .. 765 milliGRAM(s) Oral every 6 hours PRN Moderate Pain (4 - 6)  dextrose 40% Gel 15 Gram(s) Oral once PRN Blood Glucose LESS THAN 70 milliGRAM(s)/deciliter  glucagon  Injectable 1 milliGRAM(s) IntraMuscular once PRN Glucose LESS THAN 70 milligrams/deciliter  hydrALAZINE Injectable 10 milliGRAM(s) IV Push every 4 hours PRN Patient npo ngt tube clogged      Allergies    contrast media (iodine-based) (Urticaria)  erythromycin (Unknown)  IV-Dye (Unknown)  sulfADIAZINE (Unknown)    Intolerances          LABS:      12-25    153<H>  |  110<H>  |  53.0<H>  ----------------------------<  219<H>  5.3   |  33.0<H>  |  0.98    Ca    9.4      25 Dec 2018 09:02            RADIOLOGY & ADDITIONAL TESTS:

## 2018-12-26 NOTE — PROGRESS NOTE ADULT - SUBJECTIVE AND OBJECTIVE BOX
NEPHROLOGY INTERVAL HPI/OVERNIGHT EVENTS:  pt essentially the same  no acute distress  tolerating tube feeds  caretaker at bedside    MEDICATIONS  (STANDING):  aspirin  chewable 81 milliGRAM(s) Oral daily  brimonidine 0.2% Ophthalmic Solution 1 Drop(s) Both EYES daily  dextrose 5%. 1000 milliLiter(s) (50 mL/Hr) IV Continuous <Continuous>  dextrose 50% Injectable 12.5 Gram(s) IV Push once  dextrose 50% Injectable 25 Gram(s) IV Push once  dextrose 50% Injectable 25 Gram(s) IV Push once  enoxaparin Injectable 40 milliGRAM(s) SubCutaneous daily  insulin lispro (HumaLOG) corrective regimen sliding scale   SubCutaneous every 6 hours  insulin NPH human recombinant 3 Unit(s) SubCutaneous every 8 hours  lactobacillus acidophilus 1 Tablet(s) Oral daily  latanoprost 0.005% Ophthalmic Solution 1 Drop(s) Both EYES at bedtime  levETIRAcetam  IVPB 250 milliGRAM(s) IV Intermittent every 12 hours  lidocaine   Patch 1 Patch Transdermal daily  multivitamin 1 Tablet(s) Oral daily  sodium chloride 0.225%. 1000 milliLiter(s) (100 mL/Hr) IV Continuous <Continuous>  timolol 0.25% Solution 1 Drop(s) Both EYES daily    MEDICATIONS  (PRN):  acetaminophen    Suspension .. 765 milliGRAM(s) Oral every 6 hours PRN Moderate Pain (4 - 6)  dextrose 40% Gel 15 Gram(s) Oral once PRN Blood Glucose LESS THAN 70 milliGRAM(s)/deciliter  glucagon  Injectable 1 milliGRAM(s) IntraMuscular once PRN Glucose LESS THAN 70 milligrams/deciliter  hydrALAZINE Injectable 10 milliGRAM(s) IV Push every 4 hours PRN Patient npo ngt tube clogged      Allergies    contrast media (iodine-based) (Urticaria)  erythromycin (Unknown)  IV-Dye (Unknown)  sulfADIAZINE (Unknown)          Vital Signs Last 24 Hrs  T(C): 36.9 (26 Dec 2018 08:18), Max: 37.1 (26 Dec 2018 01:20)  T(F): 98.4 (26 Dec 2018 08:18), Max: 98.7 (26 Dec 2018 01:20)  HR: 78 (26 Dec 2018 08:18) (74 - 82)  BP: 139/74 (26 Dec 2018 08:18) (122/65 - 161/99)  BP(mean): --  RR: 22 (26 Dec 2018 08:18) (22 - 22)  SpO2: 100% (26 Dec 2018 08:18) (96% - 100%)    PHYSICAL EXAM:  GENERAL: appears chronically ill  HEAD: NGT in place  NECK: Supple, +JVD  NERVOUS SYSTEM:  unresponsive  CHEST/LUNG: diminished BS anteriorly  HEART: Regular rate and rhythm; No rub  ABDOMEN: Soft, Nontender, Nondistended; +BS  EXTREMITIES:  tr edema    LABS:                        10.5   7.9   )-----------( 219      ( 26 Dec 2018 07:06 )             33.9     12-26    150<H>  |  109<H>  |  61.0<H>  ----------------------------<  298<H>  5.3   |  31.0<H>  |  0.99    Sodium, Serum: 153 mmol/L (12.25.18 @ 09:02)        Ca    9.5      26 Dec 2018 07:06  Mg     2.7     12-26          Magnesium, Serum: 2.7 mg/dL (12-26 @ 07:06)      RADIOLOGY & ADDITIONAL TESTS:

## 2018-12-26 NOTE — PROGRESS NOTE ADULT - ASSESSMENT
ARF resolved  Hypernatremia improved  - cont hypotonic IVF  - free H2O via tube as tolerates  - monitor labs

## 2018-12-26 NOTE — PROGRESS NOTE ADULT - SUBJECTIVE AND OBJECTIVE BOX
INTERVAL HPI/OVERNIGHT EVENTS: f/u diabetes. family at bedside with multiple questions- concerned about hyperglycemia    MEDICATIONS  (STANDING):  aspirin  chewable 81 milliGRAM(s) Oral daily  Biotene Dry Mouth Oral Rinse 5 milliLiter(s) Swish and Spit four times a day  brimonidine 0.2% Ophthalmic Solution 1 Drop(s) Both EYES daily  dextrose 5%. 1000 milliLiter(s) (50 mL/Hr) IV Continuous <Continuous>  dextrose 50% Injectable 12.5 Gram(s) IV Push once  dextrose 50% Injectable 25 Gram(s) IV Push once  dextrose 50% Injectable 25 Gram(s) IV Push once  enoxaparin Injectable 40 milliGRAM(s) SubCutaneous daily  insulin lispro (HumaLOG) corrective regimen sliding scale   SubCutaneous every 6 hours  insulin NPH human recombinant 3 Unit(s) SubCutaneous every 8 hours  lactobacillus acidophilus 1 Tablet(s) Oral daily  latanoprost 0.005% Ophthalmic Solution 1 Drop(s) Both EYES at bedtime  levETIRAcetam  IVPB 250 milliGRAM(s) IV Intermittent every 12 hours  lidocaine   Patch 1 Patch Transdermal daily  multivitamin 1 Tablet(s) Oral daily  sodium chloride 0.225%. 1000 milliLiter(s) (100 mL/Hr) IV Continuous <Continuous>  timolol 0.25% Solution 1 Drop(s) Both EYES daily    MEDICATIONS  (PRN):  acetaminophen    Suspension .. 765 milliGRAM(s) Oral every 6 hours PRN Moderate Pain (4 - 6)  dextrose 40% Gel 15 Gram(s) Oral once PRN Blood Glucose LESS THAN 70 milliGRAM(s)/deciliter  glucagon  Injectable 1 milliGRAM(s) IntraMuscular once PRN Glucose LESS THAN 70 milligrams/deciliter  hydrALAZINE Injectable 10 milliGRAM(s) IV Push every 4 hours PRN Patient npo ngt tube clogged      Allergies  contrast media (iodine-based) (Urticaria)  erythromycin (Unknown)  IV-Dye (Unknown)  sulfADIAZINE (Unknown)    Review of systems: unable to assess    Vital Signs Last 24 Hrs  T(C): 36.8 (26 Dec 2018 15:58), Max: 37.1 (26 Dec 2018 01:20)  T(F): 98.3 (26 Dec 2018 15:58), Max: 98.7 (26 Dec 2018 01:20)  HR: 72 (26 Dec 2018 15:58) (72 - 82)  BP: 167/103 (26 Dec 2018 15:58) (122/65 - 169/97)  BP(mean): --  RR: 22 (26 Dec 2018 08:18) (22 - 22)  SpO2: 100% (26 Dec 2018 08:18) (96% - 100%)    PHYSICAL EXAM: limited exam  Constitutional: NAD  Respiratory: CTAB  Cardiovascular: S1 and S2, RRR, no M/G/R  Gastrointestinal: BS+, soft,  Extremities: No peripheral edema  Neurological: sleeping, unresponsive      LABS:                        10.5   7.9   )-----------( 219      ( 26 Dec 2018 07:06 )             33.9         150<H>  |  109<H>  |  61.0<H>  ----------------------------<  298<H>  5.3   |  31.0<H>  |  0.99    Ca    9.5      26 Dec 2018 07:06  Mg     2.7           Urinalysis Basic - ( 26 Dec 2018 18:05 )    Color: Yellow / Appearance: Clear / S.010 / pH: x  Gluc: x / Ketone: Negative  / Bili: Negative / Urobili: Negative mg/dL   Blood: x / Protein: 100 mg/dL / Nitrite: Negative   Leuk Esterase: Trace / RBC: 3-5 /HPF / WBC 3-5   Sq Epi: x / Non Sq Epi: Occasional / Bacteria: Occasional    Triiodothyronine, Total (T3 Total): 42 ng/dL (12-10-18 @ 21:10)  T4, Serum: 5.1 ug/dL (12-10-18 @ 21:10)  Thyroid Stimulating Hormone, Serum: 2.50 uIU/mL (18 @ 05:12)      CAPILLARY BLOOD GLUCOSE      POCT Blood Glucose.: 262 mg/dL (26 Dec 2018 16:58)  POCT Blood Glucose.: 252 mg/dL (26 Dec 2018 14:20)  POCT Blood Glucose.: 311 mg/dL (26 Dec 2018 11:38)  POCT Blood Glucose.: 230 mg/dL (26 Dec 2018 10:07)  POCT Blood Glucose.: 293 mg/dL (26 Dec 2018 05:22)  POCT Blood Glucose.: 250 mg/dL (26 Dec 2018 01:00)  POCT Blood Glucose.: 224 mg/dL (25 Dec 2018 22:13)      Hemoglobin A1C, Whole Blood: 6.3 % <H> [4.0 - 5.6] (18 @ 07:34)

## 2018-12-26 NOTE — PROGRESS NOTE ADULT - SUBJECTIVE AND OBJECTIVE BOX
INTERVAL HPI/OVERNIGHT EVENTS:    CC: atrial fibrillation, hypernatremia, encephalopathy, stroke, seizures, diabetes mellitus    No overnight events, on NGT feeds.    Vital Signs Last 24 Hrs  T(C): 36.9 (26 Dec 2018 08:18), Max: 37.1 (26 Dec 2018 01:20)  T(F): 98.4 (26 Dec 2018 08:18), Max: 98.7 (26 Dec 2018 01:20)  HR: 78 (26 Dec 2018 08:18) (74 - 82)  BP: 136/77 (26 Dec 2018 12:03) (122/65 - 161/99)  BP(mean): --  RR: 22 (26 Dec 2018 08:18) (22 - 22)  SpO2: 100% (26 Dec 2018 08:18) (96% - 100%)    PHYSICAL EXAM:    GENERAL: Not in distress, minimally responsive  CHEST/LUNG: b/l air entry  HEART: Regular   ABDOMEN: Soft, BS+  EXTREMITIES: + edema, non tender    MEDICATIONS  (STANDING):  aspirin  chewable 81 milliGRAM(s) Oral daily  Biotene Dry Mouth Oral Rinse 5 milliLiter(s) Swish and Spit four times a day  brimonidine 0.2% Ophthalmic Solution 1 Drop(s) Both EYES daily  dextrose 5%. 1000 milliLiter(s) (50 mL/Hr) IV Continuous <Continuous>  dextrose 50% Injectable 12.5 Gram(s) IV Push once  dextrose 50% Injectable 25 Gram(s) IV Push once  dextrose 50% Injectable 25 Gram(s) IV Push once  enoxaparin Injectable 40 milliGRAM(s) SubCutaneous daily  insulin lispro (HumaLOG) corrective regimen sliding scale   SubCutaneous every 6 hours  insulin NPH human recombinant 3 Unit(s) SubCutaneous every 8 hours  lactobacillus acidophilus 1 Tablet(s) Oral daily  latanoprost 0.005% Ophthalmic Solution 1 Drop(s) Both EYES at bedtime  levETIRAcetam  IVPB 250 milliGRAM(s) IV Intermittent every 12 hours  lidocaine   Patch 1 Patch Transdermal daily  multivitamin 1 Tablet(s) Oral daily  sodium chloride 0.225%. 1000 milliLiter(s) (100 mL/Hr) IV Continuous <Continuous>  timolol 0.25% Solution 1 Drop(s) Both EYES daily    MEDICATIONS  (PRN):  acetaminophen    Suspension .. 765 milliGRAM(s) Oral every 6 hours PRN Moderate Pain (4 - 6)  dextrose 40% Gel 15 Gram(s) Oral once PRN Blood Glucose LESS THAN 70 milliGRAM(s)/deciliter  glucagon  Injectable 1 milliGRAM(s) IntraMuscular once PRN Glucose LESS THAN 70 milligrams/deciliter  hydrALAZINE Injectable 10 milliGRAM(s) IV Push every 4 hours PRN Patient npo ngt tube clogged      Allergies    contrast media (iodine-based) (Urticaria)  erythromycin (Unknown)  IV-Dye (Unknown)  sulfADIAZINE (Unknown)    Intolerances          LABS:                          10.5   7.9   )-----------( 219      ( 26 Dec 2018 07:06 )             33.9     12-26    150<H>  |  109<H>  |  61.0<H>  ----------------------------<  298<H>  5.3   |  31.0<H>  |  0.99    Ca    9.5      26 Dec 2018 07:06  Mg     2.7     12-26            RADIOLOGY & ADDITIONAL TESTS:

## 2018-12-26 NOTE — PROGRESS NOTE ADULT - ASSESSMENT
89 year old female with a history of CVA, seizure disorder, hypertension and diabetes mellitus who was brought to the ER for complaints of altered mental status. Patient was diagnosed with seizure disorder last year and started on keppra po. Over the past few months the family has noted she has been more lethargic and confused. She saw Dr Mcallister, neurologist at [facility]. According to the daughter she had a number of tests which indicated she did not have seizure disorder and she was advised to taper off the keppra. Her daughter tapered it off over the past 2 weeks. The morning of admission she noted her mother became unresponsive, fell to her side, noted facial droop, her eyes were "fluttering" and her tongue was rolled back. After the episode she remained lethargic for about 1 hours. MRI positive for acute right frontal cva. Discussed the benefits of statin therapy with the patient's family in regards to secondary prevention; in agreement with statin therapy- LDL of 253. HbAc of 6. Blood cultures 1of 2 positive for CNS; UA negative. Started on iv Vancomycin repeat blood cultures ordered on 11/28. Passed swallow evaluation; started on PO soft diet with nectar fluid.  Noted to have episodes of bradycardia with sinus pause.  Hospital course complicated by worsening lethargy. Neurology re-consulted; recommending to continue Vimpat. Palliative care consult appreciated; daughter refusing hospice services. Family  conference call arranged by MD, family wanted to continue to hold VImpat to assess improvement in lethargy understanding the risks of seizures of AEDs.  Second neurology opinion was obtained; vimpat or keppra not contributing to lethargy- recommend continuing AEDs which was endorsed to the family.  After a detailed discussion with the patient's family, NG tube was placed and tube feeds started. Noted to have intermittent tachycardia, EKG consistent with afib. 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 Neurology recalled, 24 hour video eeg positive for left temporal seizure focus. Neurologist Dr Luther had a detailed discussion with the patient's family  in regards to the importance of AEd to prevent further seizures given risk. Agreed to Keppra IV. UA and urine culture were positive for Proteus mirabalis. Started on IV rocephin, ID consulted. Renal ultrasound with normal kidneys and no evidence of nephrolithiasis. Spoke with ID, treat for 7 days for proteus mirabilis UTI.  GI recommended Dobhoff could be in place for 6 weeks. Family decided to wait for this period and hopeful for neurologic recovery. Tube feeds were continued. Noted to have hypernatremia, hence free water increased. Given worsening sodium, after discussion with renal, she was started on IVF.    1. T2DM - recent hyperglycemia and family is concerned that this is contributing to her AMS, glucose worse on NPH regimen  - stop NPH, try Levemir 8 units BID  - cont Humalog every 6 hours  - advised family to avoic testing FS on their own, as nurses can only give insulin when scheduled to do so  - hyperglycemia is unlikely the cause for AMS and explained in detail risks of excessive insulin dosing, but will try and improve sugars  - all questions answered    2. Euthyroid sick - repeat TFTs 1 month, no thyroid medication at this time    3.Hypernatremia - IVF as per renal    4.acute CVA with encephalopathy - likely multifactorial

## 2018-12-26 NOTE — PROGRESS NOTE ADULT - ASSESSMENT
Patient is a 89 year old female with a history of CVA, seizure disorder, hypertension and diabetes mellitus who was brought to the ER for complaints of altered mental status. Patient was diagnosed with seizure disorder last year and started on keppra po. Over the past few months the family has noted she has been more lethargic and confused. She saw Dr Mcallister, neurologist at [facility]. According to the daughter she had a number of tests which indicated she did not have seizure disorder and she was advised to taper off the keppra. Her daughter tapered it off over the past 2 weeks. The morning of admission she noted her mother became unresponsive, fell to her side, noted facial droop, her eyes were "fluttering" and her tongue was rolled back. After the episode she remained lethargic for about 1 hours. In the ER, NIH was 0. Patient was more alert and responsive. CT head was negative. Initially started on IV Keppra EEG positive for seizure activity. Spoke with neurology regarding family's concerns about Keppra causing lethargy; Changed to lacosamide. MRI positive for acute right frontal cva. Discussed the benefits of statin therapy with the patient's family in regards to secondary prevention; in agreement with statin therapy- LDL of 253. HbAc of 6. Blood cultures 1of 2 positive for CNS; UA negative. Started on iv Vancomycin repeat blood cultures ordered on 11/28. Passed swallow evaluation; started on PO soft diet with nectar fluid.  Noted to have episodes of bradycardia with sinus pause. Spoke with cardiology, iv labetalol discontinued with improvement. Recommend no AV jordan blocking agents. Hydralazine increased to 25mg TID. Aspirin to continue. Family refusing lipitor or zocor for history of myalgia. Recommend pravastatin on discharge. Repeat blood cultures x 2 negative. Hospital course complicated by worsening lethargy. Metabolic parameters acceptable; hypernatremia and uremia resolved. Neurology re-consulted; recommending to continue Vimpat. Palliative care consult appreciated; daughter refusing hospice services. Family  conference call arranged by MD, family wanted to continue to hold VImpat to assess improvement in lethargy understanding the risks of seizures of AEDs.  Second neurology opinion was obtained; vimpat or keppra not contributing to lethargy- recommend continuing AEDs which was endorsed to the family.  After a detailed discussion with the patient's family, NG tube was placed and tube feeds started. Noted to have intermittent tachycardia, EKG consistent with afib. 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 Started on lopressor therapy. Neurology recalled, 24 hour video eeg positive for left temporal seizure focus. Neurologist Dr Luther had a detailed discussion with the patient's family  in regards to the importance of AEd to prevent further seizures given risk. Agreed to Keppra IV. UA and urine culture were positive for Proteus mirabalis. Started on IV rocephin, ID consulted. Renal ultrasound with normal kidneys and no evidence of nephrolithiasis. Spoke with ID, treat for 7 days for proteus mirabilis UTI.  GI recommended Dobhoff could be in place for 6 weeks. Family decided to wait for this period and hopeful for neurologic recovery. Tube feeds were continued. Noted to have hypernatremia, hence free water increased. Given worsening sodium, after discussion with renal, she was started on IVF.

## 2018-12-27 NOTE — PROGRESS NOTE ADULT - ATTENDING COMMENTS
Decrease iv rate, outlook poor, follow up labs.
case discussed w/ daughter @ bedside
Discussed with daughter Yari at bedside.
Discussed with daughter at bedside.
Discussed with daughter at bedside.
Discussed with daughter.

## 2018-12-27 NOTE — PROGRESS NOTE ADULT - ASSESSMENT
Patient is a 89 year old female with a history of CVA, seizure disorder, hypertension and diabetes mellitus who was brought to the ER for complaints of altered mental status. Patient was diagnosed with seizure disorder last year and started on keppra po. Over the past few months the family has noted she has been more lethargic and confused. She saw Dr Mcallister, neurologist at [facility]. According to the daughter she had a number of tests which indicated she did not have seizure disorder and she was advised to taper off the keppra. Her daughter tapered it off over the past 2 weeks. The morning of admission she noted her mother became unresponsive, fell to her side, noted facial droop, her eyes were "fluttering" and her tongue was rolled back. After the episode she remained lethargic for about 1 hours. In the ER, NIH was 0. Patient was more alert and responsive. CT head was negative. Initially started on IV Keppra EEG positive for seizure activity. Spoke with neurology regarding family's concerns about Keppra causing lethargy; Changed to lacosamide. MRI positive for acute right frontal cva. Discussed the benefits of statin therapy with the patient's family in regards to secondary prevention; in agreement with statin therapy- LDL of 253. HbAc of 6. Blood cultures 1of 2 positive for CNS; UA negative. Started on iv Vancomycin repeat blood cultures ordered on 11/28. Passed swallow evaluation; started on PO soft diet with nectar fluid.  Noted to have episodes of bradycardia with sinus pause. Spoke with cardiology, iv labetalol discontinued with improvement. Recommend no AV jordan blocking agents. Hydralazine increased to 25mg TID. Aspirin to continue. Family refusing lipitor or zocor for history of myalgia. Recommend pravastatin on discharge. Repeat blood cultures x 2 negative. Hospital course complicated by worsening lethargy. Metabolic parameters acceptable; hypernatremia and uremia resolved. Neurology re-consulted; recommending to continue Vimpat. Palliative care consult appreciated; daughter refusing hospice services. Family  conference call arranged by MD, family wanted to continue to hold VImpat to assess improvement in lethargy understanding the risks of seizures of AEDs.  Second neurology opinion was obtained; vimpat or keppra not contributing to lethargy- recommend continuing AEDs which was endorsed to the family.  After a detailed discussion with the patient's family, NG tube was placed and tube feeds started. Noted to have intermittent tachycardia, EKG consistent with afib. 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 Started on lopressor therapy. Neurology recalled, 24 hour video eeg positive for left temporal seizure focus. Neurologist Dr Luther had a detailed discussion with the patient's family  in regards to the importance of AEd to prevent further seizures given risk. Agreed to Keppra IV. UA and urine culture were positive for Proteus mirabalis. Started on IV rocephin, ID consulted. Renal ultrasound with normal kidneys and no evidence of nephrolithiasis. Spoke with ID, treat for 7 days for proteus mirabilis UTI.  GI recommended Dobhoff could be in place for 6 weeks. Family decided to wait for this period and hopeful for neurologic recovery. Tube feeds were continued. Noted to have hypernatremia, hence free water increased. Given worsening sodium, after discussion with renal, she was started on IVF. Her sodium normalized. IVF was discontinued. Insulin was adjusted.

## 2018-12-27 NOTE — PROGRESS NOTE ADULT - SUBJECTIVE AND OBJECTIVE BOX
INTERVAL HPI/OVERNIGHT EVENTS: f/u daibetes. no changes in mental status.    MEDICATIONS  (STANDING):  aspirin  chewable 81 milliGRAM(s) Oral daily  Biotene Dry Mouth Oral Rinse 5 milliLiter(s) Swish and Spit four times a day  brimonidine 0.2% Ophthalmic Solution 1 Drop(s) Both EYES daily  dextrose 5%. 1000 milliLiter(s) (50 mL/Hr) IV Continuous <Continuous>  dextrose 50% Injectable 12.5 Gram(s) IV Push once  dextrose 50% Injectable 25 Gram(s) IV Push once  dextrose 50% Injectable 25 Gram(s) IV Push once  enoxaparin Injectable 40 milliGRAM(s) SubCutaneous daily  insulin detemir injectable (LEVEMIR) 8 Unit(s) SubCutaneous every 12 hours  insulin lispro (HumaLOG) corrective regimen sliding scale   SubCutaneous every 6 hours  lactobacillus acidophilus 1 Tablet(s) Oral daily  latanoprost 0.005% Ophthalmic Solution 1 Drop(s) Both EYES at bedtime  levETIRAcetam  IVPB 250 milliGRAM(s) IV Intermittent every 12 hours  lidocaine   Patch 1 Patch Transdermal daily  multivitamin 1 Tablet(s) Oral daily  timolol 0.25% Solution 1 Drop(s) Both EYES daily    MEDICATIONS  (PRN):  acetaminophen    Suspension .. 765 milliGRAM(s) Oral every 6 hours PRN Moderate Pain (4 - 6)  dextrose 40% Gel 15 Gram(s) Oral once PRN Blood Glucose LESS THAN 70 milliGRAM(s)/deciliter  glucagon  Injectable 1 milliGRAM(s) IntraMuscular once PRN Glucose LESS THAN 70 milligrams/deciliter  hydrALAZINE Injectable 10 milliGRAM(s) IV Push every 4 hours PRN Patient npo ngt tube clogged      Allergies  contrast media (iodine-based) (Urticaria)  erythromycin (Unknown)  IV-Dye (Unknown)  sulfADIAZINE (Unknown)    Review of systems: unable to assess    Vital Signs Last 24 Hrs  T(C): 36.9 (27 Dec 2018 09:47), Max: 36.9 (27 Dec 2018 09:47)  T(F): 98.5 (27 Dec 2018 09:47), Max: 98.5 (27 Dec 2018 09:47)  HR: 84 (27 Dec 2018 10:29) (72 - 92)  BP: 160/67 (27 Dec 2018 10:29) (158/76 - 179/75)  BP(mean): --  RR: 22 (27 Dec 2018 09:47) (22 - 22)  SpO2: 100% (27 Dec 2018 09:47) (100% - 100%)    PHYSICAL EXAM: limited exam  Constitutional: NAD  Respiratory: CTAB  Cardiovascular: S1 and S2, RRR, no M/G/R  Gastrointestinal: BS+, soft,  Extremities: No peripheral edema  Neurological: sleeping, unresponsive        LABS:                        10.5   7.9   )-----------( 219      ( 26 Dec 2018 07:06 )             33.9         144  |  105  |  68.0<H>  ----------------------------<  255<H>  4.6   |  28.0  |  1.01    Ca    9.2      27 Dec 2018 09:03  Mg     2.7           Urinalysis Basic - ( 26 Dec 2018 18:05 )    Color: Yellow / Appearance: Clear / S.010 / pH: x  Gluc: x / Ketone: Negative  / Bili: Negative / Urobili: Negative mg/dL   Blood: x / Protein: 100 mg/dL / Nitrite: Negative   Leuk Esterase: Trace / RBC: 3-5 /HPF / WBC 3-5   Sq Epi: x / Non Sq Epi: Occasional / Bacteria: Occasional    Hemoglobin A1C, Whole Blood: 6.3 % <H> [4.0 - 5.6] (18 @ 07:34)    CAPILLARY BLOOD GLUCOSE  POCT Blood Glucose.: 257 mg/dL (27 Dec 2018 18:21)  POCT Blood Glucose.: 281 mg/dL (27 Dec 2018 13:06)  POCT Blood Glucose.: 247 mg/dL (27 Dec 2018 10:25)  POCT Blood Glucose.: 291 mg/dL (27 Dec 2018 05:21)  POCT Blood Glucose.: 271 mg/dL (27 Dec 2018 00:12)  POCT Blood Glucose.: 202 mg/dL (26 Dec 2018 21:19)

## 2018-12-27 NOTE — PROGRESS NOTE ADULT - ASSESSMENT
89 year old female with a history of CVA, seizure disorder, hypertension and diabetes mellitus who was brought to the ER for complaints of altered mental status. Patient was diagnosed with seizure disorder last year and started on keppra po. Over the past few months the family has noted she has been more lethargic and confused.The morning of admission she noted her mother became unresponsive, fell to her side, noted facial droop, her eyes were "fluttering" and her tongue was rolled back.MRI positive for acute right frontal cva.Blood cultures 1of 2 positive for CNS; UA negative. Started on iv Vancomycin repeat blood cultures ordered on 11/28. Passed swallow evaluation; started on PO soft diet with nectar fluid. Hospital course complicated by worsening lethargy.  Palliative care consult appreciated; daughter refusing hospice services.  After a detailed discussion with the patient's family, NG tube was placed and tube feeds started. Noted to have intermittent tachycardia, EKG consistent with afib. 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 Neurology recalled, 24 hour video eeg positive for left temporal seizure focus. Neurologist Dr Luther had a detailed discussion with the patient's family  in regards to the importance of AEd to prevent further seizures given risk. Agreed to Keppra IV. UA and urine culture were positive for Proteus mirabalis. Started on IV rocephin, ID consulted. Renal ultrasound with normal kidneys and no evidence of nephrolithiasis. Spoke with ID, treat for 7 days for proteus mirabilis UTI.  GI recommended Dobhoff could be in place for 6 weeks. Family decided to wait for this period and hopeful for neurologic recovery. Tube feeds were continued. Noted to have hypernatremia, hence free water increased. Given worsening sodium, after discussion with renal, she was started on IVF.    1. T2DM - recent hyperglycemia and family is concerned that this is contributing to her AMS, glucoses remain elevated  - increase Levemir to 10 units BID, nurse gave Levemir 8 units tonight, will add stat 2 units Levemir and start Levemir 10 units BID tomorrow  - cont Humalog every 6 hours  - advised family to avoid testing FS on their own, as nurses can only give insulin when scheduled to do so  - hyperglycemia is unlikely the cause for AMS and explained in detail risks of excessive insulin dosing, but will try and improve sugars  - all questions answered    2. Euthyroid sick - repeat TFTs 1 month, no thyroid medication at this time    3.Hypernatremia - resolved, IVF per renal    4.acute CVA with encephalopathy - likely multifactorial

## 2018-12-27 NOTE — PROGRESS NOTE ADULT - SUBJECTIVE AND OBJECTIVE BOX
NEPHROLOGY INTERVAL HPI/OVERNIGHT EVENTS:  pt essentially the same  no acute distress noted    MEDICATIONS  (STANDING):  aspirin  chewable 81 milliGRAM(s) Oral daily  Biotene Dry Mouth Oral Rinse 5 milliLiter(s) Swish and Spit four times a day  brimonidine 0.2% Ophthalmic Solution 1 Drop(s) Both EYES daily  dextrose 5%. 1000 milliLiter(s) (50 mL/Hr) IV Continuous <Continuous>  dextrose 50% Injectable 12.5 Gram(s) IV Push once  dextrose 50% Injectable 25 Gram(s) IV Push once  dextrose 50% Injectable 25 Gram(s) IV Push once  enoxaparin Injectable 40 milliGRAM(s) SubCutaneous daily  insulin detemir injectable (LEVEMIR) 8 Unit(s) SubCutaneous every 12 hours  insulin detemir injectable (LEVEMIR) 8 Unit(s) SubCutaneous once  insulin lispro (HumaLOG) corrective regimen sliding scale   SubCutaneous every 6 hours  lactobacillus acidophilus 1 Tablet(s) Oral daily  latanoprost 0.005% Ophthalmic Solution 1 Drop(s) Both EYES at bedtime  levETIRAcetam  IVPB 250 milliGRAM(s) IV Intermittent every 12 hours  lidocaine   Patch 1 Patch Transdermal daily  multivitamin 1 Tablet(s) Oral daily  sodium chloride 0.225%. 1000 milliLiter(s) (100 mL/Hr) IV Continuous <Continuous>  timolol 0.25% Solution 1 Drop(s) Both EYES daily    MEDICATIONS  (PRN):  acetaminophen    Suspension .. 765 milliGRAM(s) Oral every 6 hours PRN Moderate Pain (4 - 6)  dextrose 40% Gel 15 Gram(s) Oral once PRN Blood Glucose LESS THAN 70 milliGRAM(s)/deciliter  glucagon  Injectable 1 milliGRAM(s) IntraMuscular once PRN Glucose LESS THAN 70 milligrams/deciliter  hydrALAZINE Injectable 10 milliGRAM(s) IV Push every 4 hours PRN Patient npo ngt tube clogged      Allergies    contrast media (iodine-based) (Urticaria)  erythromycin (Unknown)  IV-Dye (Unknown)  sulfADIAZINE (Unknown)          Vital Signs Last 24 Hrs  T(C): 36.3 (26 Dec 2018 23:18), Max: 36.8 (26 Dec 2018 15:58)  T(F): 97.3 (26 Dec 2018 23:18), Max: 98.3 (26 Dec 2018 15:58)  HR: 90 (27 Dec 2018 06:59) (72 - 92)  BP: 158/76 (27 Dec 2018 06:59) (136/77 - 179/75)  BP(mean): --  RR: 22 (26 Dec 2018 23:18) (22 - 22)  SpO2: 100% (26 Dec 2018 23:18) (100% - 100%)    PHYSICAL EXAM:  GENERAL: No distress  HEAD: NGT; no facial edema  NECK: Supple, +JVD  NERVOUS SYSTEM:  unresponsive  CHEST/LUNG: diminished BS anteriorly  HEART: Regular rate and rhythm; No rub  ABDOMEN: Soft, Nontender, Nondistended; +BS  EXTREMITIES:  tr edema    LABS:                        10.5   7.9   )-----------( 219      ( 26 Dec 2018 07:06 )             33.9     12-    150<H>  |  109<H>  |  61.0<H>  ----------------------------<  298<H>  5.3   |  31.0<H>  |  0.99      Ca    9.5      26 Dec 2018 07:06  Mg     2.7     12-        Urinalysis Basic - ( 26 Dec 2018 18:05 )    Color: Yellow / Appearance: Clear / S.010 / pH: x  Gluc: x / Ketone: Negative  / Bili: Negative / Urobili: Negative mg/dL   Blood: x / Protein: 100 mg/dL / Nitrite: Negative   Leuk Esterase: Trace / RBC: 3-5 /HPF / WBC 3-5   Sq Epi: x / Non Sq Epi: Occasional / Bacteria: Occasional          RADIOLOGY & ADDITIONAL TESTS:

## 2018-12-27 NOTE — PROGRESS NOTE ADULT - SUBJECTIVE AND OBJECTIVE BOX
INTERVAL HPI/OVERNIGHT EVENTS:    CC:    No overnight events, clinically unchanged.     Vital Signs Last 24 Hrs  T(C): 36.9 (27 Dec 2018 09:47), Max: 36.9 (27 Dec 2018 09:47)  T(F): 98.5 (27 Dec 2018 09:47), Max: 98.5 (27 Dec 2018 09:47)  HR: 84 (27 Dec 2018 10:29) (72 - 92)  BP: 160/67 (27 Dec 2018 10:29) (158/76 - 179/75)  BP(mean): --  RR: 22 (27 Dec 2018 09:47) (22 - 22)  SpO2: 100% (27 Dec 2018 09:47) (100% - 100%)    PHYSICAL EXAM:    GENERAL: Not in distress, non verbal, minimally responsive  CHEST/LUNG: b/l air entry  HEART: irregular  ABDOMEN: Soft, BS+  EXTREMITIES:  mild UE edema resolving. No LE edema.     MEDICATIONS  (STANDING):  aspirin  chewable 81 milliGRAM(s) Oral daily  Biotene Dry Mouth Oral Rinse 5 milliLiter(s) Swish and Spit four times a day  brimonidine 0.2% Ophthalmic Solution 1 Drop(s) Both EYES daily  dextrose 5%. 1000 milliLiter(s) (50 mL/Hr) IV Continuous <Continuous>  dextrose 50% Injectable 12.5 Gram(s) IV Push once  dextrose 50% Injectable 25 Gram(s) IV Push once  dextrose 50% Injectable 25 Gram(s) IV Push once  enoxaparin Injectable 40 milliGRAM(s) SubCutaneous daily  insulin detemir injectable (LEVEMIR) 8 Unit(s) SubCutaneous every 12 hours  insulin detemir injectable (LEVEMIR) 8 Unit(s) SubCutaneous once  insulin lispro (HumaLOG) corrective regimen sliding scale   SubCutaneous every 6 hours  lactobacillus acidophilus 1 Tablet(s) Oral daily  latanoprost 0.005% Ophthalmic Solution 1 Drop(s) Both EYES at bedtime  levETIRAcetam  IVPB 250 milliGRAM(s) IV Intermittent every 12 hours  lidocaine   Patch 1 Patch Transdermal daily  multivitamin 1 Tablet(s) Oral daily  timolol 0.25% Solution 1 Drop(s) Both EYES daily    MEDICATIONS  (PRN):  acetaminophen    Suspension .. 765 milliGRAM(s) Oral every 6 hours PRN Moderate Pain (4 - 6)  dextrose 40% Gel 15 Gram(s) Oral once PRN Blood Glucose LESS THAN 70 milliGRAM(s)/deciliter  glucagon  Injectable 1 milliGRAM(s) IntraMuscular once PRN Glucose LESS THAN 70 milligrams/deciliter  hydrALAZINE Injectable 10 milliGRAM(s) IV Push every 4 hours PRN Patient npo ngt tube clogged      Allergies    contrast media (iodine-based) (Urticaria)  erythromycin (Unknown)  IV-Dye (Unknown)  sulfADIAZINE (Unknown)    Intolerances          LABS:                          10.5   7.9   )-----------( 219      ( 26 Dec 2018 07:06 )             33.9         144  |  105  |  68.0<H>  ----------------------------<  255<H>  4.6   |  28.0  |  1.01    Ca    9.2      27 Dec 2018 09:03  Mg     2.7             Urinalysis Basic - ( 26 Dec 2018 18:05 )    Color: Yellow / Appearance: Clear / S.010 / pH: x  Gluc: x / Ketone: Negative  / Bili: Negative / Urobili: Negative mg/dL   Blood: x / Protein: 100 mg/dL / Nitrite: Negative   Leuk Esterase: Trace / RBC: 3-5 /HPF / WBC 3-5   Sq Epi: x / Non Sq Epi: Occasional / Bacteria: Occasional        RADIOLOGY & ADDITIONAL TESTS:

## 2018-12-27 NOTE — PROGRESS NOTE ADULT - ASSESSMENT
ARF resolved  Hypernatremia improving==> await today's labs  - cont hypotonic IVF; adjust rate as needed  - free H2O via tube as tolerates  - monitor labs

## 2018-12-28 NOTE — PROCEDURE NOTE - NSINDICATIONS_GEN_A_CORE
feeds
feeds
antibiotic therapy/emergency venous access
cardiac arrest/respiratory failure/airway protection/mental status change

## 2018-12-28 NOTE — CONSULT NOTE ADULT - ATTENDING COMMENTS
1: Acute on chronic encephalopathy  2: Acute on chronic hypoxic respiratory failure with aspiration PNA  3: Hypoxic Bradycardic-PEA cardiac arrest  4: Septic Shock:  5: CVA  6: Multiple UTIs through hospital course   7: HTN/ DM/ HLD    Patient seen and examined by me  Goals of care re-addressed post intubation with patient family including HCP on phone and daughters in person and final decision is to admit to ICU while awaiting few family members to arrive and then later today we will terminally wean her with goals of care as comfort only, DNR in the mean time and no escalation of care in the mean time as well     CCT 50 min (separate from procedure)

## 2018-12-28 NOTE — CONSULT NOTE ADULT - ASSESSMENT
89 y/o female pmhx DM2, HTN, HLD, hx CVA, seizure disorder presented to ED w/ after a fell w/ AMS and facial droop on 11/26. Hospital coursed complicated by acute CVA/ multiple UTI's/ worsening mental status and new onset afib. Admitted to ICU after being found unresponsive in respiratory distress, had PEA arrest.      Plan:  Continue w/ mechanical ventilation for now. Family wants some time with her before they extubate to full comfort measures. Will add PRN  morphine and ativan for any signs of respiratory distress.   Remains on levophed for now. Will d/c before extubation.   Full comfort measures   DNR/DNI     Plan discussed extensively w/ 2 daughters and and HCP Rex and ICU attending Dr. Damian. 89 y/o female pmhx DM2, HTN, HLD, hx CVA, seizure disorder presented to ED w/ after a fell w/ AMS and facial droop on 11/26. Hospital coursed complicated by acute CVA/ multiple UTI's/ worsening mental status and new onset afib. Admitted to ICU after being found unresponsive in respiratory distress, had PEA arrest.      Plan:  Continue w/ mechanical ventilation for now. Family wants some time with her before they extubate to full comfort measures. Will add PRN  morphine and ativan for any signs of respiratory distress.   Remains on levophed for now. Will d/c before extubation.   NG tube removed during code. Will not replace. Hold PO meds   Full comfort measures   DNR/DNI     Plan discussed extensively w/ 2 daughters and and HCP Rex and ICU attending Dr. Damian.

## 2018-12-28 NOTE — CONSULT NOTE ADULT - SUBJECTIVE AND OBJECTIVE BOX
Patient is a 89y old  Female who presents with a chief complaint of Altered mental status (27 Dec 2018 18:44)      BRIEF HOSPITAL COURSE:  89 y/o female pmhx DM2, HTN, HLD, hx CVA, seizure disorder presented to ED w/ AMS on .  Stroke work up negative at that time.         Events last 24 hours: ***    PAST MEDICAL & SURGICAL HISTORY:  Diabetes mellitus  Fracture: lumbar fracture.  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  Heart Murmur  Calcium Nephrolithiasis  Hyperlipidemia LDL Goal < 100  Glaucoma, Low Tension  History of Pulmonary Hypertension  CAD (Coronary Artery Disease)  Obstructive Sleep Apnea  Controlled Type 2 Diabetes with Neuropathy  H/O cataract extraction  History of appendectomy  Fracture of Humerus: repair with harware right  Glaucoma: relieve pressure left eye along with cataract extraction  Bilateral Cataracts: excision   Benign Breast Disease: removal of cyst- 1963  Cataract  Kidney Stones: lithotripsy ,   S/P Appendectomy: 1937    Allergies    contrast media (iodine-based) (Urticaria)  erythromycin (Unknown)  IV-Dye (Unknown)  sulfADIAZINE (Unknown)    Intolerances      FAMILY HISTORY:  No pertinent family history  Family history of cerebrovascular accident (CVA) (Sibling)      Review of Systems:  CONSTITUTIONAL: No fever, chills, or fatigue  EYES: No eye pain, visual disturbances, or discharge  ENMT:  No difficulty hearing, tinnitus, vertigo; No sinus or throat pain  NECK: No pain or stiffness  RESPIRATORY: No cough, wheezing, chills or hemoptysis; No shortness of breath  CARDIOVASCULAR: No chest pain, palpitations, dizziness, or leg swelling  GASTROINTESTINAL: No abdominal or epigastric pain. No nausea, vomiting, or hematemesis; No diarrhea or constipation. No melena or hematochezia.  GENITOURINARY: No dysuria, frequency, hematuria, or incontinence  NEUROLOGICAL: No headaches, memory loss, loss of strength, numbness, or tremors  SKIN: No itching, burning, rashes, or lesions   MUSCULOSKELETAL: No joint pain or swelling; No muscle, back, or extremity pain  PSYCHIATRIC: No depression, anxiety, mood swings, or difficulty sleeping      Medications:  piperacillin/tazobactam IVPB. 3.375 Gram(s) IV Intermittent every 8 hours  vancomycin  IVPB 500 milliGRAM(s) IV Intermittent every 12 hours    norepinephrine Infusion 0.05 MICROgram(s)/kG/Min IV Continuous <Continuous>    ALBUTerol    0.083% 2.5 milliGRAM(s) Nebulizer every 6 hours    acetaminophen    Suspension .. 765 milliGRAM(s) Oral every 6 hours PRN  acetaminophen  IVPB .. 1000 milliGRAM(s) IV Intermittent once  levETIRAcetam  IVPB 250 milliGRAM(s) IV Intermittent every 12 hours      aspirin  chewable 81 milliGRAM(s) Oral daily  enoxaparin Injectable 40 milliGRAM(s) SubCutaneous daily        dextrose 40% Gel 15 Gram(s) Oral once PRN  dextrose 50% Injectable 12.5 Gram(s) IV Push once  dextrose 50% Injectable 25 Gram(s) IV Push once  dextrose 50% Injectable 25 Gram(s) IV Push once  glucagon  Injectable 1 milliGRAM(s) IntraMuscular once PRN  insulin detemir injectable (LEVEMIR) 10 Unit(s) SubCutaneous every 12 hours  insulin lispro (HumaLOG) corrective regimen sliding scale   SubCutaneous every 6 hours    dextrose 5%. 1000 milliLiter(s) IV Continuous <Continuous>  lactated ringers Bolus 1000 milliLiter(s) IV Bolus once  multivitamin 1 Tablet(s) Oral daily      Biotene Dry Mouth Oral Rinse 5 milliLiter(s) Swish and Spit four times a day  brimonidine 0.2% Ophthalmic Solution 1 Drop(s) Both EYES daily  chlorhexidine 0.12% Liquid 15 milliLiter(s) Swish and Spit two times a day  latanoprost 0.005% Ophthalmic Solution 1 Drop(s) Both EYES at bedtime  lidocaine   Patch 1 Patch Transdermal daily  timolol 0.25% Solution 1 Drop(s) Both EYES daily    lactobacillus acidophilus 1 Tablet(s) Oral daily      Mode: AC/ CMV (Assist Control/ Continuous Mandatory Ventilation)  RR (machine): 12  TV (machine): 420  FiO2: 100  PEEP: 5  MAP: 9  PIP: 25      ICU Vital Signs Last 24 Hrs  T(C): 38.6 (28 Dec 2018 09:48), Max: 38.6 (28 Dec 2018 09:48)  T(F): 101.4 (28 Dec 2018 09:48), Max: 101.4 (28 Dec 2018 09:48)  HR: 91 (28 Dec 2018 11:45) (65 - 144)  BP: 91/52 (28 Dec 2018 11:45) (91/52 - 143/72)  BP(mean): 69 (28 Dec 2018 11:45) (69 - 75)  ABP: --  ABP(mean): --  RR: 12 (28 Dec 2018 11:45) (12 - 20)  SpO2: 100% (28 Dec 2018 11:45) (94% - 100%)    Vital Signs Last 24 Hrs  T(C): 38.6 (28 Dec 2018 09:48), Max: 38.6 (28 Dec 2018 09:48)  T(F): 101.4 (28 Dec 2018 09:48), Max: 101.4 (28 Dec 2018 09:48)  HR: 91 (28 Dec 2018 11:45) (65 - 144)  BP: 91/52 (28 Dec 2018 11:45) (91/52 - 143/72)  BP(mean): 69 (28 Dec 2018 11:45) (69 - 75)  RR: 12 (28 Dec 2018 11:45) (12 - 20)  SpO2: 100% (28 Dec 2018 11:45) (94% - 100%)    ABG - ( 28 Dec 2018 10:23 )  pH, Arterial: 7.40  pH, Blood: x     /  pCO2: 52    /  pO2: 73    / HCO3: 30    / Base Excess: 6.7   /  SaO2: 95                  I&O's Detail        LABS:        144  |  105  |  68.0<H>  ----------------------------<  255<H>  4.6   |  28.0  |  1.01    Ca    9.2      27 Dec 2018 09:03  Mg     2.7                 CAPILLARY BLOOD GLUCOSE      POCT Blood Glucose.: 294 mg/dL (28 Dec 2018 10:14)      Urinalysis Basic - ( 26 Dec 2018 18:05 )    Color: Yellow / Appearance: Clear / S.010 / pH: x  Gluc: x / Ketone: Negative  / Bili: Negative / Urobili: Negative mg/dL   Blood: x / Protein: 100 mg/dL / Nitrite: Negative   Leuk Esterase: Trace / RBC: 3-5 /HPF / WBC 3-5   Sq Epi: x / Non Sq Epi: Occasional / Bacteria: Occasional      CULTURES:  Culture Results:   No growth ( @ 18:05)      Physical Examination:    General: No acute distress.  Alert, oriented, interactive, nonfocal    HEENT: Pupils equal, reactive to light.  Symmetric.    PULM: Clear to auscultation bilaterally, no significant sputum production    CVS: Regular rate and rhythm, no murmurs, rubs, or gallops    ABD: Soft, nondistended, nontender, normoactive bowel sounds, no masses    EXT: No edema, nontender    SKIN: Warm and well perfused, no rashes noted.    NEURO: A&Ox3, strength 5/5 all extremities, cranial nerves grossly intact, no focal deficits    RADIOLOGY: ***    CRITICAL CARE TIME SPENT: ***  Evaluating/treating patient, reviewing data/labs/imaging, discussing case with multidisciplinary team, discussing plan/goals of care with patient/family. Non-inclusive of procedure time. Patient is a 89y old  Female who presents with a chief complaint of Altered mental status (27 Dec 2018 18:44)      BRIEF HOSPITAL COURSE:  89 y/o female pmhx DM2, HTN, HLD, hx CVA, seizure disorder presented to ED w/ after a fell w/ AMS and facial droop on . Intial Stroke work up negative at that time. Started on keppra to r/o seizures, pt became lethargic on keppra and was changed to lacosamide. MRI head found acute right frontal CVA. Hospital course complicated by UTI s/p IV abx, sinus pauses w/ episodes of bradycardia discontinued all AV jordan blockers. Further complicated by worsening mental status. Pts family refusing palliative care/hospice after multiple family meetings. Had NG tube placed for feeds. New onset afib, deemed not candidate for AC by cardiology.  Further complicated by UTI w/ + urine culture for proteus mirabilis given IV rocephin.         Events last 24 hours:  RRT called this am for worsening mental status and respiratory distress.  ICU consulted during RRT for worsening respiratory status. On our arrival patient on 100% NRB w/ O2 sat 95%, HD stable w/ 's and HR low 100's however unresponsive to verbal and painful stimuli. As per nursing staff she was not far off from her baseline mental status. ABG 7.40/52/73/30. We started her on HFNC. Soon after initiation of HFNC she became hypoxic and  bradycardic went into PEA arrest. ACLS initiated  immediately by staff at bedside.  Intubated by ICU attending Dr. Damian. Received Epi x2 and achieved ROSC by 2nd pulse check.  SHe was hypotensive, started on peripheral levophed and transferred to ICU.     Extensive discussion held w/ family post arrest w/ 2 daughter and son Rex ( HCP) via telephone. He understands his mothers declining condition and futility of continuing aggressive management He wants to ensure her comfort after terminal extubation and purse comfort measures       PAST MEDICAL & SURGICAL HISTORY:  Diabetes mellitus  Fracture: lumbar fracture.  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  Heart Murmur  Calcium Nephrolithiasis  Hyperlipidemia LDL Goal < 100  Glaucoma, Low Tension  History of Pulmonary Hypertension  CAD (Coronary Artery Disease)  Obstructive Sleep Apnea  Controlled Type 2 Diabetes with Neuropathy  H/O cataract extraction  History of appendectomy  Fracture of Humerus: repair with harware right  Glaucoma: relieve pressure left eye along with cataract extraction  Bilateral Cataracts: excision 2006  Benign Breast Disease: removal of cyst- 1963  Cataract  Kidney Stones: lithotripsy ,   S/P Appendectomy: 1937    Allergies    contrast media (iodine-based) (Urticaria)  erythromycin (Unknown)  IV-Dye (Unknown)  sulfADIAZINE (Unknown)    Intolerances      FAMILY HISTORY:  No pertinent family history  Family history of cerebrovascular accident (CVA) (Sibling)      Review of Systems:  unable to obtain due to clinical condition.       Medications:  piperacillin/tazobactam IVPB. 3.375 Gram(s) IV Intermittent every 8 hours  vancomycin  IVPB 500 milliGRAM(s) IV Intermittent every 12 hours  norepinephrine Infusion 0.05 MICROgram(s)/kG/Min IV Continuous <Continuous>  ALBUTerol    0.083% 2.5 milliGRAM(s) Nebulizer every 6 hours  acetaminophen    Suspension .. 765 milliGRAM(s) Oral every 6 hours PRN  acetaminophen  IVPB .. 1000 milliGRAM(s) IV Intermittent once  levETIRAcetam  IVPB 250 milliGRAM(s) IV Intermittent every 12 hours  aspirin  chewable 81 milliGRAM(s) Oral daily  enoxaparin Injectable 40 milliGRAM(s) SubCutaneous daily  dextrose 40% Gel 15 Gram(s) Oral once PRN  dextrose 50% Injectable 12.5 Gram(s) IV Push once  dextrose 50% Injectable 25 Gram(s) IV Push once  dextrose 50% Injectable 25 Gram(s) IV Push once  glucagon  Injectable 1 milliGRAM(s) IntraMuscular once PRN  insulin detemir injectable (LEVEMIR) 10 Unit(s) SubCutaneous every 12 hours  insulin lispro (HumaLOG) corrective regimen sliding scale   SubCutaneous every 6 hours  dextrose 5%. 1000 milliLiter(s) IV Continuous <Continuous>  lactated ringers Bolus 1000 milliLiter(s) IV Bolus once  multivitamin 1 Tablet(s) Oral daily      Biotene Dry Mouth Oral Rinse 5 milliLiter(s) Swish and Spit four times a day  brimonidine 0.2% Ophthalmic Solution 1 Drop(s) Both EYES daily  chlorhexidine 0.12% Liquid 15 milliLiter(s) Swish and Spit two times a day  latanoprost 0.005% Ophthalmic Solution 1 Drop(s) Both EYES at bedtime  lidocaine   Patch 1 Patch Transdermal daily  timolol 0.25% Solution 1 Drop(s) Both EYES daily    lactobacillus acidophilus 1 Tablet(s) Oral daily      Mode: AC/ CMV (Assist Control/ Continuous Mandatory Ventilation)  RR (machine): 12  TV (machine): 420  FiO2: 100  PEEP: 5  MAP: 9  PIP: 25      ICU Vital Signs Last 24 Hrs  T(C): 38.6 (28 Dec 2018 09:48), Max: 38.6 (28 Dec 2018 09:48)  T(F): 101.4 (28 Dec 2018 09:48), Max: 101.4 (28 Dec 2018 09:48)  HR: 91 (28 Dec 2018 11:45) (65 - 144)  BP: 91/52 (28 Dec 2018 11:45) (91/52 - 143/72)  BP(mean): 69 (28 Dec 2018 11:45) (69 - 75)  ABP: --  ABP(mean): --  RR: 12 (28 Dec 2018 11:45) (12 - 20)  SpO2: 100% (28 Dec 2018 11:45) (94% - 100%)    Vital Signs Last 24 Hrs  T(C): 38.6 (28 Dec 2018 09:48), Max: 38.6 (28 Dec 2018 09:48)  T(F): 101.4 (28 Dec 2018 09:48), Max: 101.4 (28 Dec 2018 09:48)  HR: 91 (28 Dec 2018 11:45) (65 - 144)  BP: 91/52 (28 Dec 2018 11:45) (91/52 - 143/72)  BP(mean): 69 (28 Dec 2018 11:45) (69 - 75)  RR: 12 (28 Dec 2018 11:45) (12 - 20)  SpO2: 100% (28 Dec 2018 11:45) (94% - 100%)    ABG - ( 28 Dec 2018 10:23 )  pH, Arterial: 7.40  pH, Blood: x     /  pCO2: 52    /  pO2: 73    / HCO3: 30    / Base Excess: 6.7   /  SaO2: 95                  I&O's Detail        LABS:        144  |  105  |  68.0<H>  ----------------------------<  255<H>  4.6   |  28.0  |  1.01    Ca    9.2      27 Dec 2018 09:03  Mg     2.7                 CAPILLARY BLOOD GLUCOSE      POCT Blood Glucose.: 294 mg/dL (28 Dec 2018 10:14)      Urinalysis Basic - ( 26 Dec 2018 18:05 )    Color: Yellow / Appearance: Clear / S.010 / pH: x  Gluc: x / Ketone: Negative  / Bili: Negative / Urobili: Negative mg/dL   Blood: x / Protein: 100 mg/dL / Nitrite: Negative   Leuk Esterase: Trace / RBC: 3-5 /HPF / WBC 3-5   Sq Epi: x / Non Sq Epi: Occasional / Bacteria: Occasional      CULTURES:  Culture Results:   No growth ( @ 18:05)      Physical Examination:    General: Unresponsive elderly female. Intubated.     HEENT: PERRLA. sluggish     PULM:  Diminished breath sounds b/l. B/l rhonchi. Significant sputum production.     CVS:  + S1/S2. Irregular rhythm. no murmurs.     ABD: Soft, nondistended, nontender, normoactive bowel sounds, no masses    EXT: No edema, nontender    SKIN: Warm and well perfused, no rashes noted.    NEURO: Unresponsive to verbal/ painful stimuli. Does not follow commands will not move any of her extremities spontaneously    RADIOLOGY: < from: Xray Chest 1 View- PORTABLE-Urgent (18 @ 10:13) >     EXAM:  XR CHEST PORTABLE URGENT 1V                          PROCEDURE DATE:  2018          INTERPRETATION:    CLINICAL INFORMATION: Tachypnea, abnormal breath sounds, altered mental   status    TECHNIQUE:  Portable  AP view of the chest was obtained, and compared to   prior study of 2018.    FINDINGS/  IMPRESSION: Left lower lobe atelectasis and probable small left effusion,   cannot exclude underlying consolidation. Borderline cardiomegaly. Feeding   tube is present tip below diaphragm.    < end of copied text >      CRITICAL CARE TIME SPENT:   70 min   Evaluating/treating patient, reviewing data/labs/imaging, discussing case with multidisciplinary team, discussing plan/goals of care with patient/family. Non-inclusive of procedure time.

## 2018-12-28 NOTE — PROCEDURE NOTE - NSPROCNAME_GEN_A_CORE
Gastric Intubation/Gastric Lavage
Gastric Intubation/Gastric Lavage
Peripheral Line Insertion
Tracheal Intubation

## 2018-12-28 NOTE — CONSULT NOTE ADULT - PROBLEM SELECTOR PROBLEM 4
Seizure
Dysphagia
Shock
Type 2 diabetes mellitus without complication, with long-term current use of insulin

## 2018-12-28 NOTE — CONSULT NOTE ADULT - REASON FOR ADMISSION
Altered mental status
TIA/seizure
Altered mental status

## 2018-12-28 NOTE — CONSULT NOTE ADULT - CONSULT REQUESTED BY NAME
Doctor
Dr Dowling
Dr. Jain
Dr. Ruelas
Dr. Sanchez
ER
Hospitalist
Peraga
arleen
Dr. Dowling
Dr. Ruelas

## 2018-12-28 NOTE — PROGRESS NOTE ADULT - PROBLEM SELECTOR PLAN 1
does not appear to be improving and high risk for respiratory complications with propofol sedation. I spent 30 minutes with family discussing the pro and con of placing PEG and potention for complications. If the son just needs another week or two to see if mother wakes up without stopping feeds would suggest just continue dubhoff tube feeds for another two weeks. If she does not wake up he might be agreeable to hospice care and stopped forced enteral feeds. If she does wake up, which is highly unlikely, depending upon the amount of improvement in her mental status might reconsider PEG if still needed.
Cont supportive care  Holding ASA 2/2 NPO status  No statin 2/2 myalgia history
Continue aspirin, not on statin per family.
Holding ASA 2/2 NPO status  No statin 2/2 myalgia history.
Supportive care  Holding ASA 2/2 NPO status  No statin 2/2 myalgia history.
intubated s/p cardiac arrest PEA CPR performed , overall prognosis poor  pt is willing terminal l wean and comfort measures only   further care per ICU team ;tx ed to MICU after code blue /intubation
Supportive care  Holding ASA 2/2 NPO status  No statin 2/2 myalgia history.

## 2018-12-28 NOTE — PROGRESS NOTE ADULT - PROVIDER SPECIALTY LIST ADULT
Cardiology
Endocrinology
Gastroenterology
Gastroenterology
Hospitalist
Infectious Disease
Infectious Disease
Internal Medicine
Nephrology
Neurology
Palliative Care
Pulmonology
Electrophysiology
Internal Medicine
Nephrology

## 2018-12-28 NOTE — PROGRESS NOTE ADULT - NSHPATTENDINGPLANDISCUSS_GEN_ALL_CORE
Dr Dowling
patient, daughter, RN
Multiple family members as above and with Dr Rainey.
the daughters, ICU team , HCP and medical team

## 2018-12-28 NOTE — PROGRESS NOTE ADULT - SUBJECTIVE AND OBJECTIVE BOX
Hospitalist on Service - pt is reassigned to me this morning   Chart reviewed , pt is seen examined around 8 am , met the daughter at the bedside , nurse Nancy was present   Pt is with mild respiratory distress , tachypnea , congestion , she is unresponsive at base line not able to follow any commands , NGT in place feeding   Daughter reported ; her mom had CIPAP use at night was placed by her   urinary incontinence   BP in am was 124/74 , temp 99   AFTER 10 AM ;   pt had rapid response called after my evaluation , rectal temp 101.4 , during straight  cath attempt turning her became hypoxic , see rapid response note in details   I had discussed with the daughter Ronel who is at the bedside re change of condition , options and need of ventilator , she had called her brother HCP and i spoke to him over the phone during RR , initially after discussing with his family he said go ahead and intubate if needed   , pt rapidly detoriating , ABG , blood work performed , IVF ordered per protocol, ICU team called to evaluate arrive to bedside   She got bradycardic , hypoxia worsening , increase work of breathing . code blue called PEA , protocol per ICU team , intubated  difficult intubation , + secretions suctioned in the airway . Became hypotensive , pressors abx initiated , CXR likely aspiration pneumonia   Pt is critical overall prognosis poor , Her son HCP now wants to keep his mom comfortable , ICU attending eliud long discussion with him as well over the phone , pt is tx ed to ICU   emotional; support given to the family members 2 daughters who are at the bedside       Vital Signs Last 24 Hrs  T(C): 38.6 (28 Dec 2018 09:48), Max: 38.6 (28 Dec 2018 09:48)  T(F): 101.4 (28 Dec 2018 09:48), Max: 101.4 (28 Dec 2018 09:48)  HR: 91 (28 Dec 2018 11:45) (65 - 144)  BP: 91/52 (28 Dec 2018 11:45) (91/52 - 143/72)  BP(mean): 69 (28 Dec 2018 11:45) (69 - 75)  RR: 12 (28 Dec 2018 11:45) (12 - 20)  SpO2: 100% (28 Dec 2018 11:45) (94% - 100%)  PHYSICAL EXAM:    GENERAL: respiratory distress , unresponsive , no able to follow any commands   Neck : supple , no JVD   Lungs : bilateral diffuse rhonchi , coarse BS , + congestion   HEART: irregular rate rythm S1 /S2   ABDOMEN: Soft, BS+, no distention   EXTREMITIES:  bilateral upper extremity edema , diffuse   Skin : normal color     MEDICATIONS  (STANDING):  acetaminophen  IVPB .. 1000 milliGRAM(s) IV Intermittent once  ALBUTerol    0.083% 2.5 milliGRAM(s) Nebulizer every 6 hours  aspirin  chewable 81 milliGRAM(s) Oral daily  Biotene Dry Mouth Oral Rinse 5 milliLiter(s) Swish and Spit four times a day  brimonidine 0.2% Ophthalmic Solution 1 Drop(s) Both EYES daily  chlorhexidine 0.12% Liquid 15 milliLiter(s) Swish and Spit two times a day  dextrose 5%. 1000 milliLiter(s) (50 mL/Hr) IV Continuous <Continuous>  dextrose 50% Injectable 12.5 Gram(s) IV Push once  dextrose 50% Injectable 25 Gram(s) IV Push once  dextrose 50% Injectable 25 Gram(s) IV Push once  enoxaparin Injectable 40 milliGRAM(s) SubCutaneous daily  insulin detemir injectable (LEVEMIR) 10 Unit(s) SubCutaneous every 12 hours  insulin lispro (HumaLOG) corrective regimen sliding scale   SubCutaneous every 6 hours  lactated ringers Bolus 1000 milliLiter(s) IV Bolus once  lactobacillus acidophilus 1 Tablet(s) Oral daily  latanoprost 0.005% Ophthalmic Solution 1 Drop(s) Both EYES at bedtime  levETIRAcetam  IVPB 250 milliGRAM(s) IV Intermittent every 12 hours  lidocaine   Patch 1 Patch Transdermal daily  multivitamin 1 Tablet(s) Oral daily  norepinephrine Infusion 0.05 MICROgram(s)/kG/Min (7.153 mL/Hr) IV Continuous <Continuous>  piperacillin/tazobactam IVPB. 3.375 Gram(s) IV Intermittent every 8 hours  timolol 0.25% Solution 1 Drop(s) Both EYES daily  vancomycin  IVPB 500 milliGRAM(s) IV Intermittent every 12 hours  12-27    144  |  105  |  68.0<H>  ----------------------------<  255<H>  4.6   |  28.0  |  1.01    Ca    9.2      27 Dec 2018 09:03  Mg     2.7     12-27

## 2018-12-28 NOTE — PROGRESS NOTE ADULT - PROBLEM SELECTOR PLAN 3
Family considering NG tube feedings to see if it would help with mental status
Family deciding about PEG
NG tube placed on 12/9/28.  as family wishes to see if nutrition will help with mental status.  Currently , do not feel any change in mental status.   Plan to give approx 48hours for observation. If none, family will need to decide about PEG tube
On Keppra
complete abx
cont abx- Rocephin
poor prognosis given her advanced age, overall medical condition , unresponsive
Continue Keppra

## 2018-12-28 NOTE — PROGRESS NOTE ADULT - PROBLEM SELECTOR PLAN 5
Multifactorial,   no sign of improvement or recovery during hospital stay   . No change in mental status

## 2018-12-28 NOTE — PROCEDURE NOTE - NSINFORMCONSENT_GEN_A_CORE
Benefits, risks, and possible complications of procedure explained to patient/caregiver who verbalized understanding and gave verbal consent.
This was an emergent procedure.

## 2018-12-28 NOTE — PROGRESS NOTE ADULT - ASSESSMENT
Patient is a 89 year old female with a history of CVA, seizure disorder, hypertension and diabetes mellitus who was brought to the ER for complaints of altered mental status. Patient was diagnosed with seizure disorder last year and started on keppra po. Over the past few months the family has noted she has been more lethargic and confused. She saw Dr Mcallister, neurologist at [facility]. According to the daughter she had a number of tests which indicated she did not have seizure disorder and she was advised to taper off the keppra. Her daughter tapered it off over the past 2 weeks. The morning of admission she noted her mother became unresponsive, fell to her side, noted facial droop, her eyes were "fluttering" and her tongue was rolled back. After the episode she remained lethargic for about 1 hours. In the ER, NIH was 0. Patient was more alert and responsive. CT head was negative. Initially started on IV Keppra EEG positive for seizure activity. Spoke with neurology regarding family's concerns about Keppra causing lethargy; Changed to lacosamide. MRI positive for acute right frontal cva. Discussed the benefits of statin therapy with the patient's family in regards to secondary prevention; in agreement with statin therapy- LDL of 253. HbAc of 6. Blood cultures 1of 2 positive for CNS; UA negative. Started on iv Vancomycin repeat blood cultures ordered on 11/28. Passed swallow evaluation; started on PO soft diet with nectar fluid.  Noted to have episodes of bradycardia with sinus pause. Spoke with cardiology, iv labetalol discontinued with improvement. Recommend no AV jordan blocking agents. Hydralazine increased to 25mg TID. Aspirin to continue. Family refusing lipitor or zocor for history of myalgia. Recommend pravastatin on discharge. Repeat blood cultures x 2 negative. Hospital course complicated by worsening lethargy. Metabolic parameters acceptable; hypernatremia and uremia resolved. Neurology re-consulted; recommending to continue Vimpat. Palliative care consult appreciated; daughter refusing hospice services. Family  conference call arranged by MD, family wanted to continue to hold VImpat to assess improvement in lethargy understanding the risks of seizures of AEDs.  Second neurology opinion was obtained; vimpat or keppra not contributing to lethargy- recommend continuing AEDs which was endorsed to the family.  After a detailed discussion with the patient's family, NG tube was placed and tube feeds started. Noted to have intermittent tachycardia, EKG consistent with afib. 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 Started on lopressor therapy. Neurology recalled, 24 hour video eeg positive for left temporal seizure focus. Neurologist Dr Luther had a detailed discussion with the patient's family  in regards to the importance of AEd to prevent further seizures given risk. Agreed to Keppra IV. UA and urine culture were positive for Proteus mirabalis. Started on IV rocephin, ID consulted. Renal ultrasound with normal kidneys and no evidence of nephrolithiasis. Spoke with ID, treat for 7 days for proteus mirabilis UTI.  GI recommended Dobhoff could be in place for 6 weeks. Family decided to wait for this period and hopeful for neurologic recovery. Tube feeds were continued. Noted to have hypernatremia, hence free water increased. Given worsening sodium, after discussion with renal, she was started on IVF. Her sodium normalized. IVF was discontinued. Insulin was adjusted.  12/28 - acute respiratory distress fever liekly aspiration pneumonia followed with rapid response worsening resp distress hypoxia , hypotension , became bardycardic code blue called ICU team arrivies, s/p CPR pulse regained , intubated by ICU attending , tx ed to ICU   had long discussion with the daughters and her son HCP , family wants comfort measures only , likely terminal weaning soon waiting for family members and grandchildren to come say farhad

## 2018-12-28 NOTE — CONSULT NOTE ADULT - CONSULT REASON
Goals of Care
Speech loss
AMS
AMS TIA UNCONTROLLED HTN
Clearance for PEG
Hypernatremia/ MISSY/ Hyperkalemia
PEG placement
UTI
respiratory distress
second opinion for encephalopathy and seizure disorder
DM management, abnormal TFTs

## 2018-12-28 NOTE — CONSULT NOTE ADULT - PROBLEM SELECTOR PROBLEM 3
Encephalopathy
Incontinence of urine
Coronary artery disease without angina pectoris, unspecified vessel or lesion type, unspecified whether native or transplanted heart
Cardiac arrest

## 2018-12-28 NOTE — CONSULT NOTE ADULT - PROVIDER SPECIALTY LIST ADULT
Cardiology
Critical Care
Gastroenterology
Infectious Disease
Nephrology
Neurology
Neurology
Pulmonology
TeleHospitalist
Endocrinology
Palliative Care

## 2018-12-28 NOTE — PROGRESS NOTE ADULT - PROBLEM SELECTOR PLAN 4
Assist with care
Assist with care
Met with daughter Ronel. She states mother had some responsive movement over the weekend. However, did not see any of such  during my visit. Mental status remains poor.   Ronel concerned about possible tooth infection as etiology and requesting dental eval. Informed  her, do not think dentist available on staff. Also, explained  do not feel a tooth infection would cause this degree of mental status change.   Overall prognosis is poor.  Family remains hopeful.  Continue support.
NGT in place for 6 weeks, family hoping for improvement in mental status
NGT in place for 6 weeks, family hoping for improvement in mental status. Continue free water
NGT in place for 6 weeks, family hoping for improvement in mental status. free water via NGT increased.
NGT in place for 6 weeks, family hoping for improvement in mental status. free water via NGT increased. Monitor labs and mental status.
assist with care
cont Keppra
had discussed with the daughters on service and her son the HCP over the phone multiple times , current worsening condition , overall poor prognosis and recovery time spend 20 min
Family meeting- see Goals of Care Note  In summary- family would like  a little more time with NG tube to see if she improves. They prefer to stay in hospital.  Social Work and Case management coordinating care.

## 2018-12-28 NOTE — PROCEDURE NOTE - NSPROCDETAILS_GEN_ALL_CORE
patient pre-oxygenated, tube inserted, placement confirmed/connected to ventilator
audible air bolus/placement confirmed by auscultation/chest x- ray done to confirm placement
audible air bolus/placement confirmed by auscultation/nasogastric/CXR ordered
flushes easily/blood seen on insertion/ultrasound utilization/dressing applied

## 2018-12-28 NOTE — PROGRESS NOTE ADULT - REASON FOR ADMISSION
Altered mental status

## 2018-12-28 NOTE — PROGRESS NOTE ADULT - PROBLEM SELECTOR PROBLEM 1
Encephalopathy
Acute respiratory failure with hypoxia
Cerebrovascular accident (CVA), unspecified mechanism
Seizure
Cerebrovascular accident (CVA), unspecified mechanism

## 2018-12-28 NOTE — CHART NOTE - NSCHARTNOTEFT_GEN_A_CORE
Rapid Response PGY 1 Note  Patient is a 89y old  Female with a history of CVA, seizure disorder, hypertension and diabetes mellitus who was brought to the ER for complaints of altered mental status.   Rapid response team called because of Bradycardia less than 40s, Fever and Respiratory distress.    Patient was seen and examined at the bedside by the rapid response team.    Allergies    contrast media (iodine-based) (Urticaria)  erythromycin (Unknown)  IV-Dye (Unknown)  sulfADIAZINE (Unknown)    Intolerances        PAST MEDICAL & SURGICAL HISTORY:  Diabetes mellitus  Fracture: lumbar fracture.  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  Heart Murmur  Calcium Nephrolithiasis  Hyperlipidemia LDL Goal < 100  Glaucoma, Low Tension  History of Pulmonary Hypertension  CAD (Coronary Artery Disease)  Obstructive Sleep Apnea  Controlled Type 2 Diabetes with Neuropathy  H/O cataract extraction  History of appendectomy  Fracture of Humerus: repair with harware right  Glaucoma: relieve pressure left eye along with cataract extraction  Bilateral Cataracts: excision   Benign Breast Disease: removal of cyst- 1963  Cataract  Kidney Stones: lithotripsy ,   S/P Appendectomy: 1937      Vital Signs Last 24 Hrs  T(C): 38.6 (28 Dec 2018 09:48), Max: 38.6 (28 Dec 2018 09:48)  T(F): 101.4 (28 Dec 2018 09:48), Max: 101.4 (28 Dec 2018 09:48)  HR: 144 (28 Dec 2018 08:45) (65 - 144)  BP: 121/74 (28 Dec 2018 08:45) (121/74 - 143/72)  RR: 20 (28 Dec 2018 08:45) (18 - 20)  SpO2: 98% (28 Dec 2018 08:45) (94% - 98%)      GENERAL: Pt is unresponsive, tachypneic, labored breathing on Ventimask.  HEENT: Head is normocephalic and atraumatic. Extraocular muscles are intact. Mucous membranes are moist.    NECK: Supple.  LUNGS:  respirations labored. Tachypnic  HEART: Regular rate and rhythm ,+S1/+S2, no murmurs, rubs, gallops  ABDOMEN: Soft, nontender, and nondistended, no rebound, guarding rigidity, bowel sounds in all 4 quadrants  MSK: strength equal BL  VASCULAR: Radial and Dorsal pedal pulses palpable BL  NEUROLOGIC: Grossly intact.              144  |  105  |  68.0<H>  ----------------------------<  255<H>  4.6   |  28.0  |  1.01    Ca    9.2      27 Dec 2018 09:03  Mg     2.7           ABG - ( 28 Dec 2018 10:23 )  pH, Arterial: 7.40  pH, Blood: x     /  pCO2: 52    /  pO2: 73    / HCO3: 30    / Base Excess: 6.7   /  SaO2: 95             Urinalysis Basic - ( 26 Dec 2018 18:05 )    Color: Yellow / Appearance: Clear / S.010 / pH: x  Gluc: x / Ketone: Negative  / Bili: Negative / Urobili: Negative mg/dL   Blood: x / Protein: 100 mg/dL / Nitrite: Negative   Leuk Esterase: Trace / RBC: 3-5 /HPF / WBC 3-5   Sq Epi: x / Non Sq Epi: Occasional / Bacteria: Occasional         MEDICATIONS  (STANDING):  acetaminophen  IVPB .. 1000 milliGRAM(s) IV Intermittent once  ALBUTerol    0.083% 2.5 milliGRAM(s) Nebulizer every 6 hours  aspirin  chewable 81 milliGRAM(s) Oral daily  Biotene Dry Mouth Oral Rinse 5 milliLiter(s) Swish and Spit four times a day  brimonidine 0.2% Ophthalmic Solution 1 Drop(s) Both EYES daily  dextrose 5%. 1000 milliLiter(s) (50 mL/Hr) IV Continuous <Continuous>  dextrose 50% Injectable 12.5 Gram(s) IV Push once  dextrose 50% Injectable 25 Gram(s) IV Push once  dextrose 50% Injectable 25 Gram(s) IV Push once  enoxaparin Injectable 40 milliGRAM(s) SubCutaneous daily  insulin detemir injectable (LEVEMIR) 10 Unit(s) SubCutaneous every 12 hours  insulin lispro (HumaLOG) corrective regimen sliding scale   SubCutaneous every 6 hours  lactated ringers Bolus 1000 milliLiter(s) IV Bolus once  lactobacillus acidophilus 1 Tablet(s) Oral daily  latanoprost 0.005% Ophthalmic Solution 1 Drop(s) Both EYES at bedtime  levETIRAcetam  IVPB 250 milliGRAM(s) IV Intermittent every 12 hours  lidocaine   Patch 1 Patch Transdermal daily  multivitamin 1 Tablet(s) Oral daily  piperacillin/tazobactam IVPB. 3.375 Gram(s) IV Intermittent every 8 hours  timolol 0.25% Solution 1 Drop(s) Both EYES daily    MEDICATIONS  (PRN):  acetaminophen    Suspension .. 765 milliGRAM(s) Oral every 6 hours PRN Moderate Pain (4 - 6)  dextrose 40% Gel 15 Gram(s) Oral once PRN Blood Glucose LESS THAN 70 milliGRAM(s)/deciliter  glucagon  Injectable 1 milliGRAM(s) IntraMuscular once PRN Glucose LESS THAN 70 milligrams/deciliter  hydrALAZINE Injectable 10 milliGRAM(s) IV Push every 4 hours PRN Patient npo ngt tube clogged      Patient is a 89y old  Female with a history of CVA, seizure disorder, hypertension and diabetes mellitus who was brought to the ER for complaints of altered mental status.   Rapid response team called because of Bradycardia less than 40s, Fever and Respiratory distress. Discussed with senior resident as well as attending Dr. Edward.    Sepsis complicated by respiratory distress.    -s/p IV Tylenol  -c/w 1 L Lactate Ringer bolus  -EKG resulted  -F/u CBC, blood cultures, lactate, procalcitonin  -F/u UA  -Pt is full code. Family at bedside and wants everything done for pt.  -ICU contacted. Pt has rapid, labored breathing. Intermittent desaturations to 60s. Likely need for intubation. Rapid Response PGY 1 Note  Patient is a 89y old  Female with a history of CVA, seizure disorder, hypertension and diabetes mellitus who was brought to the ER for complaints of altered mental status.   Rapid response team called because of Bradycardia less than 40s, Fever and Respiratory distress.    Patient was seen and examined at the bedside by the rapid response team.    Allergies    contrast media (iodine-based) (Urticaria)  erythromycin (Unknown)  IV-Dye (Unknown)  sulfADIAZINE (Unknown)    Intolerances        PAST MEDICAL & SURGICAL HISTORY:  Diabetes mellitus  Fracture: lumbar fracture.  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  Heart Murmur  Calcium Nephrolithiasis  Hyperlipidemia LDL Goal < 100  Glaucoma, Low Tension  History of Pulmonary Hypertension  CAD (Coronary Artery Disease)  Obstructive Sleep Apnea  Controlled Type 2 Diabetes with Neuropathy  H/O cataract extraction  History of appendectomy  Fracture of Humerus: repair with harware right  Glaucoma: relieve pressure left eye along with cataract extraction  Bilateral Cataracts: excision   Benign Breast Disease: removal of cyst- 1963  Cataract  Kidney Stones: lithotripsy ,   S/P Appendectomy: 1937      Vital Signs Last 24 Hrs  T(C): 38.6 (28 Dec 2018 09:48), Max: 38.6 (28 Dec 2018 09:48)  T(F): 101.4 (28 Dec 2018 09:48), Max: 101.4 (28 Dec 2018 09:48)  HR: 144 (28 Dec 2018 08:45) (65 - 144)  BP: 121/74 (28 Dec 2018 08:45) (121/74 - 143/72)  RR: 20 (28 Dec 2018 08:45) (18 - 20)  SpO2: 98% (28 Dec 2018 08:45) (94% - 98%)      GENERAL: Pt appears unresponsive, tachypneic, labored breathing on Ventimask.  HEENT: Head is normocephalic and atraumatic. Mucous membranes were dry.  LUNGS: respirations labored. + Tachypneic, B/L crackle. No wheeze.  HEART: irregular rate and rhythm ,+S1/+S2. + tachycardia.  ABDOMEN: Soft, nontender, and nondistended, no rebound, guarding rigidity, bowel sounds in all 4 quadrants  MSK: strength equal BL  VASCULAR: + Radial and femoral pulse.   NEUROLOGIC: Cannot access. Pt unresponsive to verbal stimuli.               144  |  105  |  68.0<H>  ----------------------------<  255<H>  4.6   |  28.0  |  1.01    Ca    9.2      27 Dec 2018 09:03  Mg     2.7           ABG - ( 28 Dec 2018 10:23 )  pH, Arterial: 7.40  pH, Blood: x     /  pCO2: 52    /  pO2: 73    / HCO3: 30    / Base Excess: 6.7   /  SaO2: 95             Urinalysis Basic - ( 26 Dec 2018 18:05 )    Color: Yellow / Appearance: Clear / S.010 / pH: x  Gluc: x / Ketone: Negative  / Bili: Negative / Urobili: Negative mg/dL   Blood: x / Protein: 100 mg/dL / Nitrite: Negative   Leuk Esterase: Trace / RBC: 3-5 /HPF / WBC 3-5   Sq Epi: x / Non Sq Epi: Occasional / Bacteria: Occasional         MEDICATIONS  (STANDING):  acetaminophen  IVPB .. 1000 milliGRAM(s) IV Intermittent once  ALBUTerol    0.083% 2.5 milliGRAM(s) Nebulizer every 6 hours  aspirin  chewable 81 milliGRAM(s) Oral daily  Biotene Dry Mouth Oral Rinse 5 milliLiter(s) Swish and Spit four times a day  brimonidine 0.2% Ophthalmic Solution 1 Drop(s) Both EYES daily  dextrose 5%. 1000 milliLiter(s) (50 mL/Hr) IV Continuous <Continuous>  dextrose 50% Injectable 12.5 Gram(s) IV Push once  dextrose 50% Injectable 25 Gram(s) IV Push once  dextrose 50% Injectable 25 Gram(s) IV Push once  enoxaparin Injectable 40 milliGRAM(s) SubCutaneous daily  insulin detemir injectable (LEVEMIR) 10 Unit(s) SubCutaneous every 12 hours  insulin lispro (HumaLOG) corrective regimen sliding scale   SubCutaneous every 6 hours  lactated ringers Bolus 1000 milliLiter(s) IV Bolus once  lactobacillus acidophilus 1 Tablet(s) Oral daily  latanoprost 0.005% Ophthalmic Solution 1 Drop(s) Both EYES at bedtime  levETIRAcetam  IVPB 250 milliGRAM(s) IV Intermittent every 12 hours  lidocaine   Patch 1 Patch Transdermal daily  multivitamin 1 Tablet(s) Oral daily  piperacillin/tazobactam IVPB. 3.375 Gram(s) IV Intermittent every 8 hours  timolol 0.25% Solution 1 Drop(s) Both EYES daily    MEDICATIONS  (PRN):  acetaminophen    Suspension .. 765 milliGRAM(s) Oral every 6 hours PRN Moderate Pain (4 - 6)  dextrose 40% Gel 15 Gram(s) Oral once PRN Blood Glucose LESS THAN 70 milliGRAM(s)/deciliter  glucagon  Injectable 1 milliGRAM(s) IntraMuscular once PRN Glucose LESS THAN 70 milligrams/deciliter  hydrALAZINE Injectable 10 milliGRAM(s) IV Push every 4 hours PRN Patient npo ngt tube clogged      Patient is a 89y old  Female with a history of CVA, seizure disorder, hypertension and diabetes mellitus with prolonged hospital stay who was brought to the ER for complaints of altered mental status.   Rapid response team called because of Bradycardia less than 40s, Fever and Respiratory distress. Discussed with senior resident as well as attending Dr. Edward.    1. Respiratory distress likely secondary to sepsis.  -s/p IV Tylenol  -c/w 1 L Lactate Ringer bolus  -EKG done and shows afib with RVR and bundle branchy block.  -F/u CBC, UA, blood cultures, lactate, procalcitonin ordered by attending  -Pt is full code. Family at bedside and wants everything done for pt.  -ICU contacted. Pt has rapid, labored breathing. Intermittent desaturations to 60s. Likely need for intubation.  -Attending Dr. Edward at bedside.

## 2018-12-28 NOTE — PROGRESS NOTE ADULT - PROBLEM SELECTOR PROBLEM 3
Cerebrovascular accident (CVA), unspecified mechanism
Acute cystitis without hematuria
Acute cystitis without hematuria
Dysphagia
Seizure
Sepsis, due to unspecified organism
Seizure

## 2018-12-28 NOTE — CONSULT NOTE ADULT - PROBLEM SELECTOR PROBLEM 1
Cerebrovascular accident (CVA), unspecified mechanism
TIA (transient ischemic attack)
Acute cystitis without hematuria
Malnutrition
Aspiration pneumonia, unspecified aspiration pneumonia type, unspecified laterality, unspecified part of lung

## 2018-12-28 NOTE — PROGRESS NOTE ADULT - PROBLEM SELECTOR PLAN 2
Family deciding if to place PEG tube
Family deciding on PEG
Family wish to hold off on Vimpat to see if patient will become more alert.  They understand risk for seizures.  Noted Dr. Ruelas's conversation with neurology Dr. Rudolph- Approx 72 hours for medication to be out system from last dose which was 12/4.
Mental status remains poor despite antiseizure meds held
Multifactorial
Multifactorial, will continue to monitor for improvement.
Multifactorial, will continue to monitor for improvement.
Multifactorial, will continue to monitor for improvement. No change in mental status
cont abx- Rocephin.
zosyn given , NGT was removed   overall prognosis poor
Continuing NG tube feeds

## 2018-12-28 NOTE — PROCEDURE NOTE - NSTRACHPOSTINTU_RESP_A_CORE
Breath sounds equal/Positive end tidal Co2 noted/Appropriate capnography/Breath sounds bilateral/Chest excursion noted/Chest X-Ray

## 2018-12-28 NOTE — PROGRESS NOTE ADULT - PROBLEM SELECTOR PROBLEM 2
Cerebrovascular accident (CVA), unspecified mechanism
Encephalopathy
Acute cystitis without hematuria
Aspiration pneumonia, unspecified aspiration pneumonia type, unspecified laterality, unspecified part of lung
Dysphagia
Dysphagia
Encephalopathy
Seizure
Seizure
Dysphagia

## 2018-12-28 NOTE — PROVIDER CONTACT NOTE (EICU) - BACKGROUND
89 yo admitted 11/26 for AMS, hospital course complicated by acute CVA, UTI, and arrhythmias. 12/28 RRT called for respiratory distress and worsening change in mental status. Initially bradycardic lead to PEA arrest. ROSC obtained transferred to ICU.  Pt remains unresponsive, unarousable. Plan for terminal extubation, family at bedside.

## 2019-03-18 NOTE — H&P ADULT - MECHANISM OF INJURY
Blunt Is This A New Presentation, Or A Follow-Up?: Skin Lesions How Severe Is Your Skin Lesion?: mild Have Your Skin Lesions Been Treated?: not been treated

## 2021-06-04 NOTE — ED ADULT NURSE NOTE - ASSIST WITH
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  Date of Procedure: 6/2/21   Physician: Dr Gómez CORDON Groin Site       -Bandage removed and is CAN.  -Denies bruising, denies lump, or hardness at site.  -Not red, hot, tender, or swollen. Denies fever, chills.  - Denies the presence of fluid or blood draining from site.  - Denies numb, cool, or blue extremities.     Symptoms    -Denies chest pain and/or shortness of breath.   - Rhythm: NSR   Education Post discharge instructions reviewed.    New Discharge Medications NA   Appointments   9/3/21 Gómez   Patient Comments      Patient verbalized understanding of information and will call with any other questions at [phone] or send a [employer].       
walking/standing/toileting

## 2021-10-13 NOTE — PROGRESS NOTE ADULT - SUBJECTIVE AND OBJECTIVE BOX
CHIEF COMPLAINT/INTERVAL HISTORY:    Patient is a 89y old  Female who presents with a chief complaint of Altered mental status (01 Dec 2018 15:24)    SUBJECTIVE & OBJECTIVE: Pt seen and examined at bedside. No overnight events. Denies any active complaints.    ROS: Denies chest pain, SOB,     ICU Vital Signs Last 24 Hrs  T(C): 36.9 (02 Dec 2018 15:39), Max: 37 (02 Dec 2018 12:52)  T(F): 98.4 (02 Dec 2018 15:39), Max: 98.6 (02 Dec 2018 12:52)  HR: 72 (02 Dec 2018 15:39) (63 - 77)  BP: 171/84 (02 Dec 2018 15:39) (149/68 - 171/84)  RR: 18 (02 Dec 2018 15:39) (18 - 20)  SpO2: 96% (02 Dec 2018 15:39) (92% - 97%)    MEDICATIONS  (STANDING):  aspirin enteric coated 81 milliGRAM(s) Oral daily  brimonidine 0.2% Ophthalmic Solution 1 Drop(s) Both EYES daily  dextrose 5%. 1000 milliLiter(s) (50 mL/Hr) IV Continuous <Continuous>  dextrose 50% Injectable 12.5 Gram(s) IV Push once  dextrose 50% Injectable 25 Gram(s) IV Push once  dextrose 50% Injectable 25 Gram(s) IV Push once  enoxaparin Injectable 40 milliGRAM(s) SubCutaneous daily  hydrALAZINE 25 milliGRAM(s) Oral three times a day  hydrALAZINE Injectable 10 milliGRAM(s) IV Push once  insulin lispro (HumaLOG) corrective regimen sliding scale   SubCutaneous three times a day before meals  lacosamide 50 milliGRAM(s) Oral two times a day  latanoprost 0.005% Ophthalmic Solution 1 Drop(s) Both EYES at bedtime  saccharomyces boulardii 250 milliGRAM(s) Oral two times a day  timolol 0.25% Solution 1 Drop(s) Both EYES daily    MEDICATIONS  (PRN):  acetaminophen   Tablet .. 650 milliGRAM(s) Oral every 6 hours PRN Mild Pain (1 - 3)  dextrose 40% Gel 15 Gram(s) Oral once PRN Blood Glucose LESS THAN 70 milliGRAM(s)/deciliter  glucagon  Injectable 1 milliGRAM(s) IntraMuscular once PRN Glucose LESS THAN 70 milligrams/deciliter  hydrALAZINE Injectable 10 milliGRAM(s) IV Push every 8 hours PRN sbp >170  or dbp >100      LABS:                        10.2   5.4   )-----------( 183      ( 02 Dec 2018 09:25 )             31.0     12-02    141  |  106  |  21.0<H>  ----------------------------<  154<H>  3.6   |  24.0  |  1.00    Ca    9.5      02 Dec 2018 09:25  Phos  3.6     12-02  Mg     1.9     12-02        Urinalysis Basic - ( 01 Dec 2018 14:36 )    Color: Yellow / Appearance: Clear / S.020 / pH: x  Gluc: x / Ketone: Trace  / Bili: Negative / Urobili: Negative mg/dL   Blood: x / Protein: 30 mg/dL / Nitrite: Negative   Leuk Esterase: Trace / RBC: 0-2 /HPF / WBC 0-2   Sq Epi: x / Non Sq Epi: Occasional / Bacteria: x      CAPILLARY BLOOD GLUCOSE      POCT Blood Glucose.: 150 mg/dL (02 Dec 2018 12:01)  POCT Blood Glucose.: 148 mg/dL (02 Dec 2018 08:36)  POCT Blood Glucose.: 157 mg/dL (01 Dec 2018 17:16)      RECENT CULTURES:      RADIOLOGY & ADDITIONAL TESTS:      PHYSICAL EXAM:    GENERAL: NAD, well-groomed, well-developed  HEAD:  Atraumatic, Normocephalic  EYES: EOMI, PERRLA, conjunctiva and sclera clear  ENMT: Moist mucous membranes  NECK: Supple, No JVD  NERVOUS SYSTEM:  Alert & Oriented X3, Motor Strength 5/5 B/L upper and lower extremities; DTRs 2+ intact and symmetric  CHEST/LUNG: Clear to auscultation bilaterally; No rales, rhonchi, wheezing, or rubs  HEART: Regular rate and rhythm; No murmurs, rubs, or gallops  ABDOMEN: Soft, Nontender, Nondistended; Bowel sounds present  EXTREMITIES:  2+ Peripheral Pulses, No clubbing, cyanosis, or edema        DVT/GI ppx  Discussed with pt @ bedside CHIEF COMPLAINT/INTERVAL HISTORY:    Patient is a 89y old  Female who presents with a chief complaint of Altered mental status (01 Dec 2018 15:24)    SUBJECTIVE & OBJECTIVE: Pt seen and examined at bedside. No overnight events. Lethargic this morning; but daughter states patient was up late last night. ABG without CO2 narcosis. CT head without any acute pathology.     ROS: Denies chest pain, SOB. Limited due to cognitive impairment.     ICU Vital Signs Last 24 Hrs  T(C): 36.9 (02 Dec 2018 15:39), Max: 37 (02 Dec 2018 12:52)  T(F): 98.4 (02 Dec 2018 15:39), Max: 98.6 (02 Dec 2018 12:52)  HR: 72 (02 Dec 2018 15:39) (63 - 77)  BP: 171/84 (02 Dec 2018 15:39) (149/68 - 171/84)  RR: 18 (02 Dec 2018 15:39) (18 - 20)  SpO2: 96% (02 Dec 2018 15:39) (92% - 97%)    MEDICATIONS  (STANDING):  aspirin enteric coated 81 milliGRAM(s) Oral daily  brimonidine 0.2% Ophthalmic Solution 1 Drop(s) Both EYES daily  dextrose 5%. 1000 milliLiter(s) (50 mL/Hr) IV Continuous <Continuous>  dextrose 50% Injectable 12.5 Gram(s) IV Push once  dextrose 50% Injectable 25 Gram(s) IV Push once  dextrose 50% Injectable 25 Gram(s) IV Push once  enoxaparin Injectable 40 milliGRAM(s) SubCutaneous daily  hydrALAZINE 25 milliGRAM(s) Oral three times a day  hydrALAZINE Injectable 10 milliGRAM(s) IV Push once  insulin lispro (HumaLOG) corrective regimen sliding scale   SubCutaneous three times a day before meals  lacosamide 50 milliGRAM(s) Oral two times a day  latanoprost 0.005% Ophthalmic Solution 1 Drop(s) Both EYES at bedtime  saccharomyces boulardii 250 milliGRAM(s) Oral two times a day  timolol 0.25% Solution 1 Drop(s) Both EYES daily    MEDICATIONS  (PRN):  acetaminophen   Tablet .. 650 milliGRAM(s) Oral every 6 hours PRN Mild Pain (1 - 3)  dextrose 40% Gel 15 Gram(s) Oral once PRN Blood Glucose LESS THAN 70 milliGRAM(s)/deciliter  glucagon  Injectable 1 milliGRAM(s) IntraMuscular once PRN Glucose LESS THAN 70 milligrams/deciliter  hydrALAZINE Injectable 10 milliGRAM(s) IV Push every 8 hours PRN sbp >170  or dbp >100      LABS:                        10.2   5.4   )-----------( 183      ( 02 Dec 2018 09:25 )             31.0     12-02    141  |  106  |  21.0<H>  ----------------------------<  154<H>  3.6   |  24.0  |  1.00    Ca    9.5      02 Dec 2018 09:25  Phos  3.6     12-02  Mg     1.9     12-02        Urinalysis Basic - ( 01 Dec 2018 14:36 )    Color: Yellow / Appearance: Clear / S.020 / pH: x  Gluc: x / Ketone: Trace  / Bili: Negative / Urobili: Negative mg/dL   Blood: x / Protein: 30 mg/dL / Nitrite: Negative   Leuk Esterase: Trace / RBC: 0-2 /HPF / WBC 0-2   Sq Epi: x / Non Sq Epi: Occasional / Bacteria: x      CAPILLARY BLOOD GLUCOSE      POCT Blood Glucose.: 150 mg/dL (02 Dec 2018 12:01)  POCT Blood Glucose.: 148 mg/dL (02 Dec 2018 08:36)  POCT Blood Glucose.: 157 mg/dL (01 Dec 2018 17:16)      PHYSICAL EXAM:    GENERAL: elderly female, laying in bed, lethargic   HEAD:  Atraumatic, Normocephalic  EYES: EOMI, PERRLA, conjunctiva and sclera clear  ENMT: Moist mucous membranes  NECK: Supple  CHEST/LUNG: bilateral air entry  HEART: Regular rate and rhythm; + S1/S2  ABDOMEN: Soft, Nontender, Nondistended; Bowel sounds present  EXTREMITIES:  no pedal edema [[id number]] [[id number]],[[id number]]

## 2022-03-14 NOTE — PROGRESS NOTE ADULT - PROBLEM SELECTOR PROBLEM 5
Essential hypertension Gabapentin Counseling: I discussed with the patient the risks of gabapentin including but not limited to dizziness, somnolence, fatigue and ataxia.

## 2022-08-24 NOTE — ED ADULT NURSE NOTE - NS ED NURSE LEVEL OF CONSCIOUSNESS SPEECH
Anesthesia Evaluation     Patient summary reviewed and Nursing notes reviewed   history of anesthetic complications: difficult airway  NPO Solid Status: > 8 hours  NPO Liquid Status: > 8 hours           Airway   Mallampati: II  TM distance: >3 FB  Neck ROM: full  No difficulty expected  Dental      Pulmonary - normal exam   (+) a smoker Former, sleep apnea on CPAP,   Cardiovascular - normal exam    ECG reviewed    (+) hypertension,       Neuro/Psych  (+) dizziness/light headedness,    GI/Hepatic/Renal/Endo    (+) obesity,   renal disease stones, thyroid problem hypothyroidism    Musculoskeletal     Abdominal  - normal exam   Substance History      OB/GYN          Other   arthritis,      ROS/Med Hx Other: Testicular hypofunction, vertigo, allergies    PSH  SHOULDER SURGERY QUADRICEPS TENDON REPAIR  COLON SURGERY SINUS SURGERY  SEPTOPLASTY                     Anesthesia Plan    ASA 3     general with block and ERAS Protocol   total IV anesthesia  (Patient identified; pre-operative vital signs, all relevant labs/studies, complete medical/surgical/anesthetic history, full medication list, full allergy list, and NPO status obtained/reviewed; physical assessment performed; anesthetic options, side effects, potential complications, risks, and benefits discussed; questions answered; written anesthesia consent obtained; patient cleared for procedure; anesthesia machine and equipment checked and functioning)  intravenous induction     Anesthetic plan, risks, benefits, and alternatives have been provided, discussed and informed consent has been obtained with: patient.    Plan discussed with CAA.       Speaking Coherently

## 2022-12-20 NOTE — SWALLOW BEDSIDE ASSESSMENT ADULT - SWALLOW EVAL: CRITERIA FOR SKILLED INTERVENTION MET
demonstrates skilled criteria for swallowing intervention Price (Do Not Change): 0.00 Detail Level: Generalized Instructions: This plan will send the code FBSE to the PM system.  DO NOT or CHANGE the price.

## 2023-03-08 NOTE — CONSULT NOTE ADULT - CONSULT REQUESTED DATE/TIME
06-Dec-2018 11:25
06-Dec-2018 13:17
12-Dec-2018 10:21
14-Dec-2018 15:14
14-Dec-2018 15:27
14-Dec-2018 16:45
26-Nov-2018 16:44
27-Nov-2018 16:51
28-Dec-2018 12:08
13-Dec-2018 15:06
27-Nov-2018 09:16
Attending with

## 2023-11-08 NOTE — SWALLOW BEDSIDE ASSESSMENT ADULT - SWALLOW EVAL: DIAGNOSIS
Lethargy impacting swallow function. Significant increased oral transit time for puree, greater than 10 seconds . Pharyngeal stage judged to be WFL based on limited assessment
Overall limited assessment 2* pt lethargy; only 2 PO puree trials accepted at this time.  At least moderate oral dysphagia confounded by lethargy. Pharyngeal stage appears functional for limited trials accepted. Ability to accept PO - impacted by reduced arousal.
This was a shared visit with the TRAN. I reviewed and verified the documentation and independently performed the documented:

## 2024-07-01 NOTE — ED PROVIDER NOTE - SKIN NEGATIVE STATEMENT, MLM
PLAN:  *Will My Chart recommendations once [employer] data is available    FOLLOW-UP:    10/29 with Dr. Rhoades  Diabetes Education TBD  
no abrasions, no jaundice, no lesions, no pruritis, and no rashes.

## 2024-11-11 NOTE — ED ADULT TRIAGE NOTE - NS ED TRIAGE AVPU SCALE
11/11/24, 2:42 PM EST    DISCHARGE ON GOING EVALUATION    [facility] day: 4  Location: -19/019-A Reason for admit: Hypertensive encephalopathy [I67.4]  Acute ischemic stroke (HCC) [I63.9]  Stroke-like symptoms [R29.90]  Nonintractable headache, unspecified chronicity pattern, unspecified headache type [R51.9]     Procedures:   11/7 TNK given  11/7 Echo with EF 55-60%  11/8 1.  Four vessel diagnostic cerebral angiogram     CATHETERIZATION OF THE FOLLOWING VESSELS:     1. Right internal carotid artery  2. Right subclavian artery  3. Left internal carotid artery  4. Left vertebral artery    11/9 EEG    Imaging since last note:   11/9 CXR 1. No acute cardiopulmonary findings.     Barriers to Discharge: PT/OT/SLP, Physiatry following, diabetes management, telemetry, Neurology has signed off. Nebs, Eliquis, Coreg, pain and nausea control, electrolyte replacement protocols.     PCP: Amanda García APRN - CNP  Readmission Risk Score: 15.8    Patient Goals/Plan/Treatment Preferences: From home with Sig other and younger sibling. IPR consult in place. Await Physiatry recommendations.     Alert-The patient is alert, awake and responds to voice. The patient is oriented to time, place, and person. The triage nurse is able to obtain subjective information.

## 2025-01-15 NOTE — PROGRESS NOTE ADULT - PROBLEM SELECTOR PROBLEM 4
Coming in today  
Dysphagia, unspecified type
Encounter for palliative care
Functional quadriplegia
Goals of care, counseling/discussion
Seizure
Encounter for palliative care